# Patient Record
Sex: MALE | Race: BLACK OR AFRICAN AMERICAN | NOT HISPANIC OR LATINO | Employment: OTHER | URBAN - METROPOLITAN AREA
[De-identification: names, ages, dates, MRNs, and addresses within clinical notes are randomized per-mention and may not be internally consistent; named-entity substitution may affect disease eponyms.]

---

## 2017-05-05 ENCOUNTER — TELEPHONE (OUTPATIENT)
Dept: GASTROENTEROLOGY | Facility: CLINIC | Age: 62
End: 2017-05-05

## 2018-06-25 ENCOUNTER — TELEPHONE (OUTPATIENT)
Dept: ADMINISTRATIVE | Facility: OTHER | Age: 63
End: 2018-06-25

## 2018-06-25 NOTE — TELEPHONE ENCOUNTER
Spoke to patient and updated his insurance to [facility] Medicare.  Scheduled his appointment with Kade Smith NP for Wedneday 6/27/18 at 1:45pm.  Patient was in agreement with this date and time.

## 2018-06-25 NOTE — TELEPHONE ENCOUNTER
----- Message from Juanis Snow sent at 6/25/2018  8:50 AM CDT -----  Contact: Pt  Pt is calling to schedule an appt for lower back pain and sciatica.  Pt was previously seen by Dr. Dasilva and has [employer] insurance.    Pt can be reached at [phone].  Thank you

## 2019-12-09 PROBLEM — E87.5 HYPERKALEMIA: Status: ACTIVE | Noted: 2019-12-09

## 2019-12-09 PROBLEM — E79.0 HYPERURICEMIA: Status: ACTIVE | Noted: 2019-12-09

## 2019-12-09 PROBLEM — I10 HYPERTENSION, ESSENTIAL: Status: ACTIVE | Noted: 2019-12-09

## 2019-12-09 PROBLEM — D50.9 IRON DEFICIENCY ANEMIA: Status: ACTIVE | Noted: 2019-12-09

## 2019-12-09 PROBLEM — R80.9 MICROALBUMINURIA: Status: ACTIVE | Noted: 2019-12-09

## 2019-12-09 PROBLEM — N17.9 ACUTE RENAL FAILURE: Status: ACTIVE | Noted: 2019-12-09

## 2020-06-16 ENCOUNTER — DOCUMENT SCAN (OUTPATIENT)
Dept: HOME HEALTH SERVICES | Facility: HOSPITAL | Age: 65
End: 2020-06-16

## 2020-06-26 ENCOUNTER — DOCUMENT SCAN (OUTPATIENT)
Dept: HOME HEALTH SERVICES | Facility: HOSPITAL | Age: 65
End: 2020-06-26

## 2020-07-06 ENCOUNTER — DOCUMENT SCAN (OUTPATIENT)
Dept: HOME HEALTH SERVICES | Facility: HOSPITAL | Age: 65
End: 2020-07-06

## 2020-12-09 ENCOUNTER — PES CALL (OUTPATIENT)
Dept: ADMINISTRATIVE | Facility: CLINIC | Age: 65
End: 2020-12-09

## 2020-12-11 ENCOUNTER — OFFICE VISIT (OUTPATIENT)
Dept: INTERNAL MEDICINE | Facility: CLINIC | Age: 65
End: 2020-12-11
Payer: MEDICARE

## 2020-12-11 VITALS
SYSTOLIC BLOOD PRESSURE: 120 MMHG | HEIGHT: 67 IN | BODY MASS INDEX: 30.83 KG/M2 | DIASTOLIC BLOOD PRESSURE: 70 MMHG | WEIGHT: 196.44 LBS | TEMPERATURE: 98 F | HEART RATE: 67 BPM

## 2020-12-11 DIAGNOSIS — N18.30 TYPE 2 DIABETES MELLITUS WITH STAGE 3 CHRONIC KIDNEY DISEASE, WITHOUT LONG-TERM CURRENT USE OF INSULIN, UNSPECIFIED WHETHER STAGE 3A OR 3B CKD: ICD-10-CM

## 2020-12-11 DIAGNOSIS — N18.30 STAGE 3 CHRONIC KIDNEY DISEASE, UNSPECIFIED WHETHER STAGE 3A OR 3B CKD: ICD-10-CM

## 2020-12-11 DIAGNOSIS — I73.9 PAD (PERIPHERAL ARTERY DISEASE): ICD-10-CM

## 2020-12-11 DIAGNOSIS — E11.22 TYPE 2 DIABETES MELLITUS WITH STAGE 3 CHRONIC KIDNEY DISEASE, WITHOUT LONG-TERM CURRENT USE OF INSULIN, UNSPECIFIED WHETHER STAGE 3A OR 3B CKD: ICD-10-CM

## 2020-12-11 DIAGNOSIS — I15.2 HYPERTENSION ASSOCIATED WITH DIABETES: Primary | ICD-10-CM

## 2020-12-11 DIAGNOSIS — G89.4 CHRONIC PAIN SYNDROME: ICD-10-CM

## 2020-12-11 DIAGNOSIS — I25.10 CORONARY ARTERY DISEASE INVOLVING NATIVE CORONARY ARTERY OF NATIVE HEART WITHOUT ANGINA PECTORIS: ICD-10-CM

## 2020-12-11 DIAGNOSIS — E11.59 HYPERTENSION ASSOCIATED WITH DIABETES: Primary | ICD-10-CM

## 2020-12-11 DIAGNOSIS — Z12.5 PROSTATE CANCER SCREENING: ICD-10-CM

## 2020-12-11 DIAGNOSIS — E11.51 DIABETES MELLITUS WITH PERIPHERAL CIRCULATORY DISORDER: ICD-10-CM

## 2020-12-11 DIAGNOSIS — E78.5 DYSLIPIDEMIA ASSOCIATED WITH TYPE 2 DIABETES MELLITUS: ICD-10-CM

## 2020-12-11 DIAGNOSIS — E11.69 DYSLIPIDEMIA ASSOCIATED WITH TYPE 2 DIABETES MELLITUS: ICD-10-CM

## 2020-12-11 PROCEDURE — 99499 UNLISTED E&M SERVICE: CPT | Mod: S$GLB,,, | Performed by: INTERNAL MEDICINE

## 2020-12-11 PROCEDURE — 1159F MED LIST DOCD IN RCRD: CPT | Mod: HCNC,S$GLB,, | Performed by: INTERNAL MEDICINE

## 2020-12-11 PROCEDURE — 90694 VACC AIIV4 NO PRSRV 0.5ML IM: CPT | Mod: HCNC,S$GLB,, | Performed by: INTERNAL MEDICINE

## 2020-12-11 PROCEDURE — 99999 PR PBB SHADOW E&M-EST. PATIENT-LVL IV: ICD-10-PCS | Mod: PBBFAC,HCNC,, | Performed by: INTERNAL MEDICINE

## 2020-12-11 PROCEDURE — 3078F PR MOST RECENT DIASTOLIC BLOOD PRESSURE < 80 MM HG: ICD-10-PCS | Mod: HCNC,CPTII,S$GLB, | Performed by: INTERNAL MEDICINE

## 2020-12-11 PROCEDURE — 3008F BODY MASS INDEX DOCD: CPT | Mod: HCNC,CPTII,S$GLB, | Performed by: INTERNAL MEDICINE

## 2020-12-11 PROCEDURE — G0008 ADMIN INFLUENZA VIRUS VAC: HCPCS | Mod: HCNC,S$GLB,, | Performed by: INTERNAL MEDICINE

## 2020-12-11 PROCEDURE — 99204 PR OFFICE/OUTPT VISIT, NEW, LEVL IV, 45-59 MIN: ICD-10-PCS | Mod: HCNC,25,S$GLB, | Performed by: INTERNAL MEDICINE

## 2020-12-11 PROCEDURE — 99999 PR PBB SHADOW E&M-EST. PATIENT-LVL IV: CPT | Mod: PBBFAC,HCNC,, | Performed by: INTERNAL MEDICINE

## 2020-12-11 PROCEDURE — 1101F PT FALLS ASSESS-DOCD LE1/YR: CPT | Mod: HCNC,CPTII,S$GLB, | Performed by: INTERNAL MEDICINE

## 2020-12-11 PROCEDURE — G0008 FLU VACCINE - QUADRIVALENT - ADJUVANTED: ICD-10-PCS | Mod: HCNC,S$GLB,, | Performed by: INTERNAL MEDICINE

## 2020-12-11 PROCEDURE — 3288F PR FALLS RISK ASSESSMENT DOCUMENTED: ICD-10-PCS | Mod: HCNC,CPTII,S$GLB, | Performed by: INTERNAL MEDICINE

## 2020-12-11 PROCEDURE — 1159F PR MEDICATION LIST DOCUMENTED IN MEDICAL RECORD: ICD-10-PCS | Mod: HCNC,S$GLB,, | Performed by: INTERNAL MEDICINE

## 2020-12-11 PROCEDURE — 1126F AMNT PAIN NOTED NONE PRSNT: CPT | Mod: HCNC,S$GLB,, | Performed by: INTERNAL MEDICINE

## 2020-12-11 PROCEDURE — 3288F FALL RISK ASSESSMENT DOCD: CPT | Mod: HCNC,CPTII,S$GLB, | Performed by: INTERNAL MEDICINE

## 2020-12-11 PROCEDURE — 3078F DIAST BP <80 MM HG: CPT | Mod: HCNC,CPTII,S$GLB, | Performed by: INTERNAL MEDICINE

## 2020-12-11 PROCEDURE — 99499 RISK ADDL DX/OHS AUDIT: ICD-10-PCS | Mod: S$GLB,,, | Performed by: INTERNAL MEDICINE

## 2020-12-11 PROCEDURE — 1101F PR PT FALLS ASSESS DOC 0-1 FALLS W/OUT INJ PAST YR: ICD-10-PCS | Mod: HCNC,CPTII,S$GLB, | Performed by: INTERNAL MEDICINE

## 2020-12-11 PROCEDURE — 3074F PR MOST RECENT SYSTOLIC BLOOD PRESSURE < 130 MM HG: ICD-10-PCS | Mod: HCNC,CPTII,S$GLB, | Performed by: INTERNAL MEDICINE

## 2020-12-11 PROCEDURE — 90694 FLU VACCINE - QUADRIVALENT - ADJUVANTED: ICD-10-PCS | Mod: HCNC,S$GLB,, | Performed by: INTERNAL MEDICINE

## 2020-12-11 PROCEDURE — 3008F PR BODY MASS INDEX (BMI) DOCUMENTED: ICD-10-PCS | Mod: HCNC,CPTII,S$GLB, | Performed by: INTERNAL MEDICINE

## 2020-12-11 PROCEDURE — 1126F PR PAIN SEVERITY QUANTIFIED, NO PAIN PRESENT: ICD-10-PCS | Mod: HCNC,S$GLB,, | Performed by: INTERNAL MEDICINE

## 2020-12-11 PROCEDURE — 3074F SYST BP LT 130 MM HG: CPT | Mod: HCNC,CPTII,S$GLB, | Performed by: INTERNAL MEDICINE

## 2020-12-11 PROCEDURE — 99204 OFFICE O/P NEW MOD 45 MIN: CPT | Mod: HCNC,25,S$GLB, | Performed by: INTERNAL MEDICINE

## 2020-12-11 RX ORDER — IRBESARTAN 300 MG/1
300 TABLET ORAL DAILY
Qty: 90 TABLET | Refills: 1 | Status: SHIPPED | OUTPATIENT
Start: 2020-12-11 | End: 2021-04-22

## 2020-12-11 NOTE — PROGRESS NOTES
History of present illness:  65-year-old male in today 1st visit with our office here to establish care.  The patient was accommodated today having arrived 20 min late for his 30 min appointment.  The patient has been followed elsewhere, due to an insurance change she is transferring most of his care here.  He has a history of hypertension, diabetes mellitus, CKD, dyslipidemia, PA D, CAD, chronic pain in uncertain psychiatric issues followed by outside psychiatry.  He reports things are generally stable this time.  He reports taking medications as directed though he does state he has been off of his irbesartan for 1 week due to logistics of refill.    Current medications:  Medications all noted reviewed with the patient's medication bottles.  Amlodipine.  Irbesartan.  Carvedilol.  Pravastatin.  Fenofibrate.  Hydrochlorothiazide.  Clopidogrel.  Metformin.  Wellbutrin.  Trazodone.  Seroquel.  Oxycodone.  Xtamza    Review of systems:  General: no fever, chills, generalized body aches. No unexpected weight loss.  Eyes:  No visual disturbances.  HEENT:  No hoarseness, dysphagia, ear pain.  Respiratory:  No cough, no shortness of breath.  Cardiovascular: no chest pain, palpitations, cough, exertional limb pain. No edema.  GI: no nausea, vomiting.  No abdominal pain. No change in bowel habits.  No melena, no hematochezia.  : no dysuria. No change in the color or character of the urine. No urinary frequency.  Musculoskeletal:  Chronic lower back pain.  Neurologic:  Denies any new focal neurological symptomatologies.  Skin:  No rashes or other concerns.  Psych:  No new issues, he is followed by outside psychiatry-.    Past medical history, past surgical history, family medical history social history is all noted reviewed with patient and in the electronic medical record history sections.    Health screenings:  Not certain about immunizations.  Not certain about date of last eye exam.  Not aware of having had  pneumococcal vaccines.    Physical examination:  General:  Alert cooperative gentleman acute distress.  Vital signs:  Blood pressure taken manually is 120/70 other vital signs normal.  HEENT:  Normocephalic.  Neck supple no masses no thyromegaly.  Lungs:  Clear to auscultation.  Cardiovascular:  Regular rate rhythm.  No significant murmur.  Carotids full bilaterally bruits.  No overt ischemic changes of lower extremities.  GI:  The abdomen is obese soft benign no tenderness.  Mental status:  Alert oriented affect mood appear to be appropriate.    Subspecialty physicians:  Cardiology-Dr Putnam.  Pain management-Dr Harmon.  Psychiatry-Dr Mars.  Previous PCP-Dr Duane Lala.      Impression:  Hypertension appears controlled.  Type 2 diabetes mellitus.  Dyslipidemia.  CAD.  CKD.  PA D.  Chronic lower back pain followed by pain management.  Followed by psychiatry for clear diagnoses.    Plan:  Outside record release.  Update lab data to include CBC, chemistry profile, lipid profile, TSH, PSA, glycohemoglobin, urinalysis, urine for microalbumin/creatinine ratio.  Influenza vaccine.  Return clinic in 1 month for a follow-up review

## 2020-12-14 PROBLEM — N18.30 STAGE 3 CHRONIC KIDNEY DISEASE: Status: ACTIVE | Noted: 2020-12-14

## 2020-12-15 ENCOUNTER — LAB VISIT (OUTPATIENT)
Dept: LAB | Facility: HOSPITAL | Age: 65
End: 2020-12-15
Attending: INTERNAL MEDICINE
Payer: MEDICARE

## 2020-12-15 DIAGNOSIS — I15.2 HYPERTENSION ASSOCIATED WITH DIABETES: ICD-10-CM

## 2020-12-15 DIAGNOSIS — E11.59 HYPERTENSION ASSOCIATED WITH DIABETES: ICD-10-CM

## 2020-12-15 DIAGNOSIS — E11.22 TYPE 2 DIABETES MELLITUS WITH STAGE 3 CHRONIC KIDNEY DISEASE, WITHOUT LONG-TERM CURRENT USE OF INSULIN, UNSPECIFIED WHETHER STAGE 3A OR 3B CKD: ICD-10-CM

## 2020-12-15 DIAGNOSIS — E78.5 DYSLIPIDEMIA ASSOCIATED WITH TYPE 2 DIABETES MELLITUS: ICD-10-CM

## 2020-12-15 DIAGNOSIS — N18.30 TYPE 2 DIABETES MELLITUS WITH STAGE 3 CHRONIC KIDNEY DISEASE, WITHOUT LONG-TERM CURRENT USE OF INSULIN, UNSPECIFIED WHETHER STAGE 3A OR 3B CKD: ICD-10-CM

## 2020-12-15 DIAGNOSIS — Z12.5 PROSTATE CANCER SCREENING: ICD-10-CM

## 2020-12-15 DIAGNOSIS — E11.69 DYSLIPIDEMIA ASSOCIATED WITH TYPE 2 DIABETES MELLITUS: ICD-10-CM

## 2020-12-15 LAB
ALBUMIN SERPL BCP-MCNC: 3.7 G/DL (ref 3.5–5.2)
ALP SERPL-CCNC: 117 U/L (ref 55–135)
ALT SERPL W/O P-5'-P-CCNC: 9 U/L (ref 10–44)
ANION GAP SERPL CALC-SCNC: 8 MMOL/L (ref 8–16)
AST SERPL-CCNC: 12 U/L (ref 10–40)
BASOPHILS # BLD AUTO: 0.05 K/UL (ref 0–0.2)
BASOPHILS NFR BLD: 0.7 % (ref 0–1.9)
BILIRUB SERPL-MCNC: 0.3 MG/DL (ref 0.1–1)
BUN SERPL-MCNC: 17 MG/DL (ref 8–23)
CALCIUM SERPL-MCNC: 9.6 MG/DL (ref 8.7–10.5)
CHLORIDE SERPL-SCNC: 102 MMOL/L (ref 95–110)
CHOLEST SERPL-MCNC: 120 MG/DL (ref 120–199)
CHOLEST/HDLC SERPL: 3.9 {RATIO} (ref 2–5)
CO2 SERPL-SCNC: 28 MMOL/L (ref 23–29)
COMPLEXED PSA SERPL-MCNC: 0.02 NG/ML (ref 0–4)
CREAT SERPL-MCNC: 1.8 MG/DL (ref 0.5–1.4)
DIFFERENTIAL METHOD: ABNORMAL
EOSINOPHIL # BLD AUTO: 0.4 K/UL (ref 0–0.5)
EOSINOPHIL NFR BLD: 5 % (ref 0–8)
ERYTHROCYTE [DISTWIDTH] IN BLOOD BY AUTOMATED COUNT: 13.7 % (ref 11.5–14.5)
EST. GFR  (AFRICAN AMERICAN): 44.3 ML/MIN/1.73 M^2
EST. GFR  (NON AFRICAN AMERICAN): 38.4 ML/MIN/1.73 M^2
ESTIMATED AVG GLUCOSE: 134 MG/DL (ref 68–131)
GLUCOSE SERPL-MCNC: 112 MG/DL (ref 70–110)
HBA1C MFR BLD HPLC: 6.3 % (ref 4–5.6)
HCT VFR BLD AUTO: 41.2 % (ref 40–54)
HDLC SERPL-MCNC: 31 MG/DL (ref 40–75)
HDLC SERPL: 25.8 % (ref 20–50)
HGB BLD-MCNC: 12.2 G/DL (ref 14–18)
IMM GRANULOCYTES # BLD AUTO: 0.04 K/UL (ref 0–0.04)
IMM GRANULOCYTES NFR BLD AUTO: 0.6 % (ref 0–0.5)
LDLC SERPL CALC-MCNC: 73.6 MG/DL (ref 63–159)
LYMPHOCYTES # BLD AUTO: 2.1 K/UL (ref 1–4.8)
LYMPHOCYTES NFR BLD: 29.2 % (ref 18–48)
MCH RBC QN AUTO: 27.5 PG (ref 27–31)
MCHC RBC AUTO-ENTMCNC: 29.6 G/DL (ref 32–36)
MCV RBC AUTO: 93 FL (ref 82–98)
MONOCYTES # BLD AUTO: 0.6 K/UL (ref 0.3–1)
MONOCYTES NFR BLD: 8.7 % (ref 4–15)
NEUTROPHILS # BLD AUTO: 3.9 K/UL (ref 1.8–7.7)
NEUTROPHILS NFR BLD: 55.8 % (ref 38–73)
NONHDLC SERPL-MCNC: 89 MG/DL
NRBC BLD-RTO: 0 /100 WBC
PLATELET # BLD AUTO: 370 K/UL (ref 150–350)
PMV BLD AUTO: 10.7 FL (ref 9.2–12.9)
POTASSIUM SERPL-SCNC: 4.1 MMOL/L (ref 3.5–5.1)
PROT SERPL-MCNC: 7.7 G/DL (ref 6–8.4)
RBC # BLD AUTO: 4.43 M/UL (ref 4.6–6.2)
SODIUM SERPL-SCNC: 138 MMOL/L (ref 136–145)
TRIGL SERPL-MCNC: 77 MG/DL (ref 30–150)
TSH SERPL DL<=0.005 MIU/L-ACNC: 1.92 UIU/ML (ref 0.4–4)
WBC # BLD AUTO: 7.05 K/UL (ref 3.9–12.7)

## 2020-12-15 PROCEDURE — 83036 HEMOGLOBIN GLYCOSYLATED A1C: CPT | Mod: HCNC

## 2020-12-15 PROCEDURE — 84443 ASSAY THYROID STIM HORMONE: CPT | Mod: HCNC

## 2020-12-15 PROCEDURE — 80053 COMPREHEN METABOLIC PANEL: CPT | Mod: HCNC

## 2020-12-15 PROCEDURE — 84153 ASSAY OF PSA TOTAL: CPT | Mod: HCNC

## 2020-12-15 PROCEDURE — 80061 LIPID PANEL: CPT | Mod: HCNC

## 2020-12-15 PROCEDURE — 36415 COLL VENOUS BLD VENIPUNCTURE: CPT | Mod: HCNC,PO

## 2020-12-15 PROCEDURE — 85025 COMPLETE CBC W/AUTO DIFF WBC: CPT | Mod: HCNC

## 2020-12-17 DIAGNOSIS — Z12.11 COLON CANCER SCREENING: ICD-10-CM

## 2021-01-15 ENCOUNTER — OFFICE VISIT (OUTPATIENT)
Dept: INTERNAL MEDICINE | Facility: CLINIC | Age: 66
End: 2021-01-15
Payer: MEDICARE

## 2021-01-15 VITALS
BODY MASS INDEX: 30.72 KG/M2 | HEIGHT: 67 IN | SYSTOLIC BLOOD PRESSURE: 136 MMHG | TEMPERATURE: 98 F | HEART RATE: 76 BPM | WEIGHT: 195.75 LBS | DIASTOLIC BLOOD PRESSURE: 70 MMHG

## 2021-01-15 DIAGNOSIS — G56.00 CARPAL TUNNEL SYNDROME, UNSPECIFIED LATERALITY: ICD-10-CM

## 2021-01-15 DIAGNOSIS — Z12.11 COLON CANCER SCREENING: ICD-10-CM

## 2021-01-15 DIAGNOSIS — E78.5 DYSLIPIDEMIA ASSOCIATED WITH TYPE 2 DIABETES MELLITUS: ICD-10-CM

## 2021-01-15 DIAGNOSIS — N18.30 TYPE 2 DIABETES MELLITUS WITH STAGE 3 CHRONIC KIDNEY DISEASE, WITHOUT LONG-TERM CURRENT USE OF INSULIN, UNSPECIFIED WHETHER STAGE 3A OR 3B CKD: ICD-10-CM

## 2021-01-15 DIAGNOSIS — I15.2 HYPERTENSION ASSOCIATED WITH DIABETES: Primary | ICD-10-CM

## 2021-01-15 DIAGNOSIS — E11.69 DYSLIPIDEMIA ASSOCIATED WITH TYPE 2 DIABETES MELLITUS: ICD-10-CM

## 2021-01-15 DIAGNOSIS — I10 HYPERTENSION, ESSENTIAL: ICD-10-CM

## 2021-01-15 DIAGNOSIS — E11.51 DIABETES MELLITUS WITH PERIPHERAL CIRCULATORY DISORDER: ICD-10-CM

## 2021-01-15 DIAGNOSIS — I73.9 PAD (PERIPHERAL ARTERY DISEASE): ICD-10-CM

## 2021-01-15 DIAGNOSIS — E11.22 TYPE 2 DIABETES MELLITUS WITH STAGE 3 CHRONIC KIDNEY DISEASE, WITHOUT LONG-TERM CURRENT USE OF INSULIN, UNSPECIFIED WHETHER STAGE 3A OR 3B CKD: ICD-10-CM

## 2021-01-15 DIAGNOSIS — E11.59 HYPERTENSION ASSOCIATED WITH DIABETES: Primary | ICD-10-CM

## 2021-01-15 PROCEDURE — 3288F PR FALLS RISK ASSESSMENT DOCUMENTED: ICD-10-PCS | Mod: HCNC,CPTII,S$GLB, | Performed by: INTERNAL MEDICINE

## 2021-01-15 PROCEDURE — 3075F SYST BP GE 130 - 139MM HG: CPT | Mod: HCNC,CPTII,S$GLB, | Performed by: INTERNAL MEDICINE

## 2021-01-15 PROCEDURE — 1159F PR MEDICATION LIST DOCUMENTED IN MEDICAL RECORD: ICD-10-PCS | Mod: HCNC,S$GLB,, | Performed by: INTERNAL MEDICINE

## 2021-01-15 PROCEDURE — 99499 RISK ADDL DX/OHS AUDIT: ICD-10-PCS | Mod: HCNC,S$GLB,, | Performed by: INTERNAL MEDICINE

## 2021-01-15 PROCEDURE — 99499 UNLISTED E&M SERVICE: CPT | Mod: HCNC,S$GLB,, | Performed by: INTERNAL MEDICINE

## 2021-01-15 PROCEDURE — 3288F FALL RISK ASSESSMENT DOCD: CPT | Mod: HCNC,CPTII,S$GLB, | Performed by: INTERNAL MEDICINE

## 2021-01-15 PROCEDURE — 99214 PR OFFICE/OUTPT VISIT, EST, LEVL IV, 30-39 MIN: ICD-10-PCS | Mod: HCNC,S$GLB,, | Performed by: INTERNAL MEDICINE

## 2021-01-15 PROCEDURE — 3075F PR MOST RECENT SYSTOLIC BLOOD PRESS GE 130-139MM HG: ICD-10-PCS | Mod: HCNC,CPTII,S$GLB, | Performed by: INTERNAL MEDICINE

## 2021-01-15 PROCEDURE — 1159F MED LIST DOCD IN RCRD: CPT | Mod: HCNC,S$GLB,, | Performed by: INTERNAL MEDICINE

## 2021-01-15 PROCEDURE — 1125F AMNT PAIN NOTED PAIN PRSNT: CPT | Mod: HCNC,S$GLB,, | Performed by: INTERNAL MEDICINE

## 2021-01-15 PROCEDURE — 3044F HG A1C LEVEL LT 7.0%: CPT | Mod: HCNC,CPTII,S$GLB, | Performed by: INTERNAL MEDICINE

## 2021-01-15 PROCEDURE — 3008F PR BODY MASS INDEX (BMI) DOCUMENTED: ICD-10-PCS | Mod: HCNC,CPTII,S$GLB, | Performed by: INTERNAL MEDICINE

## 2021-01-15 PROCEDURE — 3078F PR MOST RECENT DIASTOLIC BLOOD PRESSURE < 80 MM HG: ICD-10-PCS | Mod: HCNC,CPTII,S$GLB, | Performed by: INTERNAL MEDICINE

## 2021-01-15 PROCEDURE — 3008F BODY MASS INDEX DOCD: CPT | Mod: HCNC,CPTII,S$GLB, | Performed by: INTERNAL MEDICINE

## 2021-01-15 PROCEDURE — 1101F PR PT FALLS ASSESS DOC 0-1 FALLS W/OUT INJ PAST YR: ICD-10-PCS | Mod: HCNC,CPTII,S$GLB, | Performed by: INTERNAL MEDICINE

## 2021-01-15 PROCEDURE — 99214 OFFICE O/P EST MOD 30 MIN: CPT | Mod: HCNC,S$GLB,, | Performed by: INTERNAL MEDICINE

## 2021-01-15 PROCEDURE — 3044F PR MOST RECENT HEMOGLOBIN A1C LEVEL <7.0%: ICD-10-PCS | Mod: HCNC,CPTII,S$GLB, | Performed by: INTERNAL MEDICINE

## 2021-01-15 PROCEDURE — G0009 PNEUMOCOCCAL POLYSACCHARIDE VACCINE 23-VALENT =>2YO SQ IM: ICD-10-PCS | Mod: HCNC,S$GLB,, | Performed by: INTERNAL MEDICINE

## 2021-01-15 PROCEDURE — 1101F PT FALLS ASSESS-DOCD LE1/YR: CPT | Mod: HCNC,CPTII,S$GLB, | Performed by: INTERNAL MEDICINE

## 2021-01-15 PROCEDURE — 90732 PPSV23 VACC 2 YRS+ SUBQ/IM: CPT | Mod: HCNC,S$GLB,, | Performed by: INTERNAL MEDICINE

## 2021-01-15 PROCEDURE — G0009 ADMIN PNEUMOCOCCAL VACCINE: HCPCS | Mod: HCNC,S$GLB,, | Performed by: INTERNAL MEDICINE

## 2021-01-15 PROCEDURE — 90732 PNEUMOCOCCAL POLYSACCHARIDE VACCINE 23-VALENT =>2YO SQ IM: ICD-10-PCS | Mod: HCNC,S$GLB,, | Performed by: INTERNAL MEDICINE

## 2021-01-15 PROCEDURE — 1125F PR PAIN SEVERITY QUANTIFIED, PAIN PRESENT: ICD-10-PCS | Mod: HCNC,S$GLB,, | Performed by: INTERNAL MEDICINE

## 2021-01-15 PROCEDURE — 99999 PR PBB SHADOW E&M-EST. PATIENT-LVL V: CPT | Mod: PBBFAC,HCNC,, | Performed by: INTERNAL MEDICINE

## 2021-01-15 PROCEDURE — 3078F DIAST BP <80 MM HG: CPT | Mod: HCNC,CPTII,S$GLB, | Performed by: INTERNAL MEDICINE

## 2021-01-15 PROCEDURE — 99999 PR PBB SHADOW E&M-EST. PATIENT-LVL V: ICD-10-PCS | Mod: PBBFAC,HCNC,, | Performed by: INTERNAL MEDICINE

## 2021-01-15 RX ORDER — CARVEDILOL 12.5 MG/1
12.5 TABLET ORAL 2 TIMES DAILY WITH MEALS
Qty: 180 TABLET | Refills: 3 | Status: SHIPPED | OUTPATIENT
Start: 2021-01-15 | End: 2021-03-26 | Stop reason: DRUGHIGH

## 2021-01-18 PROBLEM — E11.59 HYPERTENSION ASSOCIATED WITH DIABETES: Status: ACTIVE | Noted: 2021-01-18

## 2021-01-18 PROBLEM — I15.2 HYPERTENSION ASSOCIATED WITH DIABETES: Status: ACTIVE | Noted: 2021-01-18

## 2021-01-26 ENCOUNTER — TELEPHONE (OUTPATIENT)
Dept: ORTHOPEDICS | Facility: CLINIC | Age: 66
End: 2021-01-26

## 2021-01-26 DIAGNOSIS — M79.642 BILATERAL HAND PAIN: Primary | ICD-10-CM

## 2021-01-26 DIAGNOSIS — M79.641 BILATERAL HAND PAIN: Primary | ICD-10-CM

## 2021-01-27 ENCOUNTER — OFFICE VISIT (OUTPATIENT)
Dept: ORTHOPEDICS | Facility: CLINIC | Age: 66
End: 2021-01-27
Payer: MEDICARE

## 2021-01-27 ENCOUNTER — HOSPITAL ENCOUNTER (OUTPATIENT)
Dept: RADIOLOGY | Facility: HOSPITAL | Age: 66
Discharge: HOME | End: 2021-01-27
Attending: ORTHOPAEDIC SURGERY
Payer: MEDICARE

## 2021-01-27 VITALS — WEIGHT: 203.25 LBS | BODY MASS INDEX: 31.84 KG/M2

## 2021-01-27 DIAGNOSIS — M79.642 BILATERAL HAND PAIN: ICD-10-CM

## 2021-01-27 DIAGNOSIS — G56.03 BILATERAL CARPAL TUNNEL SYNDROME: Primary | ICD-10-CM

## 2021-01-27 DIAGNOSIS — M79.641 BILATERAL HAND PAIN: ICD-10-CM

## 2021-01-27 DIAGNOSIS — G56.23 CUBITAL TUNNEL SYNDROME OF BOTH UPPER EXTREMITIES: ICD-10-CM

## 2021-01-27 PROCEDURE — 1159F MED LIST DOCD IN RCRD: CPT | Mod: S$GLB,,, | Performed by: ORTHOPAEDIC SURGERY

## 2021-01-27 PROCEDURE — 99204 PR OFFICE/OUTPT VISIT, NEW, LEVL IV, 45-59 MIN: ICD-10-PCS | Mod: S$GLB,,, | Performed by: ORTHOPAEDIC SURGERY

## 2021-01-27 PROCEDURE — 3008F BODY MASS INDEX DOCD: CPT | Mod: CPTII,S$GLB,, | Performed by: ORTHOPAEDIC SURGERY

## 2021-01-27 PROCEDURE — 99999 PR PBB SHADOW E&M-EST. PATIENT-LVL IV: ICD-10-PCS | Mod: PBBFAC,,, | Performed by: ORTHOPAEDIC SURGERY

## 2021-01-27 PROCEDURE — 73130 XR HAND COMPLETE 3 VIEWS BILATERAL: ICD-10-PCS | Mod: 26,50,, | Performed by: RADIOLOGY

## 2021-01-27 PROCEDURE — 99204 OFFICE O/P NEW MOD 45 MIN: CPT | Mod: S$GLB,,, | Performed by: ORTHOPAEDIC SURGERY

## 2021-01-27 PROCEDURE — 73130 X-RAY EXAM OF HAND: CPT | Mod: 26,50,, | Performed by: RADIOLOGY

## 2021-01-27 PROCEDURE — 99999 PR PBB SHADOW E&M-EST. PATIENT-LVL IV: CPT | Mod: PBBFAC,,, | Performed by: ORTHOPAEDIC SURGERY

## 2021-01-27 PROCEDURE — 3008F PR BODY MASS INDEX (BMI) DOCUMENTED: ICD-10-PCS | Mod: CPTII,S$GLB,, | Performed by: ORTHOPAEDIC SURGERY

## 2021-01-27 PROCEDURE — 73130 X-RAY EXAM OF HAND: CPT | Mod: TC,50,PO

## 2021-01-27 PROCEDURE — 1159F PR MEDICATION LIST DOCUMENTED IN MEDICAL RECORD: ICD-10-PCS | Mod: S$GLB,,, | Performed by: ORTHOPAEDIC SURGERY

## 2021-01-28 RX ORDER — CLOPIDOGREL BISULFATE 75 MG/1
75 TABLET ORAL DAILY
Qty: 90 TABLET | Refills: 3 | Status: SHIPPED | OUTPATIENT
Start: 2021-01-28 | End: 2021-05-19

## 2021-01-28 RX ORDER — HYDROCHLOROTHIAZIDE 12.5 MG/1
12.5 TABLET ORAL DAILY
Qty: 90 TABLET | Refills: 3 | Status: SHIPPED | OUTPATIENT
Start: 2021-01-28 | End: 2021-08-03

## 2021-01-28 RX ORDER — PRAVASTATIN SODIUM 10 MG/1
10 TABLET ORAL DAILY
Qty: 90 TABLET | Refills: 3 | Status: SHIPPED | OUTPATIENT
Start: 2021-01-28 | End: 2021-05-28

## 2021-03-01 ENCOUNTER — PROCEDURE VISIT (OUTPATIENT)
Dept: PHYSICAL MEDICINE AND REHAB | Facility: CLINIC | Age: 66
End: 2021-03-01
Payer: MEDICARE

## 2021-03-01 DIAGNOSIS — G56.23 CUBITAL TUNNEL SYNDROME OF BOTH UPPER EXTREMITIES: ICD-10-CM

## 2021-03-01 DIAGNOSIS — G56.03 BILATERAL CARPAL TUNNEL SYNDROME: ICD-10-CM

## 2021-03-01 PROCEDURE — 95885 MUSC TST DONE W/NERV TST LIM: CPT | Mod: 59,S$GLB,, | Performed by: PHYSICAL MEDICINE & REHABILITATION

## 2021-03-01 PROCEDURE — 95885 PR MUSC TST DONE W/NERV TST LIM: ICD-10-PCS | Mod: 59,S$GLB,, | Performed by: PHYSICAL MEDICINE & REHABILITATION

## 2021-03-01 PROCEDURE — 95886 MUSC TEST DONE W/N TEST COMP: CPT | Mod: S$GLB,,, | Performed by: PHYSICAL MEDICINE & REHABILITATION

## 2021-03-01 PROCEDURE — 95886 PR EMG COMPLETE, W/ NERVE CONDUCTION STUDIES, 5+ MUSCLES: ICD-10-PCS | Mod: S$GLB,,, | Performed by: PHYSICAL MEDICINE & REHABILITATION

## 2021-03-01 PROCEDURE — 95911 PR NERVE CONDUCTION STUDY; 9-10 STUDIES: ICD-10-PCS | Mod: S$GLB,,, | Performed by: PHYSICAL MEDICINE & REHABILITATION

## 2021-03-01 PROCEDURE — 95911 NRV CNDJ TEST 9-10 STUDIES: CPT | Mod: S$GLB,,, | Performed by: PHYSICAL MEDICINE & REHABILITATION

## 2021-03-02 ENCOUNTER — PATIENT OUTREACH (OUTPATIENT)
Dept: ADMINISTRATIVE | Facility: OTHER | Age: 66
End: 2021-03-02

## 2021-03-04 ENCOUNTER — OFFICE VISIT (OUTPATIENT)
Dept: OPTOMETRY | Facility: CLINIC | Age: 66
End: 2021-03-04
Payer: MEDICARE

## 2021-03-04 DIAGNOSIS — H04.123 DRY EYE SYNDROME OF BOTH EYES: ICD-10-CM

## 2021-03-04 DIAGNOSIS — H52.203 MYOPIA WITH ASTIGMATISM, BILATERAL: ICD-10-CM

## 2021-03-04 DIAGNOSIS — E11.9 TYPE 2 DIABETES MELLITUS WITHOUT RETINOPATHY: Primary | ICD-10-CM

## 2021-03-04 DIAGNOSIS — H25.13 SENILE NUCLEAR SCLEROSIS, BILATERAL: ICD-10-CM

## 2021-03-04 DIAGNOSIS — H10.13 ALLERGIC CONJUNCTIVITIS OF BOTH EYES: ICD-10-CM

## 2021-03-04 DIAGNOSIS — H52.13 MYOPIA WITH ASTIGMATISM, BILATERAL: ICD-10-CM

## 2021-03-04 DIAGNOSIS — E11.36 TYPE 2 DIABETES MELLITUS WITH CATARACT: ICD-10-CM

## 2021-03-04 PROCEDURE — 92015 PR REFRACTION: ICD-10-PCS | Mod: S$GLB,,, | Performed by: OPTOMETRIST

## 2021-03-04 PROCEDURE — 99999 PR PBB SHADOW E&M-EST. PATIENT-LVL III: ICD-10-PCS | Mod: PBBFAC,,, | Performed by: OPTOMETRIST

## 2021-03-04 PROCEDURE — 99999 PR PBB SHADOW E&M-EST. PATIENT-LVL III: CPT | Mod: PBBFAC,,, | Performed by: OPTOMETRIST

## 2021-03-04 PROCEDURE — 92015 DETERMINE REFRACTIVE STATE: CPT | Mod: S$GLB,,, | Performed by: OPTOMETRIST

## 2021-03-04 PROCEDURE — 1126F PR PAIN SEVERITY QUANTIFIED, NO PAIN PRESENT: ICD-10-PCS | Mod: S$GLB,,, | Performed by: OPTOMETRIST

## 2021-03-04 PROCEDURE — 3288F FALL RISK ASSESSMENT DOCD: CPT | Mod: CPTII,S$GLB,, | Performed by: OPTOMETRIST

## 2021-03-04 PROCEDURE — 92004 PR EYE EXAM, NEW PATIENT,COMPREHESV: ICD-10-PCS | Mod: S$GLB,,, | Performed by: OPTOMETRIST

## 2021-03-04 PROCEDURE — 1101F PR PT FALLS ASSESS DOC 0-1 FALLS W/OUT INJ PAST YR: ICD-10-PCS | Mod: CPTII,S$GLB,, | Performed by: OPTOMETRIST

## 2021-03-04 PROCEDURE — 1101F PT FALLS ASSESS-DOCD LE1/YR: CPT | Mod: CPTII,S$GLB,, | Performed by: OPTOMETRIST

## 2021-03-04 PROCEDURE — 1126F AMNT PAIN NOTED NONE PRSNT: CPT | Mod: S$GLB,,, | Performed by: OPTOMETRIST

## 2021-03-04 PROCEDURE — 92004 COMPRE OPH EXAM NEW PT 1/>: CPT | Mod: S$GLB,,, | Performed by: OPTOMETRIST

## 2021-03-04 PROCEDURE — 3288F PR FALLS RISK ASSESSMENT DOCUMENTED: ICD-10-PCS | Mod: CPTII,S$GLB,, | Performed by: OPTOMETRIST

## 2021-03-04 PROCEDURE — 2023F DILAT RTA XM W/O RTNOPTHY: CPT | Mod: S$GLB,,, | Performed by: OPTOMETRIST

## 2021-03-04 PROCEDURE — 2023F PR DILATED RETINAL EXAM W/O EVID OF RETINOPATHY: ICD-10-PCS | Mod: S$GLB,,, | Performed by: OPTOMETRIST

## 2021-03-16 RX ORDER — GLIMEPIRIDE 1 MG/1
TABLET ORAL
Qty: 90 TABLET | Refills: 1 | Status: SHIPPED | OUTPATIENT
Start: 2021-03-16 | End: 2021-05-13

## 2021-03-25 ENCOUNTER — PATIENT OUTREACH (OUTPATIENT)
Dept: ADMINISTRATIVE | Facility: HOSPITAL | Age: 66
End: 2021-03-25

## 2021-03-26 ENCOUNTER — HOSPITAL ENCOUNTER (OUTPATIENT)
Dept: RADIOLOGY | Facility: HOSPITAL | Age: 66
Discharge: HOME | End: 2021-03-26
Attending: INTERNAL MEDICINE
Payer: MEDICARE

## 2021-03-26 ENCOUNTER — OFFICE VISIT (OUTPATIENT)
Dept: INTERNAL MEDICINE | Facility: CLINIC | Age: 66
End: 2021-03-26
Payer: MEDICARE

## 2021-03-26 VITALS
OXYGEN SATURATION: 99 % | SYSTOLIC BLOOD PRESSURE: 143 MMHG | HEART RATE: 58 BPM | BODY MASS INDEX: 29.03 KG/M2 | DIASTOLIC BLOOD PRESSURE: 79 MMHG | HEIGHT: 67 IN | WEIGHT: 184.94 LBS | TEMPERATURE: 97 F

## 2021-03-26 DIAGNOSIS — F17.200 TOBACCO USE DISORDER: ICD-10-CM

## 2021-03-26 DIAGNOSIS — Z12.2 ENCOUNTER FOR SCREENING FOR MALIGNANT NEOPLASM OF RESPIRATORY ORGANS: ICD-10-CM

## 2021-03-26 DIAGNOSIS — I10 HTN (HYPERTENSION), BENIGN: ICD-10-CM

## 2021-03-26 DIAGNOSIS — E11.51 DIABETES MELLITUS WITH PERIPHERAL CIRCULATORY DISORDER: ICD-10-CM

## 2021-03-26 DIAGNOSIS — I10 HTN (HYPERTENSION), BENIGN: Primary | ICD-10-CM

## 2021-03-26 PROCEDURE — 1126F PR PAIN SEVERITY QUANTIFIED, NO PAIN PRESENT: ICD-10-PCS | Mod: S$GLB,,, | Performed by: INTERNAL MEDICINE

## 2021-03-26 PROCEDURE — 1101F PR PT FALLS ASSESS DOC 0-1 FALLS W/OUT INJ PAST YR: ICD-10-PCS | Mod: CPTII,S$GLB,, | Performed by: INTERNAL MEDICINE

## 2021-03-26 PROCEDURE — 3077F PR MOST RECENT SYSTOLIC BLOOD PRESSURE >= 140 MM HG: ICD-10-PCS | Mod: CPTII,S$GLB,, | Performed by: INTERNAL MEDICINE

## 2021-03-26 PROCEDURE — 1159F MED LIST DOCD IN RCRD: CPT | Mod: S$GLB,,, | Performed by: INTERNAL MEDICINE

## 2021-03-26 PROCEDURE — 71046 X-RAY EXAM CHEST 2 VIEWS: CPT | Mod: TC,PO

## 2021-03-26 PROCEDURE — 1159F PR MEDICATION LIST DOCUMENTED IN MEDICAL RECORD: ICD-10-PCS | Mod: S$GLB,,, | Performed by: INTERNAL MEDICINE

## 2021-03-26 PROCEDURE — 99214 OFFICE O/P EST MOD 30 MIN: CPT | Mod: S$GLB,,, | Performed by: INTERNAL MEDICINE

## 2021-03-26 PROCEDURE — 99999 PR PBB SHADOW E&M-EST. PATIENT-LVL V: ICD-10-PCS | Mod: PBBFAC,,, | Performed by: INTERNAL MEDICINE

## 2021-03-26 PROCEDURE — 1126F AMNT PAIN NOTED NONE PRSNT: CPT | Mod: S$GLB,,, | Performed by: INTERNAL MEDICINE

## 2021-03-26 PROCEDURE — 71046 X-RAY EXAM CHEST 2 VIEWS: CPT | Mod: 26,,, | Performed by: RADIOLOGY

## 2021-03-26 PROCEDURE — 1101F PT FALLS ASSESS-DOCD LE1/YR: CPT | Mod: CPTII,S$GLB,, | Performed by: INTERNAL MEDICINE

## 2021-03-26 PROCEDURE — 3044F HG A1C LEVEL LT 7.0%: CPT | Mod: CPTII,S$GLB,, | Performed by: INTERNAL MEDICINE

## 2021-03-26 PROCEDURE — 3077F SYST BP >= 140 MM HG: CPT | Mod: CPTII,S$GLB,, | Performed by: INTERNAL MEDICINE

## 2021-03-26 PROCEDURE — 71046 XR CHEST PA AND LATERAL: ICD-10-PCS | Mod: 26,,, | Performed by: RADIOLOGY

## 2021-03-26 PROCEDURE — 99214 PR OFFICE/OUTPT VISIT, EST, LEVL IV, 30-39 MIN: ICD-10-PCS | Mod: S$GLB,,, | Performed by: INTERNAL MEDICINE

## 2021-03-26 PROCEDURE — 3078F DIAST BP <80 MM HG: CPT | Mod: CPTII,S$GLB,, | Performed by: INTERNAL MEDICINE

## 2021-03-26 PROCEDURE — 3288F FALL RISK ASSESSMENT DOCD: CPT | Mod: CPTII,S$GLB,, | Performed by: INTERNAL MEDICINE

## 2021-03-26 PROCEDURE — 99999 PR PBB SHADOW E&M-EST. PATIENT-LVL V: CPT | Mod: PBBFAC,,, | Performed by: INTERNAL MEDICINE

## 2021-03-26 PROCEDURE — 3008F BODY MASS INDEX DOCD: CPT | Mod: CPTII,S$GLB,, | Performed by: INTERNAL MEDICINE

## 2021-03-26 PROCEDURE — 3078F PR MOST RECENT DIASTOLIC BLOOD PRESSURE < 80 MM HG: ICD-10-PCS | Mod: CPTII,S$GLB,, | Performed by: INTERNAL MEDICINE

## 2021-03-26 PROCEDURE — 3288F PR FALLS RISK ASSESSMENT DOCUMENTED: ICD-10-PCS | Mod: CPTII,S$GLB,, | Performed by: INTERNAL MEDICINE

## 2021-03-26 PROCEDURE — 3008F PR BODY MASS INDEX (BMI) DOCUMENTED: ICD-10-PCS | Mod: CPTII,S$GLB,, | Performed by: INTERNAL MEDICINE

## 2021-03-26 PROCEDURE — 3044F PR MOST RECENT HEMOGLOBIN A1C LEVEL <7.0%: ICD-10-PCS | Mod: CPTII,S$GLB,, | Performed by: INTERNAL MEDICINE

## 2021-03-26 RX ORDER — LANCETS 33 GAUGE
EACH MISCELLANEOUS
COMMUNITY
Start: 2021-03-18

## 2021-03-26 RX ORDER — CALCIUM CITRATE/VITAMIN D3 200MG-6.25
TABLET ORAL
COMMUNITY
Start: 2021-03-09

## 2021-03-26 RX ORDER — CARVEDILOL 25 MG/1
25 TABLET ORAL 2 TIMES DAILY WITH MEALS
Qty: 180 TABLET | Refills: 3 | Status: SHIPPED | OUTPATIENT
Start: 2021-03-26 | End: 2021-12-28

## 2021-03-27 ENCOUNTER — TELEPHONE (OUTPATIENT)
Dept: ENDOSCOPY | Facility: HOSPITAL | Age: 66
End: 2021-03-27

## 2021-03-29 ENCOUNTER — OFFICE VISIT (OUTPATIENT)
Dept: ORTHOPEDICS | Facility: CLINIC | Age: 66
End: 2021-03-29
Payer: MEDICARE

## 2021-03-29 ENCOUNTER — IMMUNIZATION (OUTPATIENT)
Dept: PRIMARY CARE CLINIC | Facility: CLINIC | Age: 66
End: 2021-03-29
Payer: MEDICARE

## 2021-03-29 VITALS — WEIGHT: 190.94 LBS | BODY MASS INDEX: 29.9 KG/M2

## 2021-03-29 DIAGNOSIS — G56.03 BILATERAL CARPAL TUNNEL SYNDROME: Primary | ICD-10-CM

## 2021-03-29 DIAGNOSIS — G56.21 ULNAR NEUROPATHY AT WRIST, RIGHT: ICD-10-CM

## 2021-03-29 DIAGNOSIS — M65.351 TRIGGER LITTLE FINGER OF RIGHT HAND: ICD-10-CM

## 2021-03-29 DIAGNOSIS — Z23 NEED FOR VACCINATION: Primary | ICD-10-CM

## 2021-03-29 DIAGNOSIS — G56.23 CUBITAL TUNNEL SYNDROME OF BOTH UPPER EXTREMITIES: ICD-10-CM

## 2021-03-29 PROCEDURE — 1125F PR PAIN SEVERITY QUANTIFIED, PAIN PRESENT: ICD-10-PCS | Mod: S$GLB,,, | Performed by: ORTHOPAEDIC SURGERY

## 2021-03-29 PROCEDURE — 99999 PR PBB SHADOW E&M-EST. PATIENT-LVL V: ICD-10-PCS | Mod: PBBFAC,,, | Performed by: ORTHOPAEDIC SURGERY

## 2021-03-29 PROCEDURE — 20526 THER INJECTION CARP TUNNEL: CPT | Mod: 50,S$GLB,, | Performed by: ORTHOPAEDIC SURGERY

## 2021-03-29 PROCEDURE — 99999 PR PBB SHADOW E&M-EST. PATIENT-LVL V: CPT | Mod: PBBFAC,,, | Performed by: ORTHOPAEDIC SURGERY

## 2021-03-29 PROCEDURE — 99213 OFFICE O/P EST LOW 20 MIN: CPT | Mod: 25,S$GLB,, | Performed by: ORTHOPAEDIC SURGERY

## 2021-03-29 PROCEDURE — 0011A PR IMMUNIZ ADMIN, SARS-COV-2 COVID-19 VACC, 100MCG/0.5ML, 1ST DOSE: ICD-10-PCS | Mod: CV19,S$GLB,, | Performed by: INTERNAL MEDICINE

## 2021-03-29 PROCEDURE — 91301 PR SARS-COV-2 COVID-19 VACCINE, NO PRSV, 100MCG/0.5ML, IM: CPT | Mod: S$GLB,,, | Performed by: INTERNAL MEDICINE

## 2021-03-29 PROCEDURE — 1159F MED LIST DOCD IN RCRD: CPT | Mod: S$GLB,,, | Performed by: ORTHOPAEDIC SURGERY

## 2021-03-29 PROCEDURE — 3008F PR BODY MASS INDEX (BMI) DOCUMENTED: ICD-10-PCS | Mod: CPTII,S$GLB,, | Performed by: ORTHOPAEDIC SURGERY

## 2021-03-29 PROCEDURE — 1159F PR MEDICATION LIST DOCUMENTED IN MEDICAL RECORD: ICD-10-PCS | Mod: S$GLB,,, | Performed by: ORTHOPAEDIC SURGERY

## 2021-03-29 PROCEDURE — 0011A PR IMMUNIZ ADMIN, SARS-COV-2 COVID-19 VACC, 100MCG/0.5ML, 1ST DOSE: CPT | Mod: CV19,S$GLB,, | Performed by: INTERNAL MEDICINE

## 2021-03-29 PROCEDURE — 3008F BODY MASS INDEX DOCD: CPT | Mod: CPTII,S$GLB,, | Performed by: ORTHOPAEDIC SURGERY

## 2021-03-29 PROCEDURE — 20526 PR INJECT CARPAL TUNNEL: ICD-10-PCS | Mod: 50,S$GLB,, | Performed by: ORTHOPAEDIC SURGERY

## 2021-03-29 PROCEDURE — 99213 PR OFFICE/OUTPT VISIT, EST, LEVL III, 20-29 MIN: ICD-10-PCS | Mod: 25,S$GLB,, | Performed by: ORTHOPAEDIC SURGERY

## 2021-03-29 PROCEDURE — 1125F AMNT PAIN NOTED PAIN PRSNT: CPT | Mod: S$GLB,,, | Performed by: ORTHOPAEDIC SURGERY

## 2021-03-29 PROCEDURE — 91301 PR SARS-COV-2 COVID-19 VACCINE, NO PRSV, 100MCG/0.5ML, IM: ICD-10-PCS | Mod: S$GLB,,, | Performed by: INTERNAL MEDICINE

## 2021-03-29 RX ORDER — METHYLPREDNISOLONE ACETATE 40 MG/ML
40 INJECTION, SUSPENSION INTRA-ARTICULAR; INTRALESIONAL; INTRAMUSCULAR; SOFT TISSUE
Status: DISCONTINUED | OUTPATIENT
Start: 2021-03-29 | End: 2021-03-29 | Stop reason: HOSPADM

## 2021-03-29 RX ADMIN — Medication 0.5 ML: at 10:03

## 2021-03-29 RX ADMIN — METHYLPREDNISOLONE ACETATE 40 MG: 40 INJECTION, SUSPENSION INTRA-ARTICULAR; INTRALESIONAL; INTRAMUSCULAR; SOFT TISSUE at 02:03

## 2021-03-30 ENCOUNTER — HOSPITAL ENCOUNTER (OUTPATIENT)
Dept: RADIOLOGY | Facility: HOSPITAL | Age: 66
Discharge: HOME | End: 2021-03-30
Attending: INTERNAL MEDICINE
Payer: MEDICARE

## 2021-03-30 DIAGNOSIS — F17.200 TOBACCO USE DISORDER: ICD-10-CM

## 2021-03-30 DIAGNOSIS — Z12.2 ENCOUNTER FOR SCREENING FOR MALIGNANT NEOPLASM OF RESPIRATORY ORGANS: ICD-10-CM

## 2021-03-30 PROCEDURE — 71271 CT THORAX LUNG CANCER SCR C-: CPT | Mod: TC

## 2021-03-30 PROCEDURE — 71271 CT THORAX LUNG CANCER SCR C-: CPT | Mod: 26,,, | Performed by: RADIOLOGY

## 2021-03-30 PROCEDURE — 71271 CT CHEST LUNG SCREENING LOW DOSE: ICD-10-PCS | Mod: 26,,, | Performed by: RADIOLOGY

## 2021-04-20 ENCOUNTER — HOSPITAL ENCOUNTER (EMERGENCY)
Facility: HOSPITAL | Age: 66
Discharge: HOME | End: 2021-04-21
Attending: EMERGENCY MEDICINE
Payer: MEDICARE

## 2021-04-20 DIAGNOSIS — R07.9 CHEST PAIN: ICD-10-CM

## 2021-04-20 LAB
ALBUMIN SERPL BCP-MCNC: 3.7 G/DL (ref 3.5–5.2)
ALP SERPL-CCNC: 127 U/L (ref 55–135)
ALT SERPL W/O P-5'-P-CCNC: 13 U/L (ref 10–44)
ANION GAP SERPL CALC-SCNC: 7 MMOL/L (ref 8–16)
AST SERPL-CCNC: 11 U/L (ref 10–40)
BASOPHILS # BLD AUTO: 0.05 K/UL (ref 0–0.2)
BASOPHILS NFR BLD: 0.6 % (ref 0–1.9)
BILIRUB SERPL-MCNC: 0.2 MG/DL (ref 0.1–1)
BNP SERPL-MCNC: 14 PG/ML (ref 0–99)
BUN SERPL-MCNC: 19 MG/DL (ref 8–23)
CALCIUM SERPL-MCNC: 9.1 MG/DL (ref 8.7–10.5)
CHLORIDE SERPL-SCNC: 107 MMOL/L (ref 95–110)
CO2 SERPL-SCNC: 24 MMOL/L (ref 23–29)
CREAT SERPL-MCNC: 1.8 MG/DL (ref 0.5–1.4)
DIFFERENTIAL METHOD: ABNORMAL
EOSINOPHIL # BLD AUTO: 0.2 K/UL (ref 0–0.5)
EOSINOPHIL NFR BLD: 2.4 % (ref 0–8)
ERYTHROCYTE [DISTWIDTH] IN BLOOD BY AUTOMATED COUNT: 13.7 % (ref 11.5–14.5)
EST. GFR  (AFRICAN AMERICAN): 44 ML/MIN/1.73 M^2
EST. GFR  (NON AFRICAN AMERICAN): 38 ML/MIN/1.73 M^2
GLUCOSE SERPL-MCNC: 140 MG/DL (ref 70–110)
HCT VFR BLD AUTO: 40.2 % (ref 40–54)
HGB BLD-MCNC: 12.6 G/DL (ref 14–18)
IMM GRANULOCYTES # BLD AUTO: 0.03 K/UL (ref 0–0.04)
IMM GRANULOCYTES NFR BLD AUTO: 0.4 % (ref 0–0.5)
LYMPHOCYTES # BLD AUTO: 2.1 K/UL (ref 1–4.8)
LYMPHOCYTES NFR BLD: 25.3 % (ref 18–48)
MCH RBC QN AUTO: 28.7 PG (ref 27–31)
MCHC RBC AUTO-ENTMCNC: 31.3 G/DL (ref 32–36)
MCV RBC AUTO: 92 FL (ref 82–98)
MONOCYTES # BLD AUTO: 0.6 K/UL (ref 0.3–1)
MONOCYTES NFR BLD: 7.1 % (ref 4–15)
NEUTROPHILS # BLD AUTO: 5.3 K/UL (ref 1.8–7.7)
NEUTROPHILS NFR BLD: 64.2 % (ref 38–73)
NRBC BLD-RTO: 0 /100 WBC
PLATELET # BLD AUTO: 291 K/UL (ref 150–450)
PMV BLD AUTO: 10.3 FL (ref 9.2–12.9)
POTASSIUM SERPL-SCNC: 3.9 MMOL/L (ref 3.5–5.1)
PROT SERPL-MCNC: 7.2 G/DL (ref 6–8.4)
RBC # BLD AUTO: 4.39 M/UL (ref 4.6–6.2)
SODIUM SERPL-SCNC: 138 MMOL/L (ref 136–145)
TROPONIN I SERPL DL<=0.01 NG/ML-MCNC: <0.006 NG/ML (ref 0–0.03)
WBC # BLD AUTO: 8.27 K/UL (ref 3.9–12.7)

## 2021-04-20 PROCEDURE — 80053 COMPREHEN METABOLIC PANEL: CPT | Performed by: EMERGENCY MEDICINE

## 2021-04-20 PROCEDURE — 83880 ASSAY OF NATRIURETIC PEPTIDE: CPT | Performed by: EMERGENCY MEDICINE

## 2021-04-20 PROCEDURE — 93005 ELECTROCARDIOGRAM TRACING: CPT

## 2021-04-20 PROCEDURE — 25000003 PHARM REV CODE 250: Performed by: EMERGENCY MEDICINE

## 2021-04-20 PROCEDURE — 25000242 PHARM REV CODE 250 ALT 637 W/ HCPCS: Performed by: EMERGENCY MEDICINE

## 2021-04-20 PROCEDURE — 93010 ELECTROCARDIOGRAM REPORT: CPT | Mod: ,,, | Performed by: INTERNAL MEDICINE

## 2021-04-20 PROCEDURE — 84484 ASSAY OF TROPONIN QUANT: CPT | Performed by: EMERGENCY MEDICINE

## 2021-04-20 PROCEDURE — 85025 COMPLETE CBC W/AUTO DIFF WBC: CPT | Performed by: EMERGENCY MEDICINE

## 2021-04-20 PROCEDURE — 99285 EMERGENCY DEPT VISIT HI MDM: CPT | Mod: 25

## 2021-04-20 PROCEDURE — 93010 EKG 12-LEAD: ICD-10-PCS | Mod: ,,, | Performed by: INTERNAL MEDICINE

## 2021-04-20 RX ORDER — ASPIRIN 325 MG
325 TABLET ORAL
Status: COMPLETED | OUTPATIENT
Start: 2021-04-20 | End: 2021-04-20

## 2021-04-20 RX ORDER — NITROGLYCERIN 0.4 MG/1
0.4 TABLET SUBLINGUAL
Status: COMPLETED | OUTPATIENT
Start: 2021-04-20 | End: 2021-04-20

## 2021-04-20 RX ADMIN — NITROGLYCERIN 0.4 MG: 0.4 TABLET, ORALLY DISINTEGRATING SUBLINGUAL at 09:04

## 2021-04-20 RX ADMIN — ASPIRIN 325 MG ORAL TABLET 325 MG: 325 PILL ORAL at 09:04

## 2021-04-21 VITALS
TEMPERATURE: 98 F | HEART RATE: 48 BPM | DIASTOLIC BLOOD PRESSURE: 60 MMHG | RESPIRATION RATE: 18 BRPM | SYSTOLIC BLOOD PRESSURE: 128 MMHG | WEIGHT: 190 LBS | BODY MASS INDEX: 29.76 KG/M2 | OXYGEN SATURATION: 99 %

## 2021-04-21 LAB — TROPONIN I SERPL DL<=0.01 NG/ML-MCNC: 0.01 NG/ML (ref 0–0.03)

## 2021-04-21 PROCEDURE — 84484 ASSAY OF TROPONIN QUANT: CPT | Performed by: EMERGENCY MEDICINE

## 2021-04-28 ENCOUNTER — IMMUNIZATION (OUTPATIENT)
Dept: PRIMARY CARE CLINIC | Facility: CLINIC | Age: 66
End: 2021-04-28
Payer: MEDICARE

## 2021-04-28 DIAGNOSIS — Z23 NEED FOR VACCINATION: Primary | ICD-10-CM

## 2021-04-28 PROCEDURE — 0012A PR IMMUNIZ ADMIN, SARS-COV-2 COVID-19 VACC, 100MCG/0.5ML, 2ND DOSE: ICD-10-PCS | Mod: CV19,S$GLB,, | Performed by: INTERNAL MEDICINE

## 2021-04-28 PROCEDURE — 0012A PR IMMUNIZ ADMIN, SARS-COV-2 COVID-19 VACC, 100MCG/0.5ML, 2ND DOSE: CPT | Mod: CV19,S$GLB,, | Performed by: INTERNAL MEDICINE

## 2021-04-28 PROCEDURE — 91301 PR SARS-COV-2 COVID-19 VACCINE, NO PRSV, 100MCG/0.5ML, IM: CPT | Mod: S$GLB,,, | Performed by: INTERNAL MEDICINE

## 2021-04-28 PROCEDURE — 91301 PR SARS-COV-2 COVID-19 VACCINE, NO PRSV, 100MCG/0.5ML, IM: ICD-10-PCS | Mod: S$GLB,,, | Performed by: INTERNAL MEDICINE

## 2021-04-28 RX ADMIN — Medication 0.5 ML: at 10:04

## 2021-05-13 RX ORDER — GLIMEPIRIDE 1 MG/1
TABLET ORAL
Qty: 180 TABLET | Refills: 1 | Status: SHIPPED | OUTPATIENT
Start: 2021-05-13 | End: 2021-07-19

## 2021-05-21 ENCOUNTER — LAB VISIT (OUTPATIENT)
Dept: LAB | Facility: HOSPITAL | Age: 66
End: 2021-05-21
Attending: INTERNAL MEDICINE
Payer: MEDICARE

## 2021-05-21 ENCOUNTER — OFFICE VISIT (OUTPATIENT)
Dept: INTERNAL MEDICINE | Facility: CLINIC | Age: 66
End: 2021-05-21
Payer: MEDICARE

## 2021-05-21 VITALS
HEART RATE: 39 BPM | WEIGHT: 194 LBS | SYSTOLIC BLOOD PRESSURE: 140 MMHG | TEMPERATURE: 98 F | BODY MASS INDEX: 30.45 KG/M2 | OXYGEN SATURATION: 97 % | RESPIRATION RATE: 17 BRPM | HEIGHT: 67 IN | DIASTOLIC BLOOD PRESSURE: 60 MMHG

## 2021-05-21 DIAGNOSIS — E11.22 TYPE 2 DIABETES MELLITUS WITH STAGE 3 CHRONIC KIDNEY DISEASE, WITHOUT LONG-TERM CURRENT USE OF INSULIN, UNSPECIFIED WHETHER STAGE 3A OR 3B CKD: ICD-10-CM

## 2021-05-21 DIAGNOSIS — I73.9 PAD (PERIPHERAL ARTERY DISEASE): ICD-10-CM

## 2021-05-21 DIAGNOSIS — I25.119 CORONARY ARTERY DISEASE INVOLVING NATIVE CORONARY ARTERY OF NATIVE HEART WITH ANGINA PECTORIS: ICD-10-CM

## 2021-05-21 DIAGNOSIS — N18.30 TYPE 2 DIABETES MELLITUS WITH STAGE 3 CHRONIC KIDNEY DISEASE, WITHOUT LONG-TERM CURRENT USE OF INSULIN, UNSPECIFIED WHETHER STAGE 3A OR 3B CKD: ICD-10-CM

## 2021-05-21 DIAGNOSIS — I15.2 HYPERTENSION ASSOCIATED WITH DIABETES: Primary | ICD-10-CM

## 2021-05-21 DIAGNOSIS — E11.59 HYPERTENSION ASSOCIATED WITH DIABETES: Primary | ICD-10-CM

## 2021-05-21 LAB
ESTIMATED AVG GLUCOSE: 140 MG/DL (ref 68–131)
HBA1C MFR BLD: 6.5 % (ref 4–5.6)

## 2021-05-21 PROCEDURE — 3008F BODY MASS INDEX DOCD: CPT | Mod: CPTII,S$GLB,, | Performed by: INTERNAL MEDICINE

## 2021-05-21 PROCEDURE — 99214 OFFICE O/P EST MOD 30 MIN: CPT | Mod: S$GLB,,, | Performed by: INTERNAL MEDICINE

## 2021-05-21 PROCEDURE — 1159F MED LIST DOCD IN RCRD: CPT | Mod: S$GLB,,, | Performed by: INTERNAL MEDICINE

## 2021-05-21 PROCEDURE — 3288F FALL RISK ASSESSMENT DOCD: CPT | Mod: CPTII,S$GLB,, | Performed by: INTERNAL MEDICINE

## 2021-05-21 PROCEDURE — 83036 HEMOGLOBIN GLYCOSYLATED A1C: CPT | Performed by: INTERNAL MEDICINE

## 2021-05-21 PROCEDURE — 1101F PT FALLS ASSESS-DOCD LE1/YR: CPT | Mod: CPTII,S$GLB,, | Performed by: INTERNAL MEDICINE

## 2021-05-21 PROCEDURE — 99214 PR OFFICE/OUTPT VISIT, EST, LEVL IV, 30-39 MIN: ICD-10-PCS | Mod: S$GLB,,, | Performed by: INTERNAL MEDICINE

## 2021-05-21 PROCEDURE — 3288F PR FALLS RISK ASSESSMENT DOCUMENTED: ICD-10-PCS | Mod: CPTII,S$GLB,, | Performed by: INTERNAL MEDICINE

## 2021-05-21 PROCEDURE — 99999 PR PBB SHADOW E&M-EST. PATIENT-LVL V: ICD-10-PCS | Mod: PBBFAC,,, | Performed by: INTERNAL MEDICINE

## 2021-05-21 PROCEDURE — 3008F PR BODY MASS INDEX (BMI) DOCUMENTED: ICD-10-PCS | Mod: CPTII,S$GLB,, | Performed by: INTERNAL MEDICINE

## 2021-05-21 PROCEDURE — 36415 COLL VENOUS BLD VENIPUNCTURE: CPT | Mod: PO | Performed by: INTERNAL MEDICINE

## 2021-05-21 PROCEDURE — 99499 UNLISTED E&M SERVICE: CPT | Mod: HCNC,S$GLB,, | Performed by: INTERNAL MEDICINE

## 2021-05-21 PROCEDURE — 1159F PR MEDICATION LIST DOCUMENTED IN MEDICAL RECORD: ICD-10-PCS | Mod: S$GLB,,, | Performed by: INTERNAL MEDICINE

## 2021-05-21 PROCEDURE — 99499 RISK ADDL DX/OHS AUDIT: ICD-10-PCS | Mod: HCNC,S$GLB,, | Performed by: INTERNAL MEDICINE

## 2021-05-21 PROCEDURE — 3078F PR MOST RECENT DIASTOLIC BLOOD PRESSURE < 80 MM HG: ICD-10-PCS | Mod: CPTII,S$GLB,, | Performed by: INTERNAL MEDICINE

## 2021-05-21 PROCEDURE — 1125F AMNT PAIN NOTED PAIN PRSNT: CPT | Mod: S$GLB,,, | Performed by: INTERNAL MEDICINE

## 2021-05-21 PROCEDURE — 1125F PR PAIN SEVERITY QUANTIFIED, PAIN PRESENT: ICD-10-PCS | Mod: S$GLB,,, | Performed by: INTERNAL MEDICINE

## 2021-05-21 PROCEDURE — 1101F PR PT FALLS ASSESS DOC 0-1 FALLS W/OUT INJ PAST YR: ICD-10-PCS | Mod: CPTII,S$GLB,, | Performed by: INTERNAL MEDICINE

## 2021-05-21 PROCEDURE — 3077F PR MOST RECENT SYSTOLIC BLOOD PRESSURE >= 140 MM HG: ICD-10-PCS | Mod: CPTII,S$GLB,, | Performed by: INTERNAL MEDICINE

## 2021-05-21 PROCEDURE — 3078F DIAST BP <80 MM HG: CPT | Mod: CPTII,S$GLB,, | Performed by: INTERNAL MEDICINE

## 2021-05-21 PROCEDURE — 99999 PR PBB SHADOW E&M-EST. PATIENT-LVL V: CPT | Mod: PBBFAC,,, | Performed by: INTERNAL MEDICINE

## 2021-05-21 PROCEDURE — 3077F SYST BP >= 140 MM HG: CPT | Mod: CPTII,S$GLB,, | Performed by: INTERNAL MEDICINE

## 2021-05-27 ENCOUNTER — TELEPHONE (OUTPATIENT)
Dept: UROLOGY | Facility: CLINIC | Age: 66
End: 2021-05-27

## 2021-05-27 ENCOUNTER — PATIENT OUTREACH (OUTPATIENT)
Dept: ADMINISTRATIVE | Facility: OTHER | Age: 66
End: 2021-05-27

## 2021-05-28 ENCOUNTER — OFFICE VISIT (OUTPATIENT)
Dept: CARDIOLOGY | Facility: CLINIC | Age: 66
End: 2021-05-28
Payer: MEDICARE

## 2021-05-28 VITALS
HEART RATE: 47 BPM | BODY MASS INDEX: 31.08 KG/M2 | WEIGHT: 198 LBS | HEIGHT: 67 IN | SYSTOLIC BLOOD PRESSURE: 121 MMHG | DIASTOLIC BLOOD PRESSURE: 58 MMHG

## 2021-05-28 DIAGNOSIS — E78.00 PURE HYPERCHOLESTEROLEMIA: ICD-10-CM

## 2021-05-28 DIAGNOSIS — I20.9 ANGINA PECTORIS, UNSPECIFIED: ICD-10-CM

## 2021-05-28 DIAGNOSIS — N18.32 STAGE 3B CHRONIC KIDNEY DISEASE: Chronic | ICD-10-CM

## 2021-05-28 DIAGNOSIS — Z95.5 PRESENCE OF STENT IN CORONARY ARTERY: Chronic | ICD-10-CM

## 2021-05-28 DIAGNOSIS — I25.2 OLD MI (MYOCARDIAL INFARCTION): ICD-10-CM

## 2021-05-28 DIAGNOSIS — E11.69 DYSLIPIDEMIA ASSOCIATED WITH TYPE 2 DIABETES MELLITUS: Chronic | ICD-10-CM

## 2021-05-28 DIAGNOSIS — I10 HYPERTENSION, ESSENTIAL: Chronic | ICD-10-CM

## 2021-05-28 DIAGNOSIS — I73.9 PAD (PERIPHERAL ARTERY DISEASE): Chronic | ICD-10-CM

## 2021-05-28 DIAGNOSIS — I25.119 CORONARY ARTERY DISEASE INVOLVING NATIVE CORONARY ARTERY OF NATIVE HEART WITH ANGINA PECTORIS: Primary | ICD-10-CM

## 2021-05-28 DIAGNOSIS — E78.5 DYSLIPIDEMIA ASSOCIATED WITH TYPE 2 DIABETES MELLITUS: Chronic | ICD-10-CM

## 2021-05-28 PROBLEM — I49.3 VENTRICULAR PREMATURE BEATS: Status: ACTIVE | Noted: 2021-05-28

## 2021-05-28 PROBLEM — M47.817 SPONDYLOSIS OF LUMBOSACRAL SPINE WITHOUT MYELOPATHY: Status: ACTIVE | Noted: 2021-05-28

## 2021-05-28 PROBLEM — J98.4 BRONCHOGENIC CYST: Status: ACTIVE | Noted: 2021-05-28

## 2021-05-28 PROBLEM — R73.02 IMPAIRED GLUCOSE TOLERANCE: Status: ACTIVE | Noted: 2021-05-28

## 2021-05-28 PROBLEM — F32.A DEPRESSIVE DISORDER: Status: ACTIVE | Noted: 2021-05-28

## 2021-05-28 PROBLEM — C61 CARCINOMA OF PROSTATE: Status: ACTIVE | Noted: 2021-05-28

## 2021-05-28 PROCEDURE — 99999 PR PBB SHADOW E&M-EST. PATIENT-LVL V: ICD-10-PCS | Mod: PBBFAC,,, | Performed by: INTERNAL MEDICINE

## 2021-05-28 PROCEDURE — 3008F PR BODY MASS INDEX (BMI) DOCUMENTED: ICD-10-PCS | Mod: CPTII,S$GLB,, | Performed by: INTERNAL MEDICINE

## 2021-05-28 PROCEDURE — 3288F FALL RISK ASSESSMENT DOCD: CPT | Mod: CPTII,S$GLB,, | Performed by: INTERNAL MEDICINE

## 2021-05-28 PROCEDURE — 1126F AMNT PAIN NOTED NONE PRSNT: CPT | Mod: S$GLB,,, | Performed by: INTERNAL MEDICINE

## 2021-05-28 PROCEDURE — 1159F MED LIST DOCD IN RCRD: CPT | Mod: S$GLB,,, | Performed by: INTERNAL MEDICINE

## 2021-05-28 PROCEDURE — 99204 OFFICE O/P NEW MOD 45 MIN: CPT | Mod: S$GLB,,, | Performed by: INTERNAL MEDICINE

## 2021-05-28 PROCEDURE — 99999 PR PBB SHADOW E&M-EST. PATIENT-LVL V: CPT | Mod: PBBFAC,,, | Performed by: INTERNAL MEDICINE

## 2021-05-28 PROCEDURE — 3288F PR FALLS RISK ASSESSMENT DOCUMENTED: ICD-10-PCS | Mod: CPTII,S$GLB,, | Performed by: INTERNAL MEDICINE

## 2021-05-28 PROCEDURE — 99499 UNLISTED E&M SERVICE: CPT | Mod: HCNC,S$GLB,, | Performed by: INTERNAL MEDICINE

## 2021-05-28 PROCEDURE — 1101F PR PT FALLS ASSESS DOC 0-1 FALLS W/OUT INJ PAST YR: ICD-10-PCS | Mod: CPTII,S$GLB,, | Performed by: INTERNAL MEDICINE

## 2021-05-28 PROCEDURE — 1126F PR PAIN SEVERITY QUANTIFIED, NO PAIN PRESENT: ICD-10-PCS | Mod: S$GLB,,, | Performed by: INTERNAL MEDICINE

## 2021-05-28 PROCEDURE — 99499 RISK ADDL DX/OHS AUDIT: ICD-10-PCS | Mod: HCNC,S$GLB,, | Performed by: INTERNAL MEDICINE

## 2021-05-28 PROCEDURE — 3008F BODY MASS INDEX DOCD: CPT | Mod: CPTII,S$GLB,, | Performed by: INTERNAL MEDICINE

## 2021-05-28 PROCEDURE — 1101F PT FALLS ASSESS-DOCD LE1/YR: CPT | Mod: CPTII,S$GLB,, | Performed by: INTERNAL MEDICINE

## 2021-05-28 PROCEDURE — 99204 PR OFFICE/OUTPT VISIT, NEW, LEVL IV, 45-59 MIN: ICD-10-PCS | Mod: S$GLB,,, | Performed by: INTERNAL MEDICINE

## 2021-05-28 PROCEDURE — 1159F PR MEDICATION LIST DOCUMENTED IN MEDICAL RECORD: ICD-10-PCS | Mod: S$GLB,,, | Performed by: INTERNAL MEDICINE

## 2021-05-28 RX ORDER — PRAVASTATIN SODIUM 40 MG/1
40 TABLET ORAL NIGHTLY
Qty: 90 TABLET | Refills: 3 | Status: SHIPPED | OUTPATIENT
Start: 2021-05-28 | End: 2022-03-28

## 2021-06-02 ENCOUNTER — TELEPHONE (OUTPATIENT)
Dept: CARDIOLOGY | Facility: CLINIC | Age: 66
End: 2021-06-02

## 2021-06-03 ENCOUNTER — TELEPHONE (OUTPATIENT)
Dept: CARDIOLOGY | Facility: CLINIC | Age: 66
End: 2021-06-03

## 2021-06-04 ENCOUNTER — HOSPITAL ENCOUNTER (OUTPATIENT)
Dept: CARDIOLOGY | Facility: HOSPITAL | Age: 66
Discharge: HOME | End: 2021-06-04
Attending: INTERNAL MEDICINE
Payer: MEDICARE

## 2021-06-04 DIAGNOSIS — I20.9 ANGINA PECTORIS, UNSPECIFIED: ICD-10-CM

## 2021-06-04 DIAGNOSIS — I25.119 CORONARY ARTERY DISEASE INVOLVING NATIVE CORONARY ARTERY OF NATIVE HEART WITH ANGINA PECTORIS: ICD-10-CM

## 2021-06-04 DIAGNOSIS — Z95.5 PRESENCE OF STENT IN CORONARY ARTERY: Chronic | ICD-10-CM

## 2021-06-04 DIAGNOSIS — I25.2 OLD MI (MYOCARDIAL INFARCTION): ICD-10-CM

## 2021-06-04 DIAGNOSIS — I73.9 PAD (PERIPHERAL ARTERY DISEASE): Chronic | ICD-10-CM

## 2021-06-04 LAB
CV PHARM DOSE: 0.4 MG
CV STRESS BASE HR: 45 BPM
DIASTOLIC BLOOD PRESSURE: 68 MMHG
END DIASTOLIC INDEX-HIGH: 170 ML/M2
END SYSTOLIC INDEX-HIGH: 70 ML/M2
NUC REST DIASTOLIC VOLUME INDEX: 111
NUC REST EJECTION FRACTION: 63
NUC REST SYSTOLIC VOLUME INDEX: 41
NUC STRESS DIASTOLIC VOLUME INDEX: 84
NUC STRESS EJECTION FRACTION: 72 %
NUC STRESS SYSTOLIC VOLUME INDEX: 24
OHS CV CPX 85 PERCENT MAX PREDICTED HEART RATE MALE: 131
OHS CV CPX MAX PREDICTED HEART RATE: 154
OHS CV CPX PATIENT IS FEMALE: 0
OHS CV CPX PATIENT IS MALE: 1
OHS CV CPX PEAK DIASTOLIC BLOOD PRESSURE: 51 MMHG
OHS CV CPX PEAK HEAR RATE: 57 BPM
OHS CV CPX PEAK RATE PRESSURE PRODUCT: 7695
OHS CV CPX PEAK SYSTOLIC BLOOD PRESSURE: 135 MMHG
OHS CV CPX PERCENT MAX PREDICTED HEART RATE ACHIEVED: 37
OHS CV CPX RATE PRESSURE PRODUCT PRESENTING: 7560
RETIRED EF AND QEF - SEE NOTES: 51 %
SYSTOLIC BLOOD PRESSURE: 168 MMHG

## 2021-06-04 PROCEDURE — 63600175 PHARM REV CODE 636 W HCPCS: Performed by: INTERNAL MEDICINE

## 2021-06-04 PROCEDURE — 78452 HT MUSCLE IMAGE SPECT MULT: CPT | Mod: 26,,, | Performed by: INTERNAL MEDICINE

## 2021-06-04 PROCEDURE — 93018 STRESS TEST WITH MYOCARDIAL PERFUSION (CUPID ONLY): ICD-10-PCS | Mod: ,,, | Performed by: INTERNAL MEDICINE

## 2021-06-04 PROCEDURE — 93018 CV STRESS TEST I&R ONLY: CPT | Mod: ,,, | Performed by: INTERNAL MEDICINE

## 2021-06-04 PROCEDURE — 78452 STRESS TEST WITH MYOCARDIAL PERFUSION (CUPID ONLY): ICD-10-PCS | Mod: 26,,, | Performed by: INTERNAL MEDICINE

## 2021-06-04 PROCEDURE — 93016 STRESS TEST WITH MYOCARDIAL PERFUSION (CUPID ONLY): ICD-10-PCS | Mod: ,,, | Performed by: INTERNAL MEDICINE

## 2021-06-04 PROCEDURE — 93016 CV STRESS TEST SUPVJ ONLY: CPT | Mod: ,,, | Performed by: INTERNAL MEDICINE

## 2021-06-04 PROCEDURE — 93017 CV STRESS TEST TRACING ONLY: CPT

## 2021-06-04 PROCEDURE — A9502 TC99M TETROFOSMIN: HCPCS

## 2021-06-04 RX ORDER — REGADENOSON 0.08 MG/ML
0.4 INJECTION, SOLUTION INTRAVENOUS ONCE
Status: COMPLETED | OUTPATIENT
Start: 2021-06-04 | End: 2021-06-04

## 2021-06-04 RX ORDER — AMINOPHYLLINE 25 MG/ML
75 INJECTION, SOLUTION INTRAVENOUS ONCE
Status: COMPLETED | OUTPATIENT
Start: 2021-06-04 | End: 2021-06-04

## 2021-06-04 RX ADMIN — REGADENOSON 0.4 MG: 0.08 INJECTION, SOLUTION INTRAVENOUS at 09:06

## 2021-06-04 RX ADMIN — AMINOPHYLLINE 75 MG: 25 INJECTION, SOLUTION INTRAVENOUS at 09:06

## 2021-06-07 ENCOUNTER — TELEPHONE (OUTPATIENT)
Dept: CARDIOLOGY | Facility: CLINIC | Age: 66
End: 2021-06-07

## 2021-07-06 DIAGNOSIS — F17.200 TOBACCO USE DISORDER: Primary | ICD-10-CM

## 2021-07-06 RX ORDER — IBUPROFEN 200 MG
1 TABLET ORAL DAILY
Qty: 28 PATCH | Refills: 2 | Status: SHIPPED | OUTPATIENT
Start: 2021-07-06 | End: 2021-08-04

## 2021-07-28 ENCOUNTER — HOSPITAL ENCOUNTER (OUTPATIENT)
Dept: RADIOLOGY | Facility: HOSPITAL | Age: 66
Discharge: HOME | End: 2021-07-28
Attending: INTERNAL MEDICINE
Payer: MEDICARE

## 2021-07-28 DIAGNOSIS — N18.31 STAGE 3A CHRONIC KIDNEY DISEASE: ICD-10-CM

## 2021-07-28 PROCEDURE — 76770 US EXAM ABDO BACK WALL COMP: CPT | Mod: 26,,, | Performed by: RADIOLOGY

## 2021-07-28 PROCEDURE — 76770 US EXAM ABDO BACK WALL COMP: CPT | Mod: TC

## 2021-07-28 PROCEDURE — 76770 US KIDNEY: ICD-10-PCS | Mod: 26,,, | Performed by: RADIOLOGY

## 2021-11-29 ENCOUNTER — HOSPITAL ENCOUNTER (EMERGENCY)
Facility: HOSPITAL | Age: 66
Discharge: HOME | End: 2021-11-29
Attending: EMERGENCY MEDICINE
Payer: MEDICARE

## 2021-11-29 VITALS
TEMPERATURE: 99 F | OXYGEN SATURATION: 94 % | SYSTOLIC BLOOD PRESSURE: 145 MMHG | HEART RATE: 47 BPM | DIASTOLIC BLOOD PRESSURE: 68 MMHG | RESPIRATION RATE: 20 BRPM

## 2021-11-29 DIAGNOSIS — M79.673 FOOT PAIN: ICD-10-CM

## 2021-11-29 LAB
ANION GAP SERPL CALC-SCNC: 11 MMOL/L (ref 8–16)
BASOPHILS # BLD AUTO: 0.05 K/UL (ref 0–0.2)
BASOPHILS NFR BLD: 0.6 % (ref 0–1.9)
BUN SERPL-MCNC: 17 MG/DL (ref 8–23)
CALCIUM SERPL-MCNC: 9.2 MG/DL (ref 8.7–10.5)
CHLORIDE SERPL-SCNC: 105 MMOL/L (ref 95–110)
CO2 SERPL-SCNC: 24 MMOL/L (ref 23–29)
CREAT SERPL-MCNC: 1.6 MG/DL (ref 0.5–1.4)
DIFFERENTIAL METHOD: ABNORMAL
EOSINOPHIL # BLD AUTO: 0.5 K/UL (ref 0–0.5)
EOSINOPHIL NFR BLD: 5.7 % (ref 0–8)
ERYTHROCYTE [DISTWIDTH] IN BLOOD BY AUTOMATED COUNT: 13.8 % (ref 11.5–14.5)
EST. GFR  (AFRICAN AMERICAN): 51 ML/MIN/1.73 M^2
EST. GFR  (NON AFRICAN AMERICAN): 44 ML/MIN/1.73 M^2
GLUCOSE SERPL-MCNC: 81 MG/DL (ref 70–110)
HCT VFR BLD AUTO: 38.9 % (ref 40–54)
HGB BLD-MCNC: 11.9 G/DL (ref 14–18)
IMM GRANULOCYTES # BLD AUTO: 0.03 K/UL (ref 0–0.04)
IMM GRANULOCYTES NFR BLD AUTO: 0.4 % (ref 0–0.5)
LYMPHOCYTES # BLD AUTO: 1.8 K/UL (ref 1–4.8)
LYMPHOCYTES NFR BLD: 23.1 % (ref 18–48)
MCH RBC QN AUTO: 28.2 PG (ref 27–31)
MCHC RBC AUTO-ENTMCNC: 30.6 G/DL (ref 32–36)
MCV RBC AUTO: 92 FL (ref 82–98)
MONOCYTES # BLD AUTO: 0.8 K/UL (ref 0.3–1)
MONOCYTES NFR BLD: 10.1 % (ref 4–15)
NEUTROPHILS # BLD AUTO: 4.8 K/UL (ref 1.8–7.7)
NEUTROPHILS NFR BLD: 60.1 % (ref 38–73)
NRBC BLD-RTO: 0 /100 WBC
PLATELET # BLD AUTO: 238 K/UL (ref 150–450)
PMV BLD AUTO: 11.5 FL (ref 9.2–12.9)
POTASSIUM SERPL-SCNC: 5.1 MMOL/L (ref 3.5–5.1)
RBC # BLD AUTO: 4.22 M/UL (ref 4.6–6.2)
SODIUM SERPL-SCNC: 140 MMOL/L (ref 136–145)
URATE SERPL-MCNC: 9.9 MG/DL (ref 3.4–7)
WBC # BLD AUTO: 7.91 K/UL (ref 3.9–12.7)

## 2021-11-29 PROCEDURE — 96372 THER/PROPH/DIAG INJ SC/IM: CPT | Mod: HCNC

## 2021-11-29 PROCEDURE — 80048 BASIC METABOLIC PNL TOTAL CA: CPT | Mod: HCNC | Performed by: NURSE PRACTITIONER

## 2021-11-29 PROCEDURE — 63600175 PHARM REV CODE 636 W HCPCS: Mod: HCNC | Performed by: PHYSICIAN ASSISTANT

## 2021-11-29 PROCEDURE — 99284 EMERGENCY DEPT VISIT MOD MDM: CPT | Mod: 25,HCNC

## 2021-11-29 PROCEDURE — 85025 COMPLETE CBC W/AUTO DIFF WBC: CPT | Mod: HCNC | Performed by: NURSE PRACTITIONER

## 2021-11-29 PROCEDURE — 84550 ASSAY OF BLOOD/URIC ACID: CPT | Mod: HCNC | Performed by: NURSE PRACTITIONER

## 2021-11-29 RX ORDER — KETOROLAC TROMETHAMINE 30 MG/ML
15 INJECTION, SOLUTION INTRAMUSCULAR; INTRAVENOUS
Status: COMPLETED | OUTPATIENT
Start: 2021-11-29 | End: 2021-11-29

## 2021-11-29 RX ORDER — NAPROXEN 500 MG/1
500 TABLET ORAL 2 TIMES DAILY WITH MEALS
Qty: 30 TABLET | Refills: 0 | OUTPATIENT
Start: 2021-11-29 | End: 2021-12-01

## 2021-11-29 RX ADMIN — KETOROLAC TROMETHAMINE 15 MG: 30 INJECTION, SOLUTION INTRAMUSCULAR; INTRAVENOUS at 05:11

## 2021-12-01 ENCOUNTER — HOSPITAL ENCOUNTER (EMERGENCY)
Facility: HOSPITAL | Age: 66
Discharge: HOME | End: 2021-12-01
Attending: EMERGENCY MEDICINE
Payer: MEDICARE

## 2021-12-01 VITALS
DIASTOLIC BLOOD PRESSURE: 67 MMHG | TEMPERATURE: 99 F | WEIGHT: 200 LBS | RESPIRATION RATE: 18 BRPM | BODY MASS INDEX: 31.32 KG/M2 | SYSTOLIC BLOOD PRESSURE: 145 MMHG | OXYGEN SATURATION: 98 % | HEART RATE: 49 BPM

## 2021-12-01 DIAGNOSIS — M10.9 ACUTE GOUT OF RIGHT FOOT, UNSPECIFIED CAUSE: Primary | ICD-10-CM

## 2021-12-01 PROCEDURE — 63600175 PHARM REV CODE 636 W HCPCS: Mod: HCNC | Performed by: PHYSICIAN ASSISTANT

## 2021-12-01 PROCEDURE — 96372 THER/PROPH/DIAG INJ SC/IM: CPT | Mod: HCNC

## 2021-12-01 PROCEDURE — 99284 EMERGENCY DEPT VISIT MOD MDM: CPT | Mod: 25,HCNC

## 2021-12-01 RX ORDER — KETOROLAC TROMETHAMINE 30 MG/ML
15 INJECTION, SOLUTION INTRAMUSCULAR; INTRAVENOUS
Status: COMPLETED | OUTPATIENT
Start: 2021-12-01 | End: 2021-12-01

## 2021-12-01 RX ORDER — NAPROXEN 500 MG/1
500 TABLET ORAL 2 TIMES DAILY WITH MEALS
Qty: 30 TABLET | Refills: 0 | Status: SHIPPED | OUTPATIENT
Start: 2021-12-01 | End: 2022-01-04

## 2021-12-01 RX ADMIN — KETOROLAC TROMETHAMINE 15 MG: 30 INJECTION, SOLUTION INTRAMUSCULAR; INTRAVENOUS at 10:12

## 2021-12-02 ENCOUNTER — TELEPHONE (OUTPATIENT)
Dept: INTERNAL MEDICINE | Facility: CLINIC | Age: 66
End: 2021-12-02
Payer: MEDICARE

## 2021-12-15 ENCOUNTER — PES CALL (OUTPATIENT)
Dept: ADMINISTRATIVE | Facility: CLINIC | Age: 66
End: 2021-12-15
Payer: MEDICARE

## 2022-01-19 ENCOUNTER — OFFICE VISIT (OUTPATIENT)
Dept: INTERNAL MEDICINE | Facility: CLINIC | Age: 67
End: 2022-01-19
Payer: MEDICARE

## 2022-01-19 VITALS
SYSTOLIC BLOOD PRESSURE: 130 MMHG | DIASTOLIC BLOOD PRESSURE: 62 MMHG | BODY MASS INDEX: 31.52 KG/M2 | HEART RATE: 60 BPM | WEIGHT: 200.81 LBS | TEMPERATURE: 97 F | RESPIRATION RATE: 14 BRPM | HEIGHT: 67 IN

## 2022-01-19 DIAGNOSIS — Z12.5 PROSTATE CANCER SCREENING: ICD-10-CM

## 2022-01-19 DIAGNOSIS — E11.51 DIABETES MELLITUS WITH PERIPHERAL CIRCULATORY DISORDER: Primary | ICD-10-CM

## 2022-01-19 DIAGNOSIS — I73.9 PAD (PERIPHERAL ARTERY DISEASE): ICD-10-CM

## 2022-01-19 DIAGNOSIS — E11.59 HYPERTENSION ASSOCIATED WITH DIABETES: ICD-10-CM

## 2022-01-19 DIAGNOSIS — N18.30 TYPE 2 DIABETES MELLITUS WITH STAGE 3 CHRONIC KIDNEY DISEASE, WITHOUT LONG-TERM CURRENT USE OF INSULIN, UNSPECIFIED WHETHER STAGE 3A OR 3B CKD: ICD-10-CM

## 2022-01-19 DIAGNOSIS — I15.2 HYPERTENSION ASSOCIATED WITH DIABETES: ICD-10-CM

## 2022-01-19 DIAGNOSIS — Z13.6 SCREENING FOR AAA (ABDOMINAL AORTIC ANEURYSM): ICD-10-CM

## 2022-01-19 DIAGNOSIS — E11.69 DYSLIPIDEMIA ASSOCIATED WITH TYPE 2 DIABETES MELLITUS: ICD-10-CM

## 2022-01-19 DIAGNOSIS — I25.10 CORONARY ARTERY DISEASE INVOLVING NATIVE CORONARY ARTERY OF NATIVE HEART WITHOUT ANGINA PECTORIS: ICD-10-CM

## 2022-01-19 DIAGNOSIS — E78.5 DYSLIPIDEMIA ASSOCIATED WITH TYPE 2 DIABETES MELLITUS: ICD-10-CM

## 2022-01-19 DIAGNOSIS — R05.9 COUGH: ICD-10-CM

## 2022-01-19 DIAGNOSIS — E11.22 TYPE 2 DIABETES MELLITUS WITH STAGE 3 CHRONIC KIDNEY DISEASE, WITHOUT LONG-TERM CURRENT USE OF INSULIN, UNSPECIFIED WHETHER STAGE 3A OR 3B CKD: ICD-10-CM

## 2022-01-19 PROCEDURE — 99214 OFFICE O/P EST MOD 30 MIN: CPT | Mod: HCNC,S$GLB,, | Performed by: INTERNAL MEDICINE

## 2022-01-19 PROCEDURE — 3072F LOW RISK FOR RETINOPATHY: CPT | Mod: HCNC,CPTII,S$GLB, | Performed by: INTERNAL MEDICINE

## 2022-01-19 PROCEDURE — 99214 PR OFFICE/OUTPT VISIT, EST, LEVL IV, 30-39 MIN: ICD-10-PCS | Mod: HCNC,S$GLB,, | Performed by: INTERNAL MEDICINE

## 2022-01-19 PROCEDURE — 3078F DIAST BP <80 MM HG: CPT | Mod: HCNC,CPTII,S$GLB, | Performed by: INTERNAL MEDICINE

## 2022-01-19 PROCEDURE — 1126F AMNT PAIN NOTED NONE PRSNT: CPT | Mod: HCNC,CPTII,S$GLB, | Performed by: INTERNAL MEDICINE

## 2022-01-19 PROCEDURE — 3008F PR BODY MASS INDEX (BMI) DOCUMENTED: ICD-10-PCS | Mod: HCNC,CPTII,S$GLB, | Performed by: INTERNAL MEDICINE

## 2022-01-19 PROCEDURE — 1160F RVW MEDS BY RX/DR IN RCRD: CPT | Mod: HCNC,CPTII,S$GLB, | Performed by: INTERNAL MEDICINE

## 2022-01-19 PROCEDURE — 3066F NEPHROPATHY DOC TX: CPT | Mod: HCNC,CPTII,S$GLB, | Performed by: INTERNAL MEDICINE

## 2022-01-19 PROCEDURE — 1159F PR MEDICATION LIST DOCUMENTED IN MEDICAL RECORD: ICD-10-PCS | Mod: HCNC,CPTII,S$GLB, | Performed by: INTERNAL MEDICINE

## 2022-01-19 PROCEDURE — 3072F PR LOW RISK FOR RETINOPATHY: ICD-10-PCS | Mod: HCNC,CPTII,S$GLB, | Performed by: INTERNAL MEDICINE

## 2022-01-19 PROCEDURE — 1159F MED LIST DOCD IN RCRD: CPT | Mod: HCNC,CPTII,S$GLB, | Performed by: INTERNAL MEDICINE

## 2022-01-19 PROCEDURE — 3078F PR MOST RECENT DIASTOLIC BLOOD PRESSURE < 80 MM HG: ICD-10-PCS | Mod: HCNC,CPTII,S$GLB, | Performed by: INTERNAL MEDICINE

## 2022-01-19 PROCEDURE — 3075F PR MOST RECENT SYSTOLIC BLOOD PRESS GE 130-139MM HG: ICD-10-PCS | Mod: HCNC,CPTII,S$GLB, | Performed by: INTERNAL MEDICINE

## 2022-01-19 PROCEDURE — 3066F PR DOCUMENTATION OF TREATMENT FOR NEPHROPATHY: ICD-10-PCS | Mod: HCNC,CPTII,S$GLB, | Performed by: INTERNAL MEDICINE

## 2022-01-19 PROCEDURE — U0003 INFECTIOUS AGENT DETECTION BY NUCLEIC ACID (DNA OR RNA); SEVERE ACUTE RESPIRATORY SYNDROME CORONAVIRUS 2 (SARS-COV-2) (CORONAVIRUS DISEASE [COVID-19]), AMPLIFIED PROBE TECHNIQUE, MAKING USE OF HIGH THROUGHPUT TECHNOLOGIES AS DESCRIBED BY CMS-2020-01-R: HCPCS | Mod: HCNC | Performed by: INTERNAL MEDICINE

## 2022-01-19 PROCEDURE — 99499 UNLISTED E&M SERVICE: CPT | Mod: S$GLB,,, | Performed by: INTERNAL MEDICINE

## 2022-01-19 PROCEDURE — 99999 PR PBB SHADOW E&M-EST. PATIENT-LVL V: ICD-10-PCS | Mod: PBBFAC,HCNC,, | Performed by: INTERNAL MEDICINE

## 2022-01-19 PROCEDURE — U0005 INFEC AGEN DETEC AMPLI PROBE: HCPCS | Performed by: INTERNAL MEDICINE

## 2022-01-19 PROCEDURE — 99499 RISK ADDL DX/OHS AUDIT: ICD-10-PCS | Mod: S$GLB,,, | Performed by: INTERNAL MEDICINE

## 2022-01-19 PROCEDURE — 1160F PR REVIEW ALL MEDS BY PRESCRIBER/CLIN PHARMACIST DOCUMENTED: ICD-10-PCS | Mod: HCNC,CPTII,S$GLB, | Performed by: INTERNAL MEDICINE

## 2022-01-19 PROCEDURE — 3008F BODY MASS INDEX DOCD: CPT | Mod: HCNC,CPTII,S$GLB, | Performed by: INTERNAL MEDICINE

## 2022-01-19 PROCEDURE — 99999 PR PBB SHADOW E&M-EST. PATIENT-LVL V: CPT | Mod: PBBFAC,HCNC,, | Performed by: INTERNAL MEDICINE

## 2022-01-19 PROCEDURE — 1126F PR PAIN SEVERITY QUANTIFIED, NO PAIN PRESENT: ICD-10-PCS | Mod: HCNC,CPTII,S$GLB, | Performed by: INTERNAL MEDICINE

## 2022-01-19 PROCEDURE — 3075F SYST BP GE 130 - 139MM HG: CPT | Mod: HCNC,CPTII,S$GLB, | Performed by: INTERNAL MEDICINE

## 2022-01-19 NOTE — PROGRESS NOTES
-- DO NOT REPLY / DO NOT REPLY ALL --  -- Message is from the [facility]--    COVID-19 Universal Screening: Negative    General Patient Message      Reason for Call: Wants to have  Results from colonoscopy 06/04/2020 sent to Dr. Ravi Ortiz's office . Patient has appointment this Thursday 07/09/2020.  Phone: [phone]  Fax: [phone]    Caller Information       Type Contact Phone    07/07/2020 04:43 PM Phone (Incoming) Melanie Harrington (Self) [phone] (M)          Alternative phone number: none  Please call patient to confirm.      Turnaround time given to caller:   \"This message will be sent to [state Provider's name]. The clinical team will fulfill your request as soon as they review your message when the office opens tomorrow.\"     History of present illness:  66-year-old gentleman with hypertension, diabetes mellitus, CKD, dyslipidemia, PA D, CAD following up on a couple those issues.  He is a followed by Nephrology and Cardiology.  Reports generally all is doing well though he does report a couple of days of mild nonproductive cough.  No shortness of breath.  No hemoptysis.  No fever no chills.  Mild sinus congestion.  No COVID contacts that he is aware of.    Current medications:  All medications are noted reviewed    Review of systems:  Constitutional:  No fever no chills no generalized body aches.  Cardiovascular:  Denies chest pain palpitations syncope, claudication, edema.  Respiratory:  See HPI.  GI:  No nausea no vomiting abdominal pain or diarrhea.  Neurologic:  No headaches or new focal neurological symptomatologies.    Physical examination:  General:  Pleasant alert appropriately groomed gentleman no acute distress.  Vital signs:  All noted and reviewed is normal.  Eyes:  Sclerae white and nonicteric.  HEENT:  Normocephalic.  Neck supple no masses.  Lungs:  Clear to auscultation.  Cardiovascular:  Regular rate rhythm.  No significant murmur carotids full bilaterally no JVD.  No peripheral extremity edema.    Data:  Reviewed lab data over the last six months.        Impression:  Hypertension controlled.  Type 2 diabetes mellitus due for update.  Dyslipidemia statin therapy.  CKD followed by Nephrology.  CAD peers to be clinically stable.  PA KEYANNA clinically stable.  Recent cough.    Plan:  Update PSA, lipid profile, glycohemoglobin.  COVID swab submitted.  Discussed symptomatic treatment  Return clinic six months.

## 2022-01-20 LAB
SARS-COV-2 RNA RESP QL NAA+PROBE: NOT DETECTED
SARS-COV-2- CYCLE NUMBER: NORMAL

## 2022-01-21 ENCOUNTER — LAB VISIT (OUTPATIENT)
Dept: LAB | Facility: HOSPITAL | Age: 67
End: 2022-01-21
Attending: INTERNAL MEDICINE
Payer: MEDICARE

## 2022-01-21 DIAGNOSIS — E78.5 DYSLIPIDEMIA ASSOCIATED WITH TYPE 2 DIABETES MELLITUS: ICD-10-CM

## 2022-01-21 DIAGNOSIS — Z12.5 PROSTATE CANCER SCREENING: ICD-10-CM

## 2022-01-21 DIAGNOSIS — E11.69 DYSLIPIDEMIA ASSOCIATED WITH TYPE 2 DIABETES MELLITUS: ICD-10-CM

## 2022-01-21 DIAGNOSIS — E11.22 TYPE 2 DIABETES MELLITUS WITH STAGE 3 CHRONIC KIDNEY DISEASE, WITHOUT LONG-TERM CURRENT USE OF INSULIN, UNSPECIFIED WHETHER STAGE 3A OR 3B CKD: ICD-10-CM

## 2022-01-21 DIAGNOSIS — N18.30 TYPE 2 DIABETES MELLITUS WITH STAGE 3 CHRONIC KIDNEY DISEASE, WITHOUT LONG-TERM CURRENT USE OF INSULIN, UNSPECIFIED WHETHER STAGE 3A OR 3B CKD: ICD-10-CM

## 2022-01-21 LAB
CHOLEST SERPL-MCNC: 88 MG/DL (ref 120–199)
CHOLEST/HDLC SERPL: 2.9 {RATIO} (ref 2–5)
COMPLEXED PSA SERPL-MCNC: 0.01 NG/ML (ref 0–4)
ESTIMATED AVG GLUCOSE: 126 MG/DL (ref 68–131)
HBA1C MFR BLD: 6 % (ref 4–5.6)
HDLC SERPL-MCNC: 30 MG/DL (ref 40–75)
HDLC SERPL: 34.1 % (ref 20–50)
LDLC SERPL CALC-MCNC: 35.8 MG/DL (ref 63–159)
NONHDLC SERPL-MCNC: 58 MG/DL
TRIGL SERPL-MCNC: 111 MG/DL (ref 30–150)

## 2022-01-21 PROCEDURE — 80061 LIPID PANEL: CPT | Mod: HCNC | Performed by: INTERNAL MEDICINE

## 2022-01-21 PROCEDURE — 84153 ASSAY OF PSA TOTAL: CPT | Mod: HCNC | Performed by: INTERNAL MEDICINE

## 2022-01-21 PROCEDURE — 83036 HEMOGLOBIN GLYCOSYLATED A1C: CPT | Mod: HCNC | Performed by: INTERNAL MEDICINE

## 2022-01-21 PROCEDURE — 36415 COLL VENOUS BLD VENIPUNCTURE: CPT | Mod: HCNC,PO | Performed by: INTERNAL MEDICINE

## 2022-02-24 RX ORDER — ALLOPURINOL 100 MG/1
100 TABLET ORAL DAILY
Qty: 90 TABLET | Refills: 1 | Status: SHIPPED | OUTPATIENT
Start: 2022-02-24 | End: 2022-06-14

## 2022-02-24 RX ORDER — BUPROPION HYDROCHLORIDE 75 MG/1
75 TABLET ORAL DAILY
Qty: 90 TABLET | Refills: 1 | Status: SHIPPED | OUTPATIENT
Start: 2022-02-24 | End: 2022-11-15

## 2022-03-14 DIAGNOSIS — E11.9 TYPE 2 DIABETES MELLITUS WITHOUT COMPLICATION: ICD-10-CM

## 2022-06-14 ENCOUNTER — OFFICE VISIT (OUTPATIENT)
Dept: CARDIOLOGY | Facility: CLINIC | Age: 67
End: 2022-06-14
Payer: MEDICARE

## 2022-06-14 VITALS
HEART RATE: 60 BPM | WEIGHT: 196.88 LBS | DIASTOLIC BLOOD PRESSURE: 59 MMHG | HEIGHT: 67 IN | SYSTOLIC BLOOD PRESSURE: 140 MMHG | BODY MASS INDEX: 30.9 KG/M2

## 2022-06-14 DIAGNOSIS — R01.1 SYSTOLIC MURMUR: ICD-10-CM

## 2022-06-14 DIAGNOSIS — Z95.5 PRESENCE OF STENT IN CORONARY ARTERY: ICD-10-CM

## 2022-06-14 DIAGNOSIS — I25.10 CORONARY ARTERY DISEASE INVOLVING NATIVE CORONARY ARTERY OF NATIVE HEART WITHOUT ANGINA PECTORIS: Primary | Chronic | ICD-10-CM

## 2022-06-14 DIAGNOSIS — N18.31 TYPE 2 DIABETES MELLITUS WITH STAGE 3A CHRONIC KIDNEY DISEASE, WITHOUT LONG-TERM CURRENT USE OF INSULIN: ICD-10-CM

## 2022-06-14 DIAGNOSIS — I10 HYPERTENSION, ESSENTIAL: ICD-10-CM

## 2022-06-14 DIAGNOSIS — I73.9 PAD (PERIPHERAL ARTERY DISEASE): ICD-10-CM

## 2022-06-14 DIAGNOSIS — N18.32 STAGE 3B CHRONIC KIDNEY DISEASE: ICD-10-CM

## 2022-06-14 DIAGNOSIS — I25.2 OLD MI (MYOCARDIAL INFARCTION): ICD-10-CM

## 2022-06-14 DIAGNOSIS — E78.2 MIXED HYPERLIPIDEMIA: ICD-10-CM

## 2022-06-14 DIAGNOSIS — E11.22 TYPE 2 DIABETES MELLITUS WITH STAGE 3A CHRONIC KIDNEY DISEASE, WITHOUT LONG-TERM CURRENT USE OF INSULIN: ICD-10-CM

## 2022-06-14 DIAGNOSIS — F17.200 TOBACCO USE DISORDER: ICD-10-CM

## 2022-06-14 PROBLEM — N17.9 ACUTE RENAL FAILURE: Status: RESOLVED | Noted: 2019-12-09 | Resolved: 2022-06-14

## 2022-06-14 PROCEDURE — 1126F PR PAIN SEVERITY QUANTIFIED, NO PAIN PRESENT: ICD-10-PCS | Mod: CPTII,S$GLB,, | Performed by: INTERNAL MEDICINE

## 2022-06-14 PROCEDURE — 4010F ACE/ARB THERAPY RXD/TAKEN: CPT | Mod: CPTII,S$GLB,, | Performed by: INTERNAL MEDICINE

## 2022-06-14 PROCEDURE — 4010F PR ACE/ARB THEARPY RXD/TAKEN: ICD-10-PCS | Mod: CPTII,S$GLB,, | Performed by: INTERNAL MEDICINE

## 2022-06-14 PROCEDURE — 3044F PR MOST RECENT HEMOGLOBIN A1C LEVEL <7.0%: ICD-10-PCS | Mod: CPTII,S$GLB,, | Performed by: INTERNAL MEDICINE

## 2022-06-14 PROCEDURE — 99499 RISK ADDL DX/OHS AUDIT: ICD-10-PCS | Mod: HCNC,S$GLB,, | Performed by: INTERNAL MEDICINE

## 2022-06-14 PROCEDURE — 99499 UNLISTED E&M SERVICE: CPT | Mod: HCNC,S$GLB,, | Performed by: INTERNAL MEDICINE

## 2022-06-14 PROCEDURE — 99214 OFFICE O/P EST MOD 30 MIN: CPT | Mod: S$GLB,,, | Performed by: INTERNAL MEDICINE

## 2022-06-14 PROCEDURE — 99999 PR PBB SHADOW E&M-EST. PATIENT-LVL IV: ICD-10-PCS | Mod: PBBFAC,,, | Performed by: INTERNAL MEDICINE

## 2022-06-14 PROCEDURE — 1126F AMNT PAIN NOTED NONE PRSNT: CPT | Mod: CPTII,S$GLB,, | Performed by: INTERNAL MEDICINE

## 2022-06-14 PROCEDURE — 1160F PR REVIEW ALL MEDS BY PRESCRIBER/CLIN PHARMACIST DOCUMENTED: ICD-10-PCS | Mod: CPTII,S$GLB,, | Performed by: INTERNAL MEDICINE

## 2022-06-14 PROCEDURE — 3288F FALL RISK ASSESSMENT DOCD: CPT | Mod: CPTII,S$GLB,, | Performed by: INTERNAL MEDICINE

## 2022-06-14 PROCEDURE — 3072F LOW RISK FOR RETINOPATHY: CPT | Mod: CPTII,S$GLB,, | Performed by: INTERNAL MEDICINE

## 2022-06-14 PROCEDURE — 3072F PR LOW RISK FOR RETINOPATHY: ICD-10-PCS | Mod: CPTII,S$GLB,, | Performed by: INTERNAL MEDICINE

## 2022-06-14 PROCEDURE — 99214 PR OFFICE/OUTPT VISIT, EST, LEVL IV, 30-39 MIN: ICD-10-PCS | Mod: S$GLB,,, | Performed by: INTERNAL MEDICINE

## 2022-06-14 PROCEDURE — 1160F RVW MEDS BY RX/DR IN RCRD: CPT | Mod: CPTII,S$GLB,, | Performed by: INTERNAL MEDICINE

## 2022-06-14 PROCEDURE — 3008F BODY MASS INDEX DOCD: CPT | Mod: CPTII,S$GLB,, | Performed by: INTERNAL MEDICINE

## 2022-06-14 PROCEDURE — 3078F PR MOST RECENT DIASTOLIC BLOOD PRESSURE < 80 MM HG: ICD-10-PCS | Mod: CPTII,S$GLB,, | Performed by: INTERNAL MEDICINE

## 2022-06-14 PROCEDURE — 1101F PR PT FALLS ASSESS DOC 0-1 FALLS W/OUT INJ PAST YR: ICD-10-PCS | Mod: CPTII,S$GLB,, | Performed by: INTERNAL MEDICINE

## 2022-06-14 PROCEDURE — 3066F NEPHROPATHY DOC TX: CPT | Mod: CPTII,S$GLB,, | Performed by: INTERNAL MEDICINE

## 2022-06-14 PROCEDURE — 99999 PR PBB SHADOW E&M-EST. PATIENT-LVL IV: CPT | Mod: PBBFAC,,, | Performed by: INTERNAL MEDICINE

## 2022-06-14 PROCEDURE — 3077F SYST BP >= 140 MM HG: CPT | Mod: CPTII,S$GLB,, | Performed by: INTERNAL MEDICINE

## 2022-06-14 PROCEDURE — 1159F PR MEDICATION LIST DOCUMENTED IN MEDICAL RECORD: ICD-10-PCS | Mod: CPTII,S$GLB,, | Performed by: INTERNAL MEDICINE

## 2022-06-14 PROCEDURE — 3044F HG A1C LEVEL LT 7.0%: CPT | Mod: CPTII,S$GLB,, | Performed by: INTERNAL MEDICINE

## 2022-06-14 PROCEDURE — 3077F PR MOST RECENT SYSTOLIC BLOOD PRESSURE >= 140 MM HG: ICD-10-PCS | Mod: CPTII,S$GLB,, | Performed by: INTERNAL MEDICINE

## 2022-06-14 PROCEDURE — 3066F PR DOCUMENTATION OF TREATMENT FOR NEPHROPATHY: ICD-10-PCS | Mod: CPTII,S$GLB,, | Performed by: INTERNAL MEDICINE

## 2022-06-14 PROCEDURE — 1159F MED LIST DOCD IN RCRD: CPT | Mod: CPTII,S$GLB,, | Performed by: INTERNAL MEDICINE

## 2022-06-14 PROCEDURE — 1101F PT FALLS ASSESS-DOCD LE1/YR: CPT | Mod: CPTII,S$GLB,, | Performed by: INTERNAL MEDICINE

## 2022-06-14 PROCEDURE — 3008F PR BODY MASS INDEX (BMI) DOCUMENTED: ICD-10-PCS | Mod: CPTII,S$GLB,, | Performed by: INTERNAL MEDICINE

## 2022-06-14 PROCEDURE — 3078F DIAST BP <80 MM HG: CPT | Mod: CPTII,S$GLB,, | Performed by: INTERNAL MEDICINE

## 2022-06-14 PROCEDURE — 3288F PR FALLS RISK ASSESSMENT DOCUMENTED: ICD-10-PCS | Mod: CPTII,S$GLB,, | Performed by: INTERNAL MEDICINE

## 2022-06-14 RX ORDER — TOPIRAMATE 100 MG/1
TABLET, FILM COATED ORAL
COMMUNITY
Start: 2022-04-26 | End: 2022-09-13

## 2022-06-14 RX ORDER — PROPRANOLOL HYDROCHLORIDE 10 MG/1
10 TABLET ORAL DAILY
COMMUNITY
Start: 2022-02-21 | End: 2022-06-14

## 2022-06-14 NOTE — PROGRESS NOTES
Subjective:   06/14/2022     Patient ID:  Neena Kohli is a 66 y.o. male who presents for evaulation of Hypertension, Coronary Artery Disease, Hyperlipidemia, and Old MI      Patient comes in with a history of heart problems.  He had an episode of chest pain last year and was seen in the emergency room at [facility].  His initial cardiac evaluation was negative and he was discharged.  A stress test was subsequently performed.       Normal myocardial perfusion scan. There is no evidence of myocardial ischemia or infarction.    The gated perfusion images showed an ejection fraction of 63% at rest. The gated perfusion images showed an ejection fraction of 72% post stress. Normal ejection fraction is greater than 51%.    There is normal wall motion at rest and post stress.    LV cavity size is normal at rest and normal at stress.    The EKG portion of this study is negative for ischemia.    The patient reported no chest pain during the stress test.    During stress, rare PACs are noted.    There are no prior studies for comparison.      He has not had further chest pain since that time.  He did have heart attacks, prior coronary stents.  These were also done [facility].    Records obtained from [facility] include an office visit with Dr. Putnam in 2020.  It was noted that he had coronary disease and was status post right coronary artery bare metal stent.  He developed a late stent thrombosis treated with PTCA in January of 2010. In 2016 he had abnormal Lexiscan stress test and was treated with medical therapy.  He had had a syncopal episode last year, head CT was benign.  Orthostatics were negative.  His troponin was negative.  Carvedilol was discontinued because of bradycardia.  A 30 day event detector was pending results at that time.  On that visit, he was sent for an adenosine stress test, and a tilt test.  The results of those tests are unknown.    His activity is quite limited because of severe  peripheral arterial disease.  He notes that he is status post peripheral stents, he does not know who put those in.  These were done [place]. He continues to have right leg pain with activity, 1 block walking.  The pain is more in the upper thigh then the calf.  This is unchanged.    Hypertension is present, his blood pressure appears well controlled on current medications.    He does have chronic kidney disease, stage III, will try to avoid nephrotoxic agents including angiographic contrast    Hypercholesterolemia is present.  His triglycerides are normal.  Fenofibrate was discontinued, pravastatin dose increased, his last LDL was 36.    He no longer smokes cigarettes.  Smoking cessation continuation strongly stressed            Past Medical History:   Diagnosis Date    Arthritis     Cancer     Prostate    Diabetes mellitus     Hyperthyroidism     PAD (peripheral artery disease)        Review of patient's allergies indicates:  No Known Allergies      Current Outpatient Medications:     amLODIPine (NORVASC) 10 MG tablet, TAKE 1 TABLET EVERY DAY, Disp: 90 tablet, Rfl: 3    aspirin (ECOTRIN) 81 MG EC tablet, Take 81 mg by mouth once daily., Disp: , Rfl:     baclofen (LIORESAL) 10 MG tablet, Take 1/2 to 1 tablet by mouth every day to twice a day for muscle tightness/pain, Disp: 60 tablet, Rfl: 5    buPROPion (WELLBUTRIN) 75 MG tablet, Take 1 tablet (75 mg total) by mouth Daily., Disp: 90 tablet, Rfl: 1    carvediloL (COREG) 25 MG tablet, TAKE 1 TABLET (25 MG ) BY MOUTH 2 (TWO) TIMES DAILY WITH MEALS. (Patient taking differently: Take 25 mg by mouth 2 (two) times daily.), Disp: 180 tablet, Rfl: 3    citalopram (CELEXA) 20 MG tablet, Take 20 mg by mouth once daily., Disp: , Rfl:     colchicine (COLCRYS) 0.6 mg tablet, Take 1 tablet (0.6 mg total) by mouth daily as needed (gout flare)., Disp: 30 tablet, Rfl: 2    ergocalciferol (ERGOCALCIFEROL) 50,000 unit Cap, Take 50,000 Units by mouth every 7 days.,  Disp: , Rfl:     glimepiride (AMARYL) 1 MG tablet, TAKE 2 TABLETS EVERY DAY, Disp: 180 tablet, Rfl: 1    hydroCHLOROthiazide (HYDRODIURIL) 12.5 MG Tab, TAKE 1 TABLET EVERY DAY, Disp: 90 tablet, Rfl: 3    irbesartan (AVAPRO) 300 MG tablet, TAKE 1 TABLET EVERY DAY, Disp: 90 tablet, Rfl: 1    metFORMIN (GLUCOPHAGE) 1000 MG tablet, Take 1,000 mg by mouth 2 (two) times daily with meals., Disp: , Rfl:     multivitamin with minerals tablet, Take 1 tablet by mouth once daily., Disp: , Rfl:     oxyCODONE myristate (XTAMPZA ER) 13.5 mg CSpT, take 1 capsule  by mouth every morning with a full meal as needed for pain, Disp: 30 each, Rfl: 0    pravastatin (PRAVACHOL) 40 MG tablet, TAKE 1 TABLET (40 MG TOTAL) BY MOUTH EVERY EVENING., Disp: 90 tablet, Rfl: 3    QUEtiapine (SEROQUEL) 100 MG Tab, Take 100 mg by mouth 2 (two) times daily. At bedtime, Disp: , Rfl:     topiramate (TOPAMAX) 100 MG tablet, TAKE 1 TABLET BY MOUTH TWICE DAILY AS NEEDED FOR HEADACHE PREVENTION / NERVE PAIN, Disp: , Rfl:     traZODone (DESYREL) 100 MG tablet, Take 100 mg by mouth every evening., Disp: , Rfl:     TRUE METRIX GLUCOSE TEST STRIP Strp, , Disp: , Rfl:     TRUEPLUS LANCETS 33 gauge Misc, , Disp: , Rfl:     hydrALAZINE (APRESOLINE) 100 MG tablet, Take 1 tablet (100 mg total) by mouth 2 (two) times daily., Disp: 60 tablet, Rfl: 0     Objective:   Review of Systems   Cardiovascular: Positive for claudication and dyspnea on exertion. Negative for chest pain, cyanosis, irregular heartbeat, leg swelling, near-syncope, orthopnea, palpitations, paroxysmal nocturnal dyspnea and syncope.       Vitals:    06/14/22 1339   BP: (!) 140/59   Pulse: 60       Physical Exam  Vitals reviewed.   Constitutional:       General: He is not in acute distress.     Appearance: He is well-developed.   HENT:      Head: Normocephalic and atraumatic.      Nose: Nose normal.   Eyes:      Conjunctiva/sclera: Conjunctivae normal.      Pupils: Pupils are equal, round,  and reactive to light.   Neck:      Vascular: No JVD.   Cardiovascular:      Rate and Rhythm: Normal rate and regular rhythm.      Pulses: Intact distal pulses.           Carotid pulses are 2+ on the right side and 3+ on the left side.       Radial pulses are 2+ on the right side and 2+ on the left side.        Femoral pulses are 2+ on the right side and 2+ on the left side.       Popliteal pulses are 1+ on the right side and 1+ on the left side.        Dorsalis pedis pulses are 0 on the right side and 0 on the left side.        Posterior tibial pulses are 0 on the right side and 0 on the left side.      Heart sounds: Normal heart sounds. No murmur heard.    No friction rub. No gallop.   Pulmonary:      Effort: Pulmonary effort is normal. No respiratory distress.      Breath sounds: Normal breath sounds. No wheezing or rales.   Chest:      Chest wall: No tenderness.   Abdominal:      General: Bowel sounds are normal. There is no distension.      Palpations: Abdomen is soft.      Tenderness: There is no abdominal tenderness.   Musculoskeletal:         General: No tenderness or deformity. Normal range of motion.      Cervical back: Normal range of motion and neck supple.   Skin:     General: Skin is warm and dry.      Findings: No erythema or rash.   Neurological:      Mental Status: He is alert and oriented to person, place, and time.      Cranial Nerves: No cranial nerve deficit.      Motor: No abnormal muscle tone.      Coordination: Coordination normal.   Psychiatric:         Behavior: Behavior normal.         Thought Content: Thought content normal.         Judgment: Judgment normal.         Lab Visit on 01/21/2022   Component Date Value    Cholesterol 01/21/2022 88 (A)    Triglycerides 01/21/2022 111     HDL 01/21/2022 30 (A)    LDL Cholesterol 01/21/2022 35.8 (A)    HDL/Cholesterol Ratio 01/21/2022 34.1     Total Cholesterol/HDL Ra* 01/21/2022 2.9     Non-HDL Cholesterol 01/21/2022 58     Hemoglobin A1C  01/21/2022 6.0 (A)    Estimated Avg Glucose 01/21/2022 126     PSA, Screen 01/21/2022 0.01    Office Visit on 01/19/2022   Component Date Value    SARS-CoV2 (COVID-19) Ismael* 01/19/2022 Not Detected     SARS-COV-2- Cycle Number 01/19/2022 N/A         Assessment and Plan:     Coronary artery disease involving native coronary artery of native heart without angina pectoris  Comments:  Continues to be asymptomatic    Hypertension, essential  Comments:  Generally well controlled    Mixed hyperlipidemia  Comments:  Excellent on current statin dose    Old MI (myocardial infarction)  Comments:  Systolic function preserved    PAD (peripheral artery disease)  Comments:  Appears clinically stable, no indication for escalation of evaluation    Presence of stent in coronary artery    Stage 3b chronic kidney disease    Type 2 diabetes mellitus with stage 3a chronic kidney disease, without long-term current use of insulin    Tobacco use disorder  Comments:  Has quit smoking    Systolic murmur  -     Echo; Future; Expected date: 12/14/2022         Follow up in about 6 months (around 12/14/2022).

## 2022-06-21 RX ORDER — IRBESARTAN 300 MG/1
TABLET ORAL
Qty: 90 TABLET | Refills: 0 | Status: SHIPPED | OUTPATIENT
Start: 2022-06-21 | End: 2022-06-30 | Stop reason: SDUPTHER

## 2022-06-30 RX ORDER — IRBESARTAN 300 MG/1
300 TABLET ORAL DAILY
Qty: 90 TABLET | Refills: 0 | Status: SHIPPED | OUTPATIENT
Start: 2022-06-30 | End: 2023-08-23 | Stop reason: SDUPTHER

## 2022-06-30 NOTE — TELEPHONE ENCOUNTER
----- Message from Alejandra Isaac sent at 6/30/2022  9:35 AM CDT -----  Contact: pt [phone]  Requesting an RX refill or new RX.  Is this a refill or new RX: new  RX name and strength (copy/paste from chart):  irbesartan (AVAPRO) 300 MG tablet  Is this a 30 day or 90 day RX: 90  Pharmacy name and phone # (copy/paste from chart):      [facility] ([facility]) - [facility] [address]  [address] [address]  Phone: [phone] Fax: [phone]      The doctors have asked that we provide their patients with the following 2 reminders -- prescription refills can take up to 72 hours, and a friendly reminder that in the future you can use your [facility] account to request refills: yes

## 2022-11-17 RX ORDER — METFORMIN HYDROCHLORIDE 1000 MG/1
1000 TABLET ORAL 2 TIMES DAILY WITH MEALS
Qty: 180 TABLET | Refills: 1 | Status: SHIPPED | OUTPATIENT
Start: 2022-11-17 | End: 2023-05-09

## 2022-11-30 DIAGNOSIS — E11.9 TYPE 2 DIABETES MELLITUS WITHOUT COMPLICATION: ICD-10-CM

## 2022-12-07 ENCOUNTER — HOSPITAL ENCOUNTER (OUTPATIENT)
Dept: CARDIOLOGY | Facility: HOSPITAL | Age: 67
Discharge: HOME | End: 2022-12-07
Attending: INTERNAL MEDICINE
Payer: MEDICARE

## 2022-12-07 VITALS
BODY MASS INDEX: 29.82 KG/M2 | DIASTOLIC BLOOD PRESSURE: 60 MMHG | WEIGHT: 190 LBS | HEART RATE: 51 BPM | HEIGHT: 67 IN | SYSTOLIC BLOOD PRESSURE: 160 MMHG

## 2022-12-07 DIAGNOSIS — R01.1 SYSTOLIC MURMUR: ICD-10-CM

## 2022-12-07 LAB
ASCENDING AORTA: 3.14 CM
AV INDEX (PROSTH): 0.67
AV MEAN GRADIENT: 5 MMHG
AV PEAK GRADIENT: 10 MMHG
AV VALVE AREA: 2.18 CM2
AV VELOCITY RATIO: 0.85
BSA FOR ECHO PROCEDURE: 2.02 M2
CV ECHO LV RWT: 0.38 CM
DOP CALC AO PEAK VEL: 1.56 M/S
DOP CALC AO VTI: 40.81 CM
DOP CALC LVOT AREA: 3.2 CM2
DOP CALC LVOT DIAMETER: 2.03 CM
DOP CALC LVOT PEAK VEL: 1.32 M/S
DOP CALC LVOT STROKE VOLUME: 88.8 CM3
DOP CALC RVOT PEAK VEL: 0.58 M/S
DOP CALC RVOT VTI: 15.32 CM
DOP CALCLVOT PEAK VEL VTI: 27.45 CM
ECHO LV POSTERIOR WALL: 0.86 CM (ref 0.6–1.1)
EJECTION FRACTION: 70 %
FRACTIONAL SHORTENING: 43 % (ref 28–44)
INTERVENTRICULAR SEPTUM: 0.9 CM (ref 0.6–1.1)
LA MAJOR: 4.81 CM
LA MINOR: 4.85 CM
LA WIDTH: 4.17 CM
LEFT ATRIUM SIZE: 3.99 CM
LEFT ATRIUM VOLUME INDEX MOD: 28.9 ML/M2
LEFT ATRIUM VOLUME INDEX: 34.5 ML/M2
LEFT ATRIUM VOLUME MOD: 57.19 CM3
LEFT ATRIUM VOLUME: 68.31 CM3
LEFT INTERNAL DIMENSION IN SYSTOLE: 2.56 CM (ref 2.1–4)
LEFT VENTRICLE DIASTOLIC VOLUME INDEX: 46.58 ML/M2
LEFT VENTRICLE DIASTOLIC VOLUME: 92.23 ML
LEFT VENTRICLE MASS INDEX: 65 G/M2
LEFT VENTRICLE SYSTOLIC VOLUME INDEX: 12 ML/M2
LEFT VENTRICLE SYSTOLIC VOLUME: 23.78 ML
LEFT VENTRICULAR INTERNAL DIMENSION IN DIASTOLE: 4.5 CM (ref 3.5–6)
LEFT VENTRICULAR MASS: 128.89 G
MV A" WAVE DURATION": 14.08 MSEC
PISA TR MAX VEL: 2.5 M/S
PULM VEIN S/D RATIO: 1.05
PV MEAN GRADIENT: 0.81 MMHG
PV PEAK D VEL: 0.42 M/S
PV PEAK S VEL: 0.44 M/S
PV PEAK VELOCITY: 0.91 CM/S
RA MAJOR: 4.22 CM
RA PRESSURE: 3 MMHG
RA WIDTH: 2.63 CM
SINUS: 3.11 CM
STJ: 2.94 CM
TDI LATERAL: 0.08 M/S
TDI SEPTAL: 0.06 M/S
TDI: 0.07 M/S
TR MAX PG: 25 MMHG
TRICUSPID ANNULAR PLANE SYSTOLIC EXCURSION: 2.38 CM
TV REST PULMONARY ARTERY PRESSURE: 28 MMHG

## 2022-12-07 PROCEDURE — 93306 ECHO (CUPID ONLY): ICD-10-PCS | Mod: 26,,, | Performed by: INTERNAL MEDICINE

## 2022-12-07 PROCEDURE — 93306 TTE W/DOPPLER COMPLETE: CPT | Mod: 26,,, | Performed by: INTERNAL MEDICINE

## 2022-12-07 PROCEDURE — 93306 TTE W/DOPPLER COMPLETE: CPT

## 2022-12-12 ENCOUNTER — OFFICE VISIT (OUTPATIENT)
Dept: CARDIOLOGY | Facility: CLINIC | Age: 67
End: 2022-12-12
Payer: MEDICARE

## 2022-12-12 VITALS
HEIGHT: 67 IN | DIASTOLIC BLOOD PRESSURE: 64 MMHG | HEART RATE: 45 BPM | SYSTOLIC BLOOD PRESSURE: 177 MMHG | WEIGHT: 194.25 LBS | BODY MASS INDEX: 30.49 KG/M2

## 2022-12-12 DIAGNOSIS — N18.32 STAGE 3B CHRONIC KIDNEY DISEASE: ICD-10-CM

## 2022-12-12 DIAGNOSIS — I10 HYPERTENSION, ESSENTIAL: Chronic | ICD-10-CM

## 2022-12-12 DIAGNOSIS — F17.200 TOBACCO USE DISORDER: ICD-10-CM

## 2022-12-12 DIAGNOSIS — I35.0 MILD AORTIC VALVE STENOSIS: ICD-10-CM

## 2022-12-12 DIAGNOSIS — I73.9 PAD (PERIPHERAL ARTERY DISEASE): Chronic | ICD-10-CM

## 2022-12-12 DIAGNOSIS — I25.2 OLD MI (MYOCARDIAL INFARCTION): Chronic | ICD-10-CM

## 2022-12-12 DIAGNOSIS — M54.50 LOWER BACK PAIN: Primary | ICD-10-CM

## 2022-12-12 DIAGNOSIS — Z95.5 PRESENCE OF STENT IN CORONARY ARTERY: Chronic | ICD-10-CM

## 2022-12-12 DIAGNOSIS — I25.10 CORONARY ARTERY DISEASE INVOLVING NATIVE CORONARY ARTERY OF NATIVE HEART WITHOUT ANGINA PECTORIS: Primary | Chronic | ICD-10-CM

## 2022-12-12 DIAGNOSIS — E78.2 MIXED HYPERLIPIDEMIA: Chronic | ICD-10-CM

## 2022-12-12 PROCEDURE — 3044F PR MOST RECENT HEMOGLOBIN A1C LEVEL <7.0%: ICD-10-PCS | Mod: CPTII,S$GLB,, | Performed by: INTERNAL MEDICINE

## 2022-12-12 PROCEDURE — 99214 PR OFFICE/OUTPT VISIT, EST, LEVL IV, 30-39 MIN: ICD-10-PCS | Mod: S$GLB,,, | Performed by: INTERNAL MEDICINE

## 2022-12-12 PROCEDURE — 3077F PR MOST RECENT SYSTOLIC BLOOD PRESSURE >= 140 MM HG: ICD-10-PCS | Mod: CPTII,S$GLB,, | Performed by: INTERNAL MEDICINE

## 2022-12-12 PROCEDURE — 3078F PR MOST RECENT DIASTOLIC BLOOD PRESSURE < 80 MM HG: ICD-10-PCS | Mod: CPTII,S$GLB,, | Performed by: INTERNAL MEDICINE

## 2022-12-12 PROCEDURE — 1159F PR MEDICATION LIST DOCUMENTED IN MEDICAL RECORD: ICD-10-PCS | Mod: CPTII,S$GLB,, | Performed by: INTERNAL MEDICINE

## 2022-12-12 PROCEDURE — 3077F SYST BP >= 140 MM HG: CPT | Mod: CPTII,S$GLB,, | Performed by: INTERNAL MEDICINE

## 2022-12-12 PROCEDURE — 3008F BODY MASS INDEX DOCD: CPT | Mod: CPTII,S$GLB,, | Performed by: INTERNAL MEDICINE

## 2022-12-12 PROCEDURE — 1126F PR PAIN SEVERITY QUANTIFIED, NO PAIN PRESENT: ICD-10-PCS | Mod: CPTII,S$GLB,, | Performed by: INTERNAL MEDICINE

## 2022-12-12 PROCEDURE — 3066F NEPHROPATHY DOC TX: CPT | Mod: CPTII,S$GLB,, | Performed by: INTERNAL MEDICINE

## 2022-12-12 PROCEDURE — 1101F PT FALLS ASSESS-DOCD LE1/YR: CPT | Mod: CPTII,S$GLB,, | Performed by: INTERNAL MEDICINE

## 2022-12-12 PROCEDURE — 99999 PR PBB SHADOW E&M-EST. PATIENT-LVL IV: CPT | Mod: PBBFAC,,, | Performed by: INTERNAL MEDICINE

## 2022-12-12 PROCEDURE — 3288F FALL RISK ASSESSMENT DOCD: CPT | Mod: CPTII,S$GLB,, | Performed by: INTERNAL MEDICINE

## 2022-12-12 PROCEDURE — 1101F PR PT FALLS ASSESS DOC 0-1 FALLS W/OUT INJ PAST YR: ICD-10-PCS | Mod: CPTII,S$GLB,, | Performed by: INTERNAL MEDICINE

## 2022-12-12 PROCEDURE — 4010F PR ACE/ARB THEARPY RXD/TAKEN: ICD-10-PCS | Mod: CPTII,S$GLB,, | Performed by: INTERNAL MEDICINE

## 2022-12-12 PROCEDURE — 3008F PR BODY MASS INDEX (BMI) DOCUMENTED: ICD-10-PCS | Mod: CPTII,S$GLB,, | Performed by: INTERNAL MEDICINE

## 2022-12-12 PROCEDURE — 99214 OFFICE O/P EST MOD 30 MIN: CPT | Mod: S$GLB,,, | Performed by: INTERNAL MEDICINE

## 2022-12-12 PROCEDURE — 4010F ACE/ARB THERAPY RXD/TAKEN: CPT | Mod: CPTII,S$GLB,, | Performed by: INTERNAL MEDICINE

## 2022-12-12 PROCEDURE — 3072F PR LOW RISK FOR RETINOPATHY: ICD-10-PCS | Mod: CPTII,S$GLB,, | Performed by: INTERNAL MEDICINE

## 2022-12-12 PROCEDURE — 1126F AMNT PAIN NOTED NONE PRSNT: CPT | Mod: CPTII,S$GLB,, | Performed by: INTERNAL MEDICINE

## 2022-12-12 PROCEDURE — 3066F PR DOCUMENTATION OF TREATMENT FOR NEPHROPATHY: ICD-10-PCS | Mod: CPTII,S$GLB,, | Performed by: INTERNAL MEDICINE

## 2022-12-12 PROCEDURE — 3044F HG A1C LEVEL LT 7.0%: CPT | Mod: CPTII,S$GLB,, | Performed by: INTERNAL MEDICINE

## 2022-12-12 PROCEDURE — 3288F PR FALLS RISK ASSESSMENT DOCUMENTED: ICD-10-PCS | Mod: CPTII,S$GLB,, | Performed by: INTERNAL MEDICINE

## 2022-12-12 PROCEDURE — 1159F MED LIST DOCD IN RCRD: CPT | Mod: CPTII,S$GLB,, | Performed by: INTERNAL MEDICINE

## 2022-12-12 PROCEDURE — 3078F DIAST BP <80 MM HG: CPT | Mod: CPTII,S$GLB,, | Performed by: INTERNAL MEDICINE

## 2022-12-12 PROCEDURE — 99999 PR PBB SHADOW E&M-EST. PATIENT-LVL IV: ICD-10-PCS | Mod: PBBFAC,,, | Performed by: INTERNAL MEDICINE

## 2022-12-12 PROCEDURE — 3072F LOW RISK FOR RETINOPATHY: CPT | Mod: CPTII,S$GLB,, | Performed by: INTERNAL MEDICINE

## 2022-12-12 RX ORDER — HYDRALAZINE HYDROCHLORIDE 100 MG/1
100 TABLET, FILM COATED ORAL EVERY 8 HOURS
Qty: 270 TABLET | Refills: 3 | Status: SHIPPED | OUTPATIENT
Start: 2022-12-12 | End: 2023-12-18

## 2022-12-12 RX ORDER — OXYCODONE HYDROCHLORIDE 15 MG/1
15 TABLET ORAL
COMMUNITY
Start: 2022-11-28

## 2022-12-12 RX ORDER — ASPIRIN 325 MG
TABLET, DELAYED RELEASE (ENTERIC COATED) ORAL
COMMUNITY
Start: 2022-11-28 | End: 2022-12-12 | Stop reason: SDUPTHER

## 2022-12-12 RX ORDER — TOPIRAMATE 100 MG/1
100 TABLET, FILM COATED ORAL DAILY
COMMUNITY
Start: 2022-09-26

## 2022-12-12 NOTE — PROGRESS NOTES
Subjective:   12/12/2022     Patient ID:  Neena Kohli is a 67 y.o. male who presents for evaulation of Hypertension, Coronary Artery Disease, Peripheral Arterial Disease, Hyperlipidemia, and Old MI      Patient comes in with a history of heart problems.  He had an episode of chest pain last year and was seen in the emergency room at [facility].  His initial cardiac evaluation was negative and he was discharged.  A stress test was subsequently performed.     ·  Normal myocardial perfusion scan. There is no evidence of myocardial ischemia or infarction.  ·  The gated perfusion images showed an ejection fraction of 63% at rest. The gated perfusion images showed an ejection fraction of 72% post stress. Normal ejection fraction is greater than 51%.  ·  There is normal wall motion at rest and post stress.  ·  LV cavity size is normal at rest and normal at stress.  ·  The EKG portion of this study is negative for ischemia.  ·  The patient reported no chest pain during the stress test.  ·  During stress, rare PACs are noted.  ·  There are no prior studies for comparison.      He has not had further chest pain since that time.  He did have prior heart attacks, prior coronary stents.  These were done [facility].    Records obtained from [facility] include an office visit with Dr. Putnam in 2020.  It was noted that he had coronary disease and was status post right coronary artery bare metal stent.  He developed a late stent thrombosis treated with PTCA in January of 2010. In 2016 he had abnormal Lexiscan stress test and was treated with medical therapy.  He had had a syncopal episode last year, head CT was benign.  Orthostatics were negative.  His troponin was negative.  Carvedilol was discontinued because of bradycardia.  A 30 day event detector was pending results at that time.  On that visit, he was sent for an adenosine stress test, and a tilt test.  The results of those tests are unknown.    His activity is quite  limited because of severe peripheral arterial disease.  He notes that he is status post peripheral stents, he does not know who put those in.  These were done [place]. He continues to have right leg pain with activity, 1 block walking.  The pain is more in the upper thigh then the calf.  This is unchanged.    Hypertension is present, his blood pressure appears elevated on current medications.    He does have chronic kidney disease, stage III, will try to avoid nephrotoxic agents including angiographic contrast.  Followed by Nephrology.    Hypercholesterolemia is present.  His triglycerides are normal.  Fenofibrate was discontinued, pravastatin dose increased, his last LDL was 36.  Needs to be rechecked    He has resumed smoking cigarettes.  Smoking cessation strongly stressed    Mild aortic valve stenosis present noted on recent echocardiography:  · The left ventricle is normal in size with normal systolic function.  · The estimated ejection fraction is 70%.  · Indeterminate left ventricular diastolic function.  · The estimated PA systolic pressure is 28 mmHg.  · Normal right ventricular size with normal right ventricular systolic function.  · Normal central venous pressure (3 mmHg).  · There is no pulmonary hypertension.  · There is mild aortic valve sclerosis without stenosis              Past Medical History:   Diagnosis Date    Arthritis     Cancer     Prostate    Diabetes mellitus     Hyperthyroidism     PAD (peripheral artery disease)        Review of patient's allergies indicates:  No Known Allergies      Current Outpatient Medications:     allopurinoL (ZYLOPRIM) 100 MG tablet, Take 100 mg by mouth once daily., Disp: , Rfl:     amLODIPine (NORVASC) 10 MG tablet, TAKE 1 TABLET EVERY DAY, Disp: 90 tablet, Rfl: 3    aspirin (ECOTRIN) 81 MG EC tablet, Take 81 mg by mouth once daily., Disp: , Rfl:     buPROPion (WELLBUTRIN) 75 MG tablet, TAKE 1 TABLET EVERY DAY, Disp: 90 tablet, Rfl: 0    carvediloL (COREG)  25 MG tablet, TAKE 1 TABLET (25 MG ) BY MOUTH 2 (TWO) TIMES DAILY WITH MEALS. (Patient taking differently: Take 25 mg by mouth 2 (two) times daily.), Disp: 180 tablet, Rfl: 3    citalopram (CELEXA) 20 MG tablet, Take 20 mg by mouth once daily., Disp: , Rfl:     ergocalciferol (ERGOCALCIFEROL) 50,000 unit Cap, Take 50,000 Units by mouth every 7 days., Disp: , Rfl:     glimepiride (AMARYL) 1 MG tablet, TAKE 2 TABLETS EVERY DAY, Disp: 180 tablet, Rfl: 1    irbesartan (AVAPRO) 300 MG tablet, Take 1 tablet (300 mg total) by mouth once daily., Disp: 90 tablet, Rfl: 0    metFORMIN (GLUCOPHAGE) 1000 MG tablet, Take 1 tablet (1,000 mg total) by mouth 2 (two) times daily with meals., Disp: 180 tablet, Rfl: 1    oxyCODONE (ROXICODONE) 15 MG Tab, Take 15 mg by mouth every 4 to 6 hours as needed., Disp: , Rfl:     pravastatin (PRAVACHOL) 40 MG tablet, TAKE 1 TABLET (40 MG TOTAL) BY MOUTH EVERY EVENING., Disp: 90 tablet, Rfl: 3    propranoloL (INDERAL) 10 MG tablet, Take 10 mg by mouth once daily., Disp: , Rfl:     QUEtiapine (SEROQUEL) 100 MG Tab, Take 100 mg by mouth 2 (two) times daily. At bedtime, Disp: , Rfl:     topiramate (TOPAMAX) 100 MG tablet, Take 100 mg by mouth once daily., Disp: , Rfl:     traZODone (DESYREL) 100 MG tablet, Take 100 mg by mouth every evening., Disp: , Rfl:     TRUE METRIX GLUCOSE TEST STRIP Strp, , Disp: , Rfl:     TRUEPLUS LANCETS 33 gauge Misc, , Disp: , Rfl:     hydrALAZINE (APRESOLINE) 100 MG tablet, Take 1 tablet (100 mg total) by mouth every 8 (eight) hours., Disp: 270 tablet, Rfl: 3    oxyCODONE myristate (XTAMPZA ER) 13.5 mg CSpT, take 1 capsule  by mouth every morning with a full meal as needed for pain, Disp: 30 each, Rfl: 0     Objective:   Review of Systems   Cardiovascular:  Positive for dyspnea on exertion. Negative for chest pain, claudication, cyanosis, irregular heartbeat, leg swelling, near-syncope, orthopnea, palpitations, paroxysmal nocturnal dyspnea and syncope.     Vitals:     12/12/22 1438   BP: (!) 177/64   Pulse: (!) 45         Physical Exam  Vitals reviewed.   Constitutional:       General: He is not in acute distress.     Appearance: He is well-developed.   HENT:      Head: Normocephalic and atraumatic.      Nose: Nose normal.   Eyes:      Conjunctiva/sclera: Conjunctivae normal.      Pupils: Pupils are equal, round, and reactive to light.   Neck:      Vascular: No JVD.   Cardiovascular:      Rate and Rhythm: Normal rate and regular rhythm.      Pulses: Intact distal pulses. Decreased pulses.           Carotid pulses are 2+ on the right side and 3+ on the left side.       Radial pulses are 2+ on the right side and 2+ on the left side.        Femoral pulses are 2+ on the right side and 2+ on the left side.       Popliteal pulses are 1+ on the right side and 1+ on the left side.        Dorsalis pedis pulses are 0 on the right side and 0 on the left side.        Posterior tibial pulses are 0 on the right side and 0 on the left side.      Heart sounds: Normal heart sounds. No murmur heard.    No friction rub. No gallop.   Pulmonary:      Effort: Pulmonary effort is normal. No respiratory distress.      Breath sounds: Normal breath sounds. No wheezing or rales.   Chest:      Chest wall: No tenderness.   Abdominal:      General: Bowel sounds are normal. There is no distension.      Palpations: Abdomen is soft.      Tenderness: There is no abdominal tenderness.   Musculoskeletal:         General: No tenderness or deformity. Normal range of motion.      Cervical back: Normal range of motion and neck supple.   Skin:     General: Skin is warm and dry.      Findings: No erythema or rash.   Neurological:      Mental Status: He is alert and oriented to person, place, and time.      Cranial Nerves: No cranial nerve deficit.      Motor: No abnormal muscle tone.      Coordination: Coordination normal.   Psychiatric:         Behavior: Behavior normal.         Thought Content: Thought content normal.   "       Judgment: Judgment normal.       Hospital Outpatient Visit on 12/07/2022   Component Date Value    BSA 12/07/2022 2.02     TDI SEPTAL 12/07/2022 0.06     LA WIDTH 12/07/2022 4.17     TDI LATERAL 12/07/2022 0.08     PV PEAK VELOCITY 12/07/2022 0.91     LVIDd 12/07/2022 4.50     IVS 12/07/2022 0.90     Posterior Wall 12/07/2022 0.86     LVIDs 12/07/2022 2.56     FS 12/07/2022 43     LA volume 12/07/2022 68.31     Sinus 12/07/2022 3.11     STJ 12/07/2022 2.94     Ascending aorta 12/07/2022 3.14     LV mass 12/07/2022 128.89     LA size 12/07/2022 3.99     TAPSE 12/07/2022 2.38     Left Ventricle Relative * 12/07/2022 0.38     AV mean gradient 12/07/2022 5     AV valve area 12/07/2022 2.18     AV Velocity Ratio 12/07/2022 0.85     AV index (prosthetic) 12/07/2022 0.67     Mean e' 12/07/2022 0.07     MV "A" wave duration 12/07/2022 14.08     Pulm vein S/D ratio 12/07/2022 1.05     LVOT diameter 12/07/2022 2.03     LVOT area 12/07/2022 3.2     LVOT peak trent 12/07/2022 1.32     LVOT peak VTI 12/07/2022 27.45     Ao peak trent 12/07/2022 1.56     Ao VTI 12/07/2022 40.81     RVOT peak trent 12/07/2022 0.58     RVOT peak VTI 12/07/2022 15.32     LVOT stroke volume 12/07/2022 88.80     AV peak gradient 12/07/2022 10     PV mean gradient 12/07/2022 0.81     TR Max [place] 12/07/2022 2.50     PV Peak S [place] 12/07/2022 0.44     PV Peak D [place] 12/07/2022 0.42     LV Systolic Volume 12/07/2022 23.78     LV Systolic Volume Index 12/07/2022 12.0     LV Diastolic Volume 12/07/2022 92.23     LV Diastolic Volume Index 12/07/2022 46.58     LA Volume Index 12/07/2022 34.5     LV Mass Index 12/07/2022 65     RA Major [place] 12/07/2022 4.22     Left Atrium Minor Axis 12/07/2022 4.85     Left Atrium Major Axis 12/07/2022 4.81     Triscuspid Valve Regurgi* 12/07/2022 25     LA Volume Index (Mod) 12/07/2022 28.9     LA volume (mod) 12/07/2022 57.19     RA Width 12/07/2022 2.63     Right Atrial Pressure (f* 12/07/2022 3     EF 12/07/2022 70     TV " rest pulmonary artery* 12/07/2022 28    Orders Only on 09/06/2022   Component Date Value    WBC 09/09/2022 7.4     RBC 09/09/2022 4.18 (L)     Hemoglobin 09/09/2022 12.1 (L)     Hematocrit 09/09/2022 37.4 (L)     MCV 09/09/2022 89.5     MCH 09/09/2022 28.9     MCHC 09/09/2022 32.4     RDW 09/09/2022 12.9     Platelets 09/09/2022 256     MPV 09/09/2022 10.9     Neutrophils, Abs 09/09/2022 4,255     Bands 09/09/2022 CANCELED     Metamyelocytes 09/09/2022 CANCELED     Myelocytes 09/09/2022 CANCELED     Promyelocytes 09/09/2022 CANCELED     Lymph # 09/09/2022 2,242     Mono # 09/09/2022 592     Eos # 09/09/2022 274     Baso # 09/09/2022 37     Blasts Absolute 09/09/2022 CANCELED     Absolute Nucleated RBC 09/09/2022 CANCELED     Neutrophils Relative 09/09/2022 57.5     Bands 09/09/2022 CANCELED     Metamyelocytes Relative 09/09/2022 CANCELED     Myelocytes 09/09/2022 CANCELED     Promyelocytes Relative 09/09/2022 CANCELED     Lymph % 09/09/2022 30.3     Atypical Lymphocytes Rel* 09/09/2022 CANCELED     Mono % 09/09/2022 8.0     Eosinophil % 09/09/2022 3.7     Basophil % 09/09/2022 0.5     Blasts 09/09/2022 CANCELED     Manual nRBC per 100 Cells 09/09/2022 CANCELED     Differential Comment 09/09/2022 CANCELED     Glucose 09/09/2022 80     BUN 09/09/2022 12     Creatinine 09/09/2022 1.38 (H)     eGFR 09/09/2022 56 (L)     BUN/Creatinine Ratio 09/09/2022 9     Sodium 09/09/2022 140     Potassium 09/09/2022 4.2     Chloride 09/09/2022 108     CO2 09/09/2022 25     Calcium 09/09/2022 9.3     Total Protein 09/09/2022 6.8     Albumin 09/09/2022 3.9     Globulin, Total 09/09/2022 2.9     Albumin/Globulin Ratio 09/09/2022 1.3     Total Bilirubin 09/09/2022 0.3     Alkaline Phosphatase 09/09/2022 190 (H)     AST 09/09/2022 8 (L)     ALT 09/09/2022 11     Magnesium 09/09/2022 1.9     Phosphate (as Phosphorus) 09/09/2022 4.0     Color, UA 09/09/2022 YELLOW     Appearance, UA 09/09/2022 CLEAR     Specific Gravity, UA 09/09/2022  1.020     pH, UA 09/09/2022 6.5     Glucose, UA 09/09/2022 NEGATIVE     Bilirubin, UA 09/09/2022 NEGATIVE     Ketones, UA 09/09/2022 NEGATIVE     Occult Blood UA 09/09/2022 NEGATIVE     Protein, UA 09/09/2022 NEGATIVE     Nitrite, UA 09/09/2022 NEGATIVE     Leukocytes, UA 09/09/2022 NEGATIVE     WBC Casts, UA 09/09/2022 NONE SEEN     RBC Casts, UA 09/09/2022 NONE SEEN     Squam Epithel, UA 09/09/2022 NONE SEEN     Trans Epithel, UA 09/09/2022 CANCELED     Renal Epithel, UA 09/09/2022 CANCELED     Bacteria, UA 09/09/2022 NONE SEEN     Ca Oxalate Evy, UA 09/09/2022 CANCELED     Triplephos Evy, UA 09/09/2022 CANCELED     Uric Acid Evy, UA 09/09/2022 CANCELED     Amorphous, UA 09/09/2022 CANCELED     Crystals 09/09/2022 CANCELED     Hyaline Casts, UA 09/09/2022 NONE SEEN     Granular Casts, UA 09/09/2022 CANCELED     Casts 09/09/2022 CANCELED     Yeast, UA 09/09/2022 CANCELED     Comments: 09/09/2022 CANCELED     Service Cmt: 09/09/2022      Creatinine, Urine 09/09/2022 196     Protein/Creatinine Ratio 09/09/2022 66     Protein/Creat Ratio 09/09/2022 0.066     Protein, Total Random Ur* 09/09/2022 13         Assessment and Plan:     Coronary artery disease involving native coronary artery of native heart without angina pectoris  Comments:  Currently asymptomatic    Hypertension, essential  Comments:  Blood pressure quite elevated today, even on recheck.  Increase hydralazine  Orders:  -     hydrALAZINE (APRESOLINE) 100 MG tablet; Take 1 tablet (100 mg total) by mouth every 8 (eight) hours.  Dispense: 270 tablet; Refill: 3    Old MI (myocardial infarction)  Comments:  Left ventricular systolic function preserved    Mixed hyperlipidemia  Comments:  LDL goal less than 50, check lipoprotein small a also  Orders:  -     Comprehensive Metabolic Panel; Future; Expected date: 12/19/2022  -     Lipoprotein A (LPA); Future; Expected date: 12/19/2022  -     Lipid Panel; Future; Expected date: 12/19/2022    PAD (peripheral artery  disease)  Comments:  Not symptomatic currently    Presence of stent in coronary artery    Mild aortic valve stenosis    Stage 3b chronic kidney disease    Tobacco use disorder  Comments:  Strongly supported smoking cessation       Will need follow-up with Nephrology.  Needs a lipid and lipoprotein small a also    No follow-ups on file.

## 2023-01-24 ENCOUNTER — LAB VISIT (OUTPATIENT)
Dept: LAB | Facility: HOSPITAL | Age: 68
End: 2023-01-24
Attending: INTERNAL MEDICINE
Payer: MEDICARE

## 2023-01-24 ENCOUNTER — TELEPHONE (OUTPATIENT)
Dept: CARDIOLOGY | Facility: CLINIC | Age: 68
End: 2023-01-24
Payer: MEDICARE

## 2023-01-24 DIAGNOSIS — E78.2 MIXED HYPERLIPIDEMIA: Chronic | ICD-10-CM

## 2023-01-24 DIAGNOSIS — E78.2 MIXED HYPERLIPIDEMIA: Primary | Chronic | ICD-10-CM

## 2023-01-24 DIAGNOSIS — I25.2 OLD MI (MYOCARDIAL INFARCTION): Chronic | ICD-10-CM

## 2023-01-24 LAB
ALBUMIN SERPL BCP-MCNC: 3.5 G/DL (ref 3.5–5.2)
ALP SERPL-CCNC: 179 U/L (ref 55–135)
ALT SERPL W/O P-5'-P-CCNC: 10 U/L (ref 10–44)
ANION GAP SERPL CALC-SCNC: 9 MMOL/L (ref 8–16)
AST SERPL-CCNC: 14 U/L (ref 10–40)
BILIRUB SERPL-MCNC: 0.3 MG/DL (ref 0.1–1)
BUN SERPL-MCNC: 13 MG/DL (ref 8–23)
CALCIUM SERPL-MCNC: 9.2 MG/DL (ref 8.7–10.5)
CHLORIDE SERPL-SCNC: 105 MMOL/L (ref 95–110)
CHOLEST SERPL-MCNC: 113 MG/DL (ref 120–199)
CHOLEST/HDLC SERPL: 3.5 {RATIO} (ref 2–5)
CO2 SERPL-SCNC: 25 MMOL/L (ref 23–29)
CREAT SERPL-MCNC: 1.5 MG/DL (ref 0.5–1.4)
EST. GFR  (NO RACE VARIABLE): 50.4 ML/MIN/1.73 M^2
GLUCOSE SERPL-MCNC: 140 MG/DL (ref 70–110)
HDLC SERPL-MCNC: 32 MG/DL (ref 40–75)
HDLC SERPL: 28.3 % (ref 20–50)
LDLC SERPL CALC-MCNC: 62.6 MG/DL (ref 63–159)
NONHDLC SERPL-MCNC: 81 MG/DL
POTASSIUM SERPL-SCNC: 4.4 MMOL/L (ref 3.5–5.1)
PROT SERPL-MCNC: 7.3 G/DL (ref 6–8.4)
SODIUM SERPL-SCNC: 139 MMOL/L (ref 136–145)
TRIGL SERPL-MCNC: 92 MG/DL (ref 30–150)

## 2023-01-24 PROCEDURE — 80053 COMPREHEN METABOLIC PANEL: CPT | Mod: HCNC | Performed by: INTERNAL MEDICINE

## 2023-01-24 PROCEDURE — 80061 LIPID PANEL: CPT | Mod: HCNC | Performed by: INTERNAL MEDICINE

## 2023-01-24 PROCEDURE — 36415 COLL VENOUS BLD VENIPUNCTURE: CPT | Mod: HCNC,PO | Performed by: INTERNAL MEDICINE

## 2023-01-24 PROCEDURE — 83695 ASSAY OF LIPOPROTEIN(A): CPT | Mod: HCNC | Performed by: INTERNAL MEDICINE

## 2023-01-24 RX ORDER — ATORVASTATIN CALCIUM 40 MG/1
40 TABLET, FILM COATED ORAL DAILY
Qty: 90 TABLET | Refills: 3 | Status: SHIPPED | OUTPATIENT
Start: 2023-01-24 | End: 2023-09-26

## 2023-01-24 NOTE — TELEPHONE ENCOUNTER
----- Message from Solitario Keller Jr., MD sent at 1/24/2023  3:37 PM CST -----  Please call to review results:  1. Cholesterol less good than it had been previously  2. Renal function stable    I would recommend that he change from pravastatin 40 mg daily to atorvastatin 40 mg daily to obtain a lower LDL cholesterol.    Please let me know if that is acceptable with the patient I will make the change.

## 2023-01-26 LAB — LPA SERPL-MCNC: 38 MG/DL (ref 0–30)

## 2023-05-08 NOTE — TELEPHONE ENCOUNTER
Refill Routing Note   Medication(s) are not appropriate for processing by [facility] for the following reason(s):      Non-participating provider    [facility] action(s):  Route None identified            Appointments  past 12m or future 3m with PCP    Date Provider   Last Visit   1/19/2022 CLAY Ramirez MD   Next Visit   8/17/2023 CLAY Ramirez MD   ED visits in past 90 days: 0        Note composed:5:00 PM 05/08/2023

## 2023-05-09 RX ORDER — METFORMIN HYDROCHLORIDE 1000 MG/1
TABLET ORAL
Qty: 180 TABLET | Refills: 0 | Status: SHIPPED | OUTPATIENT
Start: 2023-05-09 | End: 2023-08-23 | Stop reason: SDUPTHER

## 2023-05-12 ENCOUNTER — TELEPHONE (OUTPATIENT)
Dept: FAMILY MEDICINE | Facility: CLINIC | Age: 68
End: 2023-05-12
Payer: MEDICARE

## 2023-05-15 ENCOUNTER — TELEPHONE (OUTPATIENT)
Dept: FAMILY MEDICINE | Facility: CLINIC | Age: 68
End: 2023-05-15
Payer: MEDICARE

## 2023-06-15 ENCOUNTER — TELEPHONE (OUTPATIENT)
Dept: INTERNAL MEDICINE | Facility: CLINIC | Age: 68
End: 2023-06-15
Payer: MEDICARE

## 2023-06-15 NOTE — TELEPHONE ENCOUNTER
----- Message from Zoe Lafleur sent at 6/15/2023 10:55 AM CDT -----  Contact: Neena   Neena would torie a call back. He said his Blood pressure has been real high He did say he is okay right now. He also wants to get a recommendation for a Nephrologist

## 2023-06-15 NOTE — TELEPHONE ENCOUNTER
Pt requesting a new Nephrologist , other provider no longer practices here .      Pt complaining of high bp , offered an appointment he states he will get back to me . Denies at this time .

## 2023-07-19 DIAGNOSIS — E11.9 TYPE 2 DIABETES MELLITUS WITHOUT COMPLICATION: ICD-10-CM

## 2023-07-26 ENCOUNTER — TELEPHONE (OUTPATIENT)
Dept: INTERNAL MEDICINE | Facility: CLINIC | Age: 68
End: 2023-07-26
Payer: MEDICARE

## 2023-07-26 NOTE — TELEPHONE ENCOUNTER
----- Message from Vidya Kevyn sent at 7/26/2023  9:22 AM CDT -----  Contact: Pt @[phone]  Caller is requesting an earlier appointment then we can schedule.  Caller is requesting a message be sent to the provider.  If this is for urgent care symptoms, did you offer other providers at this location, providers at other locations, or [facility]? (yes, no, n/a):    If this is for the patients physical, did you offer to schedule next available and put on wait list, or to see NP or PA for their physical?  (yes, no, n/a):        When is the next available appointment with their provider:  n/a    Reason for the appointment:  possible gout/ feet swollen       Patient preference of timeframe to be scheduled:   7.26.23    Would the patient like a call back, or a response through their [facility] portal?:   call back     Comments:   Please call back to advise on appt.

## 2023-07-28 ENCOUNTER — OFFICE VISIT (OUTPATIENT)
Dept: URGENT CARE | Facility: CLINIC | Age: 68
End: 2023-07-28
Payer: MEDICARE

## 2023-07-28 VITALS
HEART RATE: 56 BPM | TEMPERATURE: 99 F | DIASTOLIC BLOOD PRESSURE: 73 MMHG | RESPIRATION RATE: 16 BRPM | HEIGHT: 67 IN | BODY MASS INDEX: 30.45 KG/M2 | OXYGEN SATURATION: 97 % | SYSTOLIC BLOOD PRESSURE: 153 MMHG | WEIGHT: 194 LBS

## 2023-07-28 DIAGNOSIS — M10.9 ACUTE GOUT OF LEFT FOOT, UNSPECIFIED CAUSE: Primary | ICD-10-CM

## 2023-07-28 PROCEDURE — 96372 PR INJECTION,THERAP/PROPH/DIAG2ST, IM OR SUBCUT: ICD-10-PCS | Mod: S$GLB,,, | Performed by: FAMILY MEDICINE

## 2023-07-28 PROCEDURE — 99203 OFFICE O/P NEW LOW 30 MIN: CPT | Mod: 25,S$GLB,,

## 2023-07-28 PROCEDURE — 99203 PR OFFICE/OUTPT VISIT, NEW, LEVL III, 30-44 MIN: ICD-10-PCS | Mod: 25,S$GLB,,

## 2023-07-28 PROCEDURE — 96372 THER/PROPH/DIAG INJ SC/IM: CPT | Mod: S$GLB,,, | Performed by: FAMILY MEDICINE

## 2023-07-28 RX ORDER — PREDNISONE 20 MG/1
20 TABLET ORAL DAILY
Qty: 5 TABLET | Refills: 0 | Status: SHIPPED | OUTPATIENT
Start: 2023-07-28 | End: 2023-08-02

## 2023-07-28 RX ORDER — ASPIRIN 325 MG
50000 TABLET, DELAYED RELEASE (ENTERIC COATED) ORAL
COMMUNITY
Start: 2023-07-21

## 2023-07-28 RX ORDER — KETOROLAC TROMETHAMINE 30 MG/ML
15 INJECTION, SOLUTION INTRAMUSCULAR; INTRAVENOUS
Status: COMPLETED | OUTPATIENT
Start: 2023-07-28 | End: 2023-07-28

## 2023-07-28 RX ADMIN — KETOROLAC TROMETHAMINE 15 MG: 30 INJECTION, SOLUTION INTRAMUSCULAR; INTRAVENOUS at 12:07

## 2023-07-28 NOTE — PATIENT INSTRUCTIONS
Start prednisone and take as directed. Take it in the morning so it does not keep you up at night. Take with food and water. This medication can increase your BP and sugar level. Please monitor and eat a well balanced diet. Avoid high salt content foods and eat more foods with potassium, magnesium and calcium.     Make sure you are drinking plenty of water per day with a nutritious diet. A Mediterranean diet helps decrease inflammation. Elevate your foot today and intermittently apply an ice pack. Avoid alcohol, red meat, shellfish and sweetened drinks.     The recommended daily fluid intake for men is 3.7 liters (seven 16 oz bottles).    Please follow up with your PCP for continued/worsening symptoms in a few days.       ([facility] recommendation)  The Mediterranean diet is based on plant-based foods and healthy fats.     You eat LOTS of vegetables, fruits, beans, lentils, nuts, whole grains (wheat bread, brown rice) & extra virgin olive oil.   ---- These foods are high in fiber and antioxidants.   ---- These nutrients help reduce inflammation.   ---- Fiber helps regulate bowel movements.   ---- Antioxidants protect you against cancer.     Eat a moderate amount of fish (salmon, herring, mackerel, sardines, anchovies, halibut, rainbow trout, tuna)   ---- (fish are high in omega-3 fatty acids)    Eat a moderate amount of cheese and yogurt.    Eat little or no meat-- eat more poultry (baked chicken, grilled chicken, ground turkey)  ---- avoid red meat and pork (causes inflammation and linked to colon cancer)    Eat little or no sweats, sugary drinks or butter.     Limit your sodium intake. A diet high in salt can increase your blood pressure and predisposes you to a heart attack or stroke.     BENEFITS OF DIET  --- Lowers your risk of cardiovascular disease (heart attack, stroke) and many other diseases.   --- Supports a healthy body weight.   --- Supports a healthy blood sugar, blood pressure and cholesterol.   ---  Lowers your risk of metabolic syndrome   --- Supports a heathy balance of good gut bacteria in your digestive system.   --- Lowers your risk for cancer.   --- Helps with brain function and slows decline as we age.   --- Helps you live longer.     Talk to your primary care doctor about a dietician referral for further guidance.

## 2023-07-28 NOTE — PROGRESS NOTES
"Subjective:      Patient ID: Neena Kohli is a 67 y.o. male.    Vitals:  height is 5' 7" (1.702 m) and weight is 88 kg (194 lb). His oral temperature is 98.8 °F (37.1 °C). His blood pressure is 153/73 (abnormal) and his pulse is 56 (abnormal). His respiration is 16 and oxygen saturation is 97%.     Chief Complaint: Gout (BOTH FEET *mostly LEFT)    Pt is coming in today for gout flair up in both feet mostly left foot that started Monday. He is scheduled to see his primary NP on the 31st but he has been in excruciating pain and ran out of his medication that he usually takes for gout flair up. Noticeable swelling of the LEFT foot.     Constitution: Negative for chills, fatigue and fever.   Musculoskeletal:  Positive for pain and joint swelling.   Skin:  Positive for erythema.    Objective:     Physical Exam   Constitutional: He is oriented to person, place, and time. He appears well-developed.   HENT:   Head: Normocephalic and atraumatic. Head is without abrasion, without contusion and without laceration.   Ears:   Right Ear: External ear normal.   Left Ear: External ear normal.   Nose: Nose normal.   Mouth/Throat: Oropharynx is clear and moist and mucous membranes are normal.   Eyes: EOM and lids are normal.   Neck: Trachea normal and phonation normal. Neck supple.   Cardiovascular: Normal rate, regular rhythm and normal heart sounds.   Pulmonary/Chest: Effort normal and breath sounds normal. No stridor. No respiratory distress.   Musculoskeletal: Normal range of motion.         General: Normal range of motion.        Feet:    Neurological: He is alert and oriented to person, place, and time.   Skin: Skin is warm, dry, intact and no rash. Capillary refill takes less than 2 seconds. erythema No abrasion, No burn, No bruising and No ecchymosis   Psychiatric: His speech is normal and behavior is normal. Judgment and thought content normal.   Nursing note and vitals reviewed.    Assessment:     1. Acute gout of left " foot, unspecified cause        Plan:       Acute gout of left foot, unspecified cause  -     ketorolac injection 15 mg  -     predniSONE (DELTASONE) 20 MG tablet; Take 1 tablet (20 mg total) by mouth once daily. for 5 days  Dispense: 5 tablet; Refill: 0            Discussed results/diagnosis/plan with patient in clinic. Strict precautions given to patient to monitor for worsening signs and symptoms. Advised to follow up with PCP or specialist.  Explained side effects of medications prescribed with patient and informed him/her to discontinue use if he/she has any side effects and to inform UC or PCP if this occurs. All questions answered. Strict ED verses clinic return precautions stressed and given in depth. Advised if symptoms worsens of fail to improve he/she should go to the Emergency Room. Discharge and follow-up instructions given verbally/printed with the patient who expressed understanding and willingness to comply with my recommendations. Patient voiced understanding and in agreement with current treatment plan. Patient exits the exam room in no acute distress. Conversant and engaged during discharge discussion, verbalized understanding.

## 2023-07-31 ENCOUNTER — PATIENT OUTREACH (OUTPATIENT)
Dept: ADMINISTRATIVE | Facility: HOSPITAL | Age: 68
End: 2023-07-31
Payer: MEDICARE

## 2023-07-31 NOTE — PROGRESS NOTES
Health Maintenance Due   Topic Date Due    TETANUS VACCINE  Never done    Shingles Vaccine (1 of 2) Never done    Colorectal Cancer Screening  Never done    Abdominal Aortic Aneurysm Screening  Never done    Diabetes Urine Screening  12/15/2021    Pneumococcal Vaccines (Age 65+) (3 - PCV) 01/15/2022    Eye Exam  03/04/2022    COVID-19 Vaccine (4 - Moderna series) 03/24/2022    Foot Exam  05/21/2022     Chart reviewed.   Immunizations: Reconciled  Orders placed: N/A  Upcoming appts to satisfy MIKAELA topics: N/A

## 2023-08-01 RX ORDER — BUPROPION HYDROCHLORIDE 75 MG/1
TABLET ORAL
Qty: 90 TABLET | Refills: 0 | Status: SHIPPED | OUTPATIENT
Start: 2023-08-01

## 2023-08-23 ENCOUNTER — LAB VISIT (OUTPATIENT)
Dept: LAB | Facility: HOSPITAL | Age: 68
End: 2023-08-23
Payer: MEDICARE

## 2023-08-23 ENCOUNTER — OFFICE VISIT (OUTPATIENT)
Dept: INTERNAL MEDICINE | Facility: CLINIC | Age: 68
End: 2023-08-23
Payer: MEDICARE

## 2023-08-23 VITALS
RESPIRATION RATE: 20 BRPM | DIASTOLIC BLOOD PRESSURE: 58 MMHG | TEMPERATURE: 98 F | OXYGEN SATURATION: 97 % | HEART RATE: 60 BPM | SYSTOLIC BLOOD PRESSURE: 148 MMHG | HEIGHT: 67 IN | BODY MASS INDEX: 29.18 KG/M2 | WEIGHT: 185.94 LBS

## 2023-08-23 DIAGNOSIS — Z23 NEED FOR PNEUMOCOCCAL VACCINATION: ICD-10-CM

## 2023-08-23 DIAGNOSIS — Z23 NEED FOR SHINGLES VACCINE: ICD-10-CM

## 2023-08-23 DIAGNOSIS — N18.31 TYPE 2 DIABETES MELLITUS WITH STAGE 3A CHRONIC KIDNEY DISEASE, WITHOUT LONG-TERM CURRENT USE OF INSULIN: ICD-10-CM

## 2023-08-23 DIAGNOSIS — I25.119 CORONARY ARTERY DISEASE INVOLVING NATIVE CORONARY ARTERY OF NATIVE HEART WITH ANGINA PECTORIS: ICD-10-CM

## 2023-08-23 DIAGNOSIS — Z12.11 COLON CANCER SCREENING: ICD-10-CM

## 2023-08-23 DIAGNOSIS — E11.9 TYPE 2 DIABETES MELLITUS WITHOUT COMPLICATION: ICD-10-CM

## 2023-08-23 DIAGNOSIS — Z13.6 SCREENING FOR AAA (ABDOMINAL AORTIC ANEURYSM): ICD-10-CM

## 2023-08-23 DIAGNOSIS — C61 CARCINOMA OF PROSTATE: ICD-10-CM

## 2023-08-23 DIAGNOSIS — M10.9 GOUT, UNSPECIFIED CAUSE, UNSPECIFIED CHRONICITY, UNSPECIFIED SITE: ICD-10-CM

## 2023-08-23 DIAGNOSIS — N18.32 STAGE 3B CHRONIC KIDNEY DISEASE: ICD-10-CM

## 2023-08-23 DIAGNOSIS — F33.2 MAJOR DEPRESSIVE DISORDER, RECURRENT SEVERE WITHOUT PSYCHOTIC FEATURES: ICD-10-CM

## 2023-08-23 DIAGNOSIS — Z12.5 ENCOUNTER FOR SCREENING FOR MALIGNANT NEOPLASM OF PROSTATE: ICD-10-CM

## 2023-08-23 DIAGNOSIS — I10 HYPERTENSION, UNSPECIFIED TYPE: ICD-10-CM

## 2023-08-23 DIAGNOSIS — F17.200 SMOKING: ICD-10-CM

## 2023-08-23 DIAGNOSIS — E11.22 TYPE 2 DIABETES MELLITUS WITH STAGE 3A CHRONIC KIDNEY DISEASE, WITHOUT LONG-TERM CURRENT USE OF INSULIN: ICD-10-CM

## 2023-08-23 DIAGNOSIS — Z00.01 ANNUAL VISIT FOR GENERAL ADULT MEDICAL EXAMINATION WITH ABNORMAL FINDINGS: Primary | ICD-10-CM

## 2023-08-23 DIAGNOSIS — Z23 NEED FOR DIPHTHERIA, TETANUS, PERTUSSIS, AND HIB VACCINATION: ICD-10-CM

## 2023-08-23 DIAGNOSIS — E11.9 TYPE 2 DIABETES MELLITUS WITHOUT COMPLICATION, WITHOUT LONG-TERM CURRENT USE OF INSULIN: ICD-10-CM

## 2023-08-23 LAB
ALBUMIN/CREAT UR: 5.5 UG/MG (ref 0–30)
BASOPHILS # BLD AUTO: 0.05 K/UL (ref 0–0.2)
BASOPHILS NFR BLD: 0.6 % (ref 0–1.9)
CREAT UR-MCNC: 220 MG/DL (ref 23–375)
DIFFERENTIAL METHOD: ABNORMAL
EOSINOPHIL # BLD AUTO: 0.4 K/UL (ref 0–0.5)
EOSINOPHIL NFR BLD: 5.1 % (ref 0–8)
ERYTHROCYTE [DISTWIDTH] IN BLOOD BY AUTOMATED COUNT: 13.5 % (ref 11.5–14.5)
HCT VFR BLD AUTO: 41.7 % (ref 40–54)
HGB BLD-MCNC: 12.7 G/DL (ref 14–18)
IMM GRANULOCYTES # BLD AUTO: 0.03 K/UL (ref 0–0.04)
IMM GRANULOCYTES NFR BLD AUTO: 0.4 % (ref 0–0.5)
LYMPHOCYTES # BLD AUTO: 2.2 K/UL (ref 1–4.8)
LYMPHOCYTES NFR BLD: 26.3 % (ref 18–48)
MCH RBC QN AUTO: 29.6 PG (ref 27–31)
MCHC RBC AUTO-ENTMCNC: 30.5 G/DL (ref 32–36)
MCV RBC AUTO: 97 FL (ref 82–98)
MICROALBUMIN UR DL<=1MG/L-MCNC: 12 UG/ML
MONOCYTES # BLD AUTO: 0.8 K/UL (ref 0.3–1)
MONOCYTES NFR BLD: 9.4 % (ref 4–15)
NEUTROPHILS # BLD AUTO: 4.8 K/UL (ref 1.8–7.7)
NEUTROPHILS NFR BLD: 58.2 % (ref 38–73)
NRBC BLD-RTO: 0 /100 WBC
PLATELET # BLD AUTO: 286 K/UL (ref 150–450)
PMV BLD AUTO: 11.1 FL (ref 9.2–12.9)
RBC # BLD AUTO: 4.29 M/UL (ref 4.6–6.2)
WBC # BLD AUTO: 8.28 K/UL (ref 3.9–12.7)

## 2023-08-23 PROCEDURE — G0009 ADMIN PNEUMOCOCCAL VACCINE: HCPCS | Mod: 59,S$GLB,, | Performed by: INTERNAL MEDICINE

## 2023-08-23 PROCEDURE — 3078F PR MOST RECENT DIASTOLIC BLOOD PRESSURE < 80 MM HG: ICD-10-PCS | Mod: CPTII,GC,S$GLB,

## 2023-08-23 PROCEDURE — 85025 COMPLETE CBC W/AUTO DIFF WBC: CPT

## 2023-08-23 PROCEDURE — 90732 PNEUMOCOCCAL POLYSACCHARIDE VACCINE 23-VALENT =>2YO SQ IM: ICD-10-PCS | Mod: S$GLB,,, | Performed by: INTERNAL MEDICINE

## 2023-08-23 PROCEDURE — 3288F FALL RISK ASSESSMENT DOCD: CPT | Mod: CPTII,GC,S$GLB,

## 2023-08-23 PROCEDURE — 4010F PR ACE/ARB THEARPY RXD/TAKEN: ICD-10-PCS | Mod: CPTII,GC,S$GLB,

## 2023-08-23 PROCEDURE — 3061F NEG MICROALBUMINURIA REV: CPT | Mod: CPTII,GC,S$GLB,

## 2023-08-23 PROCEDURE — 3066F NEPHROPATHY DOC TX: CPT | Mod: CPTII,GC,S$GLB,

## 2023-08-23 PROCEDURE — 90714 TD VACCINE GREATER THAN OR EQUAL TO 7YO PRESERVATIVE FREE IM: ICD-10-PCS | Mod: S$GLB,,, | Performed by: INTERNAL MEDICINE

## 2023-08-23 PROCEDURE — 4010F ACE/ARB THERAPY RXD/TAKEN: CPT | Mod: CPTII,GC,S$GLB,

## 2023-08-23 PROCEDURE — 3061F PR NEG MICROALBUMINURIA RESULT DOCUMENTED/REVIEW: ICD-10-PCS | Mod: CPTII,GC,S$GLB,

## 2023-08-23 PROCEDURE — 82570 ASSAY OF URINE CREATININE: CPT | Performed by: INTERNAL MEDICINE

## 2023-08-23 PROCEDURE — 1101F PR PT FALLS ASSESS DOC 0-1 FALLS W/OUT INJ PAST YR: ICD-10-PCS | Mod: CPTII,GC,S$GLB,

## 2023-08-23 PROCEDURE — 1159F PR MEDICATION LIST DOCUMENTED IN MEDICAL RECORD: ICD-10-PCS | Mod: CPTII,GC,S$GLB,

## 2023-08-23 PROCEDURE — 3077F SYST BP >= 140 MM HG: CPT | Mod: CPTII,GC,S$GLB,

## 2023-08-23 PROCEDURE — 36415 COLL VENOUS BLD VENIPUNCTURE: CPT | Mod: PO

## 2023-08-23 PROCEDURE — 90732 PPSV23 VACC 2 YRS+ SUBQ/IM: CPT | Mod: S$GLB,,, | Performed by: INTERNAL MEDICINE

## 2023-08-23 PROCEDURE — 90471 TD VACCINE GREATER THAN OR EQUAL TO 7YO PRESERVATIVE FREE IM: ICD-10-PCS | Mod: S$GLB,,, | Performed by: INTERNAL MEDICINE

## 2023-08-23 PROCEDURE — G0009 PNEUMOCOCCAL POLYSACCHARIDE VACCINE 23-VALENT =>2YO SQ IM: ICD-10-PCS | Mod: 59,S$GLB,, | Performed by: INTERNAL MEDICINE

## 2023-08-23 PROCEDURE — 90471 IMMUNIZATION ADMIN: CPT | Mod: S$GLB,,, | Performed by: INTERNAL MEDICINE

## 2023-08-23 PROCEDURE — 84153 ASSAY OF PSA TOTAL: CPT

## 2023-08-23 PROCEDURE — 1159F MED LIST DOCD IN RCRD: CPT | Mod: CPTII,GC,S$GLB,

## 2023-08-23 PROCEDURE — 3077F PR MOST RECENT SYSTOLIC BLOOD PRESSURE >= 140 MM HG: ICD-10-PCS | Mod: CPTII,GC,S$GLB,

## 2023-08-23 PROCEDURE — 1101F PT FALLS ASSESS-DOCD LE1/YR: CPT | Mod: CPTII,GC,S$GLB,

## 2023-08-23 PROCEDURE — 3008F PR BODY MASS INDEX (BMI) DOCUMENTED: ICD-10-PCS | Mod: CPTII,GC,S$GLB,

## 2023-08-23 PROCEDURE — 90714 TD VACC NO PRESV 7 YRS+ IM: CPT | Mod: S$GLB,,, | Performed by: INTERNAL MEDICINE

## 2023-08-23 PROCEDURE — 3066F PR DOCUMENTATION OF TREATMENT FOR NEPHROPATHY: ICD-10-PCS | Mod: CPTII,GC,S$GLB,

## 2023-08-23 PROCEDURE — 99999 PR PBB SHADOW E&M-EST. PATIENT-LVL V: ICD-10-PCS | Mod: PBBFAC,GC,,

## 2023-08-23 PROCEDURE — 1125F AMNT PAIN NOTED PAIN PRSNT: CPT | Mod: CPTII,GC,S$GLB,

## 2023-08-23 PROCEDURE — 99999 PR PBB SHADOW E&M-EST. PATIENT-LVL V: CPT | Mod: PBBFAC,GC,,

## 2023-08-23 PROCEDURE — 99214 OFFICE O/P EST MOD 30 MIN: CPT | Mod: 25,GC,S$GLB,

## 2023-08-23 PROCEDURE — 3288F PR FALLS RISK ASSESSMENT DOCUMENTED: ICD-10-PCS | Mod: CPTII,GC,S$GLB,

## 2023-08-23 PROCEDURE — 3078F DIAST BP <80 MM HG: CPT | Mod: CPTII,GC,S$GLB,

## 2023-08-23 PROCEDURE — 99214 PR OFFICE/OUTPT VISIT, EST, LEVL IV, 30-39 MIN: ICD-10-PCS | Mod: 25,GC,S$GLB,

## 2023-08-23 PROCEDURE — 1125F PR PAIN SEVERITY QUANTIFIED, PAIN PRESENT: ICD-10-PCS | Mod: CPTII,GC,S$GLB,

## 2023-08-23 PROCEDURE — 3008F BODY MASS INDEX DOCD: CPT | Mod: CPTII,GC,S$GLB,

## 2023-08-23 RX ORDER — QUETIAPINE FUMARATE 100 MG/1
100 TABLET, FILM COATED ORAL 2 TIMES DAILY
Qty: 60 TABLET | Refills: 2 | Status: SHIPPED | OUTPATIENT
Start: 2023-08-23

## 2023-08-23 RX ORDER — METFORMIN HYDROCHLORIDE 1000 MG/1
1000 TABLET ORAL 2 TIMES DAILY WITH MEALS
Qty: 180 TABLET | Refills: 0 | Status: SHIPPED | OUTPATIENT
Start: 2023-08-23 | End: 2023-12-20 | Stop reason: SDUPTHER

## 2023-08-23 RX ORDER — IRBESARTAN 300 MG/1
300 TABLET ORAL DAILY
Qty: 90 TABLET | Refills: 0 | Status: SHIPPED | OUTPATIENT
Start: 2023-08-23 | End: 2023-12-18 | Stop reason: SDUPTHER

## 2023-08-23 RX ORDER — ALLOPURINOL 100 MG/1
100 TABLET ORAL DAILY
Qty: 90 TABLET | Refills: 0 | Status: SHIPPED | OUTPATIENT
Start: 2023-08-23 | End: 2023-12-18 | Stop reason: SDUPTHER

## 2023-08-23 RX ORDER — CARVEDILOL 25 MG/1
25 TABLET ORAL 2 TIMES DAILY
Qty: 180 TABLET | Refills: 3 | Status: SHIPPED | OUTPATIENT
Start: 2023-08-23

## 2023-08-23 RX ORDER — AMLODIPINE BESYLATE 10 MG/1
10 TABLET ORAL DAILY
Qty: 90 TABLET | Refills: 2 | Status: SHIPPED | OUTPATIENT
Start: 2023-08-23 | End: 2024-08-22

## 2023-08-23 RX ORDER — VARENICLINE TARTRATE 1 MG/1
1 TABLET, FILM COATED ORAL DAILY
Qty: 30 TABLET | Refills: 2 | Status: CANCELLED | OUTPATIENT
Start: 2023-08-23

## 2023-08-23 NOTE — PROGRESS NOTES
CC: ANNUAL PE    Mr. Neena Kohli is a 67 y.o. male w/ an active medical problem list including HTN, T2DM, CKD, MDD, DLD, PAD, CAD. Follows with nephrology, Cardiology. Presents for Annual PE    Recently went to urgent care in late July for gout flare, was given prednisone and ketorolac.       Current meds include   Current Outpatient Medications   Medication Instructions    allopurinoL (ZYLOPRIM) 100 mg, Oral, Daily    amLODIPine (NORVASC) 10 MG tablet TAKE 1 TABLET EVERY DAY    aspirin (ECOTRIN) 81 mg, Oral, Daily    atorvastatin (LIPITOR) 40 mg, Oral, Daily    buPROPion (WELLBUTRIN) 75 MG tablet TAKE 1 TABLET EVERY DAY    carvediloL (COREG) 25 MG tablet TAKE 1 TABLET (25 MG ) BY MOUTH 2 (TWO) TIMES DAILY WITH MEALS.    cholecalciferol (vitamin D3) 50,000 Units, Oral, Every 14 days    citalopram (CELEXA) 20 mg, Oral, Daily    ergocalciferol (ERGOCALCIFEROL) 50,000 Units, Oral, Every 7 days    glimepiride (AMARYL) 1 MG tablet TAKE 2 TABLETS EVERY DAY    hydrALAZINE (APRESOLINE) 100 mg, Oral, Every 8 hours    irbesartan (AVAPRO) 300 mg, Oral, Daily    metFORMIN (GLUCOPHAGE) 1000 MG tablet TAKE 1 TABLET BY MOUTH TWICE DAILY WITH MEALS    oxyCODONE (ROXICODONE) 15 mg, Oral, Every 4-6 hours PRN    oxyCODONE myristate (XTAMPZA ER) 13.5 mg CSpT take 1 capsule  by mouth every morning with a full meal as needed for pain    propranoloL (INDERAL) 10 mg, Oral, Daily    QUEtiapine (SEROQUEL) 100 mg, Oral, 2 times daily, At bedtime     topiramate (TOPAMAX) 100 mg, Oral, Daily    traZODone (DESYREL) 100 mg, Oral, Nightly    TRUE METRIX GLUCOSE TEST STRIP Strp No dose, route, or frequency recorded.    TRUEPLUS LANCETS 33 gauge Misc No dose, route, or frequency recorded.           Nonsmoker  Social ETOH      HEALTH MAINTENANCE:  Cholesterol/lab: Last lipid panel in Jan 2023   - Cholesterol 113, TGL 92, HDLL 32, LDL 62.3   - on atorvastatin 40mg  C-scope: Colonoscopy  due  AAA screening: due  PSA/prostate: due  DM urine  screening: due  A1c: due  EYE exam: Done  FOOT exam: due  VACCINATIONS:   Td: due  Pneumovax: due  Shingles:: dose 1/2 due    MEDCARD: Reviewed    ROS:  No fever, chills, or night sweats  No visual disturbance or d/c  No ear or sinus pain or pressure  No dysphagia or early satiety  No chest pain or palpitations  No cough or wheezing  No GERD or abdominal pain  No change in bowels or blood in stool  No hematuria or dysuria;  + nocturia  No difficulty starting or stopping urinary stream  No skin rashes or lesions  No joint swelling or erythema  No unusual HA's or focal deficits  No cold or heat intolerance  No increased thirst or urination  No unusual bruising or bleeding    ADLs: no limitations, 100% independent  Advance directive, living will:   Memory:   Mental health:   Safety:   Gait: wnl, no falls  Diet:   Exercise:  Urinary incontinence:    PMHX:  Past Medical History:   Diagnosis Date    Arthritis     Cancer     Prostate    Diabetes mellitus     Hyperthyroidism     PAD (peripheral artery disease)        PSHX:   Past Surgical History:   Procedure Laterality Date    PROSTATE SURGERY         SHX:   Social Connections: Not on file         PHYSICAL EXAM:  VS: As noted  GENERAL: Well developed well nourished, alert and oriented, NAD  Conversant and co-operative.  EYES: Conjunctiva/lids unremarkable, sclera anicteric, PERRL, EOMI  ENT: Hearing intact. Canals free of cerumen, TM's unremarkable;  Nasal mucosa/turbinates/septum unremarkable  Oropharynx w/o lesions or exudate. No stridor.  Sinus nontender  NECK: Supple w/o lymphadenopathy or thyromegaly  RESPIRATORY: Efforts unlabored, lungs CTAP  CARDIOVASCULAR: Heart RRR, w/o mumur, gallop, or rub. No carotid bruits noted  1+ pedal pulses. No LE edema noted  GASTROINTESTINAL: Bowel sounds +, soft, nontender, nondistended,   no hepatosplenomegaly  Noted  Rectum: No lesions noted. Heme negative  : Prostate nontender, w/o asymmetry or nodules noted  MUSCULOSKELETAL:  Gait normal. No CCE.  NEUROLOGY: CARMONA. No tremor noted.    IMPRESSION:    Mr. Neena Kohli is a 67 y.o. male w/ an active medical problem list including     Colon cancer screening  - colonoscopy ordered    Need for pneumococcal vaccination  - 23 valent final dose administered    Need for diphtheria, tetanus, vaccination  - Td vaccine today    Screening for AAA (abdominal aortic aneurysm)  - Aortic ultrasound ordered    Type 2 diabetes mellitus without complication, without long-term current use of insulin  - foot exam  - A1c  - Optho referral  - microalbumin/Cr  - continue glimepiride  - refilled metformin 1000mg BID    Need for Shingles Vaccine  - pt to receive at pharmacy    Hypertension  - unclear compliance with amlodipine 10mg, refilling  - not taking carvedilol, refilled  - refill irbesartan    Gout  - out of allopurinol, refilling today    Smoking  - smoking cessation program.    CKD3b   - lost to nephrology follow-up, referral placed    MDD  - continue following with psych  - continue SSRI, trazodone,     Prostate Screening  PSA    CAD  - continue ASA, statin    Time spent today: > 30 minutes on H&P, MRR, and MDM        Health Maintenance Due   Topic Date Due    TETANUS VACCINE  Never done    Shingles Vaccine (1 of 2) Never done    Colorectal Cancer Screening  Never done    Abdominal Aortic Aneurysm Screening  Never done    Diabetes Urine Screening  12/15/2021    Pneumococcal Vaccines (Age 65+) (3 - PCV) 01/15/2022    Eye Exam  03/04/2022    COVID-19 Vaccine (4 - Moderna series) 03/24/2022    Foot Exam  05/21/2022     Chantel Calle MD  Internal Medicine PGY-2  [facility]

## 2023-08-23 NOTE — PATIENT INSTRUCTIONS
Medication list:  Blood Pressure  - Irbesartan  - Carvedilol (Coreg) Twice a day  - Amlodipine (Norvasc)  - Hydralazine. Three times a day    Gout  - Allopurinol     Diabetes  - metformin  - glimepiride    Cholesterol  - Atorvastatin  - Baby aspirin    Anxiety/Sleep/Depression  - Quetiapine nightly  - trazodone nightly  - propranolol  - celexa  - Buproprion     Headache  - topiramate twice a day    Pain  - oxycodone (ONLY AS NEEDED)    Need Shingles vaccine, can get [facility]    Need Colonoscopy, will call to schedule    Need Aorta ultrasound, will call to schedule    Need Nephrology, will call to schedule     Quit smoking clinic, will call to schedule.     Check [facility] to schedule the above.

## 2023-08-24 LAB — COMPLEXED PSA SERPL-MCNC: <0.01 NG/ML (ref 0–4)

## 2023-08-25 ENCOUNTER — TELEPHONE (OUTPATIENT)
Dept: INTERNAL MEDICINE | Facility: CLINIC | Age: 68
End: 2023-08-25
Payer: MEDICARE

## 2023-08-25 NOTE — TELEPHONE ENCOUNTER
----- Message from Elvia Glass sent at 8/25/2023  2:18 PM CDT -----  Contact: [phone]  Patient is calling for Medical Advice regarding: Patient was seen on 8/23 and received two injections and he is now having problems with his left leg, please call and advise. He says he is having trouble walking.      Comments: Please call

## 2023-08-28 ENCOUNTER — HOSPITAL ENCOUNTER (OUTPATIENT)
Dept: RADIOLOGY | Facility: HOSPITAL | Age: 68
Discharge: HOME | End: 2023-08-28
Payer: MEDICARE

## 2023-08-28 ENCOUNTER — TELEPHONE (OUTPATIENT)
Dept: INTERNAL MEDICINE | Facility: CLINIC | Age: 68
End: 2023-08-28
Payer: MEDICARE

## 2023-08-28 DIAGNOSIS — Z13.6 SCREENING FOR AAA (ABDOMINAL AORTIC ANEURYSM): ICD-10-CM

## 2023-08-28 PROCEDURE — 76775 US EXAM ABDO BACK WALL LIM: CPT | Mod: 26,,, | Performed by: RADIOLOGY

## 2023-08-28 PROCEDURE — 76775 US ABDOMINAL AORTA: ICD-10-PCS | Mod: 26,,, | Performed by: RADIOLOGY

## 2023-08-28 PROCEDURE — 76775 US EXAM ABDO BACK WALL LIM: CPT | Mod: TC

## 2023-08-28 NOTE — TELEPHONE ENCOUNTER
Received fax from [facility] requesting clarification on the pt's quetipine 100 mg. Two sets of directions: one tablet BID or one tablet at bedtime.  Please clarify.  Pharmacy queued.

## 2023-08-29 NOTE — TELEPHONE ENCOUNTER
Per Dr. Ramirez:  This medication is normally prescribed by his psychiatrist Dr Harry Mars.   Clarify with pt what his current dose is

## 2023-09-01 ENCOUNTER — TELEPHONE (OUTPATIENT)
Dept: INTERNAL MEDICINE | Facility: CLINIC | Age: 68
End: 2023-09-01

## 2023-09-01 NOTE — TELEPHONE ENCOUNTER
[facility] pharmacy needs clarification for Seroquel.   It states 100 mg 2 times a day,  but then also states at bedtime  Pt was seen by Dr Calle on 8/23  Please clarify / advise

## 2023-09-01 NOTE — TELEPHONE ENCOUNTER
----- Message from Johanna Paniagua sent at 9/1/2023  9:12 AM CDT -----  Contact: [facility] called to confirm rx QUEtiapine (SEROQUEL) 100 MG Tab. Please call and advise. [facility] - [place], [place] - [address]   Phone:  [phone]  Fax:  [phone]  Osiel busby

## 2023-09-03 ENCOUNTER — OFFICE VISIT (OUTPATIENT)
Dept: URGENT CARE | Facility: CLINIC | Age: 68
End: 2023-09-03
Payer: MEDICARE

## 2023-09-03 ENCOUNTER — HOSPITAL ENCOUNTER (INPATIENT)
Facility: HOSPITAL | Age: 68
LOS: 5 days | Discharge: HOME | DRG: 418 | End: 2023-09-09
Attending: EMERGENCY MEDICINE | Admitting: HOSPITALIST
Payer: MEDICARE

## 2023-09-03 VITALS
DIASTOLIC BLOOD PRESSURE: 67 MMHG | HEIGHT: 67 IN | HEART RATE: 51 BPM | WEIGHT: 185 LBS | RESPIRATION RATE: 18 BRPM | BODY MASS INDEX: 29.03 KG/M2 | TEMPERATURE: 99 F | SYSTOLIC BLOOD PRESSURE: 195 MMHG | OXYGEN SATURATION: 96 %

## 2023-09-03 DIAGNOSIS — I25.10 CORONARY ARTERY DISEASE INVOLVING NATIVE CORONARY ARTERY OF NATIVE HEART WITHOUT ANGINA PECTORIS: Chronic | ICD-10-CM

## 2023-09-03 DIAGNOSIS — E11.51 DIABETES MELLITUS WITH PERIPHERAL CIRCULATORY DISORDER: ICD-10-CM

## 2023-09-03 DIAGNOSIS — J96.01 ACUTE RESPIRATORY FAILURE WITH HYPOXEMIA: ICD-10-CM

## 2023-09-03 DIAGNOSIS — K80.01 CALCULUS OF GALLBLADDER WITH ACUTE CHOLECYSTITIS AND OBSTRUCTION: Primary | ICD-10-CM

## 2023-09-03 DIAGNOSIS — R10.30 LOWER ABDOMINAL PAIN: ICD-10-CM

## 2023-09-03 DIAGNOSIS — I16.0 HYPERTENSIVE URGENCY: ICD-10-CM

## 2023-09-03 DIAGNOSIS — I10 HYPERTENSION, ESSENTIAL: Chronic | ICD-10-CM

## 2023-09-03 DIAGNOSIS — R19.7 DIARRHEA, UNSPECIFIED TYPE: ICD-10-CM

## 2023-09-03 DIAGNOSIS — Z01.818 PREOPERATIVE CLEARANCE: ICD-10-CM

## 2023-09-03 DIAGNOSIS — G47.33 OBSTRUCTIVE SLEEP APNEA: ICD-10-CM

## 2023-09-03 DIAGNOSIS — R00.1 SINUS BRADYCARDIA: ICD-10-CM

## 2023-09-03 DIAGNOSIS — K80.20 CHOLELITHIASIS: ICD-10-CM

## 2023-09-03 DIAGNOSIS — Z87.19 HISTORY OF CHOLELITHIASIS: ICD-10-CM

## 2023-09-03 DIAGNOSIS — R07.9 CHEST PAIN: ICD-10-CM

## 2023-09-03 DIAGNOSIS — R10.9 ABDOMINAL PAIN, UNSPECIFIED ABDOMINAL LOCATION: Primary | ICD-10-CM

## 2023-09-03 LAB
ALBUMIN SERPL BCP-MCNC: 3.7 G/DL (ref 3.5–5.2)
ALP SERPL-CCNC: 147 U/L (ref 55–135)
ALT SERPL W/O P-5'-P-CCNC: 5 U/L (ref 10–44)
ANION GAP SERPL CALC-SCNC: 10 MMOL/L (ref 8–16)
AST SERPL-CCNC: 7 U/L (ref 10–40)
BACTERIA #/AREA URNS HPF: NORMAL /HPF
BASOPHILS # BLD AUTO: 0.04 K/UL (ref 0–0.2)
BASOPHILS NFR BLD: 0.4 % (ref 0–1.9)
BILIRUB SERPL-MCNC: 0.4 MG/DL (ref 0.1–1)
BILIRUB UR QL STRIP: NEGATIVE
BILIRUB UR QL STRIP: NEGATIVE
BUN SERPL-MCNC: 9 MG/DL (ref 8–23)
CALCIUM SERPL-MCNC: 9.3 MG/DL (ref 8.7–10.5)
CHLORIDE SERPL-SCNC: 107 MMOL/L (ref 95–110)
CLARITY UR: CLEAR
CO2 SERPL-SCNC: 22 MMOL/L (ref 23–29)
COLOR UR: YELLOW
CREAT SERPL-MCNC: 1.2 MG/DL (ref 0.5–1.4)
DIFFERENTIAL METHOD: ABNORMAL
EOSINOPHIL # BLD AUTO: 0.1 K/UL (ref 0–0.5)
EOSINOPHIL NFR BLD: 1.2 % (ref 0–8)
ERYTHROCYTE [DISTWIDTH] IN BLOOD BY AUTOMATED COUNT: 13.2 % (ref 11.5–14.5)
EST. GFR  (NO RACE VARIABLE): >60 ML/MIN/1.73 M^2
GLUCOSE SERPL-MCNC: 97 MG/DL (ref 70–110)
GLUCOSE UR QL STRIP: NEGATIVE
GLUCOSE UR QL STRIP: NEGATIVE
HCT VFR BLD AUTO: 38 % (ref 40–54)
HGB BLD-MCNC: 12.3 G/DL (ref 14–18)
HGB UR QL STRIP: NEGATIVE
HYALINE CASTS #/AREA URNS LPF: 0 /LPF
IMM GRANULOCYTES # BLD AUTO: 0.02 K/UL (ref 0–0.04)
IMM GRANULOCYTES NFR BLD AUTO: 0.2 % (ref 0–0.5)
KETONES UR QL STRIP: NEGATIVE
KETONES UR QL STRIP: NEGATIVE
LACTATE SERPL-SCNC: 1.6 MMOL/L (ref 0.5–2.2)
LEUKOCYTE ESTERASE UR QL STRIP: NEGATIVE
LEUKOCYTE ESTERASE UR QL STRIP: NEGATIVE
LIPASE SERPL-CCNC: 3 U/L (ref 4–60)
LYMPHOCYTES # BLD AUTO: 2.1 K/UL (ref 1–4.8)
LYMPHOCYTES NFR BLD: 18.6 % (ref 18–48)
MCH RBC QN AUTO: 29.6 PG (ref 27–31)
MCHC RBC AUTO-ENTMCNC: 32.4 G/DL (ref 32–36)
MCV RBC AUTO: 92 FL (ref 82–98)
MICROSCOPIC COMMENT: NORMAL
MONOCYTES # BLD AUTO: 1.1 K/UL (ref 0.3–1)
MONOCYTES NFR BLD: 9.7 % (ref 4–15)
NEUTROPHILS # BLD AUTO: 7.8 K/UL (ref 1.8–7.7)
NEUTROPHILS NFR BLD: 69.9 % (ref 38–73)
NITRITE UR QL STRIP: NEGATIVE
NRBC BLD-RTO: 0 /100 WBC
PH UR STRIP: 6 [PH] (ref 5–8)
PH, POC UA: 5.5 (ref 5–8)
PLATELET # BLD AUTO: 243 K/UL (ref 150–450)
PMV BLD AUTO: 10.4 FL (ref 9.2–12.9)
POC BLOOD, URINE: NEGATIVE
POC NITRATES, URINE: NEGATIVE
POTASSIUM SERPL-SCNC: 3.8 MMOL/L (ref 3.5–5.1)
PROT SERPL-MCNC: 7 G/DL (ref 6–8.4)
PROT UR QL STRIP: ABNORMAL
PROT UR QL STRIP: POSITIVE
RBC # BLD AUTO: 4.15 M/UL (ref 4.6–6.2)
RBC #/AREA URNS HPF: 1 /HPF (ref 0–4)
SODIUM SERPL-SCNC: 139 MMOL/L (ref 136–145)
SP GR UR STRIP: 1.02 (ref 1–1.03)
SP GR UR STRIP: >1.03 (ref 1–1.03)
SQUAMOUS #/AREA URNS HPF: 0 /HPF
URN SPEC COLLECT METH UR: ABNORMAL
UROBILINOGEN UR STRIP-ACNC: NEGATIVE EU/DL
UROBILINOGEN UR STRIP-ACNC: NORMAL (ref 0.3–2.2)
WBC # BLD AUTO: 11.12 K/UL (ref 3.9–12.7)
WBC #/AREA URNS HPF: 0 /HPF (ref 0–5)

## 2023-09-03 PROCEDURE — 93005 ELECTROCARDIOGRAM TRACING: CPT

## 2023-09-03 PROCEDURE — 96376 TX/PRO/DX INJ SAME DRUG ADON: CPT

## 2023-09-03 PROCEDURE — 63600175 PHARM REV CODE 636 W HCPCS: Performed by: EMERGENCY MEDICINE

## 2023-09-03 PROCEDURE — 99285 EMERGENCY DEPT VISIT HI MDM: CPT | Mod: 25

## 2023-09-03 PROCEDURE — 96375 TX/PRO/DX INJ NEW DRUG ADDON: CPT

## 2023-09-03 PROCEDURE — 81003 POCT URINALYSIS, DIPSTICK, AUTOMATED, W/O SCOPE: ICD-10-PCS | Mod: QW,S$GLB,, | Performed by: FAMILY MEDICINE

## 2023-09-03 PROCEDURE — 81003 URINALYSIS AUTO W/O SCOPE: CPT | Mod: QW,S$GLB,, | Performed by: FAMILY MEDICINE

## 2023-09-03 PROCEDURE — 81000 URINALYSIS NONAUTO W/SCOPE: CPT | Performed by: EMERGENCY MEDICINE

## 2023-09-03 PROCEDURE — 25500020 PHARM REV CODE 255: Performed by: EMERGENCY MEDICINE

## 2023-09-03 PROCEDURE — 93010 ELECTROCARDIOGRAM REPORT: CPT | Mod: ,,, | Performed by: INTERNAL MEDICINE

## 2023-09-03 PROCEDURE — 83605 ASSAY OF LACTIC ACID: CPT | Performed by: EMERGENCY MEDICINE

## 2023-09-03 PROCEDURE — 25000003 PHARM REV CODE 250: Performed by: EMERGENCY MEDICINE

## 2023-09-03 PROCEDURE — 80053 COMPREHEN METABOLIC PANEL: CPT | Performed by: EMERGENCY MEDICINE

## 2023-09-03 PROCEDURE — 83690 ASSAY OF LIPASE: CPT | Performed by: EMERGENCY MEDICINE

## 2023-09-03 PROCEDURE — 93010 EKG 12-LEAD: ICD-10-PCS | Mod: ,,, | Performed by: INTERNAL MEDICINE

## 2023-09-03 PROCEDURE — 82962 GLUCOSE BLOOD TEST: CPT

## 2023-09-03 PROCEDURE — 96374 THER/PROPH/DIAG INJ IV PUSH: CPT

## 2023-09-03 PROCEDURE — 99214 PR OFFICE/OUTPT VISIT, EST, LEVL IV, 30-39 MIN: ICD-10-PCS | Mod: S$GLB,,, | Performed by: FAMILY MEDICINE

## 2023-09-03 PROCEDURE — 85025 COMPLETE CBC W/AUTO DIFF WBC: CPT | Performed by: EMERGENCY MEDICINE

## 2023-09-03 PROCEDURE — 99214 OFFICE O/P EST MOD 30 MIN: CPT | Mod: S$GLB,,, | Performed by: FAMILY MEDICINE

## 2023-09-03 PROCEDURE — 96361 HYDRATE IV INFUSION ADD-ON: CPT

## 2023-09-03 RX ORDER — MORPHINE SULFATE 4 MG/ML
4 INJECTION, SOLUTION INTRAMUSCULAR; INTRAVENOUS
Status: COMPLETED | OUTPATIENT
Start: 2023-09-03 | End: 2023-09-03

## 2023-09-03 RX ORDER — HYDRALAZINE HYDROCHLORIDE 25 MG/1
100 TABLET, FILM COATED ORAL EVERY 8 HOURS
Status: DISCONTINUED | OUTPATIENT
Start: 2023-09-03 | End: 2023-09-04 | Stop reason: SDUPTHER

## 2023-09-03 RX ORDER — CARVEDILOL 25 MG/1
25 TABLET ORAL 2 TIMES DAILY
Status: DISCONTINUED | OUTPATIENT
Start: 2023-09-03 | End: 2023-09-04

## 2023-09-03 RX ORDER — ONDANSETRON 2 MG/ML
8 INJECTION INTRAMUSCULAR; INTRAVENOUS
Status: COMPLETED | OUTPATIENT
Start: 2023-09-03 | End: 2023-09-03

## 2023-09-03 RX ORDER — HYDRALAZINE HYDROCHLORIDE 20 MG/ML
10 INJECTION INTRAMUSCULAR; INTRAVENOUS
Status: COMPLETED | OUTPATIENT
Start: 2023-09-03 | End: 2023-09-03

## 2023-09-03 RX ADMIN — HYDRALAZINE HYDROCHLORIDE 100 MG: 25 TABLET, FILM COATED ORAL at 09:09

## 2023-09-03 RX ADMIN — ONDANSETRON 8 MG: 2 INJECTION INTRAMUSCULAR; INTRAVENOUS at 07:09

## 2023-09-03 RX ADMIN — IOHEXOL 100 ML: 350 INJECTION, SOLUTION INTRAVENOUS at 08:09

## 2023-09-03 RX ADMIN — HYDRALAZINE HYDROCHLORIDE 10 MG: 20 INJECTION, SOLUTION INTRAMUSCULAR; INTRAVENOUS at 11:09

## 2023-09-03 RX ADMIN — MORPHINE SULFATE 4 MG: 4 INJECTION INTRAVENOUS at 09:09

## 2023-09-03 RX ADMIN — SODIUM CHLORIDE 1000 ML: 9 INJECTION, SOLUTION INTRAVENOUS at 07:09

## 2023-09-03 RX ADMIN — MORPHINE SULFATE 4 MG: 4 INJECTION INTRAVENOUS at 07:09

## 2023-09-03 NOTE — PATIENT INSTRUCTIONS
67 yr old male with DM, HTN, CKD, CAD, prostate CA s/p surgical resection remotely presents with acute onset of severe abdominal pain x 2 days, sharp, almost constant. Pain preceded by 3 days of intermittent vomiting and diarrhea, non bloody, no melena reported. No fever. + malaise.    States did have vomiting today and could not keep his BP meds 'down'. Bp elevated in clinic. Does not report CP, SOB.       On exam he is very tender in the left and right lower quadrants with guarding.     UA concentrated and with protein.     I do recommend for patient to go to ER for further evaluation of acute severe abd pain, r/o appendicitis, colitis, diverticulitis and other acute abdominal processes.

## 2023-09-03 NOTE — Clinical Note
Diagnosis: Cholelithiasis [660826]   Future Attending Provider: MARNI EPPS [[id number]]   Admitting Provider:: MARNI EPPS [[id number]]

## 2023-09-03 NOTE — PROGRESS NOTES
"Subjective:      Patient ID: Neena Kohli is a 67 y.o. male.    Vitals:  height is 5' 7" (1.702 m) and weight is 83.9 kg (185 lb). His oral temperature is 99.3 °F (37.4 °C). His blood pressure is 195/67 (abnormal) and his pulse is 51 (abnormal). His respiration is 18 and oxygen saturation is 96%.     Chief Complaint: Abdominal Pain    67 yr old male with DM, HTN, CKD, CAD, prostate CA s/p surgical resection remotely presents with acute onset of severe abdominal pain x 2 days, sharp, almost constant. Pain preceded by 3 days of intermittent vomiting and diarrhea, non bloody, no melena reported. No fever. + malaise.    States did have vomiting today and could not keep his BP meds 'down'. Bp elevated in clinic. Does not report CP, SOB.         Abdominal Pain  This is a new problem. The current episode started in the past 7 days. The onset quality is sudden. The problem occurs daily. The problem has been unchanged. The pain is located in the generalized abdominal region. The pain is at a severity of 8/10. The quality of the pain is cramping. The abdominal pain does not radiate. Associated symptoms include diarrhea and vomiting. The pain is aggravated by eating. The pain is relieved by Nothing. Treatments tried: pepto bismul and kaopectate. The treatment provided no relief.       Gastrointestinal:  Positive for abdominal pain, vomiting and diarrhea.      Objective:     Physical Exam  Constitutional: Pt oriented to person, place, and time.  Non-toxic appearance.   Patient does not appear ill. No distress. normal  HENT: No icterus or facial swelling appreciated  Head: Normocephalic and atraumatic.   Dry mucous membranes  Nose: No congestion.   Pulmonary/Chest: Effort normal. No stridor. No respiratory distress.   Abdominal: Normal appearance. Abdomen exhibits no distension.   Musculoskeletal:         General: No swelling.   Neurological: no focal deficit. Patient is alert and oriented to person, place, and time.   Skin: " Skin is not diaphoretic and not pale. no jaundice  Psychiatric: Patients behavior is normal. Mood, judgment and thought content normal.     Assessment:     1. Abdominal pain, unspecified abdominal location    2. History of cholelithiasis - seen incidentally on previous imaging   3. Lower abdominal pain    4. Diarrhea, unspecified type        Plan:       Abdominal pain, unspecified abdominal location  -     POCT Urinalysis, Dipstick, Automated, W/O Scope    History of cholelithiasis  Neg murphys  Recommending further eval for acute abd pain, can asses the stones.     Lower abdominal pain    Diarrhea, unspecified type        On exam he is very tender in the left and right lower quadrants with guarding.     UA concentrated and with protein.     I do recommend for patient to go to ER for further evaluation of acute severe abd pain, r/o appendicitis, colitis, diverticulitis and other acute abdominal processes.

## 2023-09-04 PROBLEM — J96.01 ACUTE RESPIRATORY FAILURE WITH HYPOXEMIA: Status: ACTIVE | Noted: 2023-09-04

## 2023-09-04 PROBLEM — R00.1 SINUS BRADYCARDIA: Status: ACTIVE | Noted: 2023-09-04

## 2023-09-04 PROBLEM — E87.6 HYPOKALEMIA: Status: ACTIVE | Noted: 2023-09-04

## 2023-09-04 PROBLEM — I16.0 HYPERTENSIVE URGENCY: Status: ACTIVE | Noted: 2023-09-04

## 2023-09-04 PROBLEM — K80.01 CALCULUS OF GALLBLADDER WITH ACUTE CHOLECYSTITIS AND OBSTRUCTION: Status: ACTIVE | Noted: 2023-09-04

## 2023-09-04 PROBLEM — G47.33 OBSTRUCTIVE SLEEP APNEA: Status: ACTIVE | Noted: 2023-09-04

## 2023-09-04 LAB
ALBUMIN SERPL BCP-MCNC: 3.6 G/DL (ref 3.5–5.2)
ALP SERPL-CCNC: 144 U/L (ref 55–135)
ALT SERPL W/O P-5'-P-CCNC: 6 U/L (ref 10–44)
ANION GAP SERPL CALC-SCNC: 10 MMOL/L (ref 8–16)
ANION GAP SERPL CALC-SCNC: 2 MMOL/L (ref 8–16)
AST SERPL-CCNC: 8 U/L (ref 10–40)
AV INDEX (PROSTH): 0.74
AV MEAN GRADIENT: 11 MMHG
AV PEAK GRADIENT: 19 MMHG
AV VALVE AREA BY VELOCITY RATIO: 2.33 CM²
AV VALVE AREA: 2.21 CM²
AV VELOCITY RATIO: 0.78
BASOPHILS # BLD AUTO: 0.05 K/UL (ref 0–0.2)
BASOPHILS NFR BLD: 0.3 % (ref 0–1.9)
BILIRUB SERPL-MCNC: 0.5 MG/DL (ref 0.1–1)
BSA FOR ECHO PROCEDURE: 1.99 M2
BUN SERPL-MCNC: 10 MG/DL (ref 8–23)
BUN SERPL-MCNC: 8 MG/DL (ref 8–23)
CALCIUM SERPL-MCNC: 8.7 MG/DL (ref 8.7–10.5)
CALCIUM SERPL-MCNC: 9.2 MG/DL (ref 8.7–10.5)
CHLORIDE SERPL-SCNC: 106 MMOL/L (ref 95–110)
CHLORIDE SERPL-SCNC: 108 MMOL/L (ref 95–110)
CO2 SERPL-SCNC: 22 MMOL/L (ref 23–29)
CO2 SERPL-SCNC: 26 MMOL/L (ref 23–29)
CREAT SERPL-MCNC: 1.1 MG/DL (ref 0.5–1.4)
CREAT SERPL-MCNC: 1.1 MG/DL (ref 0.5–1.4)
CV ECHO LV RWT: 0.33 CM
DIFFERENTIAL METHOD: ABNORMAL
DOP CALC AO PEAK VEL: 2.2 M/S
DOP CALC AO VTI: 47.8 CM
DOP CALC LVOT AREA: 3 CM2
DOP CALC LVOT DIAMETER: 1.95 CM
DOP CALC LVOT PEAK VEL: 1.72 M/S
DOP CALC LVOT STROKE VOLUME: 105.67 CM3
DOP CALC MV VTI: 26.6 CM
DOP CALCLVOT PEAK VEL VTI: 35.4 CM
E WAVE DECELERATION TIME: 155.08 MSEC
E/A RATIO: 0.84
E/E' RATIO: 10.53 M/S
ECHO LV POSTERIOR WALL: 0.82 CM (ref 0.6–1.1)
EOSINOPHIL # BLD AUTO: 0 K/UL (ref 0–0.5)
EOSINOPHIL NFR BLD: 0.1 % (ref 0–8)
ERYTHROCYTE [DISTWIDTH] IN BLOOD BY AUTOMATED COUNT: 13.3 % (ref 11.5–14.5)
EST. GFR  (NO RACE VARIABLE): >60 ML/MIN/1.73 M^2
EST. GFR  (NO RACE VARIABLE): >60 ML/MIN/1.73 M^2
ESTIMATED AVG GLUCOSE: 100 MG/DL (ref 68–131)
FRACTIONAL SHORTENING: 31 % (ref 28–44)
GLUCOSE SERPL-MCNC: 152 MG/DL (ref 70–110)
GLUCOSE SERPL-MCNC: 192 MG/DL (ref 70–110)
HBA1C MFR BLD: 5.1 % (ref 4–5.6)
HCT VFR BLD AUTO: 38 % (ref 40–54)
HGB BLD-MCNC: 12.4 G/DL (ref 14–18)
IMM GRANULOCYTES # BLD AUTO: 0.11 K/UL (ref 0–0.04)
IMM GRANULOCYTES NFR BLD AUTO: 0.7 % (ref 0–0.5)
INTERVENTRICULAR SEPTUM: 1.23 CM (ref 0.6–1.1)
IVC DIAMETER: 1.85 CM
LA MAJOR: 4.97 CM
LA MINOR: 5.32 CM
LA WIDTH: 4.4 CM
LEFT ATRIUM SIZE: 4.66 CM
LEFT ATRIUM VOLUME INDEX MOD: 32.9 ML/M2
LEFT ATRIUM VOLUME INDEX: 45.7 ML/M2
LEFT ATRIUM VOLUME MOD: 64.56 CM3
LEFT ATRIUM VOLUME: 89.57 CM3
LEFT INTERNAL DIMENSION IN SYSTOLE: 3.47 CM (ref 2.1–4)
LEFT VENTRICLE DIASTOLIC VOLUME INDEX: 60.73 ML/M2
LEFT VENTRICLE DIASTOLIC VOLUME: 119.04 ML
LEFT VENTRICLE MASS INDEX: 96 G/M2
LEFT VENTRICLE SYSTOLIC VOLUME INDEX: 25.4 ML/M2
LEFT VENTRICLE SYSTOLIC VOLUME: 49.73 ML
LEFT VENTRICULAR INTERNAL DIMENSION IN DIASTOLE: 5.01 CM (ref 3.5–6)
LEFT VENTRICULAR MASS: 188.75 G
LV LATERAL E/E' RATIO: 9.09 M/S
LV SEPTAL E/E' RATIO: 12.5 M/S
LVOT MG: 5.25 MMHG
LVOT MV: 1.07 CM/S
LYMPHOCYTES # BLD AUTO: 1.2 K/UL (ref 1–4.8)
LYMPHOCYTES NFR BLD: 8.2 % (ref 18–48)
MAGNESIUM SERPL-MCNC: 1.6 MG/DL (ref 1.6–2.6)
MAGNESIUM SERPL-MCNC: 2.1 MG/DL (ref 1.6–2.6)
MCH RBC QN AUTO: 30 PG (ref 27–31)
MCHC RBC AUTO-ENTMCNC: 32.6 G/DL (ref 32–36)
MCV RBC AUTO: 92 FL (ref 82–98)
MONOCYTES # BLD AUTO: 1.1 K/UL (ref 0.3–1)
MONOCYTES NFR BLD: 7.4 % (ref 4–15)
MV A" WAVE DURATION": 125.59 MSEC
MV MEAN GRADIENT: 3 MMHG
MV PEAK A VEL: 1.19 M/S
MV PEAK E VEL: 1 M/S
MV PEAK GRADIENT: 6 MMHG
MV STENOSIS PRESSURE HALF TIME: 44.97 MS
MV VALVE AREA BY CONTINUITY EQUATION: 3.97 CM2
MV VALVE AREA P 1/2 METHOD: 4.89 CM2
NEUTROPHILS # BLD AUTO: 12.6 K/UL (ref 1.8–7.7)
NEUTROPHILS NFR BLD: 83.3 % (ref 38–73)
NRBC BLD-RTO: 0 /100 WBC
OHS LV EJECTION FRACTION SIMPSONS BIPLANE MOD: 71 %
PHOSPHATE SERPL-MCNC: 3.2 MG/DL (ref 2.7–4.5)
PISA TR MAX VEL: 2.05 M/S
PLATELET # BLD AUTO: 277 K/UL (ref 150–450)
PMV BLD AUTO: 10.4 FL (ref 9.2–12.9)
POCT GLUCOSE: 137 MG/DL (ref 70–110)
POCT GLUCOSE: 171 MG/DL (ref 70–110)
POCT GLUCOSE: 172 MG/DL (ref 70–110)
POCT GLUCOSE: 227 MG/DL (ref 70–110)
POCT GLUCOSE: 230 MG/DL (ref 70–110)
POCT GLUCOSE: 84 MG/DL (ref 70–110)
POTASSIUM SERPL-SCNC: 3.1 MMOL/L (ref 3.5–5.1)
POTASSIUM SERPL-SCNC: 4.3 MMOL/L (ref 3.5–5.1)
PROT SERPL-MCNC: 7.1 G/DL (ref 6–8.4)
PULM VEIN S/D RATIO: 1.82
PV MV: 1.15 M/S
PV PEAK D VEL: 0.57 M/S
PV PEAK GRADIENT: 8 MMHG
PV PEAK S VEL: 1.04 M/S
PV PEAK VELOCITY: 1.37 M/S
RA MAJOR: 3.92 CM
RA PRESSURE ESTIMATED: 3 MMHG
RA WIDTH: 3.61 CM
RBC # BLD AUTO: 4.13 M/UL (ref 4.6–6.2)
RIGHT VENTRICULAR END-DIASTOLIC DIMENSION: 3.23 CM
RV TB RVSP: 5 MMHG
RV TISSUE DOPPLER FREE WALL SYSTOLIC VELOCITY 1 (APICAL 4 CHAMBER VIEW): 19.25 CM/S
SODIUM SERPL-SCNC: 136 MMOL/L (ref 136–145)
SODIUM SERPL-SCNC: 138 MMOL/L (ref 136–145)
TDI LATERAL: 0.11 M/S
TDI SEPTAL: 0.08 M/S
TDI: 0.1 M/S
TR MAX PG: 17 MMHG
TRICUSPID ANNULAR PLANE SYSTOLIC EXCURSION: 3 CM
TROPONIN I SERPL DL<=0.01 NG/ML-MCNC: 0.01 NG/ML (ref 0–0.03)
TSH SERPL DL<=0.005 MIU/L-ACNC: 0.98 UIU/ML (ref 0.4–4)
TV REST PULMONARY ARTERY PRESSURE: 20 MMHG
WBC # BLD AUTO: 15.19 K/UL (ref 3.9–12.7)
Z-SCORE OF LEFT VENTRICULAR DIMENSION IN END DIASTOLE: -1.13
Z-SCORE OF LEFT VENTRICULAR DIMENSION IN END SYSTOLE: 0.05

## 2023-09-04 PROCEDURE — 63600175 PHARM REV CODE 636 W HCPCS: Performed by: STUDENT IN AN ORGANIZED HEALTH CARE EDUCATION/TRAINING PROGRAM

## 2023-09-04 PROCEDURE — 80053 COMPREHEN METABOLIC PANEL: CPT | Performed by: STUDENT IN AN ORGANIZED HEALTH CARE EDUCATION/TRAINING PROGRAM

## 2023-09-04 PROCEDURE — 25000003 PHARM REV CODE 250: Performed by: STUDENT IN AN ORGANIZED HEALTH CARE EDUCATION/TRAINING PROGRAM

## 2023-09-04 PROCEDURE — 83735 ASSAY OF MAGNESIUM: CPT | Performed by: STUDENT IN AN ORGANIZED HEALTH CARE EDUCATION/TRAINING PROGRAM

## 2023-09-04 PROCEDURE — 63600175 PHARM REV CODE 636 W HCPCS: Performed by: EMERGENCY MEDICINE

## 2023-09-04 PROCEDURE — 25000003 PHARM REV CODE 250: Performed by: HOSPITALIST

## 2023-09-04 PROCEDURE — 99223 PR INITIAL HOSPITAL CARE,LEVL III: ICD-10-PCS | Mod: ,,, | Performed by: SURGERY

## 2023-09-04 PROCEDURE — 25000003 PHARM REV CODE 250: Performed by: EMERGENCY MEDICINE

## 2023-09-04 PROCEDURE — 84100 ASSAY OF PHOSPHORUS: CPT | Performed by: STUDENT IN AN ORGANIZED HEALTH CARE EDUCATION/TRAINING PROGRAM

## 2023-09-04 PROCEDURE — 96372 THER/PROPH/DIAG INJ SC/IM: CPT | Performed by: STUDENT IN AN ORGANIZED HEALTH CARE EDUCATION/TRAINING PROGRAM

## 2023-09-04 PROCEDURE — 85025 COMPLETE CBC W/AUTO DIFF WBC: CPT | Performed by: STUDENT IN AN ORGANIZED HEALTH CARE EDUCATION/TRAINING PROGRAM

## 2023-09-04 PROCEDURE — 63600175 PHARM REV CODE 636 W HCPCS: Performed by: NURSE PRACTITIONER

## 2023-09-04 PROCEDURE — 83036 HEMOGLOBIN GLYCOSYLATED A1C: CPT | Performed by: STUDENT IN AN ORGANIZED HEALTH CARE EDUCATION/TRAINING PROGRAM

## 2023-09-04 PROCEDURE — 83735 ASSAY OF MAGNESIUM: CPT | Mod: 91 | Performed by: SURGERY

## 2023-09-04 PROCEDURE — 99223 1ST HOSP IP/OBS HIGH 75: CPT | Mod: ,,, | Performed by: INTERNAL MEDICINE

## 2023-09-04 PROCEDURE — 80048 BASIC METABOLIC PNL TOTAL CA: CPT | Mod: XB | Performed by: SURGERY

## 2023-09-04 PROCEDURE — 25000003 PHARM REV CODE 250: Performed by: SURGERY

## 2023-09-04 PROCEDURE — 84443 ASSAY THYROID STIM HORMONE: CPT | Performed by: STUDENT IN AN ORGANIZED HEALTH CARE EDUCATION/TRAINING PROGRAM

## 2023-09-04 PROCEDURE — 25000242 PHARM REV CODE 250 ALT 637 W/ HCPCS: Performed by: STUDENT IN AN ORGANIZED HEALTH CARE EDUCATION/TRAINING PROGRAM

## 2023-09-04 PROCEDURE — 36415 COLL VENOUS BLD VENIPUNCTURE: CPT | Performed by: SURGERY

## 2023-09-04 PROCEDURE — 84484 ASSAY OF TROPONIN QUANT: CPT | Performed by: STUDENT IN AN ORGANIZED HEALTH CARE EDUCATION/TRAINING PROGRAM

## 2023-09-04 PROCEDURE — 99223 PR INITIAL HOSPITAL CARE,LEVL III: ICD-10-PCS | Mod: ,,, | Performed by: INTERNAL MEDICINE

## 2023-09-04 PROCEDURE — 20000000 HC ICU ROOM

## 2023-09-04 PROCEDURE — 87040 BLOOD CULTURE FOR BACTERIA: CPT | Mod: 59 | Performed by: STUDENT IN AN ORGANIZED HEALTH CARE EDUCATION/TRAINING PROGRAM

## 2023-09-04 PROCEDURE — 94761 N-INVAS EAR/PLS OXIMETRY MLT: CPT

## 2023-09-04 PROCEDURE — 99223 1ST HOSP IP/OBS HIGH 75: CPT | Mod: ,,, | Performed by: SURGERY

## 2023-09-04 PROCEDURE — 63600175 PHARM REV CODE 636 W HCPCS: Performed by: SURGERY

## 2023-09-04 PROCEDURE — 94640 AIRWAY INHALATION TREATMENT: CPT

## 2023-09-04 RX ORDER — OXYCODONE HYDROCHLORIDE 5 MG/1
10 TABLET ORAL EVERY 6 HOURS PRN
Status: DISCONTINUED | OUTPATIENT
Start: 2023-09-04 | End: 2023-09-04

## 2023-09-04 RX ORDER — IPRATROPIUM BROMIDE AND ALBUTEROL SULFATE 2.5; .5 MG/3ML; MG/3ML
3 SOLUTION RESPIRATORY (INHALATION) ONCE
Status: COMPLETED | OUTPATIENT
Start: 2023-09-04 | End: 2023-09-04

## 2023-09-04 RX ORDER — ONDANSETRON 8 MG/1
8 TABLET, ORALLY DISINTEGRATING ORAL EVERY 8 HOURS PRN
Status: DISCONTINUED | OUTPATIENT
Start: 2023-09-04 | End: 2023-09-09 | Stop reason: HOSPADM

## 2023-09-04 RX ORDER — HYDRALAZINE HYDROCHLORIDE 20 MG/ML
20 INJECTION INTRAMUSCULAR; INTRAVENOUS ONCE
Status: COMPLETED | OUTPATIENT
Start: 2023-09-04 | End: 2023-09-04

## 2023-09-04 RX ORDER — BUPROPION HYDROCHLORIDE 75 MG/1
75 TABLET ORAL DAILY
Status: DISCONTINUED | OUTPATIENT
Start: 2023-09-04 | End: 2023-09-09 | Stop reason: HOSPADM

## 2023-09-04 RX ORDER — LOSARTAN POTASSIUM 50 MG/1
100 TABLET ORAL DAILY
Status: DISCONTINUED | OUTPATIENT
Start: 2023-09-04 | End: 2023-09-04

## 2023-09-04 RX ORDER — MAGNESIUM SULFATE HEPTAHYDRATE 40 MG/ML
2 INJECTION, SOLUTION INTRAVENOUS ONCE
Status: COMPLETED | OUTPATIENT
Start: 2023-09-04 | End: 2023-09-04

## 2023-09-04 RX ORDER — ACETAMINOPHEN 325 MG/1
650 TABLET ORAL EVERY 6 HOURS PRN
Status: DISCONTINUED | OUTPATIENT
Start: 2023-09-04 | End: 2023-09-09 | Stop reason: HOSPADM

## 2023-09-04 RX ORDER — KETOROLAC TROMETHAMINE 30 MG/ML
15 INJECTION, SOLUTION INTRAMUSCULAR; INTRAVENOUS EVERY 6 HOURS
Status: DISCONTINUED | OUTPATIENT
Start: 2023-09-04 | End: 2023-09-05

## 2023-09-04 RX ORDER — IBUPROFEN 200 MG
1 TABLET ORAL DAILY
Status: DISCONTINUED | OUTPATIENT
Start: 2023-09-05 | End: 2023-09-09 | Stop reason: HOSPADM

## 2023-09-04 RX ORDER — HYDROCHLOROTHIAZIDE 12.5 MG/1
12.5 TABLET ORAL DAILY
Status: DISCONTINUED | OUTPATIENT
Start: 2023-09-04 | End: 2023-09-05

## 2023-09-04 RX ORDER — MORPHINE SULFATE 4 MG/ML
4 INJECTION, SOLUTION INTRAMUSCULAR; INTRAVENOUS EVERY 4 HOURS PRN
Status: DISCONTINUED | OUTPATIENT
Start: 2023-09-04 | End: 2023-09-04

## 2023-09-04 RX ORDER — TRAZODONE HYDROCHLORIDE 50 MG/1
50 TABLET ORAL NIGHTLY
Status: DISCONTINUED | OUTPATIENT
Start: 2023-09-04 | End: 2023-09-09 | Stop reason: HOSPADM

## 2023-09-04 RX ORDER — HYDRALAZINE HYDROCHLORIDE 25 MG/1
100 TABLET, FILM COATED ORAL EVERY 8 HOURS
Status: DISCONTINUED | OUTPATIENT
Start: 2023-09-04 | End: 2023-09-04

## 2023-09-04 RX ORDER — LOSARTAN POTASSIUM 50 MG/1
100 TABLET ORAL DAILY
Status: DISCONTINUED | OUTPATIENT
Start: 2023-09-05 | End: 2023-09-04

## 2023-09-04 RX ORDER — MORPHINE SULFATE 2 MG/ML
2 INJECTION, SOLUTION INTRAMUSCULAR; INTRAVENOUS
Status: COMPLETED | OUTPATIENT
Start: 2023-09-04 | End: 2023-09-04

## 2023-09-04 RX ORDER — NALOXONE HCL 0.4 MG/ML
0.02 VIAL (ML) INJECTION
Status: DISCONTINUED | OUTPATIENT
Start: 2023-09-04 | End: 2023-09-09 | Stop reason: HOSPADM

## 2023-09-04 RX ORDER — LOSARTAN POTASSIUM 50 MG/1
100 TABLET ORAL ONCE
Status: COMPLETED | OUTPATIENT
Start: 2023-09-04 | End: 2023-09-04

## 2023-09-04 RX ORDER — POLYETHYLENE GLYCOL 3350 17 G/17G
17 POWDER, FOR SOLUTION ORAL 2 TIMES DAILY PRN
Status: DISCONTINUED | OUTPATIENT
Start: 2023-09-04 | End: 2023-09-06

## 2023-09-04 RX ORDER — SODIUM CHLORIDE 9 MG/ML
INJECTION, SOLUTION INTRAVENOUS
Status: DISCONTINUED | OUTPATIENT
Start: 2023-09-04 | End: 2023-09-09 | Stop reason: HOSPADM

## 2023-09-04 RX ORDER — AMLODIPINE BESYLATE 5 MG/1
10 TABLET ORAL
Status: COMPLETED | OUTPATIENT
Start: 2023-09-04 | End: 2023-09-04

## 2023-09-04 RX ORDER — SODIUM CHLORIDE, SODIUM LACTATE, POTASSIUM CHLORIDE, CALCIUM CHLORIDE 600; 310; 30; 20 MG/100ML; MG/100ML; MG/100ML; MG/100ML
INJECTION, SOLUTION INTRAVENOUS CONTINUOUS
Status: ACTIVE | OUTPATIENT
Start: 2023-09-04 | End: 2023-09-05

## 2023-09-04 RX ORDER — HYDROMORPHONE HYDROCHLORIDE 1 MG/ML
1 INJECTION, SOLUTION INTRAMUSCULAR; INTRAVENOUS; SUBCUTANEOUS
Status: DISCONTINUED | OUTPATIENT
Start: 2023-09-04 | End: 2023-09-06

## 2023-09-04 RX ORDER — SODIUM CHLORIDE 0.9 % (FLUSH) 0.9 %
10 SYRINGE (ML) INJECTION EVERY 12 HOURS PRN
Status: DISCONTINUED | OUTPATIENT
Start: 2023-09-04 | End: 2023-09-09 | Stop reason: HOSPADM

## 2023-09-04 RX ORDER — NICARDIPINE HYDROCHLORIDE 0.2 MG/ML
0-15 INJECTION INTRAVENOUS CONTINUOUS
Status: DISCONTINUED | OUTPATIENT
Start: 2023-09-04 | End: 2023-09-07

## 2023-09-04 RX ORDER — GLUCAGON 1 MG
1 KIT INJECTION
Status: DISCONTINUED | OUTPATIENT
Start: 2023-09-04 | End: 2023-09-09 | Stop reason: HOSPADM

## 2023-09-04 RX ORDER — OXYCODONE HYDROCHLORIDE 5 MG/1
5 TABLET ORAL EVERY 6 HOURS PRN
Status: DISCONTINUED | OUTPATIENT
Start: 2023-09-04 | End: 2023-09-06

## 2023-09-04 RX ORDER — INSULIN ASPART 100 [IU]/ML
0-5 INJECTION, SOLUTION INTRAVENOUS; SUBCUTANEOUS
Status: DISCONTINUED | OUTPATIENT
Start: 2023-09-04 | End: 2023-09-09 | Stop reason: HOSPADM

## 2023-09-04 RX ORDER — IBUPROFEN 200 MG
16 TABLET ORAL
Status: DISCONTINUED | OUTPATIENT
Start: 2023-09-04 | End: 2023-09-09 | Stop reason: HOSPADM

## 2023-09-04 RX ORDER — ACETAMINOPHEN 325 MG/1
650 TABLET ORAL EVERY 4 HOURS
Status: DISCONTINUED | OUTPATIENT
Start: 2023-09-04 | End: 2023-09-06

## 2023-09-04 RX ORDER — TALC
6 POWDER (GRAM) TOPICAL NIGHTLY PRN
Status: DISCONTINUED | OUTPATIENT
Start: 2023-09-04 | End: 2023-09-09 | Stop reason: HOSPADM

## 2023-09-04 RX ORDER — NICARDIPINE HYDROCHLORIDE 0.2 MG/ML
0-15 INJECTION INTRAVENOUS CONTINUOUS
Status: DISCONTINUED | OUTPATIENT
Start: 2023-09-04 | End: 2023-09-04

## 2023-09-04 RX ORDER — ATORVASTATIN CALCIUM 40 MG/1
40 TABLET, FILM COATED ORAL DAILY
Status: DISCONTINUED | OUTPATIENT
Start: 2023-09-04 | End: 2023-09-04

## 2023-09-04 RX ORDER — SODIUM CHLORIDE, SODIUM LACTATE, POTASSIUM CHLORIDE, CALCIUM CHLORIDE 600; 310; 30; 20 MG/100ML; MG/100ML; MG/100ML; MG/100ML
INJECTION, SOLUTION INTRAVENOUS CONTINUOUS
Status: ACTIVE | OUTPATIENT
Start: 2023-09-04 | End: 2023-09-04

## 2023-09-04 RX ORDER — QUETIAPINE FUMARATE 100 MG/1
200 TABLET, FILM COATED ORAL NIGHTLY
Status: DISCONTINUED | OUTPATIENT
Start: 2023-09-04 | End: 2023-09-09 | Stop reason: HOSPADM

## 2023-09-04 RX ORDER — ASPIRIN 81 MG/1
81 TABLET ORAL DAILY
Status: DISCONTINUED | OUTPATIENT
Start: 2023-09-04 | End: 2023-09-04

## 2023-09-04 RX ORDER — CARVEDILOL 25 MG/1
25 TABLET ORAL 2 TIMES DAILY
Status: DISCONTINUED | OUTPATIENT
Start: 2023-09-04 | End: 2023-09-09 | Stop reason: HOSPADM

## 2023-09-04 RX ORDER — AMLODIPINE BESYLATE 5 MG/1
10 TABLET ORAL DAILY
Status: DISCONTINUED | OUTPATIENT
Start: 2023-09-05 | End: 2023-09-04

## 2023-09-04 RX ORDER — IPRATROPIUM BROMIDE AND ALBUTEROL SULFATE 2.5; .5 MG/3ML; MG/3ML
3 SOLUTION RESPIRATORY (INHALATION) EVERY 6 HOURS PRN
Status: DISCONTINUED | OUTPATIENT
Start: 2023-09-04 | End: 2023-09-09 | Stop reason: HOSPADM

## 2023-09-04 RX ORDER — OXYCODONE HYDROCHLORIDE 5 MG/1
15 TABLET ORAL EVERY 6 HOURS PRN
Status: DISCONTINUED | OUTPATIENT
Start: 2023-09-04 | End: 2023-09-04

## 2023-09-04 RX ORDER — POTASSIUM CHLORIDE 7.45 MG/ML
10 INJECTION INTRAVENOUS
Status: COMPLETED | OUTPATIENT
Start: 2023-09-04 | End: 2023-09-04

## 2023-09-04 RX ORDER — OXYCODONE HYDROCHLORIDE 5 MG/1
5 TABLET ORAL EVERY 6 HOURS PRN
Status: DISCONTINUED | OUTPATIENT
Start: 2023-09-04 | End: 2023-09-04

## 2023-09-04 RX ORDER — PROCHLORPERAZINE EDISYLATE 5 MG/ML
5 INJECTION INTRAMUSCULAR; INTRAVENOUS EVERY 6 HOURS PRN
Status: DISCONTINUED | OUTPATIENT
Start: 2023-09-04 | End: 2023-09-09 | Stop reason: HOSPADM

## 2023-09-04 RX ORDER — ENOXAPARIN SODIUM 100 MG/ML
40 INJECTION SUBCUTANEOUS EVERY 24 HOURS
Status: DISCONTINUED | OUTPATIENT
Start: 2023-09-04 | End: 2023-09-09 | Stop reason: HOSPADM

## 2023-09-04 RX ORDER — MUPIROCIN 20 MG/G
OINTMENT TOPICAL 2 TIMES DAILY
Status: COMPLETED | OUTPATIENT
Start: 2023-09-04 | End: 2023-09-09

## 2023-09-04 RX ORDER — HYDROMORPHONE HYDROCHLORIDE 1 MG/ML
1 INJECTION, SOLUTION INTRAMUSCULAR; INTRAVENOUS; SUBCUTANEOUS ONCE
Status: COMPLETED | OUTPATIENT
Start: 2023-09-04 | End: 2023-09-04

## 2023-09-04 RX ORDER — IBUPROFEN 200 MG
24 TABLET ORAL
Status: DISCONTINUED | OUTPATIENT
Start: 2023-09-04 | End: 2023-09-09 | Stop reason: HOSPADM

## 2023-09-04 RX ORDER — HYDROMORPHONE HYDROCHLORIDE 1 MG/ML
1 INJECTION, SOLUTION INTRAMUSCULAR; INTRAVENOUS; SUBCUTANEOUS EVERY 6 HOURS PRN
Status: DISCONTINUED | OUTPATIENT
Start: 2023-09-04 | End: 2023-09-04

## 2023-09-04 RX ADMIN — Medication 6 MG: at 01:09

## 2023-09-04 RX ADMIN — PIPERACILLIN AND TAZOBACTAM 4.5 G: 4; .5 INJECTION, POWDER, LYOPHILIZED, FOR SOLUTION INTRAVENOUS; PARENTERAL at 11:09

## 2023-09-04 RX ADMIN — POTASSIUM CHLORIDE 10 MEQ: 7.46 INJECTION, SOLUTION INTRAVENOUS at 11:09

## 2023-09-04 RX ADMIN — OXYCODONE HYDROCHLORIDE 15 MG: 5 TABLET ORAL at 05:09

## 2023-09-04 RX ADMIN — HYDROMORPHONE HYDROCHLORIDE 1 MG: 1 INJECTION, SOLUTION INTRAMUSCULAR; INTRAVENOUS; SUBCUTANEOUS at 03:09

## 2023-09-04 RX ADMIN — ENOXAPARIN SODIUM 40 MG: 40 INJECTION SUBCUTANEOUS at 05:09

## 2023-09-04 RX ADMIN — ASPIRIN 81 MG: 81 TABLET, COATED ORAL at 09:09

## 2023-09-04 RX ADMIN — MAGNESIUM SULFATE 2 G: 2 INJECTION INTRAVENOUS at 09:09

## 2023-09-04 RX ADMIN — NICARDIPINE HYDROCHLORIDE 15 MG/HR: 0.2 INJECTION, SOLUTION INTRAVENOUS at 01:09

## 2023-09-04 RX ADMIN — NICARDIPINE HYDROCHLORIDE 2.5 MG/HR: 0.2 INJECTION, SOLUTION INTRAVENOUS at 04:09

## 2023-09-04 RX ADMIN — NICARDIPINE HYDROCHLORIDE 12.5 MG/HR: 0.2 INJECTION, SOLUTION INTRAVENOUS at 08:09

## 2023-09-04 RX ADMIN — HYDROCHLOROTHIAZIDE 12.5 MG: 12.5 TABLET ORAL at 01:09

## 2023-09-04 RX ADMIN — SODIUM CHLORIDE, POTASSIUM CHLORIDE, SODIUM LACTATE AND CALCIUM CHLORIDE: 600; 310; 30; 20 INJECTION, SOLUTION INTRAVENOUS at 03:09

## 2023-09-04 RX ADMIN — MORPHINE SULFATE 4 MG: 4 INJECTION INTRAVENOUS at 02:09

## 2023-09-04 RX ADMIN — PIPERACILLIN AND TAZOBACTAM 4.5 G: 4; .5 INJECTION, POWDER, LYOPHILIZED, FOR SOLUTION INTRAVENOUS; PARENTERAL at 06:09

## 2023-09-04 RX ADMIN — KETOROLAC TROMETHAMINE 15 MG: 30 INJECTION, SOLUTION INTRAMUSCULAR; INTRAVENOUS at 11:09

## 2023-09-04 RX ADMIN — KETOROLAC TROMETHAMINE 15 MG: 30 INJECTION, SOLUTION INTRAMUSCULAR; INTRAVENOUS at 06:09

## 2023-09-04 RX ADMIN — BUPROPION HYDROCHLORIDE 75 MG: 75 TABLET, FILM COATED ORAL at 09:09

## 2023-09-04 RX ADMIN — HYDRALAZINE HYDROCHLORIDE 20 MG: 20 INJECTION, SOLUTION INTRAMUSCULAR; INTRAVENOUS at 03:09

## 2023-09-04 RX ADMIN — HYDROMORPHONE HYDROCHLORIDE 1 MG: 1 INJECTION, SOLUTION INTRAMUSCULAR; INTRAVENOUS; SUBCUTANEOUS at 09:09

## 2023-09-04 RX ADMIN — NICARDIPINE HYDROCHLORIDE 15 MG/HR: 0.2 INJECTION, SOLUTION INTRAVENOUS at 11:09

## 2023-09-04 RX ADMIN — TRAZODONE HYDROCHLORIDE 50 MG: 50 TABLET ORAL at 09:09

## 2023-09-04 RX ADMIN — ACETAMINOPHEN 325MG 650 MG: 325 TABLET ORAL at 06:09

## 2023-09-04 RX ADMIN — QUETIAPINE FUMARATE 200 MG: 100 TABLET ORAL at 09:09

## 2023-09-04 RX ADMIN — OXYCODONE HYDROCHLORIDE 5 MG: 5 TABLET ORAL at 02:09

## 2023-09-04 RX ADMIN — ACETAMINOPHEN 325MG 650 MG: 325 TABLET ORAL at 09:09

## 2023-09-04 RX ADMIN — HYDROMORPHONE HYDROCHLORIDE 1 MG: 1 INJECTION, SOLUTION INTRAMUSCULAR; INTRAVENOUS; SUBCUTANEOUS at 07:09

## 2023-09-04 RX ADMIN — POTASSIUM CHLORIDE 10 MEQ: 7.46 INJECTION, SOLUTION INTRAVENOUS at 01:09

## 2023-09-04 RX ADMIN — SODIUM CHLORIDE, POTASSIUM CHLORIDE, SODIUM LACTATE AND CALCIUM CHLORIDE: 600; 310; 30; 20 INJECTION, SOLUTION INTRAVENOUS at 01:09

## 2023-09-04 RX ADMIN — IPRATROPIUM BROMIDE AND ALBUTEROL SULFATE 3 ML: 2.5; .5 SOLUTION RESPIRATORY (INHALATION) at 04:09

## 2023-09-04 RX ADMIN — MORPHINE SULFATE 2 MG: 2 INJECTION, SOLUTION INTRAMUSCULAR; INTRAVENOUS at 01:09

## 2023-09-04 RX ADMIN — CARVEDILOL 25 MG: 25 TABLET, FILM COATED ORAL at 09:09

## 2023-09-04 RX ADMIN — INSULIN ASPART 2 UNITS: 100 INJECTION, SOLUTION INTRAVENOUS; SUBCUTANEOUS at 12:09

## 2023-09-04 RX ADMIN — ONDANSETRON 8 MG: 8 TABLET, ORALLY DISINTEGRATING ORAL at 03:09

## 2023-09-04 RX ADMIN — LOSARTAN POTASSIUM 100 MG: 50 TABLET, FILM COATED ORAL at 02:09

## 2023-09-04 RX ADMIN — PIPERACILLIN AND TAZOBACTAM 4.5 G: 4; .5 INJECTION, POWDER, LYOPHILIZED, FOR SOLUTION INTRAVENOUS; PARENTERAL at 01:09

## 2023-09-04 RX ADMIN — MORPHINE SULFATE 4 MG: 4 INJECTION INTRAVENOUS at 07:09

## 2023-09-04 RX ADMIN — HYDROMORPHONE HYDROCHLORIDE 1 MG: 1 INJECTION, SOLUTION INTRAMUSCULAR; INTRAVENOUS; SUBCUTANEOUS at 10:09

## 2023-09-04 RX ADMIN — POTASSIUM CHLORIDE 10 MEQ: 7.46 INJECTION, SOLUTION INTRAVENOUS at 02:09

## 2023-09-04 RX ADMIN — POTASSIUM CHLORIDE 10 MEQ: 7.46 INJECTION, SOLUTION INTRAVENOUS at 09:09

## 2023-09-04 RX ADMIN — POTASSIUM CHLORIDE 10 MEQ: 7.46 INJECTION, SOLUTION INTRAVENOUS at 10:09

## 2023-09-04 RX ADMIN — SODIUM CHLORIDE, POTASSIUM CHLORIDE, SODIUM LACTATE AND CALCIUM CHLORIDE: 600; 310; 30; 20 INJECTION, SOLUTION INTRAVENOUS at 11:09

## 2023-09-04 RX ADMIN — INSULIN ASPART 1 UNITS: 100 INJECTION, SOLUTION INTRAVENOUS; SUBCUTANEOUS at 05:09

## 2023-09-04 RX ADMIN — SODIUM CHLORIDE: 9 INJECTION, SOLUTION INTRAVENOUS at 08:09

## 2023-09-04 RX ADMIN — MUPIROCIN: 20 OINTMENT TOPICAL at 11:09

## 2023-09-04 RX ADMIN — AMLODIPINE BESYLATE 10 MG: 5 TABLET ORAL at 12:09

## 2023-09-04 RX ADMIN — POTASSIUM CHLORIDE 10 MEQ: 7.46 INJECTION, SOLUTION INTRAVENOUS at 08:09

## 2023-09-04 RX ADMIN — ACETAMINOPHEN 325MG 650 MG: 325 TABLET ORAL at 01:09

## 2023-09-04 NOTE — ASSESSMENT & PLAN NOTE
Outpatient regimen includes coreg, irbesartan, hydralazine   Unable to keep po meds down for last few days  Unable to control BP with po and IVP meds on admission -- transferred to ICU for Cardene gtt

## 2023-09-04 NOTE — ASSESSMENT & PLAN NOTE
Takes oxycodone 15mg multiple times per day for chronic pain prescribed by pain dr  - confirmed on  -- pt recently picked up Rx of oxy 15mg, 175 tabs for a 30d supply  - will continue Oxycodonde 15mg prn inpatient -- increase frequency given acute on chronic pain and will add IV prn pain med for breakthrough pain or inability to tolerate po med

## 2023-09-04 NOTE — ASSESSMENT & PLAN NOTE
Patient has a current diagnosis of hypertensive urgency (without evidence of end organ damage) which is uncontrolled.  Latest blood pressure and vitals reviewed-   Temp:  [98.2 °F (36.8 °C)-99.3 °F (37.4 °C)]   Pulse:  [43-90]   Resp:  [14-36]   BP: (175-242)/()   SpO2:  [94 %-99 %] .   Patient currently on IV antihypertensives.   Home meds for hypertension were reviewed and noted below.   Hypertension Medications             amLODIPine (NORVASC) 10 MG tablet Take 1 tablet (10 mg total) by mouth once daily.    carvediloL (COREG) 25 MG tablet Take 1 tablet (25 mg total) by mouth 2 (two) times daily.    hydrALAZINE (APRESOLINE) 100 MG tablet Take 1 tablet (100 mg total) by mouth every 8 (eight) hours.    irbesartan (AVAPRO) 300 MG tablet Take 1 tablet (300 mg total) by mouth once daily.    propranoloL (INDERAL) 10 MG tablet Take 10 mg by mouth once daily.          Medication adjustment for hospital antihypertensives is as follows-   Attempted to control BP with po meds and control pain with narcotics however persistently elevated BP and inability to keep po meds down ultimately I transferred pt to the ICU for Cardene gtt. Discontinued all po anti-HTN for now.   Goal MAP reduction of 25-30% in first few hours with goal to avoid rapidly correcting BP    Will aim for controlled BP reduction by medications noted above. Monitor and mitigate end organ damage as indicated.

## 2023-09-04 NOTE — SUBJECTIVE & OBJECTIVE
Past Medical History:   Diagnosis Date    Arthritis     Cancer     Prostate    Diabetes mellitus     Hyperthyroidism     PAD (peripheral artery disease)        Past Surgical History:   Procedure Laterality Date    PROSTATE SURGERY         Review of patient's allergies indicates:  No Known Allergies    No current facility-administered medications on file prior to encounter.     Current Outpatient Medications on File Prior to Encounter   Medication Sig    allopurinoL (ZYLOPRIM) 100 MG tablet Take 1 tablet (100 mg total) by mouth once daily.    amLODIPine (NORVASC) 10 MG tablet Take 1 tablet (10 mg total) by mouth once daily.    aspirin (ECOTRIN) 81 MG EC tablet Take 81 mg by mouth once daily.    atorvastatin (LIPITOR) 40 MG tablet Take 1 tablet (40 mg total) by mouth once daily.    buPROPion (WELLBUTRIN) 75 MG tablet TAKE 1 TABLET EVERY DAY    carvediloL (COREG) 25 MG tablet Take 1 tablet (25 mg total) by mouth 2 (two) times daily.    cholecalciferol, vitamin D3, 1,250 mcg (50,000 unit) capsule Take 50,000 Units by mouth every 14 (fourteen) days.    citalopram (CELEXA) 20 MG tablet Take 20 mg by mouth once daily.    ergocalciferol (ERGOCALCIFEROL) 50,000 unit Cap Take 50,000 Units by mouth every 7 days.    glimepiride (AMARYL) 1 MG tablet TAKE 2 TABLETS EVERY DAY    hydrALAZINE (APRESOLINE) 100 MG tablet Take 1 tablet (100 mg total) by mouth every 8 (eight) hours.    irbesartan (AVAPRO) 300 MG tablet Take 1 tablet (300 mg total) by mouth once daily.    metFORMIN (GLUCOPHAGE) 1000 MG tablet Take 1 tablet (1,000 mg total) by mouth 2 (two) times daily with meals.    oxyCODONE (ROXICODONE) 15 MG Tab Take 15 mg by mouth every 4 to 6 hours as needed.    oxyCODONE myristate (XTAMPZA ER) 13.5 mg CSpT take 1 capsule  by mouth every morning with a full meal as needed for pain    propranoloL (INDERAL) 10 MG tablet Take 10 mg by mouth once daily.    QUEtiapine (SEROQUEL) 100 MG Tab Take 1 tablet (100 mg total) by mouth 2 (two)  times daily. At bedtime    topiramate (TOPAMAX) 100 MG tablet Take 100 mg by mouth once daily.    traZODone (DESYREL) 100 MG tablet Take 100 mg by mouth every evening.    TRUE METRIX GLUCOSE TEST STRIP Strp     TRUEPLUS LANCETS 33 gauge Misc      Family History       Problem Relation (Age of Onset)    Cancer Paternal Uncle    Diabetes Mother, Father    Gout Father    Hyperlipidemia Mother, Father    Hypertension Mother, Father    Stroke Maternal Grandmother          Tobacco Use    Smoking status: Every Day     Types: Vaping with nicotine    Smokeless tobacco: Current   Substance and Sexual Activity    Alcohol use: Not Currently     Alcohol/week: 0.0 standard drinks of alcohol     Comment: Former User    Drug use: Not Currently     Types: Cocaine, Marijuana     Comment: Former User    Sexual activity: Not Currently     Review of Systems   Constitutional:  Positive for appetite change and chills. Negative for diaphoresis and fever.   HENT:  Negative for congestion, sore throat and trouble swallowing.    Eyes:  Negative for photophobia, pain and visual disturbance.   Respiratory:  Positive for shortness of breath (2/2 abd pain when inhaling). Negative for cough and choking.    Cardiovascular:  Positive for chest pain. Negative for palpitations and leg swelling.   Gastrointestinal:  Positive for abdominal pain, diarrhea, nausea and vomiting. Negative for abdominal distention, blood in stool and constipation.   Endocrine: Negative for polydipsia and polyuria.   Genitourinary:  Negative for difficulty urinating, dysuria, flank pain, frequency, hematuria and urgency.   Musculoskeletal:  Positive for arthralgias and back pain. Negative for myalgias.   Skin:  Negative for rash and wound.   Neurological:  Negative for dizziness, tremors, syncope, facial asymmetry, speech difficulty, weakness, light-headedness and headaches.   Hematological:  Negative for adenopathy. Does not bruise/bleed easily.   Psychiatric/Behavioral:   Negative for agitation and confusion.      Objective:     Vital Signs (Most Recent):  Temp: 98.2 °F (36.8 °C) (09/04/23 0633)  Pulse: 90 (09/04/23 0633)  Resp: (!) 28 (09/04/23 0633)  BP: (!) 175/77 (09/04/23 0633)  SpO2: 95 % (09/04/23 0633) Vital Signs (24h Range):  Temp:  [98.2 °F (36.8 °C)-99.3 °F (37.4 °C)] 98.2 °F (36.8 °C)  Pulse:  [43-90] 90  Resp:  [14-28] 28  SpO2:  [94 %-99 %] 95 %  BP: (175-242)/() 175/77     Weight: 84 kg (185 lb 3 oz)  Body mass index is 29 kg/m².     Physical Exam  Vitals and nursing note reviewed.   Constitutional:       General: He is in acute distress.      Appearance: He is ill-appearing.   HENT:      Head: Normocephalic and atraumatic.      Nose: Nose normal.      Mouth/Throat:      Mouth: Mucous membranes are dry.   Eyes:      Extraocular Movements: Extraocular movements intact.      Conjunctiva/sclera: Conjunctivae normal.   Cardiovascular:      Rate and Rhythm: Regular rhythm. Bradycardia present.      Pulses: Normal pulses.      Comments: Bradycardic initially, HR now 80s  Pulmonary:      Effort: Pulmonary effort is normal. No respiratory distress.      Breath sounds: No wheezing or rales.   Abdominal:      General: There is no distension.      Palpations: Abdomen is soft.      Tenderness: There is no right CVA tenderness, left CVA tenderness, guarding or rebound.      Comments: +TTP RUQ, LUQ, and epigastric area   Hypoactive BS   Musculoskeletal:      Cervical back: Normal range of motion and neck supple. No rigidity.      Right lower leg: No edema.      Left lower leg: No edema.   Skin:     General: Skin is warm and dry.   Neurological:      General: No focal deficit present.      Mental Status: He is alert and oriented to person, place, and time.   Psychiatric:         Mood and Affect: Mood normal.         Behavior: Behavior normal.                Significant Labs: All pertinent labs within the past 24 hours have been reviewed.    Significant Imaging: I have reviewed  all pertinent imaging results/findings within the past 24 hours.

## 2023-09-04 NOTE — ASSESSMENT & PLAN NOTE
Takes oxycodone 15mg multiple times per day for chronic pain prescribed by pain dr MAN confirmed on  -- pt recently picked up Rx of oxy 15mg, 175 tabs for a 30d supply  Will continue Oxycodonde 15mg prn inpatient -- increase frequency given acute on chronic pain and will add IV prn pain med for breakthrough pain or inability to tolerate po med

## 2023-09-04 NOTE — NURSING
Pt arrived to unit from ED w/ cardiac monitoring and 3L O2 via NC. Pt compalined of 20 out of 10 sharp lower abdominal pain that did not radiate to another site. Pt asked for morphine, but it was too soon to administer med, so deep breathing and calming techniques were used. Pt then stated that he couldn't breathe, Pt was sat up further and O2 was increased to 4L, O2 sats 96% at this time. Pt Nicardipine gtt was increased to 15 and BP goal to be clarified with primary team. Pt oriented to room, call light in reach, bed in lowest position and locked, and pt was provided with a urinal.

## 2023-09-04 NOTE — ASSESSMENT & PLAN NOTE
Hx MI with stent  Continue home bASA  Will r/o ACS on admission given substernal CP earlier on day of admission.

## 2023-09-04 NOTE — ASSESSMENT & PLAN NOTE
P/w n/v/abd pain x6d and imaging findings concerning for acute cholecystitis, CBD dilation, and possible choledocholithiasis.   ED spoke to gen surg who rec HIDA scan and to admit to internal medicine given co-morbidities   Pt was fed in the ED just before midnight with subsequent n/v -- will keep NPO for now   Got IVF bolus in the ED. Will give additional 1L IVF slowly -- 100mL/hr x10h due to continued volume losses. reassess volume status after this  Prn anti-emetics    Pain control with po and IV narcotics   Had chills at home. Leukocytosis now. Imaging with evidence of obstruction -- will start Zosyn for intra-abdominal ABX coverage  BCx x2 ordered on admission prior to ABX order  F/u HIDA scan  F/u gen surg recommendations

## 2023-09-04 NOTE — CONSULTS
[facility] - Intensive Care  Gastroenterology  Consult Note    Patient Name: Neena Kohli  MRN: [id number]  Admission Date: 9/3/2023  Hospital Length of Stay: 0 days  Code Status: Full Code   Attending Provider: Kamari Mina,*   Consulting Provider: Cesar Meadows MD  Primary Care Physician: CLAY Ramirez MD  Principal Problem:Calculus of gallbladder with acute cholecystitis and obstruction    Consults  Subjective:     HPI:  67-year-old gentleman past medical history significant for coronary artery disease, hypertension, chronic opioid use, and diabetes who presented to the emergency department complaints of right upper quadrant pain x1 week.  In the ED patient was found to have radiographic evidence cholecystitis with elevated white count ultrasound showed dilation and common bile duct, however liver tests are relatively normal.  Patient was admitted to ICU for treatment of hypertensive emergency.  GI was consulted for comanagement of cholecystitis.      Past Medical History:   Diagnosis Date    Arthritis     Cancer     Prostate    Diabetes mellitus     Hyperthyroidism     PAD (peripheral artery disease)        Past Surgical History:   Procedure Laterality Date    PROSTATE SURGERY         Review of patient's allergies indicates:  No Known Allergies  Family History       Problem Relation (Age of Onset)    Cancer Paternal Uncle    Diabetes Mother, Father    Gout Father    Hyperlipidemia Mother, Father    Hypertension Mother, Father    Stroke Maternal Grandmother          Tobacco Use    Smoking status: Every Day     Types: Vaping with nicotine    Smokeless tobacco: Current   Substance and Sexual Activity    Alcohol use: Not Currently     Alcohol/week: 0.0 standard drinks of alcohol     Comment: Former User    Drug use: Not Currently     Types: Cocaine, Marijuana     Comment: Former User    Sexual activity: Not Currently     Review of Systems   Constitutional:  Negative for chills and  fever.   Gastrointestinal:  Positive for abdominal pain. Negative for abdominal distention and nausea.     Objective:     Vital Signs (Most Recent):  Temp: 98.5 °F (36.9 °C) (09/04/23 0730)  Pulse: 70 (09/04/23 1440)  Resp: (!) 24 (09/04/23 1540)  BP: (!) 170/74 (09/04/23 1320)  SpO2: (!) 88 % (09/04/23 1440) Vital Signs (24h Range):  Temp:  [98.2 °F (36.8 °C)-99.2 °F (37.3 °C)] 98.5 °F (36.9 °C)  Pulse:  [] 70  Resp:  [14-36] 24  SpO2:  [87 %-99 %] 88 %  BP: (170-242)/() 170/74     Weight: 83.9 kg (185 lb) (09/04/23 0850)  Body mass index is 28.98 kg/m².      Intake/Output Summary (Last 24 hours) at 9/4/2023 1546  Last data filed at 9/4/2023 1530  Gross per 24 hour   Intake 3048.43 ml   Output 250 ml   Net 2798.43 ml       Lines/Drains/Airways       Peripheral Intravenous Line  Duration                  Peripheral IV - Single Lumen 09/03/23 1915 18 G Left Antecubital <1 day         Peripheral IV - Single Lumen 09/04/23 0326 20 G Right Antecubital <1 day                     Physical Exam  Vitals and nursing note reviewed.   Constitutional:       Appearance: He is well-developed.   HENT:      Head: Normocephalic and atraumatic.   Eyes:      General: No scleral icterus.     Pupils: Pupils are equal, round, and reactive to light.   Cardiovascular:      Rate and Rhythm: Normal rate and regular rhythm.      Heart sounds: Normal heart sounds.   Pulmonary:      Effort: Pulmonary effort is normal. No respiratory distress.      Breath sounds: Normal breath sounds.   Abdominal:      General: Bowel sounds are normal. There is no distension.      Palpations: Abdomen is soft.      Tenderness: There is abdominal tenderness. There is no guarding.   Musculoskeletal:         General: Normal range of motion.      Cervical back: Normal range of motion and neck supple.   Skin:     General: Skin is warm and dry.      Findings: No erythema or rash.   Neurological:      Mental Status: He is alert and oriented to person, place,  and time.      Comments: No asterixis   Psychiatric:         Behavior: Behavior normal.          Significant Labs:  Recent Lab Results  (Last 5 results in the past 24 hours)        09/04/23  1205   09/04/23  0935   09/04/23  0850   09/04/23  0558   09/04/23  0540        Albumin       3.6         Alkaline Phosphatase       144         ALT       6         Anion Gap       10         Ao peak jenise     2.20           Ao VTI     47.80           AST       8         AV valve area     2.21           ASPEN by Velocity Ratio     2.33           AV mean gradient     11           AV index (prosthetic)     0.74           AV peak gradient     19           AV Velocity Ratio     0.78           Baso #       0.05         Basophil %       0.3         BILIRUBIN TOTAL       0.5  Comment: For infants and newborns, interpretation of results should be based  on gestational age, weight and in agreement with clinical  observations.    Premature Infant recommended reference ranges:  Up to 24 hours.............<8.0 mg/dL  Up to 48 hours............<12.0 mg/dL  3-5 days..................<15.0 mg/dL  6-29 days.................<15.0 mg/dL           BSA     1.99           BUN       8         Calcium       9.2         Chloride       106         CO2       22         Creatinine       1.1         Left Ventricle Relative Wall Thickness     0.33           Differential Method       Automated         E/A ratio     0.84           E/E' ratio     10.53           eGFR       >60         Eos #       0.0         Eosinophil %       0.1         Estimated Avg Glucose       100         E wave deceleration time     155.08           FS     31           Glucose       192         Gran # (ANC)       12.6         Gran %       83.3         Hematocrit       38.0         Hemoglobin       12.4         Hemoglobin A1C External       5.1  Comment: ADA Screening Guidelines:  5.7-6.4%  Consistent with prediabetes  >or=6.5%  Consistent with diabetes    High levels of fetal hemoglobin  "interfere with the HbA1C  assay. Heterozygous hemoglobin variants (HbS, HgC, etc)do  not significantly interfere with this assay.   However, presence of multiple variants may affect accuracy.           Immature Grans (Abs)       0.11  Comment: Mild elevation in immature granulocytes is non specific and   can be seen in a variety of conditions including stress response,   acute inflammation, trauma and pregnancy. Correlation with other   laboratory and clinical findings is essential.           Immature Granulocytes       0.7         IVC diameter     1.85           IVSd     1.23           LA WIDTH     4.4           Left Atrium Major Axis     4.97           Left Atrium Minor Axis     5.32           LA size     4.66           LA volume     89.57                64.56           LA vol index     45.7           LA Volume Index (Mod)     32.9           LVOT area     3.0           LV LATERAL E/E' RATIO     9.09           LV SEPTAL E/E' RATIO     12.50           LV EDV BP     119.04           LV Diastolic Volume Index     60.73           LVIDd     5.01           LVIDs     3.47           LV mass     188.75           LV Mass Index     96           Left Ventricular Outflow Tract Mean Gradient     5.25           Left Ventricular Outflow Tract Mean Velocity     1.07           LVOT diameter     1.95           LVOT peak jenise     1.72           LVOT stroke volume     105.67           LVOT peak VTI     35.40           LV ESV BP     49.73           LV Systolic Volume Index     25.4           Lymph #       1.2         Lymph %       8.2         Magnesium        1.6         MCH       30.0         MCHC       32.6         MCV       92         Mean e'     0.10           Mono #       1.1         Mono %       7.4         MPV       10.4         MV valve area p 1/2 method     4.89           MV "A" wave duration     [id number]           MV valve area by continuity eq     3.97           MV mean gradient     3           MV peak gradient     " 6           MV Peak A Trent     1.19           MV Peak E Trent     1.00           MV stenosis pressure 1/2 time     44.97           MV VTI     26.6           nRBC       0         Ahn's Biplane MOD Ejection Fraction     71           Phosphorus       3.2         Platelets       277         POCT Glucose 227   230       172       Potassium       3.1         PROTEIN TOTAL       7.1         Pulmonary Valve Mean Velocity     1.15           PV peak gradient     8           PV Peak D Trent     0.57           PV Peak S Trent     1.04           PV PEAK VELOCITY     1.37           Pulm vein S/D ratio     1.82           Posterior Wall     0.82           RA Major Axis     3.92           Est. RA pres     3           RA Width     3.61           RBC       4.13         RDW       13.3         RV S'     19.25           RV TB RVSP     5           RVDD     3.23           Sodium       138         TAPSE     3.00           TDI SEPTAL     0.08           TDI LATERAL     0.11           Triscuspid Valve Regurgitation Peak Gradient     17           TR Max Trent     2.05           Troponin I       0.013  Comment: The reference interval for Troponin I represents the 99th percentile   cutoff   for our facility and is consistent with 3rd generation assay   performance.           TSH       0.985         TV resting pulmonary artery pressure     20           WBC       15.19         ZLVIDD     -1.13           ZLVIDS     0.05                                  Significant Imaging:  U/S: I have reviewed all results within the past 24 hours and my personal findings are:  Dilated common bile duct.    Assessment/Plan:     GI  * Calculus of gallbladder with acute cholecystitis and obstruction  With CBD dilation.  Liver tests not suggestive of obstructive process   Agree with surgery consultation.  Could consider MRCP versus intraoperative cholangiogram at time of cholecystectomy.  Will deferred to surgery  Continue antibiotics        Thank you for your consult. I  will follow-up with patient. Please contact us if you have any additional questions.    Cesar Meadows MD  Gastroenterology  [facility] - Intensive [facility]

## 2023-09-04 NOTE — PROGRESS NOTES
[facility] Medicine  Progress Note    Patient Name: Neena Kohli  MRN: [id number]  Patient Class: OP- Observation   Admission Date: 9/3/2023  Length of Stay: 0 days  Attending Physician: Kamari Mina,*  Primary Care Provider: CLAY Ramirze MD        Subjective:     Principal Problem:Calculus of gallbladder with acute cholecystitis and obstruction        HPI:  66yo M w/ HTN, HLD, PAD, CKD, T2DM, MDD, CAD with hx MI/stents, hx prostate CA p/w abd pain, nausea, vomiting, and diarrhea for last 6 days. Unable to keep down much of anything po, including his BP meds. BP usually well controlled on multiple medications but has been high since his symptoms began.  No fevers (did not take temperature at home) but has had chills. No flank pain, dysuria,or other urinary symptoms. Had some substernal CP when breathing deeply earlier today but denies CP at time of my assessment. Also worsening of abdominal pain with deep breathing. Denies HA, vision changes, weakness, dizziness or lightheadedness. No recent falls. No sick contacts. Has never had an episode like this before. He was unaware that he had gallstones.     He has chronic back pain and takes Oxycodone 15mg several times per day prescribed by pain doctor. He reports compliance with all of his meds prior to getting sick, but since he has been unable to keep down much, has not had all of his meds. Some nonbloody diarrhea. No constipation. Workup significant for imaging revealing gallstones with CBD dilation, possible cholecystitis and possible choledocholithiasis. ED physician spoke to general surgery who requested HIDA scan and admission to internal medicine given multiple co-morbidities. Sinus volodymyr but otherwise hemodynamically stable. Initially admitted pt to med/tele bed but due to inability to control BP with po meds, decision made to transfer him to the ICU for a nicardipine gtt.            Overview/Hospital Course:  No notes on  file    Interval History:  Substantial, spasming right upper quadrant pain.  Suspect secondary to cholecystitis.  Will need operative intervention; discussed with surgery who would like better blood pressure control 1st.    Review of Systems   Gastrointestinal:  Positive for abdominal pain.     Objective:     Vital Signs (Most Recent):  Temp: 98.5 °F (36.9 °C) (09/04/23 0730)  Pulse: 91 (09/04/23 1045)  Resp: (!) 25 (09/04/23 1045)  BP: (!) 203/93 (09/04/23 1045)  SpO2: (!) 93 % (09/04/23 1045) Vital Signs (24h Range):  Temp:  [98.2 °F (36.8 °C)-99.3 °F (37.4 °C)] 98.5 °F (36.9 °C)  Pulse:  [] 91  Resp:  [14-36] 25  SpO2:  [87 %-99 %] 93 %  BP: (175-242)/() 203/93     Weight: 83.9 kg (185 lb)  Body mass index is 28.98 kg/m².    Intake/Output Summary (Last 24 hours) at 9/4/2023 1134  Last data filed at 9/4/2023 0730  Gross per 24 hour   Intake 1437.9 ml   Output 100 ml   Net 1337.9 ml         Physical Exam  Vitals and nursing note reviewed.   Constitutional:       General: He is in acute distress.      Appearance: He is ill-appearing.   HENT:      Head: Normocephalic and atraumatic.      Nose: Nose normal.      Mouth/Throat:      Mouth: Mucous membranes are dry.   Eyes:      Extraocular Movements: Extraocular movements intact.      Conjunctiva/sclera: Conjunctivae normal.   Cardiovascular:      Rate and Rhythm: Regular rhythm. Bradycardia present.      Pulses: Normal pulses.      Comments: Bradycardic initially, HR now 80s  Pulmonary:      Effort: Pulmonary effort is normal. No respiratory distress.      Breath sounds: No wheezing or rales.   Abdominal:      General: There is no distension.      Palpations: Abdomen is soft.      Tenderness: There is no right CVA tenderness, left CVA tenderness, guarding or rebound.      Comments: +TTP RUQ, LUQ, and epigastric area   Hypoactive BS   Musculoskeletal:      Cervical back: Normal range of motion and neck supple. No rigidity.      Right lower leg: No edema.       Left lower leg: No edema.   Skin:     General: Skin is warm and dry.   Neurological:      General: No focal deficit present.      Mental Status: He is alert and oriented to person, place, and time.   Psychiatric:         Mood and Affect: Mood normal.         Behavior: Behavior normal.             Significant Labs: All pertinent labs within the past 24 hours have been reviewed.    Significant Imaging: I have reviewed all pertinent imaging results/findings within the past 24 hours.      Assessment/Plan:      * Calculus of gallbladder with acute cholecystitis and obstruction  P/w n/v/abd pain x6d and imaging findings concerning for acute cholecystitis, CBD dilation, and possible choledocholithiasis.   ED spoke to gen surg who rec HIDA scan and to admit to internal medicine given co-morbidities   Pt was fed in the ED just before midnight with subsequent n/v -- will keep NPO for now   Got IVF bolus in the ED. Will give additional 1L IVF slowly -- 100mL/hr x10h due to continued volume losses. reassess volume status after this  Prn anti-emetics    Pain control with po and IV narcotics   Had chills at home. Leukocytosis now. Imaging with evidence of obstruction -- will start Zosyn for intra-abdominal ABX coverage  BCx x2 ordered on admission prior to ABX order  F/u gen surg recommendations: will need OR once better BP control, planned for tomorrow    Sinus bradycardia  Sinus volodymyr to 40s on admission, confirmed sinus on EKG  Held BB   HR did finally begin to improve after several doses of po and IV hydralazine, so may be a component of reflex tachycardia from hydralazine rather than improvement in bradycardia  Pt was asymptomatic in terms of bradycardia   Telemetry, trend and replenish electrolytes as needed  Will get TTE (last in 12/2022 with hyperdynamic EF and indeterminate diastolic function)        Hypokalemia  Will replenish K with IV KCl given n/v        Hypertensive urgency  Patient has a current diagnosis of  hypertensive urgency (without evidence of end organ damage) which is uncontrolled.  Latest blood pressure and vitals reviewed-   Temp:  [98.2 °F (36.8 °C)-99.3 °F (37.4 °C)]   Pulse:  []   Resp:  [14-36]   BP: (175-242)/()   SpO2:  [87 %-99 %] .   Patient currently on IV antihypertensives.   Home meds for hypertension were reviewed and noted below.   Hypertension Medications             amLODIPine (NORVASC) 10 MG tablet Take 1 tablet (10 mg total) by mouth once daily.    carvediloL (COREG) 25 MG tablet Take 1 tablet (25 mg total) by mouth 2 (two) times daily.    hydrALAZINE (APRESOLINE) 100 MG tablet Take 1 tablet (100 mg total) by mouth every 8 (eight) hours.    irbesartan (AVAPRO) 300 MG tablet Take 1 tablet (300 mg total) by mouth once daily.    propranoloL (INDERAL) 10 MG tablet Take 10 mg by mouth once daily.          Medication adjustment for hospital antihypertensives is as follows-   Attempted to control BP with po meds and control pain with narcotics however persistently elevated BP and inability to keep po meds down ultimately transferred pt to the ICU for Cardene gtt. Discontinued all po anti-HTN for now.   Goal MAP reduction of 25-30% in first few hours with goal to avoid rapidly correcting BP  - could consider nitroprusside for short term BP management  - likely significant component of pain related    Will aim for controlled BP reduction by medications noted above. Monitor and mitigate end organ damage as indicated.    Major depressive disorder, recurrent severe without psychotic features  Patient has persistent depression which is unknown and is currently controlled. Will Continue anti-depressant medications. We will not consult psychiatry at this time. Patient does not display psychosis at this time. Continue to monitor closely and adjust plan of care as needed.        Mild aortic valve stenosis  Seen on echo in 2022  Repeating TTE this admission 2/2 sinus volodymyr on arrival       Mixed  hyperlipidemia  Will hold statin for now -- when tolerating po meds and symptoms improve, should restart this       Stage 3b chronic kidney disease  Cr at baseline low-mid 1s on admission   Continue to trend renal function  Will give additional IVF due to hypovolemia from n/v and poor po intake   Renally dose medications and avoid nephrotoxins    Coronary artery disease involving native coronary artery of native heart without angina pectoris  Hx MI with stent  Continue home La[place]ano  Will r/o ACS on admission given substernal CP earlier on day of admission.      Type 2 diabetes mellitus with stage 3 chronic kidney disease, without long-term current use of insulin  Outpatient he is on Amaryl and metformin with good glycemic control   Hold oral DM meds inpatient   Accuchecks q4h when NPO/not eating regular meals with Novolog low dose correction scale prn  If requiring SSI frequently, will need to add scheduled basal/bolus insulin   DM diet when tolerating/able to advance diet      Hypertension, essential  Outpatient regimen includes coreg, irbesartan, hydralazine   Unable to keep po meds down for last few days  Unable to control BP with po and IVP meds on admission -- transferred to ICU for Cardene gtt    DDD (degenerative disc disease), lumbar  Takes oxycodone 15mg multiple times per day for chronic pain prescribed by pain dr  - confirmed on  -- pt recently picked up Rx of oxy 15mg, 175 tabs for a 30d supply  - will continue Oxycodonde 15mg prn inpatient -- increase frequency given acute on chronic pain and will add IV prn pain med for breakthrough pain or inability to tolerate po med         VTE Risk Mitigation (From admission, onward)         Ordered     enoxaparin injection 40 mg  Daily         09/04/23 0104     IP VTE HIGH RISK PATIENT  Once         09/04/23 0104     Place sequential compression device  Until discontinued         09/04/23 0104                Discharge Planning   DONNA:      Code Status: Full Code    Is the patient medically ready for discharge?:     Reason for patient still in hospital (select all that apply): Consult recommendations               Critical care time spent on the evaluation and treatment of severe organ dysfunction, review of pertinent labs and imaging studies, discussions with consulting providers and discussions with patient/family: 35 minutes.      Kamari Mina MD  Department of Hospital Medicine   [facility] Intensive Care

## 2023-09-04 NOTE — CONSULTS
[facility]  INPATIENT Consult    REASON FOR CONSULT/ADMISSION: Severe abdominal pain    HPI: Neena Kohli is a 67 y.o. male with week of RUQ pain, became severe recently.  ER workup showed cholecystitis with CBD dilation and normal LFTs.  WBC 15.  SBP 220s on arrival.  Pt in ICU for hypertensive emergency on cardene gtt.  BP still 200s/90s. Pt with significant medical hx of CAD and DM among many others.    I have reviewed the patient's chart including prior progress notes, procedures and testing.     ROS:   Review of Systems   All other systems reviewed and are negative.      PROBLEM LIST:  Patient Active Problem List   Diagnosis    Lumbar facet arthropathy    Chronic pain syndrome    DDD (degenerative disc disease), lumbar    Hyperuricemia    Microalbuminuria    Hyperkalemia    Iron deficiency anemia    Hypertension, essential    Type 2 diabetes mellitus with stage 3 chronic kidney disease, without long-term current use of insulin    Coronary artery disease involving native coronary artery of native heart without angina pectoris    PAD (peripheral artery disease)    Dyslipidemia associated with type 2 diabetes mellitus    Diabetes mellitus with peripheral circulatory disorder    Stage 3b chronic kidney disease    Hypertension associated with diabetes    Tobacco use disorder    Presence of stent in coronary artery    Bronchogenic cyst    Carcinoma of prostate    Carpal tunnel syndrome    Depressive disorder    Mixed hyperlipidemia    Impaired glucose tolerance    Spondylosis of lumbosacral spine without myelopathy    Ventricular premature beats    Old MI (myocardial infarction)    Mild aortic valve stenosis    Major depressive disorder, recurrent severe without psychotic features    Calculus of gallbladder with acute cholecystitis and obstruction    Hypertensive urgency    Hypokalemia    Sinus bradycardia         HISTORY  Past Medical History:   Diagnosis Date    Arthritis     Cancer     Prostate     Diabetes mellitus     Hyperthyroidism     PAD (peripheral artery disease)        Past Surgical History:   Procedure Laterality Date    PROSTATE SURGERY         Social History     Tobacco Use    Smoking status: Every Day     Types: Vaping with nicotine    Smokeless tobacco: Current   Substance Use Topics    Alcohol use: Not Currently     Alcohol/week: 0.0 standard drinks of alcohol     Comment: Former User    Drug use: Not Currently     Types: Cocaine, Marijuana     Comment: Former User       Family History   Problem Relation Age of Onset    Diabetes Mother     Hypertension Mother     Hyperlipidemia Mother     Diabetes Father     Hyperlipidemia Father     Hypertension Father     Gout Father     Cancer Paternal Uncle     Stroke Maternal Grandmother     Amblyopia Neg Hx     Blindness Neg Hx     Cataracts Neg Hx     Glaucoma Neg Hx     Macular degeneration Neg Hx     Retinal detachment Neg Hx     Strabismus Neg Hx     Thyroid disease Neg Hx          MEDS:  No current facility-administered medications on file prior to encounter.     Current Outpatient Medications on File Prior to Encounter   Medication Sig Dispense Refill    allopurinoL (ZYLOPRIM) 100 MG tablet Take 1 tablet (100 mg total) by mouth once daily. 90 tablet 0    amLODIPine (NORVASC) 10 MG tablet Take 1 tablet (10 mg total) by mouth once daily. 90 tablet 2    aspirin (ECOTRIN) 81 MG EC tablet Take 81 mg by mouth once daily.      atorvastatin (LIPITOR) 40 MG tablet Take 1 tablet (40 mg total) by mouth once daily. 90 tablet 3    buPROPion (WELLBUTRIN) 75 MG tablet TAKE 1 TABLET EVERY DAY 90 tablet 0    carvediloL (COREG) 25 MG tablet Take 1 tablet (25 mg total) by mouth 2 (two) times daily. 180 tablet 3    cholecalciferol, vitamin D3, 1,250 mcg (50,000 unit) capsule Take 50,000 Units by mouth every 14 (fourteen) days.      citalopram (CELEXA) 20 MG tablet Take 20 mg by mouth once daily.      ergocalciferol (ERGOCALCIFEROL) 50,000 unit Cap Take 50,000 Units by  mouth every 7 days.      glimepiride (AMARYL) 1 MG tablet TAKE 2 TABLETS EVERY  tablet 1    hydrALAZINE (APRESOLINE) 100 MG tablet Take 1 tablet (100 mg total) by mouth every 8 (eight) hours. 270 tablet 3    irbesartan (AVAPRO) 300 MG tablet Take 1 tablet (300 mg total) by mouth once daily. 90 tablet 0    metFORMIN (GLUCOPHAGE) 1000 MG tablet Take 1 tablet (1,000 mg total) by mouth 2 (two) times daily with meals. 180 tablet 0    oxyCODONE (ROXICODONE) 15 MG Tab Take 15 mg by mouth every 4 to 6 hours as needed.      oxyCODONE myristate (XTAMPZA ER) 13.5 mg CSpT take 1 capsule  by mouth every morning with a full meal as needed for pain 30 each 0    propranoloL (INDERAL) 10 MG tablet Take 10 mg by mouth once daily.      QUEtiapine (SEROQUEL) 100 MG Tab Take 1 tablet (100 mg total) by mouth 2 (two) times daily. At bedtime 60 tablet 2    topiramate (TOPAMAX) 100 MG tablet Take 100 mg by mouth once daily.      traZODone (DESYREL) 100 MG tablet Take 100 mg by mouth every evening.      TRUE METRIX GLUCOSE TEST STRIP Strp       TRUEPLUS LANCETS 33 gauge Misc          ALLERGIES:  Review of patient's allergies indicates:  No Known Allergies      VITALS:  Temp:  [98.2 °F (36.8 °C)-99.3 °F (37.4 °C)] 98.5 °F (36.9 °C)  Pulse:  [] 94  Resp:  [14-36] 22  SpO2:  [87 %-99 %] 93 %  BP: (175-242)/() 203/94    I/O last 3 completed shifts:  In: 1437.9 [I.V.:338.9; IV Piggyback:1099]  Out: -       PHYSICAL EXAM:  Physical Exam  Constitutional:       General: He is in acute distress.      Appearance: Normal appearance. He is ill-appearing. He is not toxic-appearing.   HENT:      Head: Normocephalic and atraumatic.   Pulmonary:      Effort: Pulmonary effort is normal.   Abdominal:      Tenderness: There is abdominal tenderness.   Skin:     General: Skin is warm and dry.   Neurological:      Mental Status: Mental status is at baseline.   Psychiatric:         Mood and Affect: Mood normal.         Behavior: Behavior normal.            LABS:  Lab Results   Component Value Date    WBC 15.19 (H) 09/04/2023    WBC 8.4 01/28/2020    RBC 4.13 (L) 09/04/2023    HGB 12.4 (L) 09/04/2023    HGB 11.9 (L) 01/28/2020    HCT 38.0 (L) 09/04/2023     09/04/2023     Lab Results   Component Value Date     (H) 09/04/2023     09/04/2023     01/28/2020    K 3.1 (L) 09/04/2023    K 4.5 01/28/2020     09/04/2023    CL 98 01/28/2020    CO2 22 (L) 09/04/2023    CO2 28 01/28/2020    BUN 8 09/04/2023    BUN 17 01/28/2020    CREATININE 1.1 09/04/2023    CREATININE 1.5 (H) 01/28/2020    CALCIUM 9.2 09/04/2023    CALCIUM 9.6 01/28/2020     Lab Results   Component Value Date    ALT 6 (L) 09/04/2023    AST 8 (L) 09/04/2023    ALKPHOS 144 (H) 09/04/2023    BILITOT 0.5 09/04/2023     Lab Results   Component Value Date    MG 1.6 09/04/2023    PHOS 3.2 09/04/2023       STUDIES:  CG, U/S images and reports were personally reviewed.        ASSESSMENT & PLAN:  67 y.o. male with acute cholecystitis.  Suspect CBD dilation from GB inflammation.  Pt does not need HIDA scan from my standpoint.  Will need lap erik once BP under control.  Discussed with Dr Maldonado and she agrees he is not safe for anesthesia yet.  ICU team updated.      Alvaro Snyder M.D., F.A.C.S.  [facility] General [facility]  [facility] [place] [facility] [place]

## 2023-09-04 NOTE — H&P
[facility]  History & Physical    Patient Name: Neena Kohli  MRN: [id number]  Patient Class: OP- Observation  Admission Date: 9/3/2023  Attending Physician: Kamari Mina,*   Primary Care Provider: CLAY Ramirez MD         Patient information was obtained from patient, past medical records and ER records.     Subjective:     Principal Problem:Calculus of gallbladder with acute cholecystitis and obstruction    Chief Complaint:   Chief Complaint   Patient presents with    Abdominal Pain     Pt reports to the ed for abdominal pain and nausea x3 days. Pt is also not eating and taking his medication due to cramping.  Pt was sent here from urgent care.         HPI: 66yo M w/ HTN, HLD, PAD, CKD, T2DM, MDD, CAD with hx MI/stents, hx prostate CA p/w abd pain, nausea, vomiting, and diarrhea for last 6 days. Unable to keep down much of anything po, including his BP meds. BP usually well controlled on multiple medications but has been high since his symptoms began.  No fevers (did not take temperature at home) but has had chills. No flank pain, dysuria,or other urinary symptoms. Had some substernal CP when breathing deeply earlier today but denies CP at time of my assessment. Also worsening of abdominal pain with deep breathing. Denies HA, vision changes, weakness, dizziness or lightheadedness. No recent falls. No sick contacts. Has never had an episode like this before. He was unaware that he had gallstones.     He has chronic back pain and takes Oxycodone 15mg several times per day prescribed by pain doctor. He reports compliance with all of his meds prior to getting sick, but since he has been unable to keep down much, has not had all of his meds. Some nonbloody diarrhea. No constipation. Workup significant for imaging revealing gallstones with CBD dilation, possible cholecystitis and possible choledocholithiasis. ED physician spoke to general surgery who requested HIDA scan  and admission to internal medicine given multiple co-morbidities. Sinus volodymyr but otherwise hemodynamically stable. Initially admitted pt to med/tele bed but due to inability to control BP with po meds, decision made to transfer him to the ICU for a nicardipine gtt.            Past Medical History:   Diagnosis Date    Arthritis     Cancer     Prostate    Diabetes mellitus     Hyperthyroidism     PAD (peripheral artery disease)        Past Surgical History:   Procedure Laterality Date    PROSTATE SURGERY         Review of patient's allergies indicates:  No Known Allergies    No current facility-administered medications on file prior to encounter.     Current Outpatient Medications on File Prior to Encounter   Medication Sig    allopurinoL (ZYLOPRIM) 100 MG tablet Take 1 tablet (100 mg total) by mouth once daily.    amLODIPine (NORVASC) 10 MG tablet Take 1 tablet (10 mg total) by mouth once daily.    aspirin (ECOTRIN) 81 MG EC tablet Take 81 mg by mouth once daily.    atorvastatin (LIPITOR) 40 MG tablet Take 1 tablet (40 mg total) by mouth once daily.    buPROPion (WELLBUTRIN) 75 MG tablet TAKE 1 TABLET EVERY DAY    carvediloL (COREG) 25 MG tablet Take 1 tablet (25 mg total) by mouth 2 (two) times daily.    cholecalciferol, vitamin D3, 1,250 mcg (50,000 unit) capsule Take 50,000 Units by mouth every 14 (fourteen) days.    citalopram (CELEXA) 20 MG tablet Take 20 mg by mouth once daily.    ergocalciferol (ERGOCALCIFEROL) 50,000 unit Cap Take 50,000 Units by mouth every 7 days.    glimepiride (AMARYL) 1 MG tablet TAKE 2 TABLETS EVERY DAY    hydrALAZINE (APRESOLINE) 100 MG tablet Take 1 tablet (100 mg total) by mouth every 8 (eight) hours.    irbesartan (AVAPRO) 300 MG tablet Take 1 tablet (300 mg total) by mouth once daily.    metFORMIN (GLUCOPHAGE) 1000 MG tablet Take 1 tablet (1,000 mg total) by mouth 2 (two) times daily with meals.    oxyCODONE (ROXICODONE) 15 MG Tab Take 15 mg by mouth every 4 to  6 hours as needed.    oxyCODONE myristate (XTAMPZA ER) 13.5 mg CSpT take 1 capsule  by mouth every morning with a full meal as needed for pain    propranoloL (INDERAL) 10 MG tablet Take 10 mg by mouth once daily.    QUEtiapine (SEROQUEL) 100 MG Tab Take 1 tablet (100 mg total) by mouth 2 (two) times daily. At bedtime    topiramate (TOPAMAX) 100 MG tablet Take 100 mg by mouth once daily.    traZODone (DESYREL) 100 MG tablet Take 100 mg by mouth every evening.    TRUE METRIX GLUCOSE TEST STRIP Strp     TRUEPLUS LANCETS 33 gauge Misc      Family History       Problem Relation (Age of Onset)    Cancer Paternal Uncle    Diabetes Mother, Father    Gout Father    Hyperlipidemia Mother, Father    Hypertension Mother, Father    Stroke Maternal Grandmother          Tobacco Use    Smoking status: Every Day     Types: Vaping with nicotine    Smokeless tobacco: Current   Substance and Sexual Activity    Alcohol use: Not Currently     Alcohol/week: 0.0 standard drinks of alcohol     Comment: Former User    Drug use: Not Currently     Types: Cocaine, Marijuana     Comment: Former User    Sexual activity: Not Currently     Review of Systems   Constitutional:  Positive for appetite change and chills. Negative for diaphoresis and fever.   HENT:  Negative for congestion, sore throat and trouble swallowing.    Eyes:  Negative for photophobia, pain and visual disturbance.   Respiratory:  Positive for shortness of breath (2/2 abd pain when inhaling). Negative for cough and choking.    Cardiovascular:  Positive for chest pain. Negative for palpitations and leg swelling.   Gastrointestinal:  Positive for abdominal pain, diarrhea, nausea and vomiting. Negative for abdominal distention, blood in stool and constipation.   Endocrine: Negative for polydipsia and polyuria.   Genitourinary:  Negative for difficulty urinating, dysuria, flank pain, frequency, hematuria and urgency.   Musculoskeletal:  Positive for arthralgias and back  pain. Negative for myalgias.   Skin:  Negative for rash and wound.   Neurological:  Negative for dizziness, tremors, syncope, facial asymmetry, speech difficulty, weakness, light-headedness and headaches.   Hematological:  Negative for adenopathy. Does not bruise/bleed easily.   Psychiatric/Behavioral:  Negative for agitation and confusion.      Objective:     Vital Signs (Most Recent):  Temp: 98.2 °F (36.8 °C) (09/04/23 0633)  Pulse: 90 (09/04/23 0633)  Resp: (!) 28 (09/04/23 0633)  BP: (!) 175/77 (09/04/23 0633)  SpO2: 95 % (09/04/23 0633) Vital Signs (24h Range):  Temp:  [98.2 °F (36.8 °C)-99.3 °F (37.4 °C)] 98.2 °F (36.8 °C)  Pulse:  [43-90] 90  Resp:  [14-28] 28  SpO2:  [94 %-99 %] 95 %  BP: (175-242)/() 175/77     Weight: 84 kg (185 lb 3 oz)  Body mass index is 29 kg/m².     Physical Exam  Vitals and nursing note reviewed.   Constitutional:       General: He is in acute distress.      Appearance: He is ill-appearing.   HENT:      Head: Normocephalic and atraumatic.      Nose: Nose normal.      Mouth/Throat:      Mouth: Mucous membranes are dry.   Eyes:      Extraocular Movements: Extraocular movements intact.      Conjunctiva/sclera: Conjunctivae normal.   Cardiovascular:      Rate and Rhythm: Regular rhythm. Bradycardia present.      Pulses: Normal pulses.      Comments: Bradycardic initially, HR now 80s  Pulmonary:      Effort: Pulmonary effort is normal. No respiratory distress.      Breath sounds: No wheezing or rales.   Abdominal:      General: There is no distension.      Palpations: Abdomen is soft.      Tenderness: There is no right CVA tenderness, left CVA tenderness, guarding or rebound.      Comments: +TTP RUQ, LUQ, and epigastric area   Hypoactive BS   Musculoskeletal:      Cervical back: Normal range of motion and neck supple. No rigidity.      Right lower leg: No edema.      Left lower leg: No edema.   Skin:     General: Skin is warm and dry.   Neurological:      General: No focal deficit  present.      Mental Status: He is alert and oriented to person, place, and time.   Psychiatric:         Mood and Affect: Mood normal.         Behavior: Behavior normal.                Significant Labs: All pertinent labs within the past 24 hours have been reviewed.    Significant Imaging: I have reviewed all pertinent imaging results/findings within the past 24 hours.    Assessment/Plan:     * Calculus of gallbladder with acute cholecystitis and obstruction  P/w n/v/abd pain x6d and imaging findings concerning for acute cholecystitis, CBD dilation, and possible choledocholithiasis.   ED spoke to gen surg who rec HIDA scan and to admit to internal medicine given co-morbidities   Pt was fed in the ED just before midnight with subsequent n/v -- will keep NPO for now   Got IVF bolus in the ED. Will give additional 1L IVF slowly -- 100mL/hr x10h due to continued volume losses. reassess volume status after this  Prn anti-emetics    Pain control with po and IV narcotics   Had chills at home. Leukocytosis now. Imaging with evidence of obstruction -- will start Zosyn for intra-abdominal ABX coverage  BCx x2 ordered on admission prior to ABX order  F/u HIDA scan  F/u gen surg recommendations       Sinus bradycardia  Sinus volodymyr to 40s on admission, confirmed sinus on EKG  Held BB   HR did finally begin to improve after several doses of po and IV hydralazine, so may be a component of reflex tachycardia from hydralazine rather than improvement in bradycardia  Pt was asymptomatic in terms of bradycardia   Telemetry, trend and replenish electrolytes as needed  Will get TTE (last in 12/2022 with hyperdynamic EF and indeterminate diastolic function)        Hypokalemia  Will replenish K with IV KCl given n/v        Hypertensive urgency  Patient has a current diagnosis of hypertensive urgency (without evidence of end organ damage) which is uncontrolled.  Latest blood pressure and vitals reviewed-   Temp:  [98.2 °F (36.8 °C)-99.3 °F  (37.4 °C)]   Pulse:  [43-90]   Resp:  [14-36]   BP: (175-242)/()   SpO2:  [94 %-99 %] .   Patient currently on IV antihypertensives.   Home meds for hypertension were reviewed and noted below.   Hypertension Medications             amLODIPine (NORVASC) 10 MG tablet Take 1 tablet (10 mg total) by mouth once daily.    carvediloL (COREG) 25 MG tablet Take 1 tablet (25 mg total) by mouth 2 (two) times daily.    hydrALAZINE (APRESOLINE) 100 MG tablet Take 1 tablet (100 mg total) by mouth every 8 (eight) hours.    irbesartan (AVAPRO) 300 MG tablet Take 1 tablet (300 mg total) by mouth once daily.    propranoloL (INDERAL) 10 MG tablet Take 10 mg by mouth once daily.          Medication adjustment for hospital antihypertensives is as follows-   Attempted to control BP with po meds and control pain with narcotics however persistently elevated BP and inability to keep po meds down ultimately I transferred pt to the ICU for Cardene gtt. Discontinued all po anti-HTN for now.   Goal MAP reduction of 25-30% in first few hours with goal to avoid rapidly correcting BP    Will aim for controlled BP reduction by medications noted above. Monitor and mitigate end organ damage as indicated.    Major depressive disorder, recurrent severe without psychotic features  Patient has persistent depression which is unknown and is currently controlled. Will Continue anti-depressant medications. We will not consult psychiatry at this time. Patient does not display psychosis at this time. Continue to monitor closely and adjust plan of care as needed.        Mild aortic valve stenosis  Seen on echo in 2022  Repeating TTE this admission 2/2 sinus volodymyr on arrival       Mixed hyperlipidemia  Will hold statin for now -- when tolerating po meds and symptoms improve, should restart this       Stage 3b chronic kidney disease  Cr at baseline low-mid 1s on admission   Continue to trend renal function  Will give additional IVF due to hypovolemia from  n/v and poor po intake   Renally dose medications and avoid nephrotoxins    Coronary artery disease involving native coronary artery of native heart without angina pectoris  Hx MI with stent  Continue home La[place]  Will r/o ACS on admission given substernal CP earlier on day of admission.      Type 2 diabetes mellitus with stage 3 chronic kidney disease, without long-term current use of insulin  Outpatient he is on Amaryl and metformin with good glycemic control   Hold oral DM meds inpatient   Accuchecks q4h when NPO/not eating regular meals with Novolog low dose correction scale prn  If requiring SSI frequently, will need to add scheduled basal/bolus insulin   DM diet when tolerating/able to advance diet      Hypertension, essential  Outpatient regimen includes coreg, irbesartan, hydralazine   Unable to keep po meds down for last few days  Unable to control BP with po and IVP meds on admission -- transferred to ICU for Cardene gtt    DDD (degenerative disc disease), lumbar  Takes oxycodone 15mg multiple times per day for chronic pain prescribed by pain dr MAN confirmed on  -- pt recently picked up Rx of oxy 15mg, 175 tabs for a 30d supply  Will continue Oxycodonde 15mg prn inpatient -- increase frequency given acute on chronic pain and will add IV prn pain med for breakthrough pain or inability to tolerate po med         VTE Risk Mitigation (From admission, onward)         Ordered     enoxaparin injection 40 mg  Daily         09/04/23 0104     IP VTE HIGH RISK PATIENT  Once         09/04/23 0104     Place sequential compression device  Until discontinued         09/04/23 0104              Critical care time spent on the evaluation and treatment of severe organ dysfunction, review of pertinent labs and imaging studies, discussions with consulting providers and discussions with patient/family: 60 minutes.     On 09/04/2023, patient should be placed in hospital observation services under my care.        Fatmata GALLEGO  MD [place]  Department of [facility] Medicine  [place] - Intensive Care

## 2023-09-04 NOTE — ASSESSMENT & PLAN NOTE
Cr at baseline low-mid 1s on admission   Continue to trend renal function  Will give additional IVF due to hypovolemia from n/v and poor po intake   Renally dose medications and avoid nephrotoxins

## 2023-09-04 NOTE — SUBJECTIVE & OBJECTIVE
Interval History:  Substantial, spasming right upper quadrant pain.  Suspect secondary to cholecystitis.  Will need operative intervention; discussed with surgery who would like better blood pressure control 1st.    Review of Systems   Gastrointestinal:  Positive for abdominal pain.     Objective:     Vital Signs (Most Recent):  Temp: 98.5 °F (36.9 °C) (09/04/23 0730)  Pulse: 91 (09/04/23 1045)  Resp: (!) 25 (09/04/23 1045)  BP: (!) 203/93 (09/04/23 1045)  SpO2: (!) 93 % (09/04/23 1045) Vital Signs (24h Range):  Temp:  [98.2 °F (36.8 °C)-99.3 °F (37.4 °C)] 98.5 °F (36.9 °C)  Pulse:  [] 91  Resp:  [14-36] 25  SpO2:  [87 %-99 %] 93 %  BP: (175-242)/() 203/93     Weight: 83.9 kg (185 lb)  Body mass index is 28.98 kg/m².    Intake/Output Summary (Last 24 hours) at 9/4/2023 1134  Last data filed at 9/4/2023 0730  Gross per 24 hour   Intake 1437.9 ml   Output 100 ml   Net 1337.9 ml         Physical Exam  Vitals and nursing note reviewed.   Constitutional:       General: He is in acute distress.      Appearance: He is ill-appearing.   HENT:      Head: Normocephalic and atraumatic.      Nose: Nose normal.      Mouth/Throat:      Mouth: Mucous membranes are dry.   Eyes:      Extraocular Movements: Extraocular movements intact.      Conjunctiva/sclera: Conjunctivae normal.   Cardiovascular:      Rate and Rhythm: Regular rhythm. Bradycardia present.      Pulses: Normal pulses.      Comments: Bradycardic initially, HR now 80s  Pulmonary:      Effort: Pulmonary effort is normal. No respiratory distress.      Breath sounds: No wheezing or rales.   Abdominal:      General: There is no distension.      Palpations: Abdomen is soft.      Tenderness: There is no right CVA tenderness, left CVA tenderness, guarding or rebound.      Comments: +TTP RUQ, LUQ, and epigastric area   Hypoactive BS   Musculoskeletal:      Cervical back: Normal range of motion and neck supple. No rigidity.      Right lower leg: No edema.      Left  lower leg: No edema.   Skin:     General: Skin is warm and dry.   Neurological:      General: No focal deficit present.      Mental Status: He is alert and oriented to person, place, and time.   Psychiatric:         Mood and Affect: Mood normal.         Behavior: Behavior normal.             Significant Labs: All pertinent labs within the past 24 hours have been reviewed.    Significant Imaging: I have reviewed all pertinent imaging results/findings within the past 24 hours.

## 2023-09-04 NOTE — CONSULTS
Consult Note  [facility]    PCCM Attending: Dr. Hernandez  Fellow: Dr. Ellerman  Admit Date: 9/3/2023  Today's Date: 09/04/2023  Reason for Consult:  hypertension requiring titratable Cardene gtt    SUBJECTIVE:     HPI: 67M with PMH of HTN, HLD, PAD, CKD, T2DM, MDD, CAD with hx MI/stents, hx prostate CA p/w abd pain, nausea, vomiting, and diarrhea for six days prior to arrival. Due to these symptoms, he has been unable to keep down much of anything PO including his BP meds. He is on a multi-drug regimen for his BP including Coreg, irbesartan, and amlodipine.     Along with his nausea, vomiting, and diarrhea, he additionally complains of severe abdominal pain. The pain is generalized but worst in his RUQ. The pain is worsened by palpation and will worsen sporadically in waves.  No fevers (did not take temperature at home) but has had chills.    On admission, patient had markedly high BP readings so he was admitted to the ICU for titratable Cardene gtt.    Review of patient's allergies indicates:  No Known Allergies    Past Medical History:   Diagnosis Date    Arthritis     Cancer     Prostate    Diabetes mellitus     Hyperthyroidism     PAD (peripheral artery disease)      Past Surgical History:   Procedure Laterality Date    PROSTATE SURGERY       Family History   Problem Relation Age of Onset    Diabetes Mother     Hypertension Mother     Hyperlipidemia Mother     Diabetes Father     Hyperlipidemia Father     Hypertension Father     Gout Father     Cancer Paternal Uncle     Stroke Maternal Grandmother     Amblyopia Neg Hx     Blindness Neg Hx     Cataracts Neg Hx     Glaucoma Neg Hx     Macular degeneration Neg Hx     Retinal detachment Neg Hx     Strabismus Neg Hx     Thyroid disease Neg Hx      Social History     Tobacco Use    Smoking status: Every Day     Types: Vaping with nicotine    Smokeless tobacco: Current   Substance Use Topics    Alcohol use: Not Currently     Alcohol/week:  0.0 standard drinks of alcohol     Comment: Former User    Drug use: Not Currently     Types: Cocaine, Marijuana     Comment: Former User       All medications reviewed.    ROS negative except for HPI.    OBJECTIVE:     Vital Signs Trends/Hx Reviewed  Vitals:    09/04/23 0705 09/04/23 0754 09/04/23 0815 09/04/23 0820   BP:   (!) 208/93 (!) 208/93   BP Location:       Patient Position:       Pulse:   91 91   Resp: (!) 36 (!) 22 (!) 24 (!) 24   Temp:       TempSrc:       SpO2:   95% (!) 87%   Weight:       Height:           Physical Exam:  General: in moderate distress 2/2 biliary colic-related pain, on 2L NC  HEENT: AT/NC, PERRL, EOMI, oral and nasal mucosa moist.   Neck: Supple, no appreciable JVD.   Cardiac: normal rate, regular rhythm, with no MRG.  Respiratory: Auscultation clear bilaterally. No increased work of breathing noted.   Abdomen: Abdomen firm to RUQ, TTP throughout with worst TTP in RUQ.  Extremities: No edema.   Neuro: Grossly intact to brief exam. Oriented x3 with appropriate mood/affect to situation.       Laboratory:  Recent Labs   Lab 09/04/23  0558   WBC 15.19*   RBC 4.13*   HGB 12.4*   HCT 38.0*      MCV 92   MCH 30.0   MCHC 32.6     Recent Labs   Lab 09/04/23  0558      K 3.1*      CO2 22*   BUN 8   CREATININE 1.1   CALCIUM 9.2   MG 1.6       Microbiology Data:   Microbiology Results (last 7 days)       Procedure Component Value Units Date/Time    Blood culture [[id number]] Collected: 09/04/23 0114    Order Status: Sent Specimen: Blood from Peripheral, Antecubital, Right Updated: 09/04/23 0115    Blood culture [[id number]] Collected: 09/04/23 0106    Order Status: Sent Specimen: Blood from Peripheral, Antecubital, Left Updated: 09/04/23 0110             Chest Imaging:   No new imaging.     Infusions:     lactated ringers 100 mL/hr at 09/04/23 0637    niCARdipine 12.5 mg/hr (09/04/23 0816)       Scheduled Medications:    aspirin  81 mg Oral Daily    buPROPion  75 mg Oral Daily     carvediloL  25 mg Oral BID    enoxparin  40 mg Subcutaneous Daily    magnesium sulfate IVPB  2 g Intravenous Once    piperacillin-tazobactam (Zosyn) IV (PEDS and ADULTS) (extended infusion is not appropriate)  4.5 g Intravenous Q8H    potassium chloride  10 mEq Intravenous Q1H    QUEtiapine  200 mg Oral QHS    traZODone  50 mg Oral QHS       PRN Medications:   sodium chloride 0.9%, acetaminophen, dextrose 10%, dextrose 10%, glucagon (human recombinant), glucose, glucose, HYDROmorphone, insulin aspart U-100, melatonin, naloxone, ondansetron, oxyCODONE, polyethylene glycol, prochlorperazine, sodium chloride 0.9%    ASSESSMENT & RECOMMENDATIONS     Neuro/Psych  - no acute issues    CV  #Severe Asymptomatic HTN  - BP on arrival markedly elevated with SBP max of 242 mm Hg  - on three-drug regimen at home including Coreg 25mg BID, irbesartan 300mg daily, and amlodipine 10mg daily; he is adherent but has been unable to tolerate PO medications for the past several days  - no obvious signs of end-organ damage; Cr WNL, troponin not elevated, no encephalopathy  - BP elevated despite reintroduction of home oral medications, so started on Cardene  - suspect pain with impaired medication compliance are driving his current severely-elevated BP readings  - will follow up TTE  - continuing home Coreg, amlodipine, losartan as irbesartan is non-formulary  - starting HCTZ for additional blood pressure control  - consider secondary HTN work-up as patient is on three-drug regimen at maximal doses      Pulm  #acute hypoxemic respiratory failure  - patient on low-dose oxygen to maintain SpO2 in 90%s  - not on O2 at home  - lung exam with clear lung sounds  - could be 2/2 splinting given severe abdominal pain  - wean oxygen as tolerated  - obtaining CXR    FEN/GI  #acute cholecystitis  - patient with leukocytosis, imaging findings c/f acute cholecystitis on CT and ultrasound  - right now, covering with Dilaudid, ketorolac, and scheduled  Tylenol for pain  - agree with Zosyn  - [facility] surgery following  - keep NPO for now    RENAL  #CKD stage IIIb  - known, was to follow up with Nephrology but lost to follow-up  - daily CMPs  - will renally dose medications, avoid nephrotoxins     Heme  #normocytic anemia  - H/H 12.4/38.0; stable  - MCV in 90s  - unclear if patient is UTD on colonoscopy  - recommend OP follow-up for further work-up    Endo  #T2DM  - A1c 5.1% on admission; previously, was 6.0% in 1/2022  - maintain glucose 140-180  - Accu-checks, LDSSI ordered    ID  #acute cholecystitis  - blood cultures drawn, pending  - continuing Zosyn    F: NPO  A: Dilaudid PRN, oxycodone PRN, Toradol (scheduled)  S: none  T: Lovenox  H: elevated >30 degrees  U: not indicated  G: glucose goal 140 - 180  S: not applicable  B: none  I: PIV; maintain  D: continue Zosyn    Dispo  - remain in ICU pending BP improvement    Cesar Owusu MD  [facility], PGY-3  [facility]    Pt seen and examined with Pulmonary/Critical Care team and this note was reviewed and validated with the following additional comments: Acute cholecystitis/cholelithiasis with severe, uncontrolled hypertension. POCUS shows ,thickened GB wall with some pericholecystic fluid. Abx appropriate. Severe biliary colic.  Pt needs to go to the OR and we are attempting to titrate his BP into a safe range.    Critical Care time was spent validating the history and physical exam, reviewing the lab and imaging results, and discussing the care of the patient with the bedside nurse and the patient and/or surrogates. This critical care time did not overlap with that of any other provider or involve time for any procedures.  This patient has a high probability of sudden clinically significant deterioration which requires the highest level of physician preparedness to intervene urgently. I managed/supervised life or organ supporting interventions that required frequent physician  assessments. I devoted my full attention in the ICU to the direct care of this patient for this period of time. Organ systems which are failing and require intensive, critical care support are: hemodynamics, infectious diseases, GI  Critical Care time: 30 minutes    Se Damico MD  Phone [phone]

## 2023-09-04 NOTE — HPI
67-year-old gentleman past medical history significant for coronary artery disease, hypertension, chronic opioid use, and diabetes who presented to the emergency department complaints of right upper quadrant pain x1 week.  In the ED patient was found to have radiographic evidence cholecystitis with elevated white count ultrasound showed dilation and common bile duct, however liver tests are relatively normal.  Patient was admitted to ICU for treatment of hypertensive emergency.  GI was consulted for comanagement of cholecystitis.

## 2023-09-04 NOTE — SUBJECTIVE & OBJECTIVE
Past Medical History:   Diagnosis Date    Arthritis     Cancer     Prostate    Diabetes mellitus     Hyperthyroidism     PAD (peripheral artery disease)        Past Surgical History:   Procedure Laterality Date    PROSTATE SURGERY         Review of patient's allergies indicates:  No Known Allergies  Family History       Problem Relation (Age of Onset)    Cancer Paternal Uncle    Diabetes Mother, Father    Gout Father    Hyperlipidemia Mother, Father    Hypertension Mother, Father    Stroke Maternal Grandmother          Tobacco Use    Smoking status: Every Day     Types: Vaping with nicotine    Smokeless tobacco: Current   Substance and Sexual Activity    Alcohol use: Not Currently     Alcohol/week: 0.0 standard drinks of alcohol     Comment: Former User    Drug use: Not Currently     Types: Cocaine, Marijuana     Comment: Former User    Sexual activity: Not Currently     Review of Systems   Constitutional:  Negative for chills and fever.   Gastrointestinal:  Positive for abdominal pain. Negative for abdominal distention and nausea.     Objective:     Vital Signs (Most Recent):  Temp: 98.5 °F (36.9 °C) (09/04/23 0730)  Pulse: 70 (09/04/23 1440)  Resp: (!) 24 (09/04/23 1540)  BP: (!) 170/74 (09/04/23 1320)  SpO2: (!) 88 % (09/04/23 1440) Vital Signs (24h Range):  Temp:  [98.2 °F (36.8 °C)-99.2 °F (37.3 °C)] 98.5 °F (36.9 °C)  Pulse:  [] 70  Resp:  [14-36] 24  SpO2:  [87 %-99 %] 88 %  BP: (170-242)/() 170/74     Weight: 83.9 kg (185 lb) (09/04/23 0850)  Body mass index is 28.98 kg/m².      Intake/Output Summary (Last 24 hours) at 9/4/2023 1546  Last data filed at 9/4/2023 1530  Gross per 24 hour   Intake 3048.43 ml   Output 250 ml   Net 2798.43 ml       Lines/Drains/Airways       Peripheral Intravenous Line  Duration                  Peripheral IV - Single Lumen 09/03/23 1915 18 G Left Antecubital <1 day         Peripheral IV - Single Lumen 09/04/23 0326 20 G Right Antecubital <1 day                      Physical Exam  Vitals and nursing note reviewed.   Constitutional:       Appearance: He is well-developed.   HENT:      Head: Normocephalic and atraumatic.   Eyes:      General: No scleral icterus.     Pupils: Pupils are equal, round, and reactive to light.   Cardiovascular:      Rate and Rhythm: Normal rate and regular rhythm.      Heart sounds: Normal heart sounds.   Pulmonary:      Effort: Pulmonary effort is normal. No respiratory distress.      Breath sounds: Normal breath sounds.   Abdominal:      General: Bowel sounds are normal. There is no distension.      Palpations: Abdomen is soft.      Tenderness: There is abdominal tenderness. There is no guarding.   Musculoskeletal:         General: Normal range of motion.      Cervical back: Normal range of motion and neck supple.   Skin:     General: Skin is warm and dry.      Findings: No erythema or rash.   Neurological:      Mental Status: He is alert and oriented to person, place, and time.      Comments: No asterixis   Psychiatric:         Behavior: Behavior normal.          Significant Labs:  Recent Lab Results  (Last 5 results in the past 24 hours)        09/04/23  1205   09/04/23  0935   09/04/23  0850   09/04/23  0558   09/04/23  0540        Albumin       3.6         Alkaline Phosphatase       144         ALT       6         Anion Gap       10         Ao peak jenise     2.20           Ao VTI     47.80           AST       8         AV valve area     2.21           ASPEN by Velocity Ratio     2.33           AV mean gradient     11           AV index (prosthetic)     0.74           AV peak gradient     19           AV Velocity Ratio     0.78           Baso #       0.05         Basophil %       0.3         BILIRUBIN TOTAL       0.5  Comment: For infants and newborns, interpretation of results should be based  on gestational age, weight and in agreement with clinical  observations.    Premature Infant recommended reference ranges:  Up to 24 hours.............<8.0  mg/dL  Up to 48 hours............<12.0 mg/dL  3-5 days..................<15.0 mg/dL  6-29 days.................<15.0 mg/dL           BSA     1.99           BUN       8         Calcium       9.2         Chloride       106         CO2       22         Creatinine       1.1         Left Ventricle Relative Wall Thickness     0.33           Differential Method       Automated         E/A ratio     0.84           E/E' ratio     10.53           eGFR       >60         Eos #       0.0         Eosinophil %       0.1         Estimated Avg Glucose       100         E wave deceleration time     155.08           FS     31           Glucose       192         Gran # (ANC)       12.6         Gran %       83.3         Hematocrit       38.0         Hemoglobin       12.4         Hemoglobin A1C External       5.1  Comment: ADA Screening Guidelines:  5.7-6.4%  Consistent with prediabetes  >or=6.5%  Consistent with diabetes    High levels of fetal hemoglobin interfere with the HbA1C  assay. Heterozygous hemoglobin variants (HbS, HgC, etc)do  not significantly interfere with this assay.   However, presence of multiple variants may affect accuracy.           Immature Grans (Abs)       0.11  Comment: Mild elevation in immature granulocytes is non specific and   can be seen in a variety of conditions including stress response,   acute inflammation, trauma and pregnancy. Correlation with other   laboratory and clinical findings is essential.           Immature Granulocytes       0.7         IVC diameter     1.85           IVSd     1.23           LA WIDTH     4.4           Left Atrium Major Axis     4.97           Left Atrium Minor Axis     5.32           LA size     4.66           LA volume     89.57                64.56           LA vol index     45.7           LA Volume Index (Mod)     32.9           LVOT area     3.0           LV LATERAL E/E' RATIO     9.09           LV SEPTAL E/E' RATIO     12.50           LV EDV BP     119.04           LV  "Diastolic Volume Index     60.73           LVIDd     5.01           LVIDs     3.47           LV mass     188.75           LV Mass Index     96           Left Ventricular Outflow Tract Mean Gradient     5.25           Left Ventricular Outflow Tract Mean Velocity     1.07           LVOT diameter     1.95           LVOT peak trent     1.72           LVOT stroke volume     105.67           LVOT peak VTI     35.40           LV ESV BP     49.73           LV Systolic Volume Index     25.4           Lymph #       1.2         Lymph %       8.2         Magnesium        1.6         MCH       30.0         MCHC       32.6         MCV       92         Mean e'     0.10           Mono #       1.1         Mono %       7.4         MPV       10.4         MV valve area p 1/2 method     4.89           MV "A" wave duration     [id number]           MV valve area by continuity eq     3.97           MV mean gradient     3           MV peak gradient     6           MV Peak A Trent     1.19           MV Peak E Trent     1.00           MV stenosis pressure 1/2 time     44.97           MV VTI     26.6           nRBC       0         Ahn's Biplane MOD Ejection Fraction     71           Phosphorus       3.2         Platelets       277         POCT Glucose 227   230       172       Potassium       3.1         PROTEIN TOTAL       7.1         Pulmonary Valve Mean Velocity     1.15           PV peak gradient     8           PV Peak D Trent     0.57           PV Peak S Trent     1.04           PV PEAK VELOCITY     1.37           Pulm vein S/D ratio     1.82           Posterior Wall     0.82           RA Major Axis     3.92           Est. RA pres     3           RA Width     3.61           RBC       4.13         RDW       13.3         RV S'     19.25           RV TB RVSP     5           RVDD     3.23           Sodium       138         TAPSE     3.00           TDI SEPTAL     0.08           TDI LATERAL     0.11           Triscuspid Valve Regurgitation " Peak Gradient     17           TR Max Trent     2.05           Troponin I       0.013  Comment: The reference interval for Troponin I represents the 99th percentile   cutoff   for our facility and is consistent with 3rd generation assay   performance.           TSH       0.985         TV resting pulmonary artery pressure     20           WBC       15.19         ZLVIDD     -1.13           ZLVIDS     0.05                                  Significant Imaging:  U/S: I have reviewed all results within the past 24 hours and my personal findings are:  Dilated common bile duct.

## 2023-09-04 NOTE — ASSESSMENT & PLAN NOTE
Outpatient he is on Amaryl and metformin with good glycemic control   Hold oral DM meds inpatient   Accuchecks q4h when NPO/not eating regular meals with Novolog low dose correction scale prn  If requiring SSI frequently, will need to add scheduled basal/bolus insulin   DM diet when tolerating/able to advance diet

## 2023-09-04 NOTE — ASSESSMENT & PLAN NOTE
Sinus volodymyr to 40s on admission, confirmed sinus on EKG  Held BB   HR did finally begin to improve after several doses of po and IV hydralazine, so may be a component of reflex tachycardia from hydralazine rather than improvement in bradycardia  Pt was asymptomatic in terms of bradycardia   Telemetry, trend and replenish electrolytes as needed  Will get TTE (last in 12/2022 with hyperdynamic EF and indeterminate diastolic function)

## 2023-09-04 NOTE — ED TRIAGE NOTES
C/o generalized abd pain and n/v for the last week. Reports that he has been unable to keep food down for the last 2 days. No vomiting noted at present. Aaox3. Resp even unlabored. Skin warm and dry.

## 2023-09-04 NOTE — ED PROVIDER NOTES
Encounter Date: 9/3/2023       History     Chief Complaint   Patient presents with    Abdominal Pain     Pt reports to the ed for abdominal pain and nausea x3 days. Pt is also not eating and taking his medication due to cramping.  Pt was sent here from urgent care.      Neena Kohli is a 67 y.o. male who  has a past medical history of Arthritis, Cancer, Diabetes mellitus, Hyperthyroidism, and PAD (peripheral artery disease).    The patient presents to the ED due to abdominal pain patient has been having abdominal pain for the past 6 days.  Patient was seen at urgent care and referred here for further evaluation.  He reports nausea with vomiting and diarrhea.  He reports not passing much gas.  Has tried taking over-the-counter medications for pain without relief he denies any shortness of breath fever chest pains urinary complaints at this time.  He states he vomited 1 time today and had 1 episode of nonbloody diarrhea.    The history is provided by the patient.     Review of patient's allergies indicates:  No Known Allergies  Past Medical History:   Diagnosis Date    Arthritis     Cancer     Prostate    Diabetes mellitus     Hyperthyroidism     PAD (peripheral artery disease)      Past Surgical History:   Procedure Laterality Date    LAPAROSCOPIC CHOLECYSTECTOMY N/A 9/5/2023    Procedure: CHOLECYSTECTOMY, LAPAROSCOPIC;  Surgeon: Alvaro Snyder MD;  Location: [facility];  Service: General;  Laterality: N/A;    PROSTATE SURGERY       Family History   Problem Relation Age of Onset    Diabetes Mother     Hypertension Mother     Hyperlipidemia Mother     Diabetes Father     Hyperlipidemia Father     Hypertension Father     Gout Father     Cancer Paternal Uncle     Stroke Maternal Grandmother     Amblyopia Neg Hx     Blindness Neg Hx     Cataracts Neg Hx     Glaucoma Neg Hx     Macular degeneration Neg Hx     Retinal detachment Neg Hx     Strabismus Neg Hx     Thyroid disease Neg Hx      Social History     Tobacco Use     Smoking status: Every Day     Current packs/day: 1.00     Average packs/day: 1 pack/day for 51.7 years (51.7 ttl pk-yrs)     Types: Vaping with nicotine, Cigarettes     Start date: 1972    Smokeless tobacco: Current    Tobacco comments:     Enrolled in the [employer] on 2/20/19 ([employer] Member ID # [id number])  Ambulatory referral to Smoking Cessation clinic following hospital discharge.  Pt is a 1 pk/day cigarette smoker x 52 yrs.- switched to vaping with nicotine approx. 3 months ago.    Substance Use Topics    Alcohol use: Not Currently     Alcohol/week: 0.0 standard drinks of alcohol     Comment: Former User    Drug use: Not Currently     Types: Cocaine, Marijuana     Comment: Former User     Review of Systems   Constitutional:  Positive for appetite change. Negative for fever.   HENT:  Negative for sore throat.    Respiratory:  Negative for shortness of breath.    Cardiovascular:  Negative for chest pain.   Gastrointestinal:  Positive for abdominal pain, nausea and vomiting.   Genitourinary:  Negative for dysuria.   Musculoskeletal:  Negative for back pain.   Skin:  Negative for rash.   Neurological:  Negative for weakness.   Hematological:  Does not bruise/bleed easily.       Physical Exam     Initial Vitals [09/03/23 1804]   BP Pulse Resp Temp SpO2   (!) 194/80 80 14 98.6 °F (37 °C) 99 %      MAP       --         Physical Exam    Nursing note and vitals reviewed.  Constitutional: He appears well-developed and well-nourished. He is not diaphoretic. No distress.   HENT:   Head: Normocephalic and atraumatic.   Mouth/Throat: Oropharynx is clear and moist.   Eyes: EOM are normal. Pupils are equal, round, and reactive to light.   Neck: No tracheal deviation present.   Cardiovascular:  Normal rate, regular rhythm, normal heart sounds and intact distal pulses.           Pulmonary/Chest: Breath sounds normal. No stridor. No respiratory distress.   Abdominal: Abdomen is soft. He exhibits no distension and no mass.  There is abdominal tenderness.   R sided ttp w/o rebound or guarding    Musculoskeletal:         General: No edema. Normal range of motion.     Neurological: He is alert and oriented to person, place, and time. No cranial nerve deficit or sensory deficit.   Skin: Skin is warm and dry. Capillary refill takes less than 2 seconds. No rash noted.   Psychiatric: He has a normal mood and affect.         ED Course   Procedures  Labs Reviewed   CBC W/ AUTO DIFFERENTIAL - Abnormal; Notable for the following components:       Result Value    RBC 4.15 (*)     Hemoglobin 12.3 (*)     Hematocrit 38.0 (*)     Gran # (ANC) 7.8 (*)     Mono # 1.1 (*)     All other components within normal limits   COMPREHENSIVE METABOLIC PANEL - Abnormal; Notable for the following components:    CO2 22 (*)     Alkaline Phosphatase 147 (*)     AST 7 (*)     ALT 5 (*)     All other components within normal limits   LIPASE - Abnormal; Notable for the following components:    Lipase 3 (*)     All other components within normal limits   URINALYSIS, REFLEX TO URINE CULTURE - Abnormal; Notable for the following components:    Specific Gravity, UA >1.030 (*)     Protein, UA 1+ (*)     All other components within normal limits    Narrative:     Specimen Source->Urine   COMPREHENSIVE METABOLIC PANEL - Abnormal; Notable for the following components:    Potassium 3.1 (*)     CO2 22 (*)     Glucose 192 (*)     Alkaline Phosphatase 144 (*)     AST 8 (*)     ALT 6 (*)     All other components within normal limits   CBC W/ AUTO DIFFERENTIAL - Abnormal; Notable for the following components:    WBC 15.19 (*)     RBC 4.13 (*)     Hemoglobin 12.4 (*)     Hematocrit 38.0 (*)     Immature Granulocytes 0.7 (*)     Gran # (ANC) 12.6 (*)     Immature Grans (Abs) 0.11 (*)     Mono # 1.1 (*)     Gran % 83.3 (*)     Lymph % 8.2 (*)     All other components within normal limits   POCT GLUCOSE - Abnormal; Notable for the following components:    POCT Glucose 172 (*)     All  other components within normal limits   LACTIC ACID, PLASMA   URINALYSIS MICROSCOPIC    Narrative:     Specimen Source->Urine   MAGNESIUM   PHOSPHORUS   HEMOGLOBIN A1C   TROPONIN I   POCT GLUCOSE, HAND-HELD DEVICE   POCT GLUCOSE        ECG Results              EKG 12-lead (Final result)  Result time 09/05/23 17:26:03      Final result by Interface, Lab In [facility] (09/05/23 17:26:03)                   Narrative:    Test Reason : Z01.818,    Vent. Rate : 044 BPM     Atrial Rate : 044 BPM     P-R Int : 166 ms          QRS Dur : 088 ms      QT Int : 478 ms       P-R-T Axes : 057 059 -13 degrees     QTc Int : 408 ms    Marked sinus bradycardia  Nonspecific T wave abnormality , copnsider inferior and lateral ischemia  Abnormal ECG  When compared with ECG of 04-JUN-2021 09:09,  Inverted T waves have replaced nonspecific T wave abnormality in Inferior  leads  Nonspecific T wave abnormality now evident in Lateral leads  Confirmed by Wesley Cage MD ([id number]) on 9/5/2023 5:25:52 PM    Referred By: [email or web address]   SELF           Confirmed By:Wesley Cage MD                                  Imaging Results               US Abdomen Limited (Gallbladder) (Final result)  Result time 09/03/23 22:13:20      Final result by Cassi Alegre MD (09/03/23 22:13:20)                   Impression:      Limited visualization of the gallbladder with ultrasound secondary to multiple gallstones with artifact and contraction of the gallbladder .  Cholecystitis is not effectively excluded based on this exam however findings are suspicious for cholecystitis based on combination of the CT ule ultrasound findings as described in detail above.  Further evaluation and confirmation can be made with additional imaging with HIDA scan as clinically warranted.    Common bile duct is distended measuring 12 mm and choledocholithiasis is questioned.  MRCP could further evaluate.    Incidental note of 1.9 cm right renal simple cyst.    Incompletely  imaged pancreas secondary to artifact.    This report was flagged in Epic as abnormal.      Electronically signed by: Cassi Alegre  Date:    09/03/2023  Time:    22:13               Narrative:    EXAMINATION:  US ABDOMEN LIMITED    CLINICAL HISTORY:  Calculus of gallbladder without cholecystitis without obstruction    TECHNIQUE:  Limited ultrasound of the right upper quadrant of the abdomen (including pancreas, liver, gallbladder, common bile duct, and spleen) was performed.    COMPARISON:  CT abdomen pelvis 09/03/2023    FINDINGS:  Pancreas: The pancreas is obscured by bowel gas with only partial visualization of the head appearing unremarkable.    Liver: Visualized portions the liver are unremarkable.    Gallbladder: There is note of cholelithiasis with multiple gallstones.  Gallbladder is contracted and two dominant gallstones create significant shadowing artifact limiting visualization of a large portion of the gallbladder wall as well as its contents.  One gallstone measures at least 3.2  cm in diameter.  There is no visualized gallbladder wall thickening or pericholecystic fluid however the majority of the gallbladder wall is not visualized.  A small amount of fluid around the gallbladder tip on CT is noted in retrospect. This is not directly visualized on ultrasound likely secondary to the adjacent colon with shadowing artifact limiting assessment..  There is no Kirby sign or other visible secondary signs of cholecystitis..    Biliary system: The common duct is dilated, measuring 11.8 mm.  No intrahepatic ductal dilatation.    Miscellaneous: Right kidney demonstrates a 1.9 cm cyst which appears simple.  No hydronephrosis or significant findings noted involving the right kidney and otherwise.                        Final result by Cassi Alegre MD (09/03/23 22:13:20)                   Impression:      Limited visualization of the gallbladder with ultrasound secondary to multiple gallstones with  artifact and contraction of the gallbladder .  Cholecystitis is not effectively excluded based on this exam however findings are suspicious for cholecystitis based on combination of the CT ule ultrasound findings as described in detail above.  Further evaluation and confirmation can be made with additional imaging with HIDA scan as clinically warranted.    Common bile duct is distended measuring 12 mm and choledocholithiasis is questioned.  MRCP could further evaluate.    Incidental note of 1.9 cm right renal simple cyst.    Incompletely imaged pancreas secondary to artifact.    This report was flagged in Epic as abnormal.      Electronically signed by: Cassi Alegre  Date:    09/03/2023  Time:    22:13               Narrative:    EXAMINATION:  US ABDOMEN LIMITED    CLINICAL HISTORY:  Calculus of gallbladder without cholecystitis without obstruction    TECHNIQUE:  Limited ultrasound of the right upper quadrant of the abdomen (including pancreas, liver, gallbladder, common bile duct, and spleen) was performed.    COMPARISON:  CT abdomen pelvis 09/03/2023    FINDINGS:  Pancreas: The pancreas is obscured by bowel gas with only partial visualization of the head appearing unremarkable.    Liver: Visualized portions the liver are unremarkable.    Gallbladder: There is note of cholelithiasis with multiple gallstones.  Gallbladder is contracted and two dominant gallstones create significant shadowing artifact limiting visualization of a large portion of the gallbladder wall as well as its contents.  One gallstone measures at least 3.2  cm in diameter.  There is no visualized gallbladder wall thickening or pericholecystic fluid however the majority of the gallbladder wall is not visualized.  A small amount of fluid around the gallbladder tip on CT is noted in retrospect. This is not directly visualized on ultrasound likely secondary to the adjacent colon with shadowing artifact limiting assessment..  There is no Kirby  sign or other visible secondary signs of cholecystitis..    Biliary system: The common duct is dilated, measuring 11.8 mm.  No intrahepatic ductal dilatation.    Miscellaneous: Right kidney demonstrates a 1.9 cm cyst which appears simple.  No hydronephrosis or significant findings noted involving the right kidney and otherwise.                                        CT Abdomen Pelvis With Contrast (Final result)  Result time 09/03/23 20:41:57      Final result by Emanuel Alegre MD (09/03/23 20:41:57)                   Impression:      Cholelithiasis with equivocal findings of cholecystitis.  Correlation needed.    Intra and extrahepatic biliary ductal dilatation with focal point of obstruction not identified.  Consider HIDA scan if clinically warranted.    This report was flagged in Epic as abnormal.      Electronically signed by: Emanuel Alegre  Date:    09/03/2023  Time:    20:41               Narrative:    EXAMINATION:  CT ABDOMEN PELVIS WITH CONTRAST    CLINICAL HISTORY:  Nausea/vomiting;Abdominal pain, acute, nonlocalized;Lower abdominal pain with nausea and vomiting and diarrhea for the past 6 days;    TECHNIQUE:  Low dose axial images, sagittal and coronal reformations were obtained from the lung bases to the pubic symphysis following the IV administration of 100 mL of Omnipaque 350 .  Oral contrast was not given.    COMPARISON:  None.    FINDINGS:  There is a large 3.8 cm calcified laminated gallstone within distended gallbladder.  Mild gallbladder wall thickening and pericholecystic fluid without inflammation.  The common bile duct is dilated up to 16 mm with no discrete stone or ampullary or pancreatic head mass.  Minimal intrahepatic biliary ductal dilatation is present.    The pancreas, spleen and adrenal glands appear normal.    Both kidneys contain calcifications likely vascular with no evidence of obstructive uropathy.  A cyst is noted in the upper pole of the right kidney measuring 11  Hounsfield units.    The appendix appears normal.  The loops of large and small intestines appear normal.    The base of the heart pericardium are noteworthy for trivessel coronary artery calcifications.  No pericardial fluid.  The lung bases are clear.  Mild the Hunter seals are emphysematous changes noted in the lingula.    There is no ascites or free air.  Spondylosis in the spine without bony destructive process or fracture.    The urinary bladder appears unremarkable.  Seminal vesicles and prostate appear unremarkable.  The abdominal wall appears intact.                        Final result by Emanuel Alegre MD (09/03/23 20:41:57)                   Impression:      Cholelithiasis with equivocal findings of cholecystitis.  Correlation needed.    Intra and extrahepatic biliary ductal dilatation with focal point of obstruction not identified.  Consider HIDA scan if clinically warranted.    This report was flagged in Epic as abnormal.      Electronically signed by: Emanuel Alegre  Date:    09/03/2023  Time:    20:41               Narrative:    EXAMINATION:  CT ABDOMEN PELVIS WITH CONTRAST    CLINICAL HISTORY:  Nausea/vomiting;Abdominal pain, acute, nonlocalized;Lower abdominal pain with nausea and vomiting and diarrhea for the past 6 days;    TECHNIQUE:  Low dose axial images, sagittal and coronal reformations were obtained from the lung bases to the pubic symphysis following the IV administration of 100 mL of Omnipaque 350 .  Oral contrast was not given.    COMPARISON:  None.    FINDINGS:  There is a large 3.8 cm calcified laminated gallstone within distended gallbladder.  Mild gallbladder wall thickening and pericholecystic fluid without inflammation.  The common bile duct is dilated up to 16 mm with no discrete stone or ampullary or pancreatic head mass.  Minimal intrahepatic biliary ductal dilatation is present.    The pancreas, spleen and adrenal glands appear normal.    Both kidneys contain calcifications  likely vascular with no evidence of obstructive uropathy.  A cyst is noted in the upper pole of the right kidney measuring 11 Hounsfield units.    The appendix appears normal.  The loops of large and small intestines appear normal.    The base of the heart pericardium are noteworthy for trivessel coronary artery calcifications.  No pericardial fluid.  The lung bases are clear.  Mild the Hunter seals are emphysematous changes noted in the lingula.    There is no ascites or free air.  Spondylosis in the spine without bony destructive process or fracture.    The urinary bladder appears unremarkable.  Seminal vesicles and prostate appear unremarkable.  The abdominal wall appears intact.                        Final result by Emanuel Alegre MD (09/03/23 20:41:57)                   Impression:      Cholelithiasis with equivocal findings of cholecystitis.  Correlation needed.    Intra and extrahepatic biliary ductal dilatation with focal point of obstruction not identified.  Consider HIDA scan if clinically warranted.    This report was flagged in Epic as abnormal.      Electronically signed by: Emanuel Alegre  Date:    09/03/2023  Time:    20:41               Narrative:    EXAMINATION:  CT ABDOMEN PELVIS WITH CONTRAST    CLINICAL HISTORY:  Nausea/vomiting;Abdominal pain, acute, nonlocalized;Lower abdominal pain with nausea and vomiting and diarrhea for the past 6 days;    TECHNIQUE:  Low dose axial images, sagittal and coronal reformations were obtained from the lung bases to the pubic symphysis following the IV administration of 100 mL of Omnipaque 350 .  Oral contrast was not given.    COMPARISON:  None.    FINDINGS:  There is a large 3.8 cm calcified laminated gallstone within distended gallbladder.  Mild gallbladder wall thickening and pericholecystic fluid without inflammation.  The common bile duct is dilated up to 16 mm with no discrete stone or ampullary or pancreatic head mass.  Minimal intrahepatic biliary  ductal dilatation is present.    The pancreas, spleen and adrenal glands appear normal.    Both kidneys contain calcifications likely vascular with no evidence of obstructive uropathy.  A cyst is noted in the upper pole of the right kidney measuring 11 Hounsfield units.    The appendix appears normal.  The loops of large and small intestines appear normal.    The base of the heart pericardium are noteworthy for trivessel coronary artery calcifications.  No pericardial fluid.  The lung bases are clear.  Mild the Hunter seals are emphysematous changes noted in the lingula.    There is no ascites or free air.  Spondylosis in the spine without bony destructive process or fracture.    The urinary bladder appears unremarkable.  Seminal vesicles and prostate appear unremarkable.  The abdominal wall appears intact.                        Final result by Emanuel Alegre MD (09/03/23 20:41:57)                   Impression:      Cholelithiasis with equivocal findings of cholecystitis.  Correlation needed.    Intra and extrahepatic biliary ductal dilatation with focal point of obstruction not identified.  Consider HIDA scan if clinically warranted.    This report was flagged in Epic as abnormal.      Electronically signed by: Emanuel Alegre  Date:    09/03/2023  Time:    20:41               Narrative:    EXAMINATION:  CT ABDOMEN PELVIS WITH CONTRAST    CLINICAL HISTORY:  Nausea/vomiting;Abdominal pain, acute, nonlocalized;Lower abdominal pain with nausea and vomiting and diarrhea for the past 6 days;    TECHNIQUE:  Low dose axial images, sagittal and coronal reformations were obtained from the lung bases to the pubic symphysis following the IV administration of 100 mL of Omnipaque 350 .  Oral contrast was not given.    COMPARISON:  None.    FINDINGS:  There is a large 3.8 cm calcified laminated gallstone within distended gallbladder.  Mild gallbladder wall thickening and pericholecystic fluid without inflammation.  The  common bile duct is dilated up to 16 mm with no discrete stone or ampullary or pancreatic head mass.  Minimal intrahepatic biliary ductal dilatation is present.    The pancreas, spleen and adrenal glands appear normal.    Both kidneys contain calcifications likely vascular with no evidence of obstructive uropathy.  A cyst is noted in the upper pole of the right kidney measuring 11 Hounsfield units.    The appendix appears normal.  The loops of large and small intestines appear normal.    The base of the heart pericardium are noteworthy for trivessel coronary artery calcifications.  No pericardial fluid.  The lung bases are clear.  Mild the Hunter seals are emphysematous changes noted in the lingula.    There is no ascites or free air.  Spondylosis in the spine without bony destructive process or fracture.    The urinary bladder appears unremarkable.  Seminal vesicles and prostate appear unremarkable.  The abdominal wall appears intact.                                       Medications   sodium chloride 0.9% flush 10 mL (has no administration in time range)   naloxone 0.4 mg/mL injection 0.02 mg (has no administration in time range)   glucose chewable tablet 16 g (has no administration in time range)   glucose chewable tablet 24 g (has no administration in time range)   glucagon (human recombinant) injection 1 mg (has no administration in time range)   enoxaparin injection 40 mg (40 mg Subcutaneous Given 9/5/23 1635)   acetaminophen tablet 650 mg (has no administration in time range)   polyethylene glycol packet 17 g (has no administration in time range)   ondansetron disintegrating tablet 8 mg (8 mg Oral Given 9/6/23 1005)   prochlorperazine injection Soln 5 mg (has no administration in time range)   insulin aspart U-100 pen 0-5 Units (1 Units Subcutaneous Given 9/4/23 1704)   melatonin tablet 6 mg (6 mg Oral Given 9/4/23 0137)   dextrose 10% bolus 125 mL 125 mL (has no administration in time range)   dextrose 10%  bolus 250 mL 250 mL (has no administration in time range)   lactated ringers infusion ( Intravenous Verify Only 9/4/23 2200)   buPROPion tablet 75 mg (75 mg Oral Given 9/6/23 0940)   QUEtiapine tablet 200 mg (200 mg Oral Given 9/5/23 2206)   traZODone tablet 50 mg (50 mg Oral Given 9/5/23 2206)   niCARdipine 40 mg/200 mL (0.2 mg/mL) infusion (5 mg/hr Intravenous New Bag 9/6/23 0645)   carvediloL tablet 25 mg (25 mg Oral Given 9/6/23 0940)   0.9%  NaCl infusion ( Intravenous Stopped 9/4/23 1837)   acetaminophen tablet 650 mg (650 mg Oral Given 9/6/23 0959)   oxyCODONE immediate release tablet 5 mg (5 mg Oral Given 9/5/23 2002)   nicotine 14 mg/24 hr 1 patch (1 patch Transdermal Patch Applied 9/6/23 0826)   albuterol-ipratropium 2.5 mg-0.5 mg/3 mL nebulizer solution 3 mL (has no administration in time range)   lactated ringers infusion ( Intravenous Stopped 9/5/23 1058)   mupirocin 2 % ointment ( Nasal Given 9/6/23 0820)   hydrALAZINE injection 10 mg (10 mg Intravenous Given 9/6/23 0204)   hydroCHLOROthiazide tablet 25 mg (25 mg Oral Given 9/6/23 0940)   amLODIPine tablet 10 mg (10 mg Oral Given 9/6/23 0940)   atorvastatin tablet 40 mg (40 mg Oral Given 9/6/23 0940)   HYDROmorphone PCA syringe 6 mg/30 mL (0.2 mg/mL) NS ( Intravenous New Syringe/Bag 9/6/23 0827)   losartan tablet 100 mg (100 mg Oral Given 9/6/23 0940)   furosemide injection 40 mg (has no administration in time range)   hydrALAZINE tablet 25 mg (has no administration in time range)   ciprofloxacin (CIPRO)400mg/200ml D5W IVPB 400 mg (has no administration in time range)   metroNIDAZOLE tablet 500 mg (has no administration in time range)   sodium chloride 0.9% bolus 1,000 mL 1,000 mL (0 mLs Intravenous Stopped 9/3/23 2038)   morphine injection 4 mg (4 mg Intravenous Given 9/3/23 1937)   ondansetron injection 8 mg (8 mg Intravenous Given 9/3/23 1937)   iohexoL (OMNIPAQUE 350) injection 100 mL (100 mLs Intravenous Given 9/3/23 2001)   morphine injection 4  mg (4 mg Intravenous Given 9/3/23 2122)   hydrALAZINE injection 10 mg (10 mg Intravenous Given 9/3/23 2311)   amLODIPine tablet 10 mg (10 mg Oral Given 9/4/23 0012)   morphine injection 2 mg (2 mg Intravenous Given 9/4/23 0105)   losartan tablet 100 mg (100 mg Oral Given 9/4/23 0204)   hydrALAZINE injection 20 mg (20 mg Intravenous Given 9/4/23 0301)   potassium chloride 10 mEq in 100 mL IVPB (0 mEq Intravenous Stopped 9/4/23 1525)   HYDROmorphone injection 1 mg (1 mg Intravenous Given 9/4/23 0754)   magnesium sulfate 2g in water 50mL IVPB (premix) (0 g Intravenous Stopped 9/4/23 1103)   albuterol-ipratropium 2.5 mg-0.5 mg/3 mL nebulizer solution 3 mL (3 mLs Nebulization Given 9/4/23 1626)   furosemide injection 40 mg (40 mg Intravenous Given 9/5/23 1150)   potassium bicarbonate disintegrating tablet 50 mEq (50 mEq Oral Given 9/6/23 0943)   magnesium sulfate 2g in water 50mL IVPB (premix) (0 g Intravenous Stopped 9/6/23 1023)   potassium, sodium phosphates 280-160-250 mg packet 2 packet (2 packets Oral Given 9/6/23 0944)     Medical Decision Making  Differential Diagnosis includes, but is not limited to:  AAA, aortic dissection, mesenteric ischemia, perforated viscous, MI/ACS, SBO/volvulus, incarcerated/strangulated hernia, intussusception, ileus, appendicitis, cholecystitis, cholangitis, diverticulitis, esophagitis, hepatitis, nephrolithiasis, pancreatitis, gastroenteritis, colitis, IBD/IBS, biliary colic, GERD, PUD, constipation, UTI/pyelonephritis,  disorder.      Amount and/or Complexity of Data Reviewed  Labs: ordered.  Radiology: ordered.    Risk  Prescription drug management.                          Medical Decision Making:   Differential Diagnosis:   Differential Diagnosis includes, but is not limited to:  AAA, aortic dissection, mesenteric ischemia, perforated viscous, MI/ACS, SBO/volvulus, incarcerated/strangulated hernia, intussusception, ileus, appendicitis, cholecystitis, cholangitis, diverticulitis,  esophagitis, hepatitis, nephrolithiasis, pancreatitis, gastroenteritis, colitis, IBD/IBS, biliary colic, GERD, PUD, constipation, UTI/pyelonephritis,  disorder.    ED Management:  Patient found to have findings to suggest acute cholecystitis. No additional acute findings.   Discussed with Dr. Snyder, will evaluate in the morning. Patient informed of plan for surgery, agrees to admission.  Patient to be admitted to [facility] given elevated BP.       Clinical Impression:   Final diagnoses:  [K80.20] Cholelithiasis  [Z01.818] Preoperative clearance        ED Disposition Condition    Observation Stable          Portions of this note were dictated using voice recognition software and may contain dictation related errors in spelling/grammar/syntax not found on text review          James Bagley Jr., MD  09/06/23 6521

## 2023-09-04 NOTE — HPI
66yo M w/ HTN, HLD, PAD, CKD, T2DM, MDD, CAD with hx MI/stents, hx prostate CA p/w abd pain, nausea, vomiting, and diarrhea for last 6 days. Unable to keep down much of anything po, including his BP meds. BP usually well controlled on multiple medications but has been high since his symptoms began.  No fevers (did not take temperature at home) but has had chills. No flank pain, dysuria,or other urinary symptoms. Had some substernal CP when breathing deeply earlier today but denies CP at time of my assessment. Also worsening of abdominal pain with deep breathing. Denies HA, vision changes, weakness, dizziness or lightheadedness. No recent falls. No sick contacts. Has never had an episode like this before. He was unaware that he had gallstones.     He has chronic back pain and takes Oxycodone 15mg several times per day prescribed by pain doctor. He reports compliance with all of his meds prior to getting sick, but since he has been unable to keep down much, has not had all of his meds. Some nonbloody diarrhea. No constipation. Workup significant for imaging revealing gallstones with CBD dilation, possible cholecystitis and possible choledocholithiasis. ED physician spoke to general surgery who requested HIDA scan and admission to internal medicine given multiple co-morbidities. Sinus volodymyr but otherwise hemodynamically stable. Initially admitted pt to med/tele bed but due to inability to control BP with po meds, decision made to transfer him to the ICU for a nicardipine gtt.

## 2023-09-04 NOTE — ASSESSMENT & PLAN NOTE
P/w n/v/abd pain x6d and imaging findings concerning for acute cholecystitis, CBD dilation, and possible choledocholithiasis.   ED spoke to gen surg who rec HIDA scan and to admit to internal medicine given co-morbidities   Pt was fed in the ED just before midnight with subsequent n/v -- will keep NPO for now   Got IVF bolus in the ED. Will give additional 1L IVF slowly -- 100mL/hr x10h due to continued volume losses. reassess volume status after this  Prn anti-emetics    Pain control with po and IV narcotics   Had chills at home. Leukocytosis now. Imaging with evidence of obstruction -- will start Zosyn for intra-abdominal ABX coverage  BCx x2 ordered on admission prior to ABX order  F/u gen surg recommendations: will need OR once better BP control, planned for tomorrow

## 2023-09-04 NOTE — ASSESSMENT & PLAN NOTE
With CBD dilation.  Liver tests not suggestive of obstructive process   Agree with surgery consultation.  Could consider MRCP versus intraoperative cholangiogram at time of cholecystectomy.  Will deferred to surgery  Continue antibiotics

## 2023-09-05 ENCOUNTER — ANESTHESIA EVENT (OUTPATIENT)
Dept: SURGERY | Facility: HOSPITAL | Age: 68
DRG: 418 | End: 2023-09-05
Payer: MEDICARE

## 2023-09-05 ENCOUNTER — CLINICAL SUPPORT (OUTPATIENT)
Dept: SMOKING CESSATION | Facility: CLINIC | Age: 68
End: 2023-09-05
Payer: COMMERCIAL

## 2023-09-05 ENCOUNTER — ANESTHESIA (OUTPATIENT)
Dept: SURGERY | Facility: HOSPITAL | Age: 68
DRG: 418 | End: 2023-09-05
Payer: MEDICARE

## 2023-09-05 DIAGNOSIS — F17.210 CIGARETTE SMOKER: Primary | ICD-10-CM

## 2023-09-05 LAB
ALBUMIN SERPL BCP-MCNC: 2.6 G/DL (ref 3.5–5.2)
ALP SERPL-CCNC: 123 U/L (ref 55–135)
ALT SERPL W/O P-5'-P-CCNC: 6 U/L (ref 10–44)
ANION GAP SERPL CALC-SCNC: 11 MMOL/L (ref 8–16)
AST SERPL-CCNC: 11 U/L (ref 10–40)
BASOPHILS # BLD AUTO: 0.04 K/UL (ref 0–0.2)
BASOPHILS NFR BLD: 0.3 % (ref 0–1.9)
BILIRUB SERPL-MCNC: 0.7 MG/DL (ref 0.1–1)
BUN SERPL-MCNC: 12 MG/DL (ref 8–23)
CALCIUM SERPL-MCNC: 8.8 MG/DL (ref 8.7–10.5)
CHLORIDE SERPL-SCNC: 109 MMOL/L (ref 95–110)
CO2 SERPL-SCNC: 17 MMOL/L (ref 23–29)
CREAT SERPL-MCNC: 1.1 MG/DL (ref 0.5–1.4)
DIFFERENTIAL METHOD: ABNORMAL
EOSINOPHIL # BLD AUTO: 0.1 K/UL (ref 0–0.5)
EOSINOPHIL NFR BLD: 0.7 % (ref 0–8)
ERYTHROCYTE [DISTWIDTH] IN BLOOD BY AUTOMATED COUNT: 13.3 % (ref 11.5–14.5)
EST. GFR  (NO RACE VARIABLE): >60 ML/MIN/1.73 M^2
GLUCOSE SERPL-MCNC: 115 MG/DL (ref 70–110)
HCT VFR BLD AUTO: 32.9 % (ref 40–54)
HGB BLD-MCNC: 10.7 G/DL (ref 14–18)
IMM GRANULOCYTES # BLD AUTO: 0.07 K/UL (ref 0–0.04)
IMM GRANULOCYTES NFR BLD AUTO: 0.5 % (ref 0–0.5)
LYMPHOCYTES # BLD AUTO: 1.4 K/UL (ref 1–4.8)
LYMPHOCYTES NFR BLD: 9.7 % (ref 18–48)
MAGNESIUM SERPL-MCNC: 1.9 MG/DL (ref 1.6–2.6)
MCH RBC QN AUTO: 30.2 PG (ref 27–31)
MCHC RBC AUTO-ENTMCNC: 32.5 G/DL (ref 32–36)
MCV RBC AUTO: 93 FL (ref 82–98)
MONOCYTES # BLD AUTO: 1.3 K/UL (ref 0.3–1)
MONOCYTES NFR BLD: 8.5 % (ref 4–15)
NEUTROPHILS # BLD AUTO: 11.9 K/UL (ref 1.8–7.7)
NEUTROPHILS NFR BLD: 80.3 % (ref 38–73)
NRBC BLD-RTO: 0 /100 WBC
PHOSPHATE SERPL-MCNC: 3.5 MG/DL (ref 2.7–4.5)
PLATELET # BLD AUTO: 209 K/UL (ref 150–450)
PMV BLD AUTO: 10.4 FL (ref 9.2–12.9)
POCT GLUCOSE: 111 MG/DL (ref 70–110)
POCT GLUCOSE: 111 MG/DL (ref 70–110)
POCT GLUCOSE: 117 MG/DL (ref 70–110)
POCT GLUCOSE: 122 MG/DL (ref 70–110)
POCT GLUCOSE: 124 MG/DL (ref 70–110)
POTASSIUM SERPL-SCNC: 4.1 MMOL/L (ref 3.5–5.1)
PROT SERPL-MCNC: 5.8 G/DL (ref 6–8.4)
RBC # BLD AUTO: 3.54 M/UL (ref 4.6–6.2)
SODIUM SERPL-SCNC: 137 MMOL/L (ref 136–145)
WBC # BLD AUTO: 14.85 K/UL (ref 3.9–12.7)

## 2023-09-05 PROCEDURE — D9220A PRA ANESTHESIA: ICD-10-PCS | Mod: CRNA,,, | Performed by: NURSE ANESTHETIST, CERTIFIED REGISTERED

## 2023-09-05 PROCEDURE — 63600175 PHARM REV CODE 636 W HCPCS: Performed by: SURGERY

## 2023-09-05 PROCEDURE — 63600175 PHARM REV CODE 636 W HCPCS: Performed by: STUDENT IN AN ORGANIZED HEALTH CARE EDUCATION/TRAINING PROGRAM

## 2023-09-05 PROCEDURE — S4991 NICOTINE PATCH NONLEGEND: HCPCS | Performed by: STUDENT IN AN ORGANIZED HEALTH CARE EDUCATION/TRAINING PROGRAM

## 2023-09-05 PROCEDURE — D9220A PRA ANESTHESIA: Mod: ANES,,, | Performed by: ANESTHESIOLOGY

## 2023-09-05 PROCEDURE — 63600175 PHARM REV CODE 636 W HCPCS: Performed by: NURSE ANESTHETIST, CERTIFIED REGISTERED

## 2023-09-05 PROCEDURE — 88304 TISSUE EXAM BY PATHOLOGIST: CPT | Performed by: PATHOLOGY

## 2023-09-05 PROCEDURE — 36000708 HC OR TIME LEV III 1ST 15 MIN: Performed by: SURGERY

## 2023-09-05 PROCEDURE — 25000003 PHARM REV CODE 250: Performed by: SURGERY

## 2023-09-05 PROCEDURE — 27201423 OPTIME MED/SURG SUP & DEVICES STERILE SUPPLY: Performed by: SURGERY

## 2023-09-05 PROCEDURE — 84100 ASSAY OF PHOSPHORUS: CPT | Performed by: STUDENT IN AN ORGANIZED HEALTH CARE EDUCATION/TRAINING PROGRAM

## 2023-09-05 PROCEDURE — D9220A PRA ANESTHESIA: Mod: CRNA,,, | Performed by: NURSE ANESTHETIST, CERTIFIED REGISTERED

## 2023-09-05 PROCEDURE — 37000008 HC ANESTHESIA 1ST 15 MINUTES: Performed by: SURGERY

## 2023-09-05 PROCEDURE — 99233 SBSQ HOSP IP/OBS HIGH 50: CPT | Mod: 57,,, | Performed by: SURGERY

## 2023-09-05 PROCEDURE — 36415 COLL VENOUS BLD VENIPUNCTURE: CPT | Performed by: STUDENT IN AN ORGANIZED HEALTH CARE EDUCATION/TRAINING PROGRAM

## 2023-09-05 PROCEDURE — 99233 PR SUBSEQUENT HOSPITAL CARE,LEVL III: ICD-10-PCS | Mod: 57,,, | Performed by: SURGERY

## 2023-09-05 PROCEDURE — 99407 PR TOBACCO USE CESSATION INTENSIVE >10 MINUTES: ICD-10-PCS | Mod: S$GLB,,,

## 2023-09-05 PROCEDURE — 47562 LAPAROSCOPIC CHOLECYSTECTOMY: CPT | Mod: 22,,, | Performed by: SURGERY

## 2023-09-05 PROCEDURE — 99407 BEHAV CHNG SMOKING > 10 MIN: CPT | Mod: S$GLB,,,

## 2023-09-05 PROCEDURE — 27000221 HC OXYGEN, UP TO 24 HOURS

## 2023-09-05 PROCEDURE — 80053 COMPREHEN METABOLIC PANEL: CPT | Performed by: STUDENT IN AN ORGANIZED HEALTH CARE EDUCATION/TRAINING PROGRAM

## 2023-09-05 PROCEDURE — 94761 N-INVAS EAR/PLS OXIMETRY MLT: CPT

## 2023-09-05 PROCEDURE — 25000003 PHARM REV CODE 250: Performed by: NURSE ANESTHETIST, CERTIFIED REGISTERED

## 2023-09-05 PROCEDURE — 85025 COMPLETE CBC W/AUTO DIFF WBC: CPT | Performed by: STUDENT IN AN ORGANIZED HEALTH CARE EDUCATION/TRAINING PROGRAM

## 2023-09-05 PROCEDURE — 88304 PR  SURG PATH,LEVEL III: ICD-10-PCS | Mod: 26,,, | Performed by: PATHOLOGY

## 2023-09-05 PROCEDURE — 25000003 PHARM REV CODE 250: Performed by: STUDENT IN AN ORGANIZED HEALTH CARE EDUCATION/TRAINING PROGRAM

## 2023-09-05 PROCEDURE — 47562 PR LAP,CHOLECYSTECTOMY: ICD-10-PCS | Mod: 22,,, | Performed by: SURGERY

## 2023-09-05 PROCEDURE — D9220A PRA ANESTHESIA: ICD-10-PCS | Mod: ANES,,, | Performed by: ANESTHESIOLOGY

## 2023-09-05 PROCEDURE — 94660 CPAP INITIATION&MGMT: CPT

## 2023-09-05 PROCEDURE — 99900035 HC TECH TIME PER 15 MIN (STAT)

## 2023-09-05 PROCEDURE — 20000000 HC ICU ROOM

## 2023-09-05 PROCEDURE — 83735 ASSAY OF MAGNESIUM: CPT | Performed by: STUDENT IN AN ORGANIZED HEALTH CARE EDUCATION/TRAINING PROGRAM

## 2023-09-05 PROCEDURE — 88304 TISSUE EXAM BY PATHOLOGIST: CPT | Mod: 26,,, | Performed by: PATHOLOGY

## 2023-09-05 PROCEDURE — 37000009 HC ANESTHESIA EA ADD 15 MINS: Performed by: SURGERY

## 2023-09-05 PROCEDURE — 36000709 HC OR TIME LEV III EA ADD 15 MIN: Performed by: SURGERY

## 2023-09-05 RX ORDER — VASOPRESSIN 20 [USP'U]/ML
INJECTION, SOLUTION INTRAMUSCULAR; SUBCUTANEOUS
Status: DISCONTINUED | OUTPATIENT
Start: 2023-09-05 | End: 2023-09-05

## 2023-09-05 RX ORDER — HYDRALAZINE HYDROCHLORIDE 20 MG/ML
10 INJECTION INTRAMUSCULAR; INTRAVENOUS EVERY 8 HOURS PRN
Status: DISCONTINUED | OUTPATIENT
Start: 2023-09-05 | End: 2023-09-07

## 2023-09-05 RX ORDER — FENTANYL CITRATE 50 UG/ML
INJECTION, SOLUTION INTRAMUSCULAR; INTRAVENOUS
Status: DISCONTINUED | OUTPATIENT
Start: 2023-09-05 | End: 2023-09-05

## 2023-09-05 RX ORDER — HYDROMORPHONE HCL IN 0.9% NACL 6 MG/30 ML
PATIENT CONTROLLED ANALGESIA SYRINGE INTRAVENOUS CONTINUOUS
Status: DISCONTINUED | OUTPATIENT
Start: 2023-09-05 | End: 2023-09-07

## 2023-09-05 RX ORDER — DEXAMETHASONE SODIUM PHOSPHATE 4 MG/ML
INJECTION, SOLUTION INTRA-ARTICULAR; INTRALESIONAL; INTRAMUSCULAR; INTRAVENOUS; SOFT TISSUE
Status: DISCONTINUED | OUTPATIENT
Start: 2023-09-05 | End: 2023-09-05

## 2023-09-05 RX ORDER — SUCCINYLCHOLINE CHLORIDE 20 MG/ML
INJECTION INTRAMUSCULAR; INTRAVENOUS
Status: DISCONTINUED | OUTPATIENT
Start: 2023-09-05 | End: 2023-09-05

## 2023-09-05 RX ORDER — AMLODIPINE BESYLATE 5 MG/1
10 TABLET ORAL DAILY
Status: DISCONTINUED | OUTPATIENT
Start: 2023-09-05 | End: 2023-09-07

## 2023-09-05 RX ORDER — FUROSEMIDE 10 MG/ML
40 INJECTION INTRAMUSCULAR; INTRAVENOUS ONCE
Status: COMPLETED | OUTPATIENT
Start: 2023-09-05 | End: 2023-09-05

## 2023-09-05 RX ORDER — LOSARTAN POTASSIUM 50 MG/1
50 TABLET ORAL DAILY
Status: DISCONTINUED | OUTPATIENT
Start: 2023-09-05 | End: 2023-09-06

## 2023-09-05 RX ORDER — PROPOFOL 10 MG/ML
VIAL (ML) INTRAVENOUS
Status: DISCONTINUED | OUTPATIENT
Start: 2023-09-05 | End: 2023-09-05

## 2023-09-05 RX ORDER — HYDROCHLOROTHIAZIDE 25 MG/1
25 TABLET ORAL DAILY
Status: DISCONTINUED | OUTPATIENT
Start: 2023-09-05 | End: 2023-09-09 | Stop reason: HOSPADM

## 2023-09-05 RX ORDER — LIDOCAINE HYDROCHLORIDE 20 MG/ML
INJECTION INTRAVENOUS
Status: DISCONTINUED | OUTPATIENT
Start: 2023-09-05 | End: 2023-09-05

## 2023-09-05 RX ORDER — ATORVASTATIN CALCIUM 40 MG/1
40 TABLET, FILM COATED ORAL DAILY
Status: DISCONTINUED | OUTPATIENT
Start: 2023-09-06 | End: 2023-09-09 | Stop reason: HOSPADM

## 2023-09-05 RX ORDER — BUPIVACAINE HYDROCHLORIDE 5 MG/ML
INJECTION, SOLUTION PERINEURAL
Status: DISCONTINUED | OUTPATIENT
Start: 2023-09-05 | End: 2023-09-05 | Stop reason: HOSPADM

## 2023-09-05 RX ORDER — ROCURONIUM BROMIDE 10 MG/ML
INJECTION, SOLUTION INTRAVENOUS
Status: DISCONTINUED | OUTPATIENT
Start: 2023-09-05 | End: 2023-09-05

## 2023-09-05 RX ORDER — LIDOCAINE HYDROCHLORIDE 10 MG/ML
INJECTION, SOLUTION EPIDURAL; INFILTRATION; INTRACAUDAL; PERINEURAL
Status: DISCONTINUED | OUTPATIENT
Start: 2023-09-05 | End: 2023-09-05 | Stop reason: HOSPADM

## 2023-09-05 RX ORDER — ONDANSETRON HYDROCHLORIDE 2 MG/ML
INJECTION, SOLUTION INTRAMUSCULAR; INTRAVENOUS
Status: DISCONTINUED | OUTPATIENT
Start: 2023-09-05 | End: 2023-09-05

## 2023-09-05 RX ADMIN — VASOPRESSIN 1 UNITS: 20 INJECTION, SOLUTION INTRAMUSCULAR; SUBCUTANEOUS at 12:09

## 2023-09-05 RX ADMIN — HYDROMORPHONE HYDROCHLORIDE 1 MG: 1 INJECTION, SOLUTION INTRAMUSCULAR; INTRAVENOUS; SUBCUTANEOUS at 02:09

## 2023-09-05 RX ADMIN — PROPOFOL 180 MG: 10 INJECTION, EMULSION INTRAVENOUS at 12:09

## 2023-09-05 RX ADMIN — ACETAMINOPHEN 325MG 650 MG: 325 TABLET ORAL at 02:09

## 2023-09-05 RX ADMIN — SODIUM CHLORIDE, SODIUM LACTATE, POTASSIUM CHLORIDE, AND CALCIUM CHLORIDE: .6; .31; .03; .02 INJECTION, SOLUTION INTRAVENOUS at 12:09

## 2023-09-05 RX ADMIN — SUGAMMADEX 200 MG: 100 INJECTION, SOLUTION INTRAVENOUS at 01:09

## 2023-09-05 RX ADMIN — PIPERACILLIN AND TAZOBACTAM 4.5 G: 4; .5 INJECTION, POWDER, LYOPHILIZED, FOR SOLUTION INTRAVENOUS; PARENTERAL at 02:09

## 2023-09-05 RX ADMIN — HYDRALAZINE HYDROCHLORIDE 10 MG: 20 INJECTION, SOLUTION INTRAMUSCULAR; INTRAVENOUS at 07:09

## 2023-09-05 RX ADMIN — TRAZODONE HYDROCHLORIDE 50 MG: 50 TABLET ORAL at 10:09

## 2023-09-05 RX ADMIN — ACETAMINOPHEN 325MG 650 MG: 325 TABLET ORAL at 10:09

## 2023-09-05 RX ADMIN — CARVEDILOL 25 MG: 25 TABLET, FILM COATED ORAL at 08:09

## 2023-09-05 RX ADMIN — FENTANYL CITRATE 150 MCG: 0.05 INJECTION, SOLUTION INTRAMUSCULAR; INTRAVENOUS at 12:09

## 2023-09-05 RX ADMIN — KETOROLAC TROMETHAMINE 15 MG: 30 INJECTION, SOLUTION INTRAMUSCULAR; INTRAVENOUS at 05:09

## 2023-09-05 RX ADMIN — AMLODIPINE BESYLATE 10 MG: 5 TABLET ORAL at 08:09

## 2023-09-05 RX ADMIN — ACETAMINOPHEN 325MG 650 MG: 325 TABLET ORAL at 11:09

## 2023-09-05 RX ADMIN — PIPERACILLIN AND TAZOBACTAM 4.5 G: 4; .5 INJECTION, POWDER, LYOPHILIZED, FOR SOLUTION INTRAVENOUS; PARENTERAL at 10:09

## 2023-09-05 RX ADMIN — GLYCOPYRROLATE 0.2 MG: 0.2 INJECTION, SOLUTION INTRAMUSCULAR; INTRAVITREAL at 12:09

## 2023-09-05 RX ADMIN — ONDANSETRON 8 MG: 8 TABLET, ORALLY DISINTEGRATING ORAL at 08:09

## 2023-09-05 RX ADMIN — ROCURONIUM BROMIDE 5 MG: 10 INJECTION, SOLUTION INTRAVENOUS at 12:09

## 2023-09-05 RX ADMIN — FUROSEMIDE 40 MG: 10 INJECTION, SOLUTION INTRAMUSCULAR; INTRAVENOUS at 11:09

## 2023-09-05 RX ADMIN — HYDROMORPHONE HYDROCHLORIDE 1 MG: 1 INJECTION, SOLUTION INTRAMUSCULAR; INTRAVENOUS; SUBCUTANEOUS at 06:09

## 2023-09-05 RX ADMIN — PIPERACILLIN AND TAZOBACTAM 4.5 G: 4; .5 INJECTION, POWDER, LYOPHILIZED, FOR SOLUTION INTRAVENOUS; PARENTERAL at 05:09

## 2023-09-05 RX ADMIN — OXYCODONE HYDROCHLORIDE 5 MG: 5 TABLET ORAL at 08:09

## 2023-09-05 RX ADMIN — ENOXAPARIN SODIUM 40 MG: 40 INJECTION SUBCUTANEOUS at 04:09

## 2023-09-05 RX ADMIN — NICOTINE 1 PATCH: 14 PATCH, EXTENDED RELEASE TRANSDERMAL at 08:09

## 2023-09-05 RX ADMIN — HYDROCHLOROTHIAZIDE 25 MG: 25 TABLET ORAL at 09:09

## 2023-09-05 RX ADMIN — LOSARTAN POTASSIUM 50 MG: 50 TABLET, FILM COATED ORAL at 11:09

## 2023-09-05 RX ADMIN — MUPIROCIN: 20 OINTMENT TOPICAL at 08:09

## 2023-09-05 RX ADMIN — QUETIAPINE FUMARATE 200 MG: 100 TABLET ORAL at 10:09

## 2023-09-05 RX ADMIN — ACETAMINOPHEN 325MG 650 MG: 325 TABLET ORAL at 06:09

## 2023-09-05 RX ADMIN — ROCURONIUM BROMIDE 45 MG: 10 INJECTION, SOLUTION INTRAVENOUS at 12:09

## 2023-09-05 RX ADMIN — LIDOCAINE HYDROCHLORIDE 100 MG: 20 INJECTION, SOLUTION INTRAVENOUS at 12:09

## 2023-09-05 RX ADMIN — HYDRALAZINE HYDROCHLORIDE 10 MG: 20 INJECTION, SOLUTION INTRAMUSCULAR; INTRAVENOUS at 03:09

## 2023-09-05 RX ADMIN — NICARDIPINE HYDROCHLORIDE 2.5 MG/HR: 0.2 INJECTION, SOLUTION INTRAVENOUS at 06:09

## 2023-09-05 RX ADMIN — BUPROPION HYDROCHLORIDE 75 MG: 75 TABLET, FILM COATED ORAL at 08:09

## 2023-09-05 RX ADMIN — ONDANSETRON 8 MG: 2 INJECTION INTRAMUSCULAR; INTRAVENOUS at 01:09

## 2023-09-05 RX ADMIN — MUPIROCIN: 20 OINTMENT TOPICAL at 10:09

## 2023-09-05 RX ADMIN — SUCCINYLCHOLINE CHLORIDE 120 MG: 20 INJECTION, SOLUTION INTRAMUSCULAR; INTRAVENOUS at 12:09

## 2023-09-05 RX ADMIN — Medication: at 04:09

## 2023-09-05 RX ADMIN — OXYCODONE HYDROCHLORIDE 5 MG: 5 TABLET ORAL at 02:09

## 2023-09-05 RX ADMIN — DEXAMETHASONE SODIUM PHOSPHATE 8 MG: 4 INJECTION, SOLUTION INTRA-ARTICULAR; INTRALESIONAL; INTRAMUSCULAR; INTRAVENOUS; SOFT TISSUE at 12:09

## 2023-09-05 RX ADMIN — HYDROMORPHONE HYDROCHLORIDE 1 MG: 1 INJECTION, SOLUTION INTRAMUSCULAR; INTRAVENOUS; SUBCUTANEOUS at 08:09

## 2023-09-05 NOTE — ANESTHESIA PREPROCEDURE EVALUATION
09/05/2023  Neena Kohli is a 67 y.o., male.      Pre-op Assessment    I have reviewed the Patient Summary Reports.    I have reviewed the NPO Status.   I have reviewed the Medications.     Review of Systems  Anesthesia Hx:  No problems with previous Anesthesia   Denies Personal Hx of Anesthesia complications.   Cardiovascular:   Exercise tolerance: good Hypertension CAD   ECG has been reviewed. Admitted           Anesthesia Plan  Type of Anesthesia, risks & benefits discussed:    Anesthesia Type: Gen ETT  Intra-op Monitoring Plan: Standard ASA Monitors  Post Op Pain Control Plan: multimodal analgesia  Induction:  IV  Airway Plan: Video  Informed Consent: Informed consent signed with the Patient and all parties understand the risks and agree with anesthesia plan.  All questions answered.   ASA Score: 4  Day of Surgery Review of History & Physical: H&P Update referred to the surgeon/provider.    Ready For Surgery From Anesthesia Perspective.     .

## 2023-09-05 NOTE — ASSESSMENT & PLAN NOTE
Patient has a current diagnosis of hypertensive urgency (without evidence of end organ damage) which is uncontrolled.  Latest blood pressure and vitals reviewed-   Temp:  [97.7 °F (36.5 °C)-98.9 °F (37.2 °C)]   Pulse:  [42-74]   Resp:  [10-42]   BP: (128-223)/()   SpO2:  [89 %-97 %] .   Patient currently off IV antihypertensives.   Home meds for hypertension were reviewed and noted below.   Hypertension Medications             amLODIPine (NORVASC) 10 MG tablet Take 1 tablet (10 mg total) by mouth once daily.    carvediloL (COREG) 25 MG tablet Take 1 tablet (25 mg total) by mouth 2 (two) times daily.    hydrALAZINE (APRESOLINE) 100 MG tablet Take 1 tablet (100 mg total) by mouth every 8 (eight) hours.    irbesartan (AVAPRO) 300 MG tablet Take 1 tablet (300 mg total) by mouth once daily.    propranoloL (INDERAL) 10 MG tablet Take 10 mg by mouth once daily.          Medication adjustment for hospital antihypertensives is as follows-     Goal MAP reduction of 25-30% in first few hours with goal to avoid rapidly correcting BP  - likely significant component of pain related and inability to tolerate p.o. medications    Pain control.  Restarted on oral antihypertensives    Will aim for controlled BP reduction by medications noted above. Monitor and mitigate end organ damage as indicated.

## 2023-09-05 NOTE — OP NOTE
PATIENT: Neena Kohli    MRN: [id number]    DATE OF PROCEDURE: 09/05/2023    PREOPERATIVE DIAGNOSIS: Acute cholecystitis    POSTOPERATIVE DIAGNOSIS: Acute cholecystitis with gangrene     PROCEDURE: Laparoscopic cholecystectomy ( 22 modifier )    SURGEON: Alvaro Snyder M.D.    ASSISTANT: Ramsey Casper M.D.    ANESTHESIA: General Endotracheal    ESTIMATED BLOOD LOSS: minimal    SPECIMEN: Gallbladder and contents    COMPLICATIONS: None    INDICATIONS: The patient is 68 yo AAM with the above diagnosis. R/B/A to Laparoscopic cholecystectomy surgery were explained to the patient in detail.  The risks include, but are not limited to: bleeding, infection, re-operation, injury to surrounding structures,reaction to anesthesia, mynor-operative cardiopulmonary events including PE/DVT, bile leak, post-op diarrhea, failure to resolve symptoms, and possible death.  The patient stated a clear understanding of the risks and requests the procedure be done.    PROCEDURE IN DETAIL:  After surgical consent was obtained, the patient was transported to the operative theater and onto the operating room table in a supine position.  General endotracheal anesthesia was administered without difficulty.  The patient's abdomen was prepped and draped in a standard sterile fashion.  Appropriate perioperative antibiotics were given and a time-out was performed.  An incision was made in the periumbilical region and the fascia was elevated after blunt dissection.  It was sharply divided and the abdominal cavity was entered bluntly.  A trocar was inserted in the abdomen and it was insufflated.  Three additional trocars were inserted without difficulty and under direct visualization.  The gallbladder was noted to be acute inflamed and gangrenous with some leakage of biliary fluid.  The dome of the gallbladder was identified after taking down the omentum which was plastered over the gallbladder.  It was elevated anteriorly and towards the patient's right  shoulder.  The peritoneum was scored on both sides of the infundibulum and a critical view of safety was obtained.  The cystic duct and cystic artery were the only structures seen entering the gallbladder.  They were clipped and divided.  The gallbladder was then taken off the liver bed using cautery.  The posterior wall was partially gangrenous.  It was placed in a specimen bag and removed from the field without difficulty.  The liver bed was found to be free of bleeding or bile leakage.  Any remaining mucous on the liver bed was cauterized.  At this point all the trocars were removed under direct visualization and the abdomen was desufflated.  The fascial defect at the umbilicus was closed using two a figure-of-eight 0 Vicryl suture.  Incisions were all irrigated out with sterile saline and injected with local anesthetic.  The skin openings were closed with interrupted Monocryl suture and the incisions were all dressed in a standard sterile fashion.  At the end of the procedure the instrument, lap, and needle counts were all correct.  The patient was awoken from anesthesia having tolerated the procedure without difficulty and was returned to the PACU in a stable condition.  Patient's family was updated at the end of the procedure and all questions were answered.  Due to the significant inflammation and intraabdominal fluid, the case was extended greater than 3 times what could have been expected otherwise and a 22 modifier will be applied.      Alvaro Snyder M.D., F.A.C.S.  [facility] and General Surgery  [facility]

## 2023-09-05 NOTE — PLAN OF CARE
"  Care Plan    VSS overnight with episodes of hypertension resolved with prn pain med(dilaudid and oxycodone; scheduled tylenol and toradol ). POC reviewed with Neena Kohli. Questions and concerns addressed. No acute events today. Pt progressing toward goals. Will report off to AM RN.NPO overnight possible procedure today. See below and flowsheets for full assessment and VS info.       Neuro:  Jayashree Coma Scale  Best Eye Response: 4-->(E4) spontaneous  Best Motor Response: 6-->(M6) obeys commands  Best Verbal Response: 5-->(V5) oriented  [place] Coma Scale Score: 15  Assessment Qualifiers: patient not sedated/intubated  Pupil PERRLA: yes  24 hr Temp:  [98 °F (36.7 °C)-98.6 °F (37 °C)]      CV:  Rhythm: sinus bradycardia  DVT prophylaxis: VTE Required Core Measure: Pharmacological prophylaxis initiated/maintained    Resp:          GI/:  GI prophylaxis: no  Diet/Nutrition Received: NPO, sips of water  Last Bowel Movement: 09/03/23  Voiding Characteristics: external catheter   Intake/Output Summary (Last 24 hours) at 9/5/2023 0638  Last data filed at 9/5/2023 0600  Gross per 24 hour   Intake 3305.61 ml   Output 680 ml   Net 2625.61 ml       Labs/Accuchecks:  Recent Labs   Lab 09/05/23  0407   WBC 14.85*   RBC 3.54*   HGB 10.7*   HCT 32.9*         Recent Labs   Lab 09/04/23  1943 09/05/23  0407    137   K 4.3 4.1   CO2 26  --     109   BUN 10 12   CREATININE 1.1 1.1   ALKPHOS  --  123   ALT  --  6*   AST  --  11   BILITOT  --  0.7    No results for input(s): "PROTIME", "INR", "APTT", "HEPANTIXA" in the last 168 hours.   Recent Labs   Lab 09/04/23  0558   TROPONINI 0.013       Electrolytes: N/A - electrolytes WDL  Accuchecks: Q4H    Gtts/LDAs:   lactated ringers 100 mL/hr at 09/04/23 2328    niCARdipine Stopped (09/04/23 1657)       Lines/Drains/Airways       Drain  Duration             Male External Urinary Catheter 09/04/23 1655 Small <1 day              Peripheral Intravenous Line  Duration "                  Peripheral IV - Single Lumen 09/03/23 1915 18 G Left Antecubital 1 day         Peripheral IV - Single Lumen 09/04/23 0326 20 G Right Antecubital 1 day         Peripheral IV - Single Lumen 09/04/23 1200 20 G Anterior;Right Forearm <1 day                    Skin/Wounds  Bathing/Skin Care: bath, partial;dressed/undressed;incontinence care (09/05/23 0630)  Wounds: No  Wound care consulted: No    Consults  Consults (From admission, onward)          Status Ordering Provider     Inpatient consult to General surgery  Once        Provider:  (Not yet assigned)    Completed ALPHONSO MCNALLY JR

## 2023-09-05 NOTE — PROGRESS NOTES
[facility] Medicine  Progress Note    Patient Name: Neena Kohli  MRN: [id number]  Patient Class: IP- Inpatient   Admission Date: 9/3/2023  Length of Stay: 1 days  Attending Physician: Mary Barfield MD  Primary Care Provider: CLAY Raimrez MD        Subjective:     Principal Problem:Calculus of gallbladder with acute cholecystitis and obstruction      HPI:  66yo M w/ HTN, HLD, PAD, CKD, T2DM, MDD, CAD with hx MI/stents, hx prostate CA p/w abd pain, nausea, vomiting, and diarrhea for last 6 days. Unable to keep down much of anything po, including his BP meds. BP usually well controlled on multiple medications but has been high since his symptoms began.  No fevers (did not take temperature at home) but has had chills. No flank pain, dysuria,or other urinary symptoms. Had some substernal CP when breathing deeply earlier today but denies CP at time of my assessment. Also worsening of abdominal pain with deep breathing. Denies HA, vision changes, weakness, dizziness or lightheadedness. No recent falls. No sick contacts. Has never had an episode like this before. He was unaware that he had gallstones.     He has chronic back pain and takes Oxycodone 15mg several times per day prescribed by pain doctor. He reports compliance with all of his meds prior to getting sick, but since he has been unable to keep down much, has not had all of his meds. Some nonbloody diarrhea. No constipation. Workup significant for imaging revealing gallstones with CBD dilation, possible cholecystitis and possible choledocholithiasis. ED physician spoke to general surgery who requested HIDA scan and admission to internal medicine given multiple co-morbidities. Sinus volodymyr but otherwise hemodynamically stable. Initially admitted pt to med/tele bed but due to inability to control BP with po meds, decision made to transfer him to the ICU for a nicardipine gtt.            Overview/Hospital Course:  No notes on  file    Interval History: BP remains elevated, received po meds. Continues to have abdominal discomfort. Plan for cholecystectomy today.     Review of Systems   Gastrointestinal:  Positive for abdominal pain.     Objective:     Vital Signs (Most Recent):  Temp: 98.4 °F (36.9 °C) (09/05/23 1515)  Pulse: 65 (09/05/23 1515)  Resp: (!) 31 (09/05/23 1515)  BP: (!) 192/86 (09/05/23 1515)  SpO2: (!) 89 % (09/05/23 1515) Vital Signs (24h Range):  Temp:  [97.7 °F (36.5 °C)-98.9 °F (37.2 °C)] 98.4 °F (36.9 °C)  Pulse:  [42-74] 65  Resp:  [10-42] 31  SpO2:  [89 %-97 %] 89 %  BP: (128-223)/() 192/86     Weight: 83 kg (182 lb 15.7 oz)  Body mass index is 28.66 kg/m².    Intake/Output Summary (Last 24 hours) at 9/5/2023 1537  Last data filed at 9/5/2023 1502  Gross per 24 hour   Intake 2474.94 ml   Output 1250 ml   Net 1224.94 ml           Physical Exam  Vitals and nursing note reviewed.   Constitutional:       General: He is in acute distress.      Appearance: He is ill-appearing.   Cardiovascular:      Rate and Rhythm: Normal rate and regular rhythm.      Comments: Bradycardic initially, HR now 80s  Pulmonary:      Effort: Pulmonary effort is normal.   Abdominal:      Palpations: Abdomen is soft.      Tenderness: There is abdominal tenderness.      Comments: +TTP RUQ, LUQ, and epigastric area   Hypoactive BS   Musculoskeletal:      Right lower leg: No edema.      Left lower leg: No edema.   Skin:     General: Skin is warm and dry.   Neurological:      Mental Status: He is alert and oriented to person, place, and time.             Significant Labs: All pertinent labs within the past 24 hours have been reviewed.    Significant Imaging: I have reviewed all pertinent imaging results/findings within the past 24 hours.      Assessment/Plan:      * Calculus of gallbladder with acute cholecystitis and obstruction  P/w n/v/abd pain x6d and imaging findings concerning for acute cholecystitis, CBD dilation, and possible  choledocholithiasis.   ED spoke to gen surg who rec HIDA scan and to admit to internal medicine given co-morbidities   Pt was fed in the ED just before midnight with subsequent n/v     Prn anti-emetics, pain control   Had chills at home. Leukocytosis. Imaging with evidence of obstruction -- will start Zosyn for intra-abdominal ABX coverage  BCx x2 ordered on admission prior to ABX order    General surgery consult  NPO, s/p cholecystectomy 9/5    Obstructive sleep apnea        Sinus bradycardia  Sinus volodymyr to 40s on admission, confirmed sinus on EKG  Asymptomatic     Beta-blocker was on hold.  Heart rate now in the 60s.  Restarted on Coreg.  Telemetry.  Monitor heart rate closely.  Case of recurrent bradycardia, discontinue beta-blocker        Hypokalemia  Replace as needed        Hypertensive urgency  Patient has a current diagnosis of hypertensive urgency (without evidence of end organ damage) which is uncontrolled.  Latest blood pressure and vitals reviewed-   Temp:  [97.7 °F (36.5 °C)-98.9 °F (37.2 °C)]   Pulse:  [42-74]   Resp:  [10-42]   BP: (128-223)/()   SpO2:  [89 %-97 %] .   Patient currently off IV antihypertensives.   Home meds for hypertension were reviewed and noted below.   Hypertension Medications             amLODIPine (NORVASC) 10 MG tablet Take 1 tablet (10 mg total) by mouth once daily.    carvediloL (COREG) 25 MG tablet Take 1 tablet (25 mg total) by mouth 2 (two) times daily.    hydrALAZINE (APRESOLINE) 100 MG tablet Take 1 tablet (100 mg total) by mouth every 8 (eight) hours.    irbesartan (AVAPRO) 300 MG tablet Take 1 tablet (300 mg total) by mouth once daily.    propranoloL (INDERAL) 10 MG tablet Take 10 mg by mouth once daily.          Medication adjustment for hospital antihypertensives is as follows-     Goal MAP reduction of 25-30% in first few hours with goal to avoid rapidly correcting BP  - likely significant component of pain related and inability to tolerate p.o.  medications    Pain control.  Restarted on oral antihypertensives    Will aim for controlled BP reduction by medications noted above. Monitor and mitigate end organ damage as indicated.    Major depressive disorder, recurrent severe without psychotic features  Patient has persistent depression which is unknown and is currently controlled. Will Continue anti-depressant medications. We will not consult psychiatry at this time. Patient does not display psychosis at this time. Continue to monitor closely and adjust plan of care as needed.        Mild aortic valve stenosis  Seen on echo in 2022  Mild MR, LVEF 715 noted on echo       Mixed hyperlipidemia  Statin         Presence of stent in coronary artery        Stage 3b chronic kidney disease  Cr at baseline low-mid 1s on admission   Continue to trend renal function  Will give additional IVF due to hypovolemia from n/v and poor po intake   Renally dose medications and avoid nephrotoxins    PAD (peripheral artery disease)  Continue ASA    Coronary artery disease involving native coronary artery of native heart without angina pectoris  Hx MI with stent  Continue home bASA        Type 2 diabetes mellitus with stage 3 chronic kidney disease, without long-term current use of insulin  Outpatient he is on Amaryl and metformin with good glycemic control   Hold oral DM meds inpatient   Accuchecks q4h when NPO/not eating regular meals with Novolog low dose correction scale prn  If requiring SSI frequently, will need to add scheduled basal/bolus insulin   DM diet when tolerating/able to advance diet      Hypertension, essential  Outpatient regimen includes coreg, irbesartan, hydralazine   Unable to keep po meds down prior to admission     Transferred to ICU for IV nicardipine. Off drip, tolerating po meds. Antihypertensives restarted    DDD (degenerative disc disease), lumbar  Takes oxycodone 15mg multiple times per day for chronic pain prescribed by pain dr  - confirmed on  --  pt recently picked up Rx of oxy 15mg, 175 tabs for a 30d supply  - will continue Oxycodonde 15mg prn inpatient -- increase frequency given acute on chronic pain and will add IV prn pain med for breakthrough pain or inability to tolerate po med         VTE Risk Mitigation (From admission, onward)         Ordered     enoxaparin injection 40 mg  Daily         09/04/23 0104     IP VTE HIGH RISK PATIENT  Once         09/04/23 0104     Place sequential compression device  Until discontinued         09/04/23 0104                Discharge Planning   DONNA:      Code Status: Full Code   Is the patient medically ready for discharge?:     Reason for patient still in hospital (select all that apply): Patient trending condition, Treatment and Consult recommendations           Case discussed with ICU team. Post -op if BP better controlled, plan for step down.    Critical care time spent on the evaluation and treatment of severe organ dysfunction, review of pertinent labs and imaging studies, discussions with consulting providers and discussions with patient/family: 40 minutes.      Mary Barfield MD  Department of [facility] Medicine   [place] - Intensive Care

## 2023-09-05 NOTE — TRANSFER OF CARE
"Anesthesia Transfer of Care Note    Patient: Neena Kohli    Procedure(s) Performed: Procedure(s) (LRB):  CHOLECYSTECTOMY, LAPAROSCOPIC (N/A)    Patient location: ICU    Anesthesia Type: general    Transport from OR: Transported from OR on 6-10 L/min O2 by face mask with adequate spontaneous ventilation. Continuous ECG monitoring in transport. Continuous SpO2 monitoring in transport. Continuos invasive BP monitoring in transport    Post pain: adequate analgesia    Post assessment: no apparent anesthetic complications and tolerated procedure well    Post vital signs: stable    Level of consciousness: awake, alert and oriented    Nausea/Vomiting: no nausea/vomiting    Complications: none    Transfer of care protocol was followed      Last vitals:   Visit Vitals  BP (!) 186/81 (BP Location: Left arm, Patient Position: Lying)   Pulse (!) 57   Temp 37 °C (98.6 °F)   Resp (!) 26   Ht 5' 7" (1.702 m)   Wt 83 kg (182 lb 15.7 oz)   SpO2 (!) 91%   BMI 28.66 kg/m²     "

## 2023-09-05 NOTE — ASSESSMENT & PLAN NOTE
Sinus volodymyr to 40s on admission, confirmed sinus on EKG  Asymptomatic     Beta-blocker was on hold.  Heart rate now in the 60s.  Restarted on Coreg.  Telemetry.  Monitor heart rate closely.  Case of recurrent bradycardia, discontinue beta-blocker

## 2023-09-05 NOTE — PROGRESS NOTES
Individual Follow-Up Form    9/5/2023    Quit Date: To be determined    Clinical Status of Patient: Inpatient    Length of Service: 30 minutes    Comments: Smoking cessation education provided. Pt is a 1 pk/day cigarette smoker x 52 yrs. He states that he switched to vaping with nicotine approx. 3 months ago. Pt states ready to quit. Ambulatory referral to Smoking Cessation clinic following hospital discharge.     Diagnosis: F17.210

## 2023-09-05 NOTE — ASSESSMENT & PLAN NOTE
67M with acute cholecystitis multiple co-morbidities and admitted to ICU for mgt of HTN.     Will need cholecystectomy  Discussing with staff, anesthesia and ICU about safest timing of surgery  Continue IV Abx, IVF and NPO  Please call with questions

## 2023-09-05 NOTE — PLAN OF CARE
The sw met with  ept to complete the assessment. The pt wasn't feeling very well but agreed to answer the questions. The pt lives alone in [place] but list Abhishek Kohli([phone]/Amada Kohli([phone] as his emergency contact. The pt's brother Abhishek will transport him home at d/c. The pt's independent with his ADL's and has a glucometer. The pt still drives and is retired. The sw completed the white board in the pt's room with her name and contact info. The sw encouraged him to call if he has any further questions or concerns. The sw will continue to follow the pt throughout her transitions of care and will assist with any d/c needs.        09/05/23 3124   Discharge Assessment   Assessment Type Discharge Planning Assessment   Confirmed/corrected address, phone number and insurance Yes   Confirmed Demographics Correct on Facesheet   Source of Information patient   When was your last doctors appointment?   (1 week ago)   Communicated DONNA with patient/caregiver Date not available/Unable to determine   Reason For Admission Calculus of gallbladder with acute cholecystitis and obstruction   People in Home alone   Do you expect to return to your current living situation? No   Do you have help at home or someone to help you manage your care at home? Yes   Who are your caregiver(s) and their phone number(s)? Abhishek Kohli(brother)[phone]/Amada Kohli(dtr)[phone]   Prior to hospitilization cognitive status: Alert/Oriented   Current cognitive status: Alert/Oriented   Home Accessibility wheelchair accessible   Home Layout Able to live on 1st floor   Equipment Currently Used at Home glucometer   Readmission within 30 days? No   Patient currently being followed by outpatient case management? No   Do you currently have service(s) that help you manage your care at home? No   Do you take prescription medications? Yes   Do you have prescription coverage? Yes   Coverage [employer]   Do you have any problems  affording any of your prescribed medications? No   Is the patient taking medications as prescribed? yes   Who is going to help you get home at discharge? Abhishek Kohli(brother)[phone]   How do you get to doctors appointments? car, drives self   Are you on dialysis? No   Do you take coumadin? No   DME Needed Upon Discharge  none   Discharge Plan discussed with: Patient   Transition of Care Barriers None   Discharge Plan A Home with family   Discharge Plan B Home Health

## 2023-09-05 NOTE — SUBJECTIVE & OBJECTIVE
Interval History: BP remains elevated, received po meds. Continues to have abdominal discomfort. Plan for cholecystectomy today.     Review of Systems   Gastrointestinal:  Positive for abdominal pain.     Objective:     Vital Signs (Most Recent):  Temp: 98.4 °F (36.9 °C) (09/05/23 1515)  Pulse: 65 (09/05/23 1515)  Resp: (!) 31 (09/05/23 1515)  BP: (!) 192/86 (09/05/23 1515)  SpO2: (!) 89 % (09/05/23 1515) Vital Signs (24h Range):  Temp:  [97.7 °F (36.5 °C)-98.9 °F (37.2 °C)] 98.4 °F (36.9 °C)  Pulse:  [42-74] 65  Resp:  [10-42] 31  SpO2:  [89 %-97 %] 89 %  BP: (128-223)/() 192/86     Weight: 83 kg (182 lb 15.7 oz)  Body mass index is 28.66 kg/m².    Intake/Output Summary (Last 24 hours) at 9/5/2023 1537  Last data filed at 9/5/2023 1502  Gross per 24 hour   Intake 2474.94 ml   Output 1250 ml   Net 1224.94 ml           Physical Exam  Vitals and nursing note reviewed.   Constitutional:       General: He is in acute distress.      Appearance: He is ill-appearing.   Cardiovascular:      Rate and Rhythm: Normal rate and regular rhythm.      Comments: Bradycardic initially, HR now 80s  Pulmonary:      Effort: Pulmonary effort is normal.   Abdominal:      Palpations: Abdomen is soft.      Tenderness: There is abdominal tenderness.      Comments: +TTP RUQ, LUQ, and epigastric area   Hypoactive BS   Musculoskeletal:      Right lower leg: No edema.      Left lower leg: No edema.   Skin:     General: Skin is warm and dry.   Neurological:      Mental Status: He is alert and oriented to person, place, and time.             Significant Labs: All pertinent labs within the past 24 hours have been reviewed.    Significant Imaging: I have reviewed all pertinent imaging results/findings within the past 24 hours.

## 2023-09-05 NOTE — PLAN OF CARE
Problem: Adult Inpatient Plan of Care  Goal: Plan of Care Review  Outcome: Ongoing, Progressing       -POC discussed with patient.        -Surgery consulted.        -ECHO completed.      Problem: Diabetes Comorbidity  Goal: Blood Glucose Level Within Targeted Range  Outcome: Ongoing, Progressing      -Monitored q 4 h. Novolog SSI administered.      Problem: Hypertension Acute  Goal: Blood Pressure Within Desired Range  Outcome: Ongoing, Progressing       -Cardene drip titrated to SBP goal < 200. Cardene turned off at 1700. Pt SBP  150-180.       -Carvedilol 25 mg and HCTZ 12.5 mg administered.     Problem: Electrolyte Imbalance  Goal: Electrolyte Balance  Outcome: Ongoing, Progressing  -K+ 3.1. Total K+ 60 meq IVPB administered.   -Mg++ 1.6. Mg++ 2 grams IVPB administered.      Problem: Pain Acute  Goal: Acceptable Pain Control and Functional Ability  Outcome: Ongoing, Progressing  -Pt received Dilaudid 1mg X 4 this shift. Ketorolac 15mg X 2. APAP 650mg X2. Moderate pain relief achieved.      Problem: Infection  Goal: Absence of Infection Signs and Symptoms  Outcome: Ongoing, Progressing  -Zosyn IVPB adminstered.

## 2023-09-05 NOTE — PLAN OF CARE
Pt on LR at 100ml/hr at shift change, IVF discontinued at 1058. PRN pain medications administered as ordered providing mild relief. Pt taken to OR for surgery at 1215 and returned back to room at 1415, pt had cholecystectomy done, tolerated well. BP remains elevated, dose of hydralazine given as ordered with no relief. Instructed to restart Carnede drip if SBP >200.   PCA pump with dilaudid also initiated at 1644 for better pain management.   Cardene drip restarted at 1814, BP remains elevated.     Problem: Adult Inpatient Plan of Care  Goal: Plan of Care Review  Outcome: Ongoing, Progressing  Goal: Patient-Specific Goal (Individualized)  Outcome: Ongoing, Progressing  Goal: Absence of Hospital-Acquired Illness or Injury  Outcome: Ongoing, Progressing  Goal: Optimal Comfort and Wellbeing  Outcome: Ongoing, Progressing  Goal: Readiness for Transition of Care  Outcome: Ongoing, Progressing     Problem: Diabetes Comorbidity  Goal: Blood Glucose Level Within Targeted Range  Outcome: Ongoing, Progressing     Problem: Hypertension Acute  Goal: Blood Pressure Within Desired Range  Outcome: Ongoing, Progressing     Problem: Pain Acute  Goal: Acceptable Pain Control and Functional Ability  Outcome: Ongoing, Progressing     Problem: Infection  Goal: Absence of Infection Signs and Symptoms  Outcome: Ongoing, Progressing

## 2023-09-05 NOTE — PROGRESS NOTES
Pulm/CC Fellow Consult Note    Attending Physician: Mary Barfield MD    Date of Admit: 9/3/2023    Reason for Consult: Hypertensive crisis    History of Present Illness:  Neena Kohli is a 67 y.o.  male with a PMHx emphysema, DM2, and HTN who presents with RUQ pain, nausea, vomiting. He was found to have symptomatic cholelithiasis and dilated biliary duct with mild pericholecystic fluid. He also had asymptomatic hypertension with SBP to 240 on admission. After pain control and restarting home medications, he was titrated off a Cardene drip.     Interval History  Patient continues to have pain requiring q2 Dilaudid. He otherwise has no complaints and is asking about surgery today.     Objective:   Last 24 Hour Vital Signs:  BP  Min: 128/80  Max: 223/98  Temp  Av.4 °F (36.9 °C)  Min: 98 °F (36.7 °C)  Max: 98.9 °F (37.2 °C)  Pulse  Av.7  Min: 42  Max: 103  Resp  Av  Min: 10  Max: 42  SpO2  Av.3 %  Min: 88 %  Max: 97 %  Weight  Av kg (182 lb 15.7 oz)  Min: 83 kg (182 lb 15.7 oz)  Max: 83 kg (182 lb 15.7 oz)  Body mass index is 28.66 kg/m².  I/O last 3 completed shifts:  In: 5588.1 [P.O.:240; I.V.:3304; IV Piggyback:4.1]  Out: 820 [Urine:820]    Physical Exam:  General: Alert and awake, in distress w/ guarding of the RUQ  HENT:  NCAT; anicteric sclera; OP clear with MMM  Cardio:  Regular rate and rhythm with normal S1 and S2  Resp:  CTAB; respirations unlabored; no wheezes, crackles or rhonchi  Abdom:  Soft, Very tender in the RUQ with some involuntary guarding but without rebound  Extrem:  WWP with clubbing ntoed  Pulses:  2+ and symmetric distally  Neuro:  AAOx3; cooperative and pleasant with no focal deficits    Imaging:   No new imgaging    Assessment & Plan:   66yo M w/ PMH HTN, smoking who presents with symptomatic cholelithiasis and mild cholecystitis    Neuro  - no deficits    [facility]  #Uncontrolled HTN  Starting back on all his home medications with IV hydralazine as needed. Goal BP  < 180 at this time. May have a large component of pain control as well (see GI).   - carvedilol 25mg PO BID  - amlodipine 10mg PO qD  - HCTZ 25mg PO   - losartan 50mg PO   - IV hydralazine 10mg PRN  - pain control    Pulm  #Emphysema  Pt is long time smoker with emphysematous changes on his CT scan/CXR. Would benefit from outpatient follow-up with PFTs.  - goal O2 saturation 88-92%  - PRN albuterol as needed    GI  #Cholelithiasis  #Choledocholithiasis  #Acute cholecystitis w/ gangrene  S/p lap erik today with gangrene. Will continue to follow post-operatively with pain control and antibiotics at this time. Will defer to surgery for antibiotic choice and duration given intra-operative findings.  - Zosyn, day 2  - Pain control    - switching to PCA pump in order to control his pain    - PCA Dilaudid (max 12mg/day) at 0.2 mg q20 w/ 0.6 mg max/hr    Renal  - no current issues  - diurese as needed (net positive 5L this admission)    Heme  - n/a    ID  - see cholelithiasis     Endocrine  - glucose control with SSI    Thank you for this consultation.     Justin Ellerman, MD  Fellow  [facility]    Pt seen and examined with Pulmonary/Critical Care team and this note reviewed and validated with the following additional comments: Did well with surgery.  We have added in his home BP meds. We will titrate down his BP over next few days.  PCA pump.    Medical Decision Making (MDM) was moderate  The number and complexity of problems addressed was 2  The amount and complexity of data reviewed was low  The risk of complications and/or morbidity/mortality was high  The tests ordered were none  I communicated with the following providers Surgery, Anesthesia  Time spent in the care of this patient was 15 minutes    Se Damico MD  Phone [phone]

## 2023-09-05 NOTE — PROGRESS NOTES
[facility]  Intensive Care  General Surgery  Progress Note    Subjective:     History of Present Illness:  No notes on file    Post-Op Info:  * No surgery found *         Interval History: NAEO. BP still elevated.  Pain somewhat better but still very tender. White count marginally better.     Medications:  Continuous Infusions:   niCARdipine Stopped (09/04/23 1657)     Scheduled Meds:   acetaminophen  650 mg Oral Q4H    amLODIPine  10 mg Oral Daily    buPROPion  75 mg Oral Daily    carvediloL  25 mg Oral BID    enoxparin  40 mg Subcutaneous Daily    hydroCHLOROthiazide  25 mg Oral Daily    ketorolac  15 mg Intravenous Q6H    mupirocin   Nasal BID    nicotine  1 patch Transdermal Daily    piperacillin-tazobactam (Zosyn) IV (PEDS and ADULTS) (extended infusion is not appropriate)  4.5 g Intravenous Q8H    QUEtiapine  200 mg Oral QHS    traZODone  50 mg Oral QHS     PRN Meds:sodium chloride 0.9%, acetaminophen, albuterol-ipratropium, dextrose 10%, dextrose 10%, glucagon (human recombinant), glucose, glucose, hydrALAZINE, HYDROmorphone, insulin aspart U-100, melatonin, naloxone, ondansetron, oxyCODONE, polyethylene glycol, prochlorperazine, sodium chloride 0.9%     Review of patient's allergies indicates:  No Known Allergies  Objective:     Vital Signs (Most Recent):  Temp: 98.9 °F (37.2 °C) (09/05/23 0830)  Pulse: 68 (09/05/23 0905)  Resp: (!) 22 (09/05/23 0905)  BP: (!) 199/89 (09/05/23 0900)  SpO2: (!) 91 % (09/05/23 0905) Vital Signs (24h Range):  Temp:  [98 °F (36.7 °C)-98.9 °F (37.2 °C)] 98.9 °F (37.2 °C)  Pulse:  [] 68  Resp:  [10-42] 22  SpO2:  [88 %-97 %] 91 %  BP: (128-223)/() 199/89     Weight: 83 kg (182 lb 15.7 oz)  Body mass index is 28.66 kg/m².    Intake/Output - Last 3 Shifts         09/03 0700 09/04 0659 09/04 0700 09/05 0659 09/05 0700 09/06 0659    P.O.  240     I.V. (mL/kg) 338.9 (4) 2965.1 (35.7) 176.7 (2.1)    IV Piggyback 1099 945.1 9.4    Total Intake(mL/kg) 1437.9 (17.1) 4150.2 (50)  186.1 (2.2)    Urine (mL/kg/hr)  680 (0.3) 220 (1)    Other  0     Total Output  680 220    Net +1437.9 +3470.2 -33.9           Urine Occurrence  1 x              Physical Exam  Vitals reviewed.   Constitutional:       General: He is not in acute distress.  HENT:      Head: Normocephalic and atraumatic.      Nose: Nose normal.   Eyes:      General:         Right eye: No discharge.         Left eye: No discharge.   Cardiovascular:      Rate and Rhythm: Normal rate and regular rhythm.   Pulmonary:      Effort: Pulmonary effort is normal. No respiratory distress.      Breath sounds: No stridor.   Abdominal:      Comments: Soft, focal RUQ tenderness    Lower midline from prior surgery    Musculoskeletal:         General: Normal range of motion.      Cervical back: Normal range of motion.   Skin:     General: Skin is warm and dry.      Capillary Refill: Capillary refill takes 2 to 3 seconds.   Neurological:      General: No focal deficit present.      Mental Status: He is alert and oriented to person, place, and time.   Psychiatric:         Mood and Affect: Mood normal.         Behavior: Behavior normal.          Significant Labs:  I have reviewed all pertinent lab results within the past 24 hours.    Significant Diagnostics:  I have reviewed all pertinent imaging results/findings within the past 24 hours.    Assessment/Plan:     * Calculus of gallbladder with acute cholecystitis and obstruction  67M with acute cholecystitis multiple co-morbidities and admitted to ICU for mgt of HTN.     Will need cholecystectomy  Discussing with staff, anesthesia and ICU  OR today. Needs consent.   Continue IV Abx, IVF and NPO  Please call with questions        Tito Casper MD  General Surgery  [facility] - Intensive Care      Pt seen and examined.  Agree with note and plan.    Still hypertensive but improved, off Cardene gtt  Pain better  OR today if cleared for Anesthesia  Stop Toradol    Alvrao Snyder M.D.,  KARL.  [id number] and General Surgery  [facility]

## 2023-09-05 NOTE — SUBJECTIVE & OBJECTIVE
Interval History: NAEO. BP still elevated.  Pain somewhat better but still very tender. White count marginally better.     Medications:  Continuous Infusions:   niCARdipine Stopped (09/04/23 1657)     Scheduled Meds:   acetaminophen  650 mg Oral Q4H    amLODIPine  10 mg Oral Daily    buPROPion  75 mg Oral Daily    carvediloL  25 mg Oral BID    enoxparin  40 mg Subcutaneous Daily    hydroCHLOROthiazide  25 mg Oral Daily    ketorolac  15 mg Intravenous Q6H    mupirocin   Nasal BID    nicotine  1 patch Transdermal Daily    piperacillin-tazobactam (Zosyn) IV (PEDS and ADULTS) (extended infusion is not appropriate)  4.5 g Intravenous Q8H    QUEtiapine  200 mg Oral QHS    traZODone  50 mg Oral QHS     PRN Meds:sodium chloride 0.9%, acetaminophen, albuterol-ipratropium, dextrose 10%, dextrose 10%, glucagon (human recombinant), glucose, glucose, hydrALAZINE, HYDROmorphone, insulin aspart U-100, melatonin, naloxone, ondansetron, oxyCODONE, polyethylene glycol, prochlorperazine, sodium chloride 0.9%     Review of patient's allergies indicates:  No Known Allergies  Objective:     Vital Signs (Most Recent):  Temp: 98.9 °F (37.2 °C) (09/05/23 0830)  Pulse: 68 (09/05/23 0905)  Resp: (!) 22 (09/05/23 0905)  BP: (!) 199/89 (09/05/23 0900)  SpO2: (!) 91 % (09/05/23 0905) Vital Signs (24h Range):  Temp:  [98 °F (36.7 °C)-98.9 °F (37.2 °C)] 98.9 °F (37.2 °C)  Pulse:  [] 68  Resp:  [10-42] 22  SpO2:  [88 %-97 %] 91 %  BP: (128-223)/() 199/89     Weight: 83 kg (182 lb 15.7 oz)  Body mass index is 28.66 kg/m².    Intake/Output - Last 3 Shifts         09/03 0700 09/04 0659 09/04 0700 09/05 0659 09/05 0700 09/06 0659    P.O.  240     I.V. (mL/kg) 338.9 (4) 2965.1 (35.7) 176.7 (2.1)    IV Piggyback 1099 945.1 9.4    Total Intake(mL/kg) 1437.9 (17.1) 4150.2 (50) 186.1 (2.2)    Urine (mL/kg/hr)  680 (0.3) 220 (1)    Other  0     Total Output  680 220    Net +1437.9 +3470.2 -33.9           Urine Occurrence  1 x               Physical Exam  Vitals reviewed.   Constitutional:       General: He is not in acute distress.  HENT:      Head: Normocephalic and atraumatic.      Nose: Nose normal.   Eyes:      General:         Right eye: No discharge.         Left eye: No discharge.   Cardiovascular:      Rate and Rhythm: Normal rate and regular rhythm.   Pulmonary:      Effort: Pulmonary effort is normal. No respiratory distress.      Breath sounds: No stridor.   Abdominal:      Comments: Soft, focal RUQ tenderness    Lower midline from prior surgery    Musculoskeletal:         General: Normal range of motion.      Cervical back: Normal range of motion.   Skin:     General: Skin is warm and dry.      Capillary Refill: Capillary refill takes 2 to 3 seconds.   Neurological:      General: No focal deficit present.      Mental Status: He is alert and oriented to person, place, and time.   Psychiatric:         Mood and Affect: Mood normal.         Behavior: Behavior normal.          Significant Labs:  I have reviewed all pertinent lab results within the past 24 hours.    Significant Diagnostics:  I have reviewed all pertinent imaging results/findings within the past 24 hours.

## 2023-09-05 NOTE — ASSESSMENT & PLAN NOTE
Outpatient regimen includes coreg, irbesartan, hydralazine   Unable to keep po meds down prior to admission     Transferred to ICU for IV nicardipine. Off drip, tolerating po meds. Antihypertensives restarted

## 2023-09-05 NOTE — ASSESSMENT & PLAN NOTE
P/w n/v/abd pain x6d and imaging findings concerning for acute cholecystitis, CBD dilation, and possible choledocholithiasis.   ED spoke to gen surg who rec HIDA scan and to admit to internal medicine given co-morbidities   Pt was fed in the ED just before midnight with subsequent n/v     Prn anti-emetics, pain control   Had chills at home. Leukocytosis. Imaging with evidence of obstruction -- will start Zosyn for intra-abdominal ABX coverage  BCx x2 ordered on admission prior to ABX order    General surgery consult  NPO, s/p cholecystectomy 9/5

## 2023-09-06 LAB
ALBUMIN SERPL BCP-MCNC: 2.5 G/DL (ref 3.5–5.2)
ALP SERPL-CCNC: 140 U/L (ref 55–135)
ALT SERPL W/O P-5'-P-CCNC: 16 U/L (ref 10–44)
ANION GAP SERPL CALC-SCNC: 9 MMOL/L (ref 8–16)
AST SERPL-CCNC: 24 U/L (ref 10–40)
BASOPHILS # BLD AUTO: 0.04 K/UL (ref 0–0.2)
BASOPHILS NFR BLD: 0.4 % (ref 0–1.9)
BILIRUB SERPL-MCNC: 0.6 MG/DL (ref 0.1–1)
BUN SERPL-MCNC: 15 MG/DL (ref 8–23)
CALCIUM SERPL-MCNC: 9.2 MG/DL (ref 8.7–10.5)
CHLORIDE SERPL-SCNC: 105 MMOL/L (ref 95–110)
CO2 SERPL-SCNC: 24 MMOL/L (ref 23–29)
CREAT SERPL-MCNC: 1.3 MG/DL (ref 0.5–1.4)
DIFFERENTIAL METHOD: ABNORMAL
EOSINOPHIL # BLD AUTO: 0.1 K/UL (ref 0–0.5)
EOSINOPHIL NFR BLD: 1.1 % (ref 0–8)
ERYTHROCYTE [DISTWIDTH] IN BLOOD BY AUTOMATED COUNT: 13.2 % (ref 11.5–14.5)
EST. GFR  (NO RACE VARIABLE): 60 ML/MIN/1.73 M^2
GLUCOSE SERPL-MCNC: 118 MG/DL (ref 70–110)
HCT VFR BLD AUTO: 35.5 % (ref 40–54)
HGB BLD-MCNC: 11.3 G/DL (ref 14–18)
IMM GRANULOCYTES # BLD AUTO: 0.06 K/UL (ref 0–0.04)
IMM GRANULOCYTES NFR BLD AUTO: 0.6 % (ref 0–0.5)
LYMPHOCYTES # BLD AUTO: 1.1 K/UL (ref 1–4.8)
LYMPHOCYTES NFR BLD: 10.6 % (ref 18–48)
MAGNESIUM SERPL-MCNC: 1.6 MG/DL (ref 1.6–2.6)
MCH RBC QN AUTO: 29.2 PG (ref 27–31)
MCHC RBC AUTO-ENTMCNC: 31.8 G/DL (ref 32–36)
MCV RBC AUTO: 92 FL (ref 82–98)
MONOCYTES # BLD AUTO: 0.9 K/UL (ref 0.3–1)
MONOCYTES NFR BLD: 8.8 % (ref 4–15)
NEUTROPHILS # BLD AUTO: 8.4 K/UL (ref 1.8–7.7)
NEUTROPHILS NFR BLD: 78.5 % (ref 38–73)
NRBC BLD-RTO: 0 /100 WBC
PHOSPHATE SERPL-MCNC: 2.3 MG/DL (ref 2.7–4.5)
PLATELET # BLD AUTO: 213 K/UL (ref 150–450)
PMV BLD AUTO: 10.5 FL (ref 9.2–12.9)
POCT GLUCOSE: 111 MG/DL (ref 70–110)
POCT GLUCOSE: 116 MG/DL (ref 70–110)
POCT GLUCOSE: 129 MG/DL (ref 70–110)
POCT GLUCOSE: 147 MG/DL (ref 70–110)
POTASSIUM SERPL-SCNC: 3.3 MMOL/L (ref 3.5–5.1)
PROT SERPL-MCNC: 6.2 G/DL (ref 6–8.4)
RBC # BLD AUTO: 3.87 M/UL (ref 4.6–6.2)
SODIUM SERPL-SCNC: 138 MMOL/L (ref 136–145)
WBC # BLD AUTO: 10.67 K/UL (ref 3.9–12.7)

## 2023-09-06 PROCEDURE — S4991 NICOTINE PATCH NONLEGEND: HCPCS | Performed by: SURGERY

## 2023-09-06 PROCEDURE — 25000003 PHARM REV CODE 250: Performed by: SURGERY

## 2023-09-06 PROCEDURE — 63600175 PHARM REV CODE 636 W HCPCS: Performed by: INTERNAL MEDICINE

## 2023-09-06 PROCEDURE — 63600175 PHARM REV CODE 636 W HCPCS: Performed by: SURGERY

## 2023-09-06 PROCEDURE — 25000003 PHARM REV CODE 250: Performed by: INTERNAL MEDICINE

## 2023-09-06 PROCEDURE — 25000003 PHARM REV CODE 250: Performed by: STUDENT IN AN ORGANIZED HEALTH CARE EDUCATION/TRAINING PROGRAM

## 2023-09-06 PROCEDURE — 80053 COMPREHEN METABOLIC PANEL: CPT | Performed by: SURGERY

## 2023-09-06 PROCEDURE — 84100 ASSAY OF PHOSPHORUS: CPT | Performed by: SURGERY

## 2023-09-06 PROCEDURE — 63600175 PHARM REV CODE 636 W HCPCS: Performed by: STUDENT IN AN ORGANIZED HEALTH CARE EDUCATION/TRAINING PROGRAM

## 2023-09-06 PROCEDURE — 27000221 HC OXYGEN, UP TO 24 HOURS

## 2023-09-06 PROCEDURE — 83735 ASSAY OF MAGNESIUM: CPT | Performed by: SURGERY

## 2023-09-06 PROCEDURE — 20000000 HC ICU ROOM

## 2023-09-06 PROCEDURE — 94761 N-INVAS EAR/PLS OXIMETRY MLT: CPT

## 2023-09-06 PROCEDURE — 99900035 HC TECH TIME PER 15 MIN (STAT)

## 2023-09-06 PROCEDURE — 36415 COLL VENOUS BLD VENIPUNCTURE: CPT | Performed by: SURGERY

## 2023-09-06 PROCEDURE — 85025 COMPLETE CBC W/AUTO DIFF WBC: CPT | Performed by: SURGERY

## 2023-09-06 RX ORDER — HYDRALAZINE HYDROCHLORIDE 25 MG/1
25 TABLET, FILM COATED ORAL EVERY 8 HOURS
Status: DISCONTINUED | OUTPATIENT
Start: 2023-09-06 | End: 2023-09-07

## 2023-09-06 RX ORDER — POLYETHYLENE GLYCOL 3350 17 G/17G
17 POWDER, FOR SOLUTION ORAL 2 TIMES DAILY
Status: DISCONTINUED | OUTPATIENT
Start: 2023-09-06 | End: 2023-09-09 | Stop reason: HOSPADM

## 2023-09-06 RX ORDER — METRONIDAZOLE 500 MG/1
500 TABLET ORAL EVERY 8 HOURS
Status: DISCONTINUED | OUTPATIENT
Start: 2023-09-06 | End: 2023-09-06

## 2023-09-06 RX ORDER — ACETAMINOPHEN 500 MG
1000 TABLET ORAL 3 TIMES DAILY
Status: DISCONTINUED | OUTPATIENT
Start: 2023-09-06 | End: 2023-09-09 | Stop reason: HOSPADM

## 2023-09-06 RX ORDER — CIPROFLOXACIN 2 MG/ML
400 INJECTION, SOLUTION INTRAVENOUS
Status: DISCONTINUED | OUTPATIENT
Start: 2023-09-06 | End: 2023-09-06

## 2023-09-06 RX ORDER — LOSARTAN POTASSIUM 50 MG/1
100 TABLET ORAL DAILY
Status: DISCONTINUED | OUTPATIENT
Start: 2023-09-06 | End: 2023-09-09 | Stop reason: HOSPADM

## 2023-09-06 RX ORDER — SODIUM,POTASSIUM PHOSPHATES 280-250MG
2 POWDER IN PACKET (EA) ORAL ONCE
Status: COMPLETED | OUTPATIENT
Start: 2023-09-06 | End: 2023-09-06

## 2023-09-06 RX ORDER — OXYCODONE HYDROCHLORIDE 5 MG/1
15 TABLET ORAL 3 TIMES DAILY
Status: DISCONTINUED | OUTPATIENT
Start: 2023-09-06 | End: 2023-09-07

## 2023-09-06 RX ORDER — AMOXICILLIN 250 MG
1 CAPSULE ORAL DAILY
Status: DISCONTINUED | OUTPATIENT
Start: 2023-09-06 | End: 2023-09-09 | Stop reason: HOSPADM

## 2023-09-06 RX ORDER — FUROSEMIDE 10 MG/ML
40 INJECTION INTRAMUSCULAR; INTRAVENOUS ONCE
Status: COMPLETED | OUTPATIENT
Start: 2023-09-06 | End: 2023-09-06

## 2023-09-06 RX ORDER — MAGNESIUM SULFATE HEPTAHYDRATE 40 MG/ML
2 INJECTION, SOLUTION INTRAVENOUS ONCE
Status: COMPLETED | OUTPATIENT
Start: 2023-09-06 | End: 2023-09-06

## 2023-09-06 RX ADMIN — POLYETHYLENE GLYCOL 3350 17 G: 17 POWDER, FOR SOLUTION ORAL at 08:09

## 2023-09-06 RX ADMIN — HYDROMORPHONE HYDROCHLORIDE 1 MG: 1 INJECTION, SOLUTION INTRAMUSCULAR; INTRAVENOUS; SUBCUTANEOUS at 02:09

## 2023-09-06 RX ADMIN — ACETAMINOPHEN 325MG 650 MG: 325 TABLET ORAL at 09:09

## 2023-09-06 RX ADMIN — CARVEDILOL 25 MG: 25 TABLET, FILM COATED ORAL at 08:09

## 2023-09-06 RX ADMIN — BUPROPION HYDROCHLORIDE 75 MG: 75 TABLET, FILM COATED ORAL at 09:09

## 2023-09-06 RX ADMIN — ACETAMINOPHEN 1000 MG: 500 TABLET ORAL at 06:09

## 2023-09-06 RX ADMIN — CARVEDILOL 25 MG: 25 TABLET, FILM COATED ORAL at 09:09

## 2023-09-06 RX ADMIN — ACETAMINOPHEN 325MG 650 MG: 325 TABLET ORAL at 01:09

## 2023-09-06 RX ADMIN — NICOTINE 1 PATCH: 14 PATCH, EXTENDED RELEASE TRANSDERMAL at 08:09

## 2023-09-06 RX ADMIN — AMLODIPINE BESYLATE 10 MG: 5 TABLET ORAL at 09:09

## 2023-09-06 RX ADMIN — LOSARTAN POTASSIUM 100 MG: 50 TABLET, FILM COATED ORAL at 09:09

## 2023-09-06 RX ADMIN — CIPROFLOXACIN 400 MG: 400 INJECTION, SOLUTION INTRAVENOUS at 10:09

## 2023-09-06 RX ADMIN — ATORVASTATIN CALCIUM 40 MG: 40 TABLET, FILM COATED ORAL at 09:09

## 2023-09-06 RX ADMIN — OXYCODONE HYDROCHLORIDE 15 MG: 5 TABLET ORAL at 03:09

## 2023-09-06 RX ADMIN — MAGNESIUM SULFATE 2 G: 2 INJECTION INTRAVENOUS at 08:09

## 2023-09-06 RX ADMIN — POTASSIUM & SODIUM PHOSPHATES POWDER PACK 280-160-250 MG 2 PACKET: 280-160-250 PACK at 09:09

## 2023-09-06 RX ADMIN — ONDANSETRON 8 MG: 8 TABLET, ORALLY DISINTEGRATING ORAL at 10:09

## 2023-09-06 RX ADMIN — TRAZODONE HYDROCHLORIDE 50 MG: 50 TABLET ORAL at 10:09

## 2023-09-06 RX ADMIN — DOCUSATE SODIUM AND SENNOSIDES 1 TABLET: 8.6; 5 TABLET, FILM COATED ORAL at 02:09

## 2023-09-06 RX ADMIN — FUROSEMIDE 40 MG: 10 INJECTION, SOLUTION INTRAVENOUS at 12:09

## 2023-09-06 RX ADMIN — MUPIROCIN: 20 OINTMENT TOPICAL at 08:09

## 2023-09-06 RX ADMIN — HYDROCHLOROTHIAZIDE 25 MG: 25 TABLET ORAL at 09:09

## 2023-09-06 RX ADMIN — ENOXAPARIN SODIUM 40 MG: 40 INJECTION SUBCUTANEOUS at 06:09

## 2023-09-06 RX ADMIN — Medication: at 08:09

## 2023-09-06 RX ADMIN — POTASSIUM BICARBONATE 50 MEQ: 978 TABLET, EFFERVESCENT ORAL at 09:09

## 2023-09-06 RX ADMIN — NICARDIPINE HYDROCHLORIDE 5 MG/HR: 0.2 INJECTION, SOLUTION INTRAVENOUS at 06:09

## 2023-09-06 RX ADMIN — METRONIDAZOLE 500 MG: 500 TABLET ORAL at 09:09

## 2023-09-06 RX ADMIN — QUETIAPINE FUMARATE 200 MG: 100 TABLET ORAL at 10:09

## 2023-09-06 RX ADMIN — HYDRALAZINE HYDROCHLORIDE 10 MG: 20 INJECTION, SOLUTION INTRAMUSCULAR; INTRAVENOUS at 02:09

## 2023-09-06 RX ADMIN — ACETAMINOPHEN 325MG 650 MG: 325 TABLET ORAL at 06:09

## 2023-09-06 RX ADMIN — HYDRALAZINE HYDROCHLORIDE 25 MG: 25 TABLET, FILM COATED ORAL at 10:09

## 2023-09-06 RX ADMIN — OXYCODONE HYDROCHLORIDE 15 MG: 5 TABLET ORAL at 08:09

## 2023-09-06 RX ADMIN — PIPERACILLIN AND TAZOBACTAM 4.5 G: 4; .5 INJECTION, POWDER, LYOPHILIZED, FOR SOLUTION INTRAVENOUS; PARENTERAL at 02:09

## 2023-09-06 RX ADMIN — HYDRALAZINE HYDROCHLORIDE 25 MG: 25 TABLET, FILM COATED ORAL at 02:09

## 2023-09-06 NOTE — SUBJECTIVE & OBJECTIVE
Interval History:  S/p cholecystectomy.  Abdominal pain improved.  Started on PCA hydromorphone pump for better pain control.  Advance diet as tolerated.  Blood pressure remains elevated, on nicardipine drip.  Discussed with ICU team.  Added hydralazine.    Review of Systems   Gastrointestinal:  Positive for abdominal pain.     Objective:     Vital Signs (Most Recent):  Temp: 99.2 °F (37.3 °C) (09/06/23 1130)  Pulse: 66 (09/06/23 1245)  Resp: (!) 28 (09/06/23 1245)  BP: (!) 176/77 (09/06/23 1230)  SpO2: (!) 91 % (09/06/23 1245) Vital Signs (24h Range):  Temp:  [97.7 °F (36.5 °C)-99.2 °F (37.3 °C)] 99.2 °F (37.3 °C)  Pulse:  [47-74] 66  Resp:  [12-31] 28  SpO2:  [87 %-97 %] 91 %  BP: (141-231)/() 176/77     Weight: 83 kg (182 lb 15.7 oz)  Body mass index is 28.66 kg/m².    Intake/Output Summary (Last 24 hours) at 9/6/2023 1356  Last data filed at 9/6/2023 1348  Gross per 24 hour   Intake 1153.17 ml   Output 3255 ml   Net -2101.83 ml           Physical Exam  Vitals and nursing note reviewed.   Constitutional:       Appearance: He is ill-appearing.   Cardiovascular:      Rate and Rhythm: Normal rate and regular rhythm.   Pulmonary:      Effort: Pulmonary effort is normal.   Abdominal:      Palpations: Abdomen is soft.      Tenderness: There is abdominal tenderness.   Musculoskeletal:      Right lower leg: No edema.      Left lower leg: No edema.   Skin:     General: Skin is warm and dry.   Neurological:      Mental Status: He is alert and oriented to person, place, and time.             Significant Labs: All pertinent labs within the past 24 hours have been reviewed.    Significant Imaging: I have reviewed all pertinent imaging results/findings within the past 24 hours.

## 2023-09-06 NOTE — ASSESSMENT & PLAN NOTE
Takes oxycodone 15mg multiple times per day for chronic pain prescribed by pain dr  - confirmed on  -- pt recently picked up Rx of oxy 15mg, 175 tabs for a 30d supply  - currently on hydromorphone PCA pump, appears to be lower MME if he is actually taking 5-6 pills of oxy 15 per day. Consider switching back to home opioid regimen

## 2023-09-06 NOTE — ASSESSMENT & PLAN NOTE
Outpatient he is on Amaryl and metformin with good glycemic control   Hold oral DM meds inpatient   Accuchecks ACHS, sliding scale insulin

## 2023-09-06 NOTE — ASSESSMENT & PLAN NOTE
Patient has a current diagnosis of hypertensive urgency (without evidence of end organ damage) which is uncontrolled.  Latest blood pressure and vitals reviewed-   Temp:  [97.7 °F (36.5 °C)-99.2 °F (37.3 °C)]   Pulse:  [47-74]   Resp:  [12-31]   BP: (141-231)/()   SpO2:  [87 %-97 %] .   Patient currently on IV antihypertensives.   Home meds for hypertension were reviewed and noted below.   Hypertension Medications             amLODIPine (NORVASC) 10 MG tablet Take 1 tablet (10 mg total) by mouth once daily.    carvediloL (COREG) 25 MG tablet Take 1 tablet (25 mg total) by mouth 2 (two) times daily.    hydrALAZINE (APRESOLINE) 100 MG tablet Take 1 tablet (100 mg total) by mouth every 8 (eight) hours.    irbesartan (AVAPRO) 300 MG tablet Take 1 tablet (300 mg total) by mouth once daily.    propranoloL (INDERAL) 10 MG tablet Take 10 mg by mouth once daily.          Medication adjustment for hospital antihypertensives is as follows-     Goal MAP reduction of 25-30% in first few hours with goal to avoid rapidly correcting BP  - likely significant component of pain related and inability to tolerate p.o. medications    Pain control.  Restarted on oral antihypertensives. Add hydralazine. Opioid withdrawal likely contributing to HTN since patient is receiving much less MME than he does at home based on  aware. Restart oxycodone with plan to taper off PCA pump.     Will aim for controlled BP reduction by medications noted above. Monitor and mitigate end organ damage as indicated.

## 2023-09-06 NOTE — PROGRESS NOTES
[facility]  Intensive Care  General Surgery  Progress Note    Subjective:     History of Present Illness:  No notes on file    Post-Op Info:  Procedure(s) (LRB):  CHOLECYSTECTOMY, LAPAROSCOPIC (N/A)   1 Day Post-Op     Interval History: NAEO. POD#1 lap erik. White count normal. Put on PCA but states pain is better this am. Tolerating diet without issue. AF. HDS.     Medications:  Continuous Infusions:   hydromorphone in 0.9 % NaCl 6 mg/30 ml      niCARdipine 5 mg/hr (09/06/23 0645)     Scheduled Meds:   acetaminophen  650 mg Oral Q4H    amLODIPine  10 mg Oral Daily    atorvastatin  40 mg Oral Daily    buPROPion  75 mg Oral Daily    carvediloL  25 mg Oral BID    ciprofloxacin  400 mg Intravenous Q12H    enoxparin  40 mg Subcutaneous Daily    hydrALAZINE  25 mg Oral Q8H    hydroCHLOROthiazide  25 mg Oral Daily    losartan  100 mg Oral Daily    metroNIDAZOLE  500 mg Oral Q8H    mupirocin   Nasal BID    nicotine  1 patch Transdermal Daily    QUEtiapine  200 mg Oral QHS    traZODone  50 mg Oral QHS     PRN Meds:sodium chloride 0.9%, acetaminophen, albuterol-ipratropium, dextrose 10%, dextrose 10%, glucagon (human recombinant), glucose, glucose, hydrALAZINE, insulin aspart U-100, melatonin, naloxone, ondansetron, oxyCODONE, polyethylene glycol, prochlorperazine, sodium chloride 0.9%     Review of patient's allergies indicates:  No Known Allergies  Objective:     Vital Signs (Most Recent):  Temp: 99.2 °F (37.3 °C) (09/06/23 1130)  Pulse: (!) 55 (09/06/23 1130)  Resp: (!) 24 (09/06/23 1130)  BP: (!) 177/82 (09/06/23 1115)  SpO2: (!) 94 % (09/06/23 1130) Vital Signs (24h Range):  Temp:  [97.7 °F (36.5 °C)-99.2 °F (37.3 °C)] 99.2 °F (37.3 °C)  Pulse:  [47-74] 55  Resp:  [12-31] 24  SpO2:  [87 %-97 %] 94 %  BP: (141-231)/() 177/82     Weight: 83 kg (182 lb 15.7 oz)  Body mass index is 28.66 kg/m².    Intake/Output - Last 3 Shifts         09/04 0700 09/05 0659 09/05 0700 09/06 0659 09/06 0700 09/07 0659     P.O. 240 120     I.V. (mL/kg) 2965.1 (35.7) 569.6 (6.9)     IV Piggyback 945.1 200.6     Total Intake(mL/kg) 4150.2 (50) 890.2 (10.7)     Urine (mL/kg/hr) 680 (0.3) 3065 (1.5)     Other 0      Total Output 680 3065     Net +3470.2 -2174.8            Urine Occurrence 1 x 1 x              Physical Exam  Vitals reviewed.   Constitutional:       General: He is not in acute distress.  HENT:      Head: Normocephalic and atraumatic.      Nose: Nose normal.   Eyes:      General:         Right eye: No discharge.         Left eye: No discharge.   Cardiovascular:      Rate and Rhythm: Normal rate and regular rhythm.   Pulmonary:      Effort: Pulmonary effort is normal. No respiratory distress.      Breath sounds: No stridor.   Abdominal:      Comments: Soft, aTTP  Incisions with dressing in place. Small SS stained dressings.     Lower midline from prior surgery    Musculoskeletal:         General: Normal range of motion.      Cervical back: Normal range of motion.   Skin:     General: Skin is warm and dry.      Capillary Refill: Capillary refill takes 2 to 3 seconds.   Neurological:      General: No focal deficit present.      Mental Status: He is alert and oriented to person, place, and time.   Psychiatric:         Mood and Affect: Mood normal.         Behavior: Behavior normal.          Significant Labs:  I have reviewed all pertinent lab results within the past 24 hours.    Significant Diagnostics:  I have reviewed all pertinent imaging results/findings within the past 24 hours.    Assessment/Plan:     * Calculus of gallbladder with acute cholecystitis and obstruction  67M with acute cholecystitis multiple co-morbidities and admitted to ICU for mgt of HTN. Now s/p lap eirk on 9/6.     Okay to d/c abx  Wean PCA  IS, OOBTC, PT if needed   Dit as tolerated  Will need follow-up in 2 weeks   Please call with questions    Post-op instructions:   No lifting greater than 15 pounds for 6 weeks from day of surgery.  No  pushing/pulling such as vacuuming or raking.  No straining, avoid constipation and take stool softeners as described and laxatives as needed.  No driving while on narcotics and until you can react quickly without pain.    Okay to shower. No soaking in water for 2 weeks. Gauze and tape or any drainage form wounds.           Tito Casper MD  General Surgery  [facility] - Intensive Care

## 2023-09-06 NOTE — PLAN OF CARE
Problem: Adult Inpatient Plan of Care  Goal: Plan of Care Review  Outcome: Ongoing, Progressing  Goal: Patient-Specific Goal (Individualized)  Outcome: Ongoing, Progressing  Goal: Absence of Hospital-Acquired Illness or Injury  Outcome: Ongoing, Progressing  Goal: Optimal Comfort and Wellbeing  Outcome: Ongoing, Progressing  Goal: Readiness for Transition of Care  Outcome: Ongoing, Progressing     Problem: Diabetes Comorbidity  Goal: Blood Glucose Level Within Targeted Range  Outcome: Ongoing, Progressing     Problem: Hypertension Acute  Goal: Blood Pressure Within Desired Range  Outcome: Ongoing, Progressing     Problem: Electrolyte Imbalance  Goal: Electrolyte Balance  Outcome: Ongoing, Progressing     Problem: Pain Acute  Goal: Acceptable Pain Control and Functional Ability  Outcome: Ongoing, Progressing     Problem: Infection  Goal: Absence of Infection Signs and Symptoms  Outcome: Ongoing, Progressing     Problem: Fall Injury Risk  Goal: Absence of Fall and Fall-Related Injury  Outcome: Ongoing, Progressing     Problem: Skin Injury Risk Increased  Goal: Skin Health and Integrity  Outcome: Ongoing, Progressing  Pt in constant pain on the abdomen, pain medication adjusted as per MD Barfield orders. Pt's SBP trending @ 180's, medication administered per BP goal.

## 2023-09-06 NOTE — SUBJECTIVE & OBJECTIVE
Interval History: NAEO. POD#1 lap erik. White count normal. Put on PCA but states pain is better this am. Tolerating diet without issue. AF. HDS.     Medications:  Continuous Infusions:   hydromorphone in 0.9 % NaCl 6 mg/30 ml      niCARdipine 5 mg/hr (09/06/23 0645)     Scheduled Meds:   acetaminophen  650 mg Oral Q4H    amLODIPine  10 mg Oral Daily    atorvastatin  40 mg Oral Daily    buPROPion  75 mg Oral Daily    carvediloL  25 mg Oral BID    ciprofloxacin  400 mg Intravenous Q12H    enoxparin  40 mg Subcutaneous Daily    hydrALAZINE  25 mg Oral Q8H    hydroCHLOROthiazide  25 mg Oral Daily    losartan  100 mg Oral Daily    metroNIDAZOLE  500 mg Oral Q8H    mupirocin   Nasal BID    nicotine  1 patch Transdermal Daily    QUEtiapine  200 mg Oral QHS    traZODone  50 mg Oral QHS     PRN Meds:sodium chloride 0.9%, acetaminophen, albuterol-ipratropium, dextrose 10%, dextrose 10%, glucagon (human recombinant), glucose, glucose, hydrALAZINE, insulin aspart U-100, melatonin, naloxone, ondansetron, oxyCODONE, polyethylene glycol, prochlorperazine, sodium chloride 0.9%     Review of patient's allergies indicates:  No Known Allergies  Objective:     Vital Signs (Most Recent):  Temp: 99.2 °F (37.3 °C) (09/06/23 1130)  Pulse: (!) 55 (09/06/23 1130)  Resp: (!) 24 (09/06/23 1130)  BP: (!) 177/82 (09/06/23 1115)  SpO2: (!) 94 % (09/06/23 1130) Vital Signs (24h Range):  Temp:  [97.7 °F (36.5 °C)-99.2 °F (37.3 °C)] 99.2 °F (37.3 °C)  Pulse:  [47-74] 55  Resp:  [12-31] 24  SpO2:  [87 %-97 %] 94 %  BP: (141-231)/() 177/82     Weight: 83 kg (182 lb 15.7 oz)  Body mass index is 28.66 kg/m².    Intake/Output - Last 3 Shifts         09/04 0700  09/05 0659 09/05 0700 09/06 0659 09/06 0700 09/07 0659    P.O. 240 120     I.V. (mL/kg) 2965.1 (35.7) 569.6 (6.9)     IV Piggyback 945.1 200.6     Total Intake(mL/kg) 4150.2 (50) 890.2 (10.7)     Urine (mL/kg/hr) 680 (0.3) 3065 (1.5)     Other 0      Total Output 680 3065     Net +3470.2  -3544.8            Urine Occurrence 1 x 1 x              Physical Exam  Vitals reviewed.   Constitutional:       General: He is not in acute distress.  HENT:      Head: Normocephalic and atraumatic.      Nose: Nose normal.   Eyes:      General:         Right eye: No discharge.         Left eye: No discharge.   Cardiovascular:      Rate and Rhythm: Normal rate and regular rhythm.   Pulmonary:      Effort: Pulmonary effort is normal. No respiratory distress.      Breath sounds: No stridor.   Abdominal:      Comments: Soft, aTTP  Incisions with dressing in place. Small SS stained dressings.     Lower midline from prior surgery    Musculoskeletal:         General: Normal range of motion.      Cervical back: Normal range of motion.   Skin:     General: Skin is warm and dry.      Capillary Refill: Capillary refill takes 2 to 3 seconds.   Neurological:      General: No focal deficit present.      Mental Status: He is alert and oriented to person, place, and time.   Psychiatric:         Mood and Affect: Mood normal.         Behavior: Behavior normal.          Significant Labs:  I have reviewed all pertinent lab results within the past 24 hours.    Significant Diagnostics:  I have reviewed all pertinent imaging results/findings within the past 24 hours.

## 2023-09-06 NOTE — ASSESSMENT & PLAN NOTE
67M with acute cholecystitis multiple co-morbidities and admitted to ICU for mgt of HTN. Now s/p lap erik on 9/6.     Okay to d/c abx  Wean PCA  IS, OOBTC, PT if needed   Dit as tolerated  Will need follow-up in 2 weeks   Please call with questions

## 2023-09-06 NOTE — ASSESSMENT & PLAN NOTE
P/w n/v/abd pain x6d and imaging findings concerning for acute cholecystitis, CBD dilation, and possible choledocholithiasis.   ED spoke to gen surg who rec HIDA scan and to admit to internal medicine given co-morbidities     Prn anti-emetics, pain control   BCx x2 NGTD    General surgery consult  s/p cholecystectomy 9/5, advance diet as tolerated.  Antibiotics discontinued per surgery

## 2023-09-06 NOTE — ASSESSMENT & PLAN NOTE
Cr at baseline low-mid 1s on admission   Continue to trend renal function  Renally dose medications and avoid nephrotoxins

## 2023-09-06 NOTE — ASSESSMENT & PLAN NOTE
Takes oxycodone 15mg multiple times per day for chronic pain prescribed by pain physician - suspect minimizing use but states he takes 4 tabs a day and more if needed    - confirmed on  -- pt recently picked up Rx of oxy 15mg, 175 tabs for a 30d supply  Discontinue PCA. Increase oxy 15 to QID    Needs aggressive bowel regimen. If lactulose, miralax, senna/colace ineffective. Consider enema

## 2023-09-06 NOTE — PROGRESS NOTES
[facility] Medicine  Progress Note    Patient Name: Neena Kohli  MRN: [id number]  Patient Class: IP- Inpatient   Admission Date: 9/3/2023  Length of Stay: 2 days  Attending Physician: Mary Barfield MD  Primary Care Provider: CLAY Ramirez MD        Subjective:     Principal Problem:Calculus of gallbladder with acute cholecystitis and obstruction      HPI:  66yo M w/ HTN, HLD, PAD, CKD, T2DM, MDD, CAD with hx MI/stents, hx prostate CA p/w abd pain, nausea, vomiting, and diarrhea for last 6 days. Unable to keep down much of anything po, including his BP meds. BP usually well controlled on multiple medications but has been high since his symptoms began.  No fevers (did not take temperature at home) but has had chills. No flank pain, dysuria,or other urinary symptoms. Had some substernal CP when breathing deeply earlier today but denies CP at time of my assessment. Also worsening of abdominal pain with deep breathing. Denies HA, vision changes, weakness, dizziness or lightheadedness. No recent falls. No sick contacts. Has never had an episode like this before. He was unaware that he had gallstones.     He has chronic back pain and takes Oxycodone 15mg several times per day prescribed by pain doctor. He reports compliance with all of his meds prior to getting sick, but since he has been unable to keep down much, has not had all of his meds. Some nonbloody diarrhea. No constipation. Workup significant for imaging revealing gallstones with CBD dilation, possible cholecystitis and possible choledocholithiasis. ED physician spoke to general surgery who requested HIDA scan and admission to internal medicine given multiple co-morbidities. Sinus volodymyr but otherwise hemodynamically stable. Initially admitted pt to med/tele bed but due to inability to control BP with po meds, decision made to transfer him to the ICU for a nicardipine gtt.            Overview/Hospital Course:  No notes on  file    Interval History:  S/p cholecystectomy.  Abdominal pain improved.  Started on PCA hydromorphone pump for better pain control.  Advance diet as tolerated.  Blood pressure remains elevated, on nicardipine drip.  Discussed with ICU team.  Added hydralazine.    Review of Systems   Gastrointestinal:  Positive for abdominal pain.     Objective:     Vital Signs (Most Recent):  Temp: 99.2 °F (37.3 °C) (09/06/23 1130)  Pulse: 66 (09/06/23 1245)  Resp: (!) 28 (09/06/23 1245)  BP: (!) 176/77 (09/06/23 1230)  SpO2: (!) 91 % (09/06/23 1245) Vital Signs (24h Range):  Temp:  [97.7 °F (36.5 °C)-99.2 °F (37.3 °C)] 99.2 °F (37.3 °C)  Pulse:  [47-74] 66  Resp:  [12-31] 28  SpO2:  [87 %-97 %] 91 %  BP: (141-231)/() 176/77     Weight: 83 kg (182 lb 15.7 oz)  Body mass index is 28.66 kg/m².    Intake/Output Summary (Last 24 hours) at 9/6/2023 1356  Last data filed at 9/6/2023 1348  Gross per 24 hour   Intake 1153.17 ml   Output 3255 ml   Net -2101.83 ml           Physical Exam  Vitals and nursing note reviewed.   Constitutional:       Appearance: He is ill-appearing.   Cardiovascular:      Rate and Rhythm: Normal rate and regular rhythm.   Pulmonary:      Effort: Pulmonary effort is normal.   Abdominal:      Palpations: Abdomen is soft.      Tenderness: There is abdominal tenderness.   Musculoskeletal:      Right lower leg: No edema.      Left lower leg: No edema.   Skin:     General: Skin is warm and dry.   Neurological:      Mental Status: He is alert and oriented to person, place, and time.             Significant Labs: All pertinent labs within the past 24 hours have been reviewed.    Significant Imaging: I have reviewed all pertinent imaging results/findings within the past 24 hours.      Assessment/Plan:      * Calculus of gallbladder with acute cholecystitis and obstruction  P/w n/v/abd pain x6d and imaging findings concerning for acute cholecystitis, CBD dilation, and possible choledocholithiasis.   ED spoke to gen  surg who rec HIDA scan and to admit to internal medicine given co-morbidities     Prn anti-emetics, pain control   BCx x2 NGTD    General surgery consult  s/p cholecystectomy 9/5, advance diet as tolerated.  Antibiotics discontinued per surgery    Obstructive sleep apnea  CPAP QHS      Sinus bradycardia  Sinus volodymyr to 40s on admission, confirmed sinus on EKG  Asymptomatic     Beta-blocker was on hold.  Heart rate now in the 60s.  Restarted on Coreg.  Telemetry.  Monitor heart rate closely.  Case of recurrent bradycardia, discontinue beta-blocker        Hypokalemia  Replace as needed        Hypertensive urgency  Patient has a current diagnosis of hypertensive urgency (without evidence of end organ damage) which is uncontrolled.  Latest blood pressure and vitals reviewed-   Temp:  [97.7 °F (36.5 °C)-99.2 °F (37.3 °C)]   Pulse:  [47-74]   Resp:  [12-31]   BP: (141-231)/()   SpO2:  [87 %-97 %] .   Patient currently on IV antihypertensives.   Home meds for hypertension were reviewed and noted below.   Hypertension Medications               amLODIPine (NORVASC) 10 MG tablet Take 1 tablet (10 mg total) by mouth once daily.    carvediloL (COREG) 25 MG tablet Take 1 tablet (25 mg total) by mouth 2 (two) times daily.    hydrALAZINE (APRESOLINE) 100 MG tablet Take 1 tablet (100 mg total) by mouth every 8 (eight) hours.    irbesartan (AVAPRO) 300 MG tablet Take 1 tablet (300 mg total) by mouth once daily.    propranoloL (INDERAL) 10 MG tablet Take 10 mg by mouth once daily.            Medication adjustment for hospital antihypertensives is as follows-     Goal MAP reduction of 25-30% in first few hours with goal to avoid rapidly correcting BP  - likely significant component of pain related and inability to tolerate p.o. medications    Pain control.  Restarted on oral antihypertensives. Add hydralazine. Opioid withdrawal likely contributing to HTN since patient is receiving much less MME than he does at home based on   aware. Restart oxycodone with plan to taper off PCA pump.     Will aim for controlled BP reduction by medications noted above. Monitor and mitigate end organ damage as indicated.    Major depressive disorder, recurrent severe without psychotic features  Patient has persistent depression which is unknown and is currently controlled. Will Continue anti-depressant medications. We will not consult psychiatry at this time. Patient does not display psychosis at this time. Continue to monitor closely and adjust plan of care as needed.        Mild aortic valve stenosis  Seen on echo in 2022  Mild MR, LVEF 715 noted on echo       Mixed hyperlipidemia  Statin         Presence of stent in coronary artery        Stage 3b chronic kidney disease  Cr at baseline low-mid 1s on admission   Continue to trend renal function  Renally dose medications and avoid nephrotoxins    PAD (peripheral artery disease)  Aspirin on hold in setting of surgery    Coronary artery disease involving native coronary artery of native heart without angina pectoris  Hx MI with stent  Aspirin on hold in setting of surgery        Type 2 diabetes mellitus with stage 3 chronic kidney disease, without long-term current use of insulin  Outpatient he is on Amaryl and metformin with good glycemic control   Hold oral DM meds inpatient   Accuchecks ACHS, sliding scale insulin       Hypertension, essential  Outpatient regimen includes coreg, irbesartan, hydralazine   Unable to keep po meds down prior to admission     Transferred to ICU for uncontrolled hypertension, on IV nicardipine.     Restarted on losartan 100 mg daily, Coreg 25 mg b.i.d., HCTZ 25 mg q.d., amlodipine 10 mg daily  Add hydralazine at lower dose, uptitrated as needed    DDD (degenerative disc disease), lumbar  Takes oxycodone 15mg multiple times per day for chronic pain prescribed by pain dr  - confirmed on  -- pt recently picked up Rx of oxy 15mg, 175 tabs for a 30d supply  - currently on  hydromorphone PCA pump, appears to be lower MME if he is actually taking 5-6 pills of oxy 15 per day. Consider switching back to home opioid regimen    Bowel regimen added      VTE Risk Mitigation (From admission, onward)           Ordered     enoxaparin injection 40 mg  Daily         09/04/23 0104     IP VTE HIGH RISK PATIENT  Once         09/04/23 0104     Place sequential compression device  Until discontinued         09/04/23 0104                    Discharge Planning   DONNA:      Code Status: Full Code   Is the patient medically ready for discharge?:     Reason for patient still in hospital (select all that apply): Patient trending condition, Treatment and Consult recommendations  Discharge Plan A: Home with family        Case discussed with ICU team. Once BP better controlled, off nicardipine drip- patient can step down.     Critical care time spent on the evaluation and treatment of severe organ dysfunction, review of pertinent labs and imaging studies, discussions with consulting providers and discussions with patient/family: 40 minutes.      Mary Barfield MD  Department of Hospital Medicine   [place] - Intensive Care

## 2023-09-06 NOTE — PROGRESS NOTES
Pulm/CC Service Progress Note    Attending Physician: Mary Barfield MD    Date of Admit: 9/3/2023    Reason for Consult: Hypertensive crisis    History of Present Illness:  Neena Kohli is a 67 y.o.  male with a PMHx emphysema, DM2, and HTN who presents with RUQ pain, nausea, vomiting. He was found to have symptomatic cholelithiasis and dilated biliary duct with mild pericholecystic fluid. He also had asymptomatic hypertension with SBP to 240 on admission. After pain control and restarting home medications, he was titrated off a Cardene drip but required resumption.    Interval History  Patient with improved pain but still requiring PCA pump. He is still on Cardene with elevated BP readings.    Objective:   Last 24 Hour Vital Signs:  BP  Min: 141/68  Max: 231/95  Temp  Av.6 °F (37 °C)  Min: 97.7 °F (36.5 °C)  Max: 99.2 °F (37.3 °C)  Pulse  Av.6  Min: 47  Max: 74  Resp  Av.2  Min: 12  Max: 31  SpO2  Av %  Min: 87 %  Max: 97 %  Body mass index is 28.66 kg/m².  I/O last 3 completed shifts:  In: 2349.7 [P.O.:360; I.V.:1596.3; IV Piggyback:393.4]  Out: 3375 [Urine:3375]    Physical Exam:  General: Alert and awake, in distress 2/2 pain but improved  HENT:  NCAT; anicteric sclera; OP clear with MMM  Cardio:  Regular rate and rhythm with normal S1 and S2  Resp:  respirations unlabored; no wheezes, crackles or rhonchi on 3L NC  Abdom:  Soft, Very tender in the RUQ with some involuntary guarding but without rebound  Extrem:  WWP with clubbing ntoed  Pulses:  2+ and symmetric distally  Neuro:  AAOx3; cooperative and pleasant with no focal deficits    Imaging:   No new imgaging    Assessment & Plan:   68yo M w/ PMH HTN, smoking who presents with symptomatic cholelithiasis and cholecystitis now s/p lap erik on 23. Intra-op, he was noted to have gangrenous gallbladder with leakage of biliary fluid.     Neuro  - no acute issues    [facility]  #Uncontrolled HTN  Starting back on all his home medications with  IV hydralazine as needed. Goal BP < 180 at this time. May have a large component of pain control as well (see GI).   - carvedilol 25mg PO BID  - amlodipine 10mg PO qD  - HCTZ 25mg PO   - losartan 100mg PO  - adding hydralazine 25mg TID today  - IV hydralazine 10mg PRN  - pain control  - renal artery ultrasound today    Pulm  #Emphysema  Pt is long time smoker with emphysematous changes on his CT scan/CXR. Would benefit from outpatient follow-up with PFTs.  - goal O2 saturation 88-92%  - PRN albuterol as needed    GI  #Cholelithiasis  #Choledocholithiasis  #Acute cholecystitis w/ gangrene  S/p lap erik today with gangrene. Will continue to follow post-operatively with pain control and antibiotics at this time. Will defer to surgery for antibiotic choice and duration given intra-operative findings.  - Zosyn transitioned to Cipro on 9/6/23  - Pain control    - continuing PCA pump in order to control his pain    - PCA Dilaudid (max 12mg/day) at 0.2 mg q20 w/ 0.6 mg max/hr    Renal  - no current issues  - diurese as needed (net positive 4.4L this admission)    Heme  - n/a    ID  - see cholelithiasis     Endocrine  - glucose control with SSI    Thank you for this consultation.     Cesar Owusu MD  [facility], PGY-3  [facility]    Pt seen and examined with Pulmonary/Critical Care team and this note reviewed and validated with the following additional comments: Symptoms improved.  BP slowly being titrated toward normal.  Can step down.    Medical Decision Making (MDM) was moderate  The number and complexity of problems addressed was 2 (BP, gall bladder)  The amount and complexity of data reviewed was moderate  The risk of complications and/or morbidity/mortality was moderate  The tests ordered were none  I communicated with the following providers , Surgery  Time spent in the care of this patient was 20 minutes    Se Damico MD  Phone [phone]

## 2023-09-06 NOTE — PLAN OF CARE
Problem: Adult Inpatient Plan of Care  Goal: Plan of Care Review  Outcome: Ongoing, Progressing  Goal: Patient-Specific Goal (Individualized)  Outcome: Ongoing, Progressing  Goal: Absence of Hospital-Acquired Illness or Injury  Outcome: Ongoing, Progressing  Goal: Optimal Comfort and Wellbeing  Outcome: Ongoing, Progressing  Goal: Readiness for Transition of Care  Outcome: Ongoing, Progressing     Problem: Pain Acute  Goal: Acceptable Pain Control and Functional Ability  Outcome: Ongoing, Progressing     Problem: Hypertension Acute  Goal: Blood Pressure Within Desired Range  Outcome: Ongoing, Progressing

## 2023-09-06 NOTE — ASSESSMENT & PLAN NOTE
Outpatient regimen includes coreg, irbesartan, hydralazine   Unable to keep po meds down prior to admission     Transferred to ICU for uncontrolled hypertension, on IV nicardipine.     Restarted on losartan 100 mg daily, Coreg 25 mg b.i.d., HCTZ 25 mg q.d., amlodipine 10 mg daily  Add hydralazine at lower dose, uptitrated as needed

## 2023-09-07 ENCOUNTER — CLINICAL SUPPORT (OUTPATIENT)
Dept: SMOKING CESSATION | Facility: CLINIC | Age: 68
End: 2023-09-07

## 2023-09-07 DIAGNOSIS — F17.200 NICOTINE DEPENDENCE: Primary | ICD-10-CM

## 2023-09-07 LAB
ALBUMIN SERPL BCP-MCNC: 2.3 G/DL (ref 3.5–5.2)
ALP SERPL-CCNC: 122 U/L (ref 55–135)
ALT SERPL W/O P-5'-P-CCNC: 13 U/L (ref 10–44)
ANION GAP SERPL CALC-SCNC: 8 MMOL/L (ref 8–16)
AST SERPL-CCNC: 13 U/L (ref 10–40)
BASOPHILS # BLD AUTO: 0.04 K/UL (ref 0–0.2)
BASOPHILS NFR BLD: 0.5 % (ref 0–1.9)
BILIRUB SERPL-MCNC: 0.5 MG/DL (ref 0.1–1)
BUN SERPL-MCNC: 18 MG/DL (ref 8–23)
CALCIUM SERPL-MCNC: 8.8 MG/DL (ref 8.7–10.5)
CHLORIDE SERPL-SCNC: 103 MMOL/L (ref 95–110)
CO2 SERPL-SCNC: 28 MMOL/L (ref 23–29)
CREAT SERPL-MCNC: 1.3 MG/DL (ref 0.5–1.4)
DIFFERENTIAL METHOD: ABNORMAL
EOSINOPHIL # BLD AUTO: 0.3 K/UL (ref 0–0.5)
EOSINOPHIL NFR BLD: 3.7 % (ref 0–8)
ERYTHROCYTE [DISTWIDTH] IN BLOOD BY AUTOMATED COUNT: 13.1 % (ref 11.5–14.5)
EST. GFR  (NO RACE VARIABLE): 60 ML/MIN/1.73 M^2
GLUCOSE SERPL-MCNC: 100 MG/DL (ref 70–110)
HCT VFR BLD AUTO: 32.9 % (ref 40–54)
HGB BLD-MCNC: 10.8 G/DL (ref 14–18)
IMM GRANULOCYTES # BLD AUTO: 0.03 K/UL (ref 0–0.04)
IMM GRANULOCYTES NFR BLD AUTO: 0.4 % (ref 0–0.5)
LYMPHOCYTES # BLD AUTO: 1.2 K/UL (ref 1–4.8)
LYMPHOCYTES NFR BLD: 15.5 % (ref 18–48)
MAGNESIUM SERPL-MCNC: 1.8 MG/DL (ref 1.6–2.6)
MCH RBC QN AUTO: 29.7 PG (ref 27–31)
MCHC RBC AUTO-ENTMCNC: 32.8 G/DL (ref 32–36)
MCV RBC AUTO: 90 FL (ref 82–98)
MONOCYTES # BLD AUTO: 0.9 K/UL (ref 0.3–1)
MONOCYTES NFR BLD: 10.9 % (ref 4–15)
NEUTROPHILS # BLD AUTO: 5.4 K/UL (ref 1.8–7.7)
NEUTROPHILS NFR BLD: 69 % (ref 38–73)
NRBC BLD-RTO: 0 /100 WBC
PLATELET # BLD AUTO: 205 K/UL (ref 150–450)
PMV BLD AUTO: 10.3 FL (ref 9.2–12.9)
POCT GLUCOSE: 125 MG/DL (ref 70–110)
POCT GLUCOSE: 148 MG/DL (ref 70–110)
POCT GLUCOSE: 93 MG/DL (ref 70–110)
POCT GLUCOSE: 99 MG/DL (ref 70–110)
POTASSIUM SERPL-SCNC: 3.3 MMOL/L (ref 3.5–5.1)
PROT SERPL-MCNC: 5.8 G/DL (ref 6–8.4)
RBC # BLD AUTO: 3.64 M/UL (ref 4.6–6.2)
SODIUM SERPL-SCNC: 139 MMOL/L (ref 136–145)
WBC # BLD AUTO: 7.8 K/UL (ref 3.9–12.7)

## 2023-09-07 PROCEDURE — 99406 BEHAV CHNG SMOKING 3-10 MIN: CPT | Mod: S$GLB,,,

## 2023-09-07 PROCEDURE — 25000003 PHARM REV CODE 250: Performed by: SURGERY

## 2023-09-07 PROCEDURE — 83735 ASSAY OF MAGNESIUM: CPT | Performed by: STUDENT IN AN ORGANIZED HEALTH CARE EDUCATION/TRAINING PROGRAM

## 2023-09-07 PROCEDURE — 80053 COMPREHEN METABOLIC PANEL: CPT | Performed by: STUDENT IN AN ORGANIZED HEALTH CARE EDUCATION/TRAINING PROGRAM

## 2023-09-07 PROCEDURE — 94761 N-INVAS EAR/PLS OXIMETRY MLT: CPT

## 2023-09-07 PROCEDURE — 99900035 HC TECH TIME PER 15 MIN (STAT)

## 2023-09-07 PROCEDURE — 63600175 PHARM REV CODE 636 W HCPCS: Performed by: STUDENT IN AN ORGANIZED HEALTH CARE EDUCATION/TRAINING PROGRAM

## 2023-09-07 PROCEDURE — 36415 COLL VENOUS BLD VENIPUNCTURE: CPT | Performed by: STUDENT IN AN ORGANIZED HEALTH CARE EDUCATION/TRAINING PROGRAM

## 2023-09-07 PROCEDURE — 94799 UNLISTED PULMONARY SVC/PX: CPT

## 2023-09-07 PROCEDURE — 63600175 PHARM REV CODE 636 W HCPCS: Performed by: SURGERY

## 2023-09-07 PROCEDURE — 25000003 PHARM REV CODE 250: Performed by: STUDENT IN AN ORGANIZED HEALTH CARE EDUCATION/TRAINING PROGRAM

## 2023-09-07 PROCEDURE — 99406 PT REFUSED TOBACCO CESSATION: ICD-10-PCS | Mod: S$GLB,,,

## 2023-09-07 PROCEDURE — 85025 COMPLETE CBC W/AUTO DIFF WBC: CPT | Performed by: STUDENT IN AN ORGANIZED HEALTH CARE EDUCATION/TRAINING PROGRAM

## 2023-09-07 PROCEDURE — S4991 NICOTINE PATCH NONLEGEND: HCPCS | Performed by: SURGERY

## 2023-09-07 PROCEDURE — 11000001 HC ACUTE MED/SURG PRIVATE ROOM

## 2023-09-07 RX ORDER — SPIRONOLACTONE 25 MG/1
25 TABLET ORAL DAILY
Status: DISCONTINUED | OUTPATIENT
Start: 2023-09-07 | End: 2023-09-09 | Stop reason: HOSPADM

## 2023-09-07 RX ORDER — HYDRALAZINE HYDROCHLORIDE 25 MG/1
100 TABLET, FILM COATED ORAL EVERY 8 HOURS
Status: DISCONTINUED | OUTPATIENT
Start: 2023-09-07 | End: 2023-09-09 | Stop reason: HOSPADM

## 2023-09-07 RX ORDER — BISACODYL 5 MG
5 TABLET, DELAYED RELEASE (ENTERIC COATED) ORAL DAILY
Status: DISCONTINUED | OUTPATIENT
Start: 2023-09-07 | End: 2023-09-09 | Stop reason: HOSPADM

## 2023-09-07 RX ORDER — HYDRALAZINE HYDROCHLORIDE 20 MG/ML
10 INJECTION INTRAMUSCULAR; INTRAVENOUS EVERY 8 HOURS PRN
Status: DISCONTINUED | OUTPATIENT
Start: 2023-09-07 | End: 2023-09-09 | Stop reason: HOSPADM

## 2023-09-07 RX ORDER — HYDRALAZINE HYDROCHLORIDE 25 MG/1
50 TABLET, FILM COATED ORAL ONCE
Status: COMPLETED | OUTPATIENT
Start: 2023-09-07 | End: 2023-09-07

## 2023-09-07 RX ORDER — SYRING-NEEDL,DISP,INSUL,0.3 ML 29 G X1/2"
296 SYRINGE, EMPTY DISPOSABLE MISCELLANEOUS ONCE
Status: COMPLETED | OUTPATIENT
Start: 2023-09-07 | End: 2023-09-07

## 2023-09-07 RX ORDER — AMLODIPINE BESYLATE 5 MG/1
10 TABLET ORAL DAILY
Status: DISCONTINUED | OUTPATIENT
Start: 2023-09-08 | End: 2023-09-09 | Stop reason: HOSPADM

## 2023-09-07 RX ORDER — MAGNESIUM SULFATE HEPTAHYDRATE 40 MG/ML
2 INJECTION, SOLUTION INTRAVENOUS ONCE
Status: COMPLETED | OUTPATIENT
Start: 2023-09-07 | End: 2023-09-07

## 2023-09-07 RX ORDER — HYDRALAZINE HYDROCHLORIDE 25 MG/1
50 TABLET, FILM COATED ORAL EVERY 8 HOURS
Status: DISCONTINUED | OUTPATIENT
Start: 2023-09-07 | End: 2023-09-07

## 2023-09-07 RX ORDER — NIFEDIPINE 60 MG/1
60 TABLET, EXTENDED RELEASE ORAL DAILY
Status: DISCONTINUED | OUTPATIENT
Start: 2023-09-08 | End: 2023-09-07

## 2023-09-07 RX ORDER — CLONIDINE HYDROCHLORIDE 0.1 MG/1
0.1 TABLET ORAL EVERY 6 HOURS PRN
Status: DISCONTINUED | OUTPATIENT
Start: 2023-09-07 | End: 2023-09-09 | Stop reason: HOSPADM

## 2023-09-07 RX ORDER — OXYCODONE HYDROCHLORIDE 5 MG/1
15 TABLET ORAL 4 TIMES DAILY
Status: DISCONTINUED | OUTPATIENT
Start: 2023-09-07 | End: 2023-09-09 | Stop reason: HOSPADM

## 2023-09-07 RX ORDER — LACTULOSE 10 G/15ML
15 SOLUTION ORAL 3 TIMES DAILY
Status: DISCONTINUED | OUTPATIENT
Start: 2023-09-07 | End: 2023-09-09 | Stop reason: HOSPADM

## 2023-09-07 RX ORDER — HYDRALAZINE HYDROCHLORIDE 25 MG/1
100 TABLET, FILM COATED ORAL EVERY 8 HOURS
Status: DISCONTINUED | OUTPATIENT
Start: 2023-09-07 | End: 2023-09-07

## 2023-09-07 RX ORDER — HYDRALAZINE HYDROCHLORIDE 20 MG/ML
10 INJECTION INTRAMUSCULAR; INTRAVENOUS EVERY 8 HOURS PRN
Status: DISCONTINUED | OUTPATIENT
Start: 2023-09-07 | End: 2023-09-07

## 2023-09-07 RX ORDER — HYDRALAZINE HYDROCHLORIDE 25 MG/1
75 TABLET, FILM COATED ORAL EVERY 8 HOURS
Status: DISCONTINUED | OUTPATIENT
Start: 2023-09-07 | End: 2023-09-07

## 2023-09-07 RX ADMIN — POLYETHYLENE GLYCOL 3350 17 G: 17 POWDER, FOR SOLUTION ORAL at 10:09

## 2023-09-07 RX ADMIN — SPIRONOLACTONE 25 MG: 25 TABLET ORAL at 05:09

## 2023-09-07 RX ADMIN — HYDRALAZINE HYDROCHLORIDE 100 MG: 25 TABLET, FILM COATED ORAL at 09:09

## 2023-09-07 RX ADMIN — NICOTINE 1 PATCH: 14 PATCH, EXTENDED RELEASE TRANSDERMAL at 08:09

## 2023-09-07 RX ADMIN — OXYCODONE HYDROCHLORIDE 15 MG: 5 TABLET ORAL at 09:09

## 2023-09-07 RX ADMIN — ATORVASTATIN CALCIUM 40 MG: 40 TABLET, FILM COATED ORAL at 08:09

## 2023-09-07 RX ADMIN — BE HEALTH MAGNESIUM CITRATE ORAL SOLUTION - LEMON 296 ML: 1.75 LIQUID ORAL at 06:09

## 2023-09-07 RX ADMIN — BUPROPION HYDROCHLORIDE 75 MG: 75 TABLET, FILM COATED ORAL at 08:09

## 2023-09-07 RX ADMIN — TRAZODONE HYDROCHLORIDE 50 MG: 50 TABLET ORAL at 09:09

## 2023-09-07 RX ADMIN — CARVEDILOL 25 MG: 25 TABLET, FILM COATED ORAL at 09:09

## 2023-09-07 RX ADMIN — HYDRALAZINE HYDROCHLORIDE 50 MG: 25 TABLET, FILM COATED ORAL at 08:09

## 2023-09-07 RX ADMIN — BISACODYL 5 MG: 5 TABLET, COATED ORAL at 06:09

## 2023-09-07 RX ADMIN — LACTULOSE 15 G: 20 SOLUTION ORAL at 02:09

## 2023-09-07 RX ADMIN — ACETAMINOPHEN 1000 MG: 500 TABLET ORAL at 05:09

## 2023-09-07 RX ADMIN — MUPIROCIN: 20 OINTMENT TOPICAL at 09:09

## 2023-09-07 RX ADMIN — HYDRALAZINE HYDROCHLORIDE 75 MG: 25 TABLET, FILM COATED ORAL at 02:09

## 2023-09-07 RX ADMIN — POLYETHYLENE GLYCOL 3350 17 G: 17 POWDER, FOR SOLUTION ORAL at 08:09

## 2023-09-07 RX ADMIN — HYDRALAZINE HYDROCHLORIDE 10 MG: 20 INJECTION, SOLUTION INTRAMUSCULAR; INTRAVENOUS at 01:09

## 2023-09-07 RX ADMIN — CARVEDILOL 25 MG: 25 TABLET, FILM COATED ORAL at 08:09

## 2023-09-07 RX ADMIN — AMLODIPINE BESYLATE 10 MG: 5 TABLET ORAL at 08:09

## 2023-09-07 RX ADMIN — HYDRALAZINE HYDROCHLORIDE 25 MG: 25 TABLET, FILM COATED ORAL at 05:09

## 2023-09-07 RX ADMIN — HYDRALAZINE HYDROCHLORIDE 10 MG: 20 INJECTION, SOLUTION INTRAMUSCULAR; INTRAVENOUS at 03:09

## 2023-09-07 RX ADMIN — LOSARTAN POTASSIUM 100 MG: 50 TABLET, FILM COATED ORAL at 08:09

## 2023-09-07 RX ADMIN — DOCUSATE SODIUM AND SENNOSIDES 1 TABLET: 8.6; 5 TABLET, FILM COATED ORAL at 08:09

## 2023-09-07 RX ADMIN — LACTULOSE 15 G: 20 SOLUTION ORAL at 09:09

## 2023-09-07 RX ADMIN — HYDRALAZINE HYDROCHLORIDE 100 MG: 25 TABLET, FILM COATED ORAL at 05:09

## 2023-09-07 RX ADMIN — MAGNESIUM SULFATE 2 G: 2 INJECTION INTRAVENOUS at 09:09

## 2023-09-07 RX ADMIN — OXYCODONE HYDROCHLORIDE 15 MG: 5 TABLET ORAL at 12:09

## 2023-09-07 RX ADMIN — MUPIROCIN: 20 OINTMENT TOPICAL at 08:09

## 2023-09-07 RX ADMIN — POTASSIUM BICARBONATE 50 MEQ: 978 TABLET, EFFERVESCENT ORAL at 09:09

## 2023-09-07 RX ADMIN — OXYCODONE HYDROCHLORIDE 15 MG: 5 TABLET ORAL at 08:09

## 2023-09-07 RX ADMIN — ENOXAPARIN SODIUM 40 MG: 40 INJECTION SUBCUTANEOUS at 04:09

## 2023-09-07 RX ADMIN — HYDROCHLOROTHIAZIDE 25 MG: 25 TABLET ORAL at 08:09

## 2023-09-07 RX ADMIN — ACETAMINOPHEN 1000 MG: 500 TABLET ORAL at 09:09

## 2023-09-07 RX ADMIN — ACETAMINOPHEN 1000 MG: 500 TABLET ORAL at 01:09

## 2023-09-07 RX ADMIN — QUETIAPINE FUMARATE 200 MG: 100 TABLET ORAL at 09:09

## 2023-09-07 RX ADMIN — OXYCODONE HYDROCHLORIDE 15 MG: 5 TABLET ORAL at 04:09

## 2023-09-07 NOTE — ASSESSMENT & PLAN NOTE
Outpatient regimen includes coreg, irbesartan, hydralazine   Unable to keep po meds down prior to admission     Transferred to ICU for uncontrolled hypertension, on IV nicardipine.     Restarted on losartan 100 mg daily, Coreg 25 mg b.i.d., HCTZ 25 mg q.d., amlodipine 10 mg daily  Uptitrate hydralazine

## 2023-09-07 NOTE — SUBJECTIVE & OBJECTIVE
Interval History:  BP remains elevated. Hydralazine uptitrated. Again discussed opioid use to ensure patient is receiving equivalent to what he receives at home. States he usually takes atleast 4 oxycodones of 15 mg each and sometimes more. Suspect he is minimizing use. Plan to discontinue PCA and increase oxy to QID. Patient in agreement. No bowel movement since admission. Added lactulose scheduled.     Review of Systems   Gastrointestinal:  Positive for abdominal pain (improving) and constipation.     Objective:     Vital Signs (Most Recent):  Temp: 99 °F (37.2 °C) (09/07/23 1100)  Pulse: (!) 51 (09/07/23 1045)  Resp: (!) 37 (09/07/23 1015)  BP: (!) 183/79 (09/07/23 1045)  SpO2: (!) 92 % (09/07/23 1045) Vital Signs (24h Range):  Temp:  [98.6 °F (37 °C)-99.2 °F (37.3 °C)] 99 °F (37.2 °C)  Pulse:  [45-67] 51  Resp:  [10-45] 37  SpO2:  [85 %-98 %] 92 %  BP: (153-228)/() 183/79     Weight: 87 kg (191 lb 12.8 oz)  Body mass index is 30.04 kg/m².    Intake/Output Summary (Last 24 hours) at 9/7/2023 1212  Last data filed at 9/7/2023 0600  Gross per 24 hour   Intake 1089.39 ml   Output 2040 ml   Net -950.61 ml           Physical Exam  Vitals and nursing note reviewed.   Constitutional:       Appearance: He is obese.   Cardiovascular:      Rate and Rhythm: Normal rate and regular rhythm.   Pulmonary:      Effort: Pulmonary effort is normal.   Abdominal:      Palpations: Abdomen is soft.      Tenderness: There is abdominal tenderness.   Musculoskeletal:      Right lower leg: No edema.      Left lower leg: No edema.   Skin:     General: Skin is warm and dry.   Neurological:      Mental Status: He is alert and oriented to person, place, and time.             Significant Labs: All pertinent labs within the past 24 hours have been reviewed.    Significant Imaging: I have reviewed all pertinent imaging results/findings within the past 24 hours.

## 2023-09-07 NOTE — ASSESSMENT & PLAN NOTE
Patient has a current diagnosis of hypertensive urgency (without evidence of end organ damage) which is uncontrolled.  Latest blood pressure and vitals reviewed-   Temp:  [98.6 °F (37 °C)-99.2 °F (37.3 °C)]   Pulse:  [45-67]   Resp:  [10-45]   BP: (153-228)/()   SpO2:  [85 %-98 %] .   Patient currently off IV antihypertensives.   Home meds for hypertension were reviewed and noted below.   Hypertension Medications             amLODIPine (NORVASC) 10 MG tablet Take 1 tablet (10 mg total) by mouth once daily.    carvediloL (COREG) 25 MG tablet Take 1 tablet (25 mg total) by mouth 2 (two) times daily.    hydrALAZINE (APRESOLINE) 100 MG tablet Take 1 tablet (100 mg total) by mouth every 8 (eight) hours.    irbesartan (AVAPRO) 300 MG tablet Take 1 tablet (300 mg total) by mouth once daily.    propranoloL (INDERAL) 10 MG tablet Take 10 mg by mouth once daily.          Medication adjustment for hospital antihypertensives is as follows-     Goal MAP reduction of 25-30% in first few hours with goal to avoid rapidly correcting BP  - likely significant component of pain related and inability to tolerate p.o. medications    Pain control.  Restarted on oral antihypertensives. Increase hydralazine dose. Opioid withdrawal likely contributing to HTN since patient is receiving much less MME than he does at home based on  aware. Placed back on oxycodone    Will aim for controlled BP reduction by medications noted above. Monitor and mitigate end organ damage as indicated.

## 2023-09-07 NOTE — PROGRESS NOTES
[facility] Medicine  Progress Note    Patient Name: Neena Kohli  MRN: [id number]  Patient Class: IP- Inpatient   Admission Date: 9/3/2023  Length of Stay: 3 days  Attending Physician: Mary Barfield MD  Primary Care Provider: CLAY Ramirez MD        Subjective:     Principal Problem:Calculus of gallbladder with acute cholecystitis and obstruction      HPI:  66yo M w/ HTN, HLD, PAD, CKD, T2DM, MDD, CAD with hx MI/stents, hx prostate CA p/w abd pain, nausea, vomiting, and diarrhea for last 6 days. Unable to keep down much of anything po, including his BP meds. BP usually well controlled on multiple medications but has been high since his symptoms began.  No fevers (did not take temperature at home) but has had chills. No flank pain, dysuria,or other urinary symptoms. Had some substernal CP when breathing deeply earlier today but denies CP at time of my assessment. Also worsening of abdominal pain with deep breathing. Denies HA, vision changes, weakness, dizziness or lightheadedness. No recent falls. No sick contacts. Has never had an episode like this before. He was unaware that he had gallstones.     He has chronic back pain and takes Oxycodone 15mg several times per day prescribed by pain doctor. He reports compliance with all of his meds prior to getting sick, but since he has been unable to keep down much, has not had all of his meds. Some nonbloody diarrhea. No constipation. Workup significant for imaging revealing gallstones with CBD dilation, possible cholecystitis and possible choledocholithiasis. ED physician spoke to general surgery who requested HIDA scan and admission to internal medicine given multiple co-morbidities. Sinus volodymyr but otherwise hemodynamically stable. Initially admitted pt to med/tele bed but due to inability to control BP with po meds, decision made to transfer him to the ICU for a nicardipine gtt.            Overview/Hospital Course:  No notes on  file    Interval History:  BP remains elevated. Hydralazine uptitrated. Again discussed opioid use to ensure patient is receiving equivalent to what he receives at home. States he usually takes atleast 4 oxycodones of 15 mg each and sometimes more. Suspect he is minimizing use. Plan to discontinue PCA and increase oxy to QID. Patient in agreement. No bowel movement since admission. Added lactulose scheduled.     Review of Systems   Gastrointestinal:  Positive for abdominal pain (improving) and constipation.     Objective:     Vital Signs (Most Recent):  Temp: 99 °F (37.2 °C) (09/07/23 1100)  Pulse: (!) 51 (09/07/23 1045)  Resp: (!) 37 (09/07/23 1015)  BP: (!) 183/79 (09/07/23 1045)  SpO2: (!) 92 % (09/07/23 1045) Vital Signs (24h Range):  Temp:  [98.6 °F (37 °C)-99.2 °F (37.3 °C)] 99 °F (37.2 °C)  Pulse:  [45-67] 51  Resp:  [10-45] 37  SpO2:  [85 %-98 %] 92 %  BP: (153-228)/() 183/79     Weight: 87 kg (191 lb 12.8 oz)  Body mass index is 30.04 kg/m².    Intake/Output Summary (Last 24 hours) at 9/7/2023 1212  Last data filed at 9/7/2023 0600  Gross per 24 hour   Intake 1089.39 ml   Output 2040 ml   Net -950.61 ml           Physical Exam  Vitals and nursing note reviewed.   Constitutional:       Appearance: He is obese.   Cardiovascular:      Rate and Rhythm: Normal rate and regular rhythm.   Pulmonary:      Effort: Pulmonary effort is normal.   Abdominal:      Palpations: Abdomen is soft.      Tenderness: There is abdominal tenderness.   Musculoskeletal:      Right lower leg: No edema.      Left lower leg: No edema.   Skin:     General: Skin is warm and dry.   Neurological:      Mental Status: He is alert and oriented to person, place, and time.             Significant Labs: All pertinent labs within the past 24 hours have been reviewed.    Significant Imaging: I have reviewed all pertinent imaging results/findings within the past 24 hours.      Assessment/Plan:      * Calculus of gallbladder with acute  cholecystitis and obstruction  P/w n/v/abd pain x6d and imaging findings concerning for acute cholecystitis, CBD dilation, and possible choledocholithiasis.   ED spoke to gen surg who rec HIDA scan and to admit to internal medicine given co-morbidities     Prn anti-emetics, pain control   BCx x2 NGTD    General surgery consult  s/p cholecystectomy 9/5, advance diet as tolerated.  Antibiotics discontinued per surgery    Hypertensive urgency  Patient has a current diagnosis of hypertensive urgency (without evidence of end organ damage) which is uncontrolled.  Latest blood pressure and vitals reviewed-   Temp:  [98.6 °F (37 °C)-99.2 °F (37.3 °C)]   Pulse:  [45-67]   Resp:  [10-45]   BP: (153-228)/()   SpO2:  [85 %-98 %] .   Patient currently off IV antihypertensives.   Home meds for hypertension were reviewed and noted below.   Hypertension Medications             amLODIPine (NORVASC) 10 MG tablet Take 1 tablet (10 mg total) by mouth once daily.    carvediloL (COREG) 25 MG tablet Take 1 tablet (25 mg total) by mouth 2 (two) times daily.    hydrALAZINE (APRESOLINE) 100 MG tablet Take 1 tablet (100 mg total) by mouth every 8 (eight) hours.    irbesartan (AVAPRO) 300 MG tablet Take 1 tablet (300 mg total) by mouth once daily.    propranoloL (INDERAL) 10 MG tablet Take 10 mg by mouth once daily.          Medication adjustment for hospital antihypertensives is as follows-     Goal MAP reduction of 25-30% in first few hours with goal to avoid rapidly correcting BP  - likely significant component of pain related and inability to tolerate p.o. medications    Pain control.  Restarted on oral antihypertensives. Increase hydralazine dose. Opioid withdrawal likely contributing to HTN since patient is receiving much less MME than he does at home based on  aware. Placed back on oxycodone    Will aim for controlled BP reduction by medications noted above. Monitor and mitigate end organ damage as indicated.    Obstructive  sleep apnea  CPAP QHS      Sinus bradycardia  Sinus volodymyr to 40s on admission, confirmed sinus on EKG  Asymptomatic     Beta-blocker was on hold.  Heart rate now in the 60s.  Restarted on Coreg.  Telemetry.  Monitor heart rate closely.  Case of recurrent bradycardia, discontinue beta-blocker        Hypokalemia  Replace as needed        Major depressive disorder, recurrent severe without psychotic features  Patient has persistent depression which is unknown and is currently controlled. Will Continue anti-depressant medications. We will not consult psychiatry at this time. Patient does not display psychosis at this time. Continue to monitor closely and adjust plan of care as needed.        Mild aortic valve stenosis  Seen on echo in 2022  Mild MR, LVEF 715 noted on echo       Mixed hyperlipidemia  Statin         Presence of stent in coronary artery        Stage 3b chronic kidney disease  Cr at baseline low-mid 1s on admission   Continue to trend renal function  Renally dose medications and avoid nephrotoxins    PAD (peripheral artery disease)  Aspirin on hold in setting of surgery    Coronary artery disease involving native coronary artery of native heart without angina pectoris  Hx MI with stent  Aspirin on hold in setting of surgery        Type 2 diabetes mellitus with stage 3 chronic kidney disease, without long-term current use of insulin  Outpatient he is on Amaryl and metformin with good glycemic control   Hold oral DM meds inpatient   Accuchecks ACHS, sliding scale insulin       Hypertension, essential  Outpatient regimen includes coreg, irbesartan, hydralazine   Unable to keep po meds down prior to admission     Transferred to ICU for uncontrolled hypertension, on IV nicardipine.     Restarted on losartan 100 mg daily, Coreg 25 mg b.i.d., HCTZ 25 mg q.d., amlodipine 10 mg daily  Uptitrate hydralazine     DDD (degenerative disc disease), lumbar  Takes oxycodone 15mg multiple times per day for chronic pain  prescribed by pain physician - suspect minimizing use but states he takes 4 tabs a day and more if needed    - confirmed on  -- pt recently picked up Rx of oxy 15mg, 175 tabs for a 30d supply  Discontinue PCA. Increase oxy 15 to QID    Needs aggressive bowel regimen. If lactulose, miralax, senna/colace ineffective. Consider enema    VTE Risk Mitigation (From admission, onward)         Ordered     enoxaparin injection 40 mg  Daily         09/04/23 0104     IP VTE HIGH RISK PATIENT  Once         09/04/23 0104     Place sequential compression device  Until discontinued         09/04/23 0104                Discharge Planning   DONNA:      Code Status: Full Code   Is the patient medically ready for discharge?:     Reason for patient still in hospital (select all that apply): Patient trending condition and Treatment  Discharge Plan A: Home with family      Discussed with ICU team. Stable for transfer to the floor. Transfer orders placed.     Mary Barfield MD  Department of Hospital Medicine   [place] - Intensive Care

## 2023-09-07 NOTE — PLAN OF CARE
Patient on oxygen with documented flow.  Will attempt to wean per O2 order protocol. Pt on CPAP with documented settings. Alarms are set and functioning. Will continue to monitor.

## 2023-09-07 NOTE — PLAN OF CARE
Problem: Adult Inpatient Plan of Care  Goal: Plan of Care Review  Outcome: Ongoing, Progressing  Goal: Patient-Specific Goal (Individualized)  Outcome: Ongoing, Progressing  Goal: Absence of Hospital-Acquired Illness or Injury  Outcome: Ongoing, Progressing  Goal: Optimal Comfort and Wellbeing  Outcome: Ongoing, Progressing  Goal: Readiness for Transition of Care  Outcome: Ongoing, Progressing     Problem: Diabetes Comorbidity  Goal: Blood Glucose Level Within Targeted Range  Outcome: Ongoing, Progressing     Problem: Hypertension Acute  Goal: Blood Pressure Within Desired Range  Outcome: Ongoing, Progressing     Problem: Electrolyte Imbalance  Goal: Electrolyte Balance  Outcome: Ongoing, Progressing     Problem: Pain Acute  Goal: Acceptable Pain Control and Functional Ability  Outcome: Ongoing, Progressing     Problem: Infection  Goal: Absence of Infection Signs and Symptoms  Outcome: Ongoing, Progressing     Problem: Fall Injury Risk  Goal: Absence of Fall and Fall-Related Injury  Outcome: Ongoing, Progressing     Problem: Skin Injury Risk Increased  Goal: Skin Health and Integrity  Outcome: Ongoing, Progressing

## 2023-09-07 NOTE — PROGRESS NOTES
Pulm/CC Service Progress Note    Attending Physician: Mary Barfield MD    Date of Admit: 9/3/2023    Reason for Consult: Hypertensive crisis    History of Present Illness:  Neena Kohli is a 67 y.o.  male with a PMHx emphysema, DM2, and HTN who presents with RUQ pain, nausea, vomiting. He was found to have symptomatic cholelithiasis and dilated biliary duct with mild pericholecystic fluid. He also had asymptomatic hypertension with SBP to 240 on admission. After pain control and restarting home medications, he was titrated off a Cardene drip but required resumption. Now, he has been off of Cardene since the evening of 23.    Interval History  This morning, his pain is improved but still prevalent. He has been resumed on his home opioid medications. Still intermittently requires PCA pump use but largely did not use it throughout the night.    Objective:   Last 24 Hour Vital Signs:  BP  Min: 141/68  Max: 228/90  Temp  Av.9 °F (37.2 °C)  Min: 98.6 °F (37 °C)  Max: 99.2 °F (37.3 °C)  Pulse  Av.1  Min: 45  Max: 70  Resp  Av.7  Min: 10  Max: 44  SpO2  Av.2 %  Min: 90 %  Max: 98 %  Weight  Av kg (191 lb 12.8 oz)  Min: 87 kg (191 lb 12.8 oz)  Max: 87 kg (191 lb 12.8 oz)  Body mass index is 30.04 kg/m².  I/O last 3 completed shifts:  In: 1427.4 [P.O.:720; I.V.:360.9; IV Piggyback:346.5]  Out: 3285 [Urine:3285]    Physical Exam:  General: Alert and awake, in distress 2/2 pain but improved  HENT:  NCAT; anicteric sclera; OP clear with MMM  Cardio:  Regular rate and rhythm with normal S1 and S2  Resp:  respirations unlabored; no wheezes, crackles or rhonchi  Abdom:  Soft, Very tender in the RUQ with some involuntary guarding but without rebound  Extrem:  WWP with clubbing noted  Pulses:  2+ and symmetric distally  Neuro:  AAOx3; cooperative and pleasant with no focal deficits    Imaging:   No new imgaging    Assessment & Plan:   68yo M w/ PMH HTN, smoking who presents with symptomatic  cholelithiasis and cholecystitis now s/p lap erik on 9/6/23. Intra-op, he was noted to have gangrenous gallbladder with leakage of biliary fluid.     Neuro  - no acute issues    [facility]  #Uncontrolled HTN  Starting back on all his home medications with IV hydralazine as needed. Goal BP < 180 at this time. May have a large component of pain control as well (see GI).   - carvedilol 25mg PO BID  - amlodipine 10mg PO qD  - HCTZ 25mg PO   - losartan 100mg PO  - increasing hydralazine 75mg TID  - IV hydralazine 10mg PRN  - pain control  - consider renin-aldosterone ratio outpatient    Pulm  #Emphysema  Pt is long time smoker with emphysematous changes on his CT scan/CXR. Would benefit from outpatient follow-up with PFTs.  - goal O2 saturation 88-92%  - PRN albuterol as needed    GI  #Cholelithiasis  #Choledocholithiasis  #Acute cholecystitis w/ gangrene  S/p lap erik today with gangrene. Will continue to follow post-operatively with pain control and antibiotics at this time. Will defer to surgery for antibiotic choice and duration given intra-operative findings.  - Zosyn transitioned to Cipro on 9/6/23, continue for now  - Pain control    - continuing PCA pump in order to control his pain, although trying to decrease    - PCA Dilaudid (max 12mg/day) at 0.2 mg q20 w/ 0.6 mg max/hr    Renal  - no current issues  - diurese as needed (net positive 4.4L this admission)    Heme  - n/a    ID  - see cholelithiasis     Endocrine  - glucose control with SSI    Thank you for this consultation.     Cesar Owusu MD  [facility], PGY-3  [facility] P[facility]M Service    Pt seen and examined with Pulmonary/Critical Care team and this note reviewed and validated with the following additional comments:  BP improving but will need slow titration downward.  Abx stopped. OK to step down.      Medical Decision Making (MDM) was straight forward.  The number and complexity of problems addressed was 2.  The amount and complexity of data  reviewed was low.  The risk of complications and/or morbidity/mortality was moderate.  The tests ordered were none.  I communicated with the following providers Surgery, .  Time spent in the care of this patient was 10 minutes    Se Damico MD  Phone [phone]

## 2023-09-07 NOTE — PROGRESS NOTES
Individual Follow-Up Form    9/7/2023    Quit Date: To be determined    Clinical Status of Patient: Inpatient    Length of Service: 30 minutes    Comments: Smoking cessation education follow-up: No reported nicotine withdrawal symptoms with patch in use. Order requested upon discharge for 14 mg nicotine patch Q day. Reviewed details of pt's Initial Ambulatory Smoking Cessation clinic appointnent and pt states no conflicts. Appointment scheduled 9/25/23 at 1400, [facility] Smoking Cessation clinic.    Diagnosis: F17.210

## 2023-09-07 NOTE — ANESTHESIA POSTPROCEDURE EVALUATION
Anesthesia Post Evaluation    Patient: Neena Kohli    Procedure(s) Performed: Procedure(s) (LRB):  CHOLECYSTECTOMY, LAPAROSCOPIC (N/A)    Final Anesthesia Type: general      Patient location during evaluation: PACU  Patient participation: Yes- Able to Participate  Level of consciousness: awake and alert  Post-procedure vital signs: reviewed and stable  Pain management: adequate  Airway patency: patent    PONV status at discharge: No PONV  Anesthetic complications: no      Cardiovascular status: stable  Respiratory status: spontaneous ventilation  Hydration status: euvolemic  Follow-up not needed.          Vitals Value Taken Time   /130 09/07/23 0917   Temp 37.1 °C (98.7 °F) 09/07/23 0700   Pulse 57 09/07/23 0918   Resp 14 09/07/23 0918   SpO2 92 % 09/07/23 0918   Vitals shown include unvalidated device data.      No case tracking events are documented in the log.      Pain/Janessa Score: Pain Rating Prior to Med Admin: 8 (9/7/2023  8:13 AM)  Pain Rating Post Med Admin: 5 (9/7/2023  3:01 AM)

## 2023-09-07 NOTE — PROGRESS NOTES
Orders to be transferred to the floor. SBP maintaining 180-190's. Spoke with MD regarding BP parameters. Orders to be placed for SBP goal <200; PRN hydralazine orders will be adjusted to reflect this.

## 2023-09-07 NOTE — NURSING
Patient arrived to unit from ICU.   Patient in room 219.  Transferred into bed with assist.   Telephone report given.  Charge nurse advised of patient arrival.   Patient hypertensive, SBP > 200's  Primary MD notified, new orders given   Tele monitor on.   Patient oriented to room, unit, and call bell.   Bed in lowest position, call light in reach.  Encouraged to notify of all needs.   Will continue to monitor.

## 2023-09-08 LAB
ALBUMIN SERPL BCP-MCNC: 2.4 G/DL (ref 3.5–5.2)
ALP SERPL-CCNC: 126 U/L (ref 55–135)
ALT SERPL W/O P-5'-P-CCNC: 15 U/L (ref 10–44)
ANION GAP SERPL CALC-SCNC: 11 MMOL/L (ref 8–16)
AST SERPL-CCNC: 12 U/L (ref 10–40)
BASOPHILS # BLD AUTO: 0.04 K/UL (ref 0–0.2)
BASOPHILS NFR BLD: 0.5 % (ref 0–1.9)
BILIRUB SERPL-MCNC: 0.4 MG/DL (ref 0.1–1)
BUN SERPL-MCNC: 20 MG/DL (ref 8–23)
CALCIUM SERPL-MCNC: 8.8 MG/DL (ref 8.7–10.5)
CHLORIDE SERPL-SCNC: 102 MMOL/L (ref 95–110)
CO2 SERPL-SCNC: 27 MMOL/L (ref 23–29)
CREAT SERPL-MCNC: 1.3 MG/DL (ref 0.5–1.4)
DIFFERENTIAL METHOD: ABNORMAL
EOSINOPHIL # BLD AUTO: 0.4 K/UL (ref 0–0.5)
EOSINOPHIL NFR BLD: 5.7 % (ref 0–8)
ERYTHROCYTE [DISTWIDTH] IN BLOOD BY AUTOMATED COUNT: 13.2 % (ref 11.5–14.5)
EST. GFR  (NO RACE VARIABLE): 60 ML/MIN/1.73 M^2
GLUCOSE SERPL-MCNC: 113 MG/DL (ref 70–110)
HCT VFR BLD AUTO: 31 % (ref 40–54)
HGB BLD-MCNC: 10 G/DL (ref 14–18)
IMM GRANULOCYTES # BLD AUTO: 0.02 K/UL (ref 0–0.04)
IMM GRANULOCYTES NFR BLD AUTO: 0.3 % (ref 0–0.5)
LYMPHOCYTES # BLD AUTO: 1.6 K/UL (ref 1–4.8)
LYMPHOCYTES NFR BLD: 21.2 % (ref 18–48)
MCH RBC QN AUTO: 29.6 PG (ref 27–31)
MCHC RBC AUTO-ENTMCNC: 32.3 G/DL (ref 32–36)
MCV RBC AUTO: 92 FL (ref 82–98)
MONOCYTES # BLD AUTO: 1 K/UL (ref 0.3–1)
MONOCYTES NFR BLD: 14.2 % (ref 4–15)
NEUTROPHILS # BLD AUTO: 4.3 K/UL (ref 1.8–7.7)
NEUTROPHILS NFR BLD: 58.1 % (ref 38–73)
NRBC BLD-RTO: 0 /100 WBC
PLATELET # BLD AUTO: 225 K/UL (ref 150–450)
PMV BLD AUTO: 10.1 FL (ref 9.2–12.9)
POCT GLUCOSE: 107 MG/DL (ref 70–110)
POCT GLUCOSE: 108 MG/DL (ref 70–110)
POCT GLUCOSE: 146 MG/DL (ref 70–110)
POCT GLUCOSE: 153 MG/DL (ref 70–110)
POCT GLUCOSE: 177 MG/DL (ref 70–110)
POTASSIUM SERPL-SCNC: 3.7 MMOL/L (ref 3.5–5.1)
PROT SERPL-MCNC: 5.8 G/DL (ref 6–8.4)
RBC # BLD AUTO: 3.38 M/UL (ref 4.6–6.2)
SODIUM SERPL-SCNC: 140 MMOL/L (ref 136–145)
WBC # BLD AUTO: 7.32 K/UL (ref 3.9–12.7)

## 2023-09-08 PROCEDURE — 25000003 PHARM REV CODE 250: Performed by: SURGERY

## 2023-09-08 PROCEDURE — 94799 UNLISTED PULMONARY SVC/PX: CPT

## 2023-09-08 PROCEDURE — S4991 NICOTINE PATCH NONLEGEND: HCPCS | Performed by: SURGERY

## 2023-09-08 PROCEDURE — 85025 COMPLETE CBC W/AUTO DIFF WBC: CPT | Performed by: STUDENT IN AN ORGANIZED HEALTH CARE EDUCATION/TRAINING PROGRAM

## 2023-09-08 PROCEDURE — 99223 PR INITIAL HOSPITAL CARE,LEVL III: ICD-10-PCS | Mod: ,,, | Performed by: NURSE PRACTITIONER

## 2023-09-08 PROCEDURE — 11000001 HC ACUTE MED/SURG PRIVATE ROOM

## 2023-09-08 PROCEDURE — 99223 1ST HOSP IP/OBS HIGH 75: CPT | Mod: ,,, | Performed by: NURSE PRACTITIONER

## 2023-09-08 PROCEDURE — 99900035 HC TECH TIME PER 15 MIN (STAT)

## 2023-09-08 PROCEDURE — 94761 N-INVAS EAR/PLS OXIMETRY MLT: CPT

## 2023-09-08 PROCEDURE — 80053 COMPREHEN METABOLIC PANEL: CPT | Performed by: STUDENT IN AN ORGANIZED HEALTH CARE EDUCATION/TRAINING PROGRAM

## 2023-09-08 PROCEDURE — 63600175 PHARM REV CODE 636 W HCPCS: Performed by: SURGERY

## 2023-09-08 PROCEDURE — 99233 PR SUBSEQUENT HOSPITAL CARE,LEVL III: ICD-10-PCS | Mod: ,,, | Performed by: INTERNAL MEDICINE

## 2023-09-08 PROCEDURE — 99233 SBSQ HOSP IP/OBS HIGH 50: CPT | Mod: ,,, | Performed by: INTERNAL MEDICINE

## 2023-09-08 PROCEDURE — 25000003 PHARM REV CODE 250: Performed by: STUDENT IN AN ORGANIZED HEALTH CARE EDUCATION/TRAINING PROGRAM

## 2023-09-08 PROCEDURE — 36415 COLL VENOUS BLD VENIPUNCTURE: CPT | Performed by: STUDENT IN AN ORGANIZED HEALTH CARE EDUCATION/TRAINING PROGRAM

## 2023-09-08 RX ADMIN — BISACODYL 5 MG: 5 TABLET, COATED ORAL at 08:09

## 2023-09-08 RX ADMIN — CLONIDINE HYDROCHLORIDE 0.1 MG: 0.1 TABLET ORAL at 11:09

## 2023-09-08 RX ADMIN — POLYETHYLENE GLYCOL 3350 17 G: 17 POWDER, FOR SOLUTION ORAL at 08:09

## 2023-09-08 RX ADMIN — OXYCODONE HYDROCHLORIDE 15 MG: 5 TABLET ORAL at 09:09

## 2023-09-08 RX ADMIN — QUETIAPINE FUMARATE 200 MG: 100 TABLET ORAL at 09:09

## 2023-09-08 RX ADMIN — DOCUSATE SODIUM AND SENNOSIDES 1 TABLET: 8.6; 5 TABLET, FILM COATED ORAL at 08:09

## 2023-09-08 RX ADMIN — ATORVASTATIN CALCIUM 40 MG: 40 TABLET, FILM COATED ORAL at 08:09

## 2023-09-08 RX ADMIN — SPIRONOLACTONE 25 MG: 25 TABLET ORAL at 08:09

## 2023-09-08 RX ADMIN — OXYCODONE HYDROCHLORIDE 15 MG: 5 TABLET ORAL at 08:09

## 2023-09-08 RX ADMIN — LOSARTAN POTASSIUM 100 MG: 50 TABLET, FILM COATED ORAL at 08:09

## 2023-09-08 RX ADMIN — CARVEDILOL 25 MG: 25 TABLET, FILM COATED ORAL at 08:09

## 2023-09-08 RX ADMIN — CARVEDILOL 25 MG: 25 TABLET, FILM COATED ORAL at 09:09

## 2023-09-08 RX ADMIN — BUPROPION HYDROCHLORIDE 75 MG: 75 TABLET, FILM COATED ORAL at 08:09

## 2023-09-08 RX ADMIN — ACETAMINOPHEN 1000 MG: 500 TABLET ORAL at 05:09

## 2023-09-08 RX ADMIN — LACTULOSE 15 G: 20 SOLUTION ORAL at 08:09

## 2023-09-08 RX ADMIN — MUPIROCIN: 20 OINTMENT TOPICAL at 09:09

## 2023-09-08 RX ADMIN — CLONIDINE HYDROCHLORIDE 0.1 MG: 0.1 TABLET ORAL at 01:09

## 2023-09-08 RX ADMIN — HYDRALAZINE HYDROCHLORIDE 100 MG: 25 TABLET, FILM COATED ORAL at 02:09

## 2023-09-08 RX ADMIN — MUPIROCIN: 20 OINTMENT TOPICAL at 08:09

## 2023-09-08 RX ADMIN — AMLODIPINE BESYLATE 10 MG: 5 TABLET ORAL at 08:09

## 2023-09-08 RX ADMIN — HYDRALAZINE HYDROCHLORIDE 100 MG: 25 TABLET, FILM COATED ORAL at 09:09

## 2023-09-08 RX ADMIN — HYDRALAZINE HYDROCHLORIDE 100 MG: 25 TABLET, FILM COATED ORAL at 05:09

## 2023-09-08 RX ADMIN — HYDROCHLOROTHIAZIDE 25 MG: 25 TABLET ORAL at 08:09

## 2023-09-08 RX ADMIN — OXYCODONE HYDROCHLORIDE 15 MG: 5 TABLET ORAL at 02:09

## 2023-09-08 RX ADMIN — TRAZODONE HYDROCHLORIDE 50 MG: 50 TABLET ORAL at 09:09

## 2023-09-08 RX ADMIN — ENOXAPARIN SODIUM 40 MG: 40 INJECTION SUBCUTANEOUS at 05:09

## 2023-09-08 RX ADMIN — ACETAMINOPHEN 1000 MG: 500 TABLET ORAL at 01:09

## 2023-09-08 RX ADMIN — NICOTINE 1 PATCH: 14 PATCH, EXTENDED RELEASE TRANSDERMAL at 08:09

## 2023-09-08 RX ADMIN — OXYCODONE HYDROCHLORIDE 15 MG: 5 TABLET ORAL at 05:09

## 2023-09-08 NOTE — PROGRESS NOTES
[facility] Medicine  Progress Note    Patient Name: Neena Kohli  MRN: [id number]  Patient Class: IP- Inpatient   Admission Date: 9/3/2023  Length of Stay: 4 days  Attending Physician: Mary Barfield MD  Primary Care Provider: CLAY Ramirez MD        Subjective:     Principal Problem:Hypertensive urgency        HPI:  66yo M w/ HTN, HLD, PAD, CKD, T2DM, MDD, CAD with hx MI/stents, hx prostate CA p/w abd pain, nausea, vomiting, and diarrhea for last 6 days. Unable to keep down much of anything po, including his BP meds. BP usually well controlled on multiple medications but has been high since his symptoms began.  No fevers (did not take temperature at home) but has had chills. No flank pain, dysuria,or other urinary symptoms. Had some substernal CP when breathing deeply earlier today but denies CP at time of my assessment. Also worsening of abdominal pain with deep breathing. Denies HA, vision changes, weakness, dizziness or lightheadedness. No recent falls. No sick contacts. Has never had an episode like this before. He was unaware that he had gallstones.     He has chronic back pain and takes Oxycodone 15mg several times per day prescribed by pain doctor. He reports compliance with all of his meds prior to getting sick, but since he has been unable to keep down much, has not had all of his meds. Some nonbloody diarrhea. No constipation. Workup significant for imaging revealing gallstones with CBD dilation, possible cholecystitis and possible choledocholithiasis. ED physician spoke to general surgery who requested HIDA scan and admission to internal medicine given multiple co-morbidities. Sinus volodymyr but otherwise hemodynamically stable. Initially admitted pt to med/tele bed but due to inability to control BP with po meds, decision made to transfer him to the ICU for a nicardipine gtt.            Overview/Hospital Course:  No notes on file    Interval History:  BP remains elevated to systolic  180 this morning. Asymptomatic. Received PRN clonidine last night and this morning for systolic BP>170. Clonidine effective.   Patient had a bowel movement last night with magnesium citrate. Has chronic back pain. Abdominal pain improved.     Review of Systems   Gastrointestinal:  Positive for abdominal pain (improving).     Objective:     Vital Signs (Most Recent):  Temp: 98.9 °F (37.2 °C) (09/08/23 1132)  Pulse: (!) 54 (09/08/23 1553)  Resp: 18 (09/08/23 1412)  BP: (!) 160/78 (09/08/23 1536)  SpO2: 95 % (09/08/23 1143) Vital Signs (24h Range):  Temp:  [98.1 °F (36.7 °C)-100.1 °F (37.8 °C)] 98.9 °F (37.2 °C)  Pulse:  [42-61] 54  Resp:  [16-20] 18  SpO2:  [92 %-98 %] 95 %  BP: (160-218)/(68-84) 160/78     Weight: 87 kg (191 lb 12.8 oz)  Body mass index is 30.04 kg/m².    Intake/Output Summary (Last 24 hours) at 9/8/2023 1648  Last data filed at 9/8/2023 1230  Gross per 24 hour   Intake 780 ml   Output 200 ml   Net 580 ml           Physical Exam  Vitals and nursing note reviewed.   Constitutional:       Appearance: He is obese.   Cardiovascular:      Rate and Rhythm: Normal rate and regular rhythm.   Pulmonary:      Effort: Pulmonary effort is normal.   Abdominal:      Palpations: Abdomen is soft.      Tenderness: There is abdominal tenderness (improving).   Musculoskeletal:      Right lower leg: No edema.      Left lower leg: No edema.   Skin:     General: Skin is warm and dry.   Neurological:      Mental Status: He is alert and oriented to person, place, and time.             Significant Labs: All pertinent labs within the past 24 hours have been reviewed.    Significant Imaging: I have reviewed all pertinent imaging results/findings within the past 24 hours.      Assessment/Plan:      * Hypertensive urgency  Patient has a current diagnosis of hypertensive urgency (without evidence of end organ damage) which is uncontrolled.  Latest blood pressure and vitals reviewed-   Temp:  [98.1 °F (36.7 °C)-100.1 °F (37.8 °C)]    Pulse:  [42-61]   Resp:  [16-20]   BP: (160-218)/(68-84)   SpO2:  [92 %-98 %] .   Patient currently off IV antihypertensives.   Home meds for hypertension were reviewed and noted below.   Hypertension Medications             amLODIPine (NORVASC) 10 MG tablet Take 1 tablet (10 mg total) by mouth once daily.    carvediloL (COREG) 25 MG tablet Take 1 tablet (25 mg total) by mouth 2 (two) times daily.    hydrALAZINE (APRESOLINE) 100 MG tablet Take 1 tablet (100 mg total) by mouth every 8 (eight) hours.    irbesartan (AVAPRO) 300 MG tablet Take 1 tablet (300 mg total) by mouth once daily.    propranoloL (INDERAL) 10 MG tablet Take 10 mg by mouth once daily.          Medication adjustment for hospital antihypertensives is as follows-     Goal MAP reduction of 25-30% in first few hours with goal to avoid rapidly correcting BP  - likely significant component of pain related and inability to tolerate p.o. medications    Pain under reasonable control as patient is back on his home opioid regimen.     Continue coreg 25 mg BID, HCTZ 25 mg QD, hydralazine 100 mg TID, amlodipine 10 mg QD, losartan 100 mg QD, spironolactone 25 mg QD  Appears to have good effect with clonidine. Though clonidine not a preferred agent due to risk of rebound HTN, if clonidine is effective in reducing BP- consider scheduled clonidine  Bradycardia with HR in the 40's noted which limits uptitration of coreg.     Cardiology consult    Will aim for controlled BP reduction by medications noted above. Monitor and mitigate end organ damage as indicated.    Calculus of gallbladder with acute cholecystitis and obstruction  P/w n/v/abd pain x6d and imaging findings concerning for acute cholecystitis, CBD dilation, and possible choledocholithiasis.   ED spoke to gen surg who rec HIDA scan and to admit to internal medicine given co-morbidities     Prn anti-emetics, pain control   BCx x2 NGTD    General surgery consult  s/p cholecystectomy 9/5, advance diet as  tolerated.  Antibiotics discontinued per surgery    Obstructive sleep apnea  CPAP QHS      Sinus bradycardia  Sinus volodymyr to 40s on admission, confirmed sinus on EKG  Asymptomatic     Beta-blocker was on hold.  Heart rate now in the 60s.  Restarted on Coreg.  Telemetry.  Monitor heart rate closely.  Case of recurrent bradycardia, discontinue beta-blocker        Hypokalemia  Replace as needed        Major depressive disorder, recurrent severe without psychotic features  Patient has persistent depression which is unknown and is currently controlled. Will Continue anti-depressant medications. We will not consult psychiatry at this time. Patient does not display psychosis at this time. Continue to monitor closely and adjust plan of care as needed.        Mild aortic valve stenosis  Seen on echo in 2022  Mild MR, LVEF 715 noted on echo       Mixed hyperlipidemia  Statin         Presence of stent in coronary artery        Stage 3b chronic kidney disease  Cr at baseline low-mid 1s on admission   Continue to trend renal function  Renally dose medications and avoid nephrotoxins    PAD (peripheral artery disease)  Aspirin on hold in setting of surgery    Coronary artery disease involving native coronary artery of native heart without angina pectoris  Hx MI with stent  Aspirin on hold in setting of surgery        Type 2 diabetes mellitus with stage 3 chronic kidney disease, without long-term current use of insulin  Outpatient he is on Amaryl and metformin with good glycemic control   Hold oral DM meds inpatient   Accuchecks ACHS, sliding scale insulin       Hypertension, essential  Outpatient regimen includes coreg, irbesartan, hydralazine   Unable to keep po meds down prior to admission     See HTN urgency for regimen    DDD (degenerative disc disease), lumbar  Takes oxycodone 15mg multiple times per day for chronic pain prescribed by pain physician - suspect minimizing use but states he takes 4 tabs a day and more if  needed    - confirmed on  -- pt recently picked up Rx of oxy 15mg, 175 tabs for a 30d supply  Off PCA. Continue oxy 15 to QID    Aggressive bowel regimen    VTE Risk Mitigation (From admission, onward)         Ordered     enoxaparin injection 40 mg  Daily         09/04/23 0104     IP VTE HIGH RISK PATIENT  Once         09/04/23 0104     Place sequential compression device  Until discontinued         09/04/23 0104                Discharge Planning   DONNA:      Code Status: Full Code   Is the patient medically ready for discharge?:     Reason for patient still in hospital (select all that apply): Patient trending condition and Treatment  Discharge Plan A: Home with family          Mary Barfield MD  Department of Hospital Medicine   [facility]

## 2023-09-08 NOTE — SUBJECTIVE & OBJECTIVE
Past Medical History:   Diagnosis Date    Arthritis     Cancer     Prostate    Diabetes mellitus     Hyperthyroidism     PAD (peripheral artery disease)        Past Surgical History:   Procedure Laterality Date    LAPAROSCOPIC CHOLECYSTECTOMY N/A 9/5/2023    Procedure: CHOLECYSTECTOMY, LAPAROSCOPIC;  Surgeon: Alvaro Snyder MD;  Location: [facility];  Service: General;  Laterality: N/A;    PROSTATE SURGERY         Review of patient's allergies indicates:  No Known Allergies    No current facility-administered medications on file prior to encounter.     Current Outpatient Medications on File Prior to Encounter   Medication Sig    allopurinoL (ZYLOPRIM) 100 MG tablet Take 1 tablet (100 mg total) by mouth once daily.    amLODIPine (NORVASC) 10 MG tablet Take 1 tablet (10 mg total) by mouth once daily.    aspirin (ECOTRIN) 81 MG EC tablet Take 81 mg by mouth once daily.    atorvastatin (LIPITOR) 40 MG tablet Take 1 tablet (40 mg total) by mouth once daily.    buPROPion (WELLBUTRIN) 75 MG tablet TAKE 1 TABLET EVERY DAY    carvediloL (COREG) 25 MG tablet Take 1 tablet (25 mg total) by mouth 2 (two) times daily.    cholecalciferol, vitamin D3, 1,250 mcg (50,000 unit) capsule Take 50,000 Units by mouth every 14 (fourteen) days.    citalopram (CELEXA) 20 MG tablet Take 20 mg by mouth once daily.    ergocalciferol (ERGOCALCIFEROL) 50,000 unit Cap Take 50,000 Units by mouth every 7 days.    glimepiride (AMARYL) 1 MG tablet TAKE 2 TABLETS EVERY DAY    hydrALAZINE (APRESOLINE) 100 MG tablet Take 1 tablet (100 mg total) by mouth every 8 (eight) hours.    irbesartan (AVAPRO) 300 MG tablet Take 1 tablet (300 mg total) by mouth once daily.    metFORMIN (GLUCOPHAGE) 1000 MG tablet Take 1 tablet (1,000 mg total) by mouth 2 (two) times daily with meals.    oxyCODONE (ROXICODONE) 15 MG Tab Take 15 mg by mouth every 4 to 6 hours as needed.    oxyCODONE myristate (XTAMPZA ER) 13.5 mg CSpT take 1 capsule  by mouth every morning with a full  meal as needed for pain    propranoloL (INDERAL) 10 MG tablet Take 10 mg by mouth once daily.    QUEtiapine (SEROQUEL) 100 MG Tab Take 1 tablet (100 mg total) by mouth 2 (two) times daily. At bedtime    topiramate (TOPAMAX) 100 MG tablet Take 100 mg by mouth once daily.    traZODone (DESYREL) 100 MG tablet Take 100 mg by mouth every evening.    TRUE METRIX GLUCOSE TEST STRIP Strp     TRUEPLUS LANCETS 33 gauge Misc      Family History       Problem Relation (Age of Onset)    Cancer Paternal Uncle    Diabetes Mother, Father    Gout Father    Hyperlipidemia Mother, Father    Hypertension Mother, Father    Stroke Maternal Grandmother          Tobacco Use    Smoking status: Every Day     Current packs/day: 1.00     Average packs/day: 1 pack/day for 51.7 years (51.7 ttl pk-yrs)     Types: Vaping with nicotine, Cigarettes     Start date: 1972    Smokeless tobacco: Current    Tobacco comments:     Enrolled in the [employer] on 2/20/19 ([employer] Member ID # [id number])  Ambulatory referral to Smoking Cessation clinic following hospital discharge.  Pt is a 1 pk/day cigarette smoker x 52 yrs.- switched to vaping with nicotine approx. 3 months ago.    Substance and Sexual Activity    Alcohol use: Not Currently     Alcohol/week: 0.0 standard drinks of alcohol     Comment: Former User    Drug use: Not Currently     Types: Cocaine, Marijuana     Comment: Former User    Sexual activity: Not Currently     Review of Systems   Constitutional: Negative for diaphoresis and fever.   HENT: Negative.     Cardiovascular:  Negative for chest pain, irregular heartbeat, leg swelling, near-syncope, orthopnea, palpitations, paroxysmal nocturnal dyspnea and syncope.   Respiratory:  Positive for cough. Negative for shortness of breath.    Endocrine: Negative.    Musculoskeletal:  Positive for back pain and myalgias.   Gastrointestinal:  Positive for abdominal pain, bowel incontinence and diarrhea. Negative for nausea and vomiting.   Genitourinary:  Negative.    Neurological: Negative.      Objective:     Vital Signs (Most Recent):  Temp: 98.9 °F (37.2 °C) (09/08/23 1132)  Pulse: (!) 42 (09/08/23 1208)  Resp: 16 (09/08/23 1132)  BP: (!) 172/68 (09/08/23 1136)  SpO2: 95 % (09/08/23 1143) Vital Signs (24h Range):  Temp:  [98.1 °F (36.7 °C)-100.1 °F (37.8 °C)] 98.9 °F (37.2 °C)  Pulse:  [42-59] 42  Resp:  [11-25] 16  SpO2:  [90 %-99 %] 95 %  BP: (172-218)/() 172/68     Weight: 87 kg (191 lb 12.8 oz)  Body mass index is 30.04 kg/m².    SpO2: 95 %         Intake/Output Summary (Last 24 hours) at 9/8/2023 1256  Last data filed at 9/8/2023 0830  Gross per 24 hour   Intake 649.67 ml   Output 625 ml   Net 24.67 ml       Lines/Drains/Airways       Peripheral Intravenous Line  Duration                  Peripheral IV - Single Lumen 09/04/23 0326 20 G Right Antecubital 4 days         Peripheral IV - Single Lumen 09/04/23 1200 20 G Anterior;Right Forearm 4 days                     Physical Exam  Constitutional:       General: He is not in acute distress.     Appearance: He is not diaphoretic.   Eyes:      General:         Right eye: No discharge.         Left eye: No discharge.   Cardiovascular:      Rate and Rhythm: Normal rate and regular rhythm.      Heart sounds: Murmur heard.   Pulmonary:      Effort: Pulmonary effort is normal.      Breath sounds: No rales.   Abdominal:      General: There is distension.      Palpations: Abdomen is soft.   Skin:     General: Skin is warm and dry.   Neurological:      Mental Status: He is alert. Mental status is at baseline.          Significant Labs: BMP:   Recent Labs   Lab 09/07/23  0712 09/08/23  0508    113*    140   K 3.3* 3.7    102   CO2 28 27   BUN 18 20   CREATININE 1.3 1.3   CALCIUM 8.8 8.8   MG 1.8  --    , CMP   Recent Labs   Lab 09/07/23  0712 09/08/23  0508    140   K 3.3* 3.7    102   CO2 28 27    113*   BUN 18 20   CREATININE 1.3 1.3   CALCIUM 8.8 8.8   PROT 5.8* 5.8*  "  ALBUMIN 2.3* 2.4*   BILITOT 0.5 0.4   ALKPHOS 122 126   AST 13 12   ALT 13 15   ANIONGAP 8 11   , CBC   Recent Labs   Lab 09/07/23  0712 09/08/23  0508   WBC 7.80 7.32   HGB 10.8* 10.0*   HCT 32.9* 31.0*    225   , INR No results for input(s): "INR", "PROTIME" in the last 48 hours., Lipid Panel No results for input(s): "CHOL", "HDL", "LDLCALC", "TRIG", "CHOLHDL" in the last 48 hours., Troponin No results for input(s): "TROPONINI" in the last 48 hours., and All pertinent lab results from the last 24 hours have been reviewed.    Significant Imaging: Echocardiogram: Transthoracic echo (TTE) complete (Cupid Only):   Results for orders placed or performed during the hospital encounter of 09/03/23   Echo Saline Bubble? No   Result Value Ref Range    BSA 1.99 m2    Ahn's Biplane MOD Ejection Fraction 71 %    LVOT stroke volume 105.67 cm3    LVIDd 5.01 3.5 - 6.0 cm    LV Systolic Volume 49.73 mL    LV Systolic Volume Index 25.4 mL/m2    LVIDs 3.47 2.1 - 4.0 cm    LV Diastolic Volume 119.04 mL    LV Diastolic Volume Index 60.73 mL/m2    IVS 1.23 (A) 0.6 - 1.1 cm    LVOT diameter 1.95 cm    LVOT area 3.0 cm2    FS 31 28 - 44 %    Left Ventricle Relative Wall Thickness 0.33 cm    Posterior Wall 0.82 0.6 - 1.1 cm    LV mass 188.75 g    LV Mass Index 96 g/m2    MV Peak E Trent 1.00 m/s    TDI LATERAL 0.11 m/s    TDI SEPTAL 0.08 m/s    E/E' ratio 10.53 m/s    MV Peak A Trent 1.19 m/s    TR Max Trent 2.05 m/s    E/A ratio 0.84     E wave deceleration time 155.08 msec    MV "A" wave duration [id number] msec    LV SEPTAL E/E' RATIO 12.50 m/s    LV LATERAL E/E' RATIO 9.09 m/s    PV Peak S Trent 1.04 m/s    PV Peak D Trent 0.57 m/s    Pulm vein S/D ratio 1.82     LVOT peak trent 1.72 m/s    Left Ventricular Outflow Tract Mean Velocity 1.07 cm/s    Left Ventricular Outflow Tract Mean Gradient 5.25 mmHg    LA volume (mod) 64.56 cm3    LA Volume Index (Mod) 32.9 mL/m2    LA size 4.66 cm    Left Atrium Major Axis 4.97 cm    Left " Atrium Minor Axis 5.32 cm    RVDD 3.23 cm    RV S' 19.25 cm/s    TAPSE 3.00 cm    RA Major Axis 3.92 cm    RA Width 3.61 cm    AV mean gradient 11 mmHg    AV peak gradient 19 mmHg    Ao peak jenise 2.20 m/s    Ao VTI 47.80 cm    LVOT peak VTI 35.40 cm    AV valve area 2.21 cm²    AV Velocity Ratio 0.78     AV index (prosthetic) 0.74     ASPEN by Velocity Ratio 2.33 cm²    MV mean gradient 3 mmHg    MV peak gradient 6 mmHg    MV stenosis pressure 1/2 time 44.97 ms    MV valve area p 1/2 method 4.89 cm2    MV valve area by continuity eq 3.97 cm2    MV VTI 26.6 cm    Triscuspid Valve Regurgitation Peak Gradient 17 mmHg    PV PEAK VELOCITY 1.37 m/s    PV peak gradient 8 mmHg    Pulmonary Valve Mean Velocity 1.15 m/s    IVC diameter 1.85 cm    Mean e' 0.10 m/s    ZLVIDS 0.05     ZLVIDD -1.13     LA Volume Index 45.7 mL/m2    LA volume 89.57 cm3    LA WIDTH 4.4 cm    TV resting pulmonary artery pressure 20 mmHg    RV TB RVSP 5 mmHg    Est. RA pres 3 mmHg    Narrative      Left Ventricle: The left ventricle is normal in size. Normal wall   thickness. Normal wall motion. There is normal systolic function. Biplane   (2D) method of discs ejection fraction is 71%. There is normal diastolic   function.    Left Atrium: Left atrium is moderately dilated.    Right Ventricle: Normal right ventricular cavity size. Wall thickness   is normal. Right ventricle wall motion  is normal. Systolic function is   normal.    Mitral Valve: There is mild regurgitation.    Tricuspid Valve: There is mild regurgitation.    Pulmonary Artery: The estimated pulmonary artery systolic pressure is   20 mmHg.    IVC/SVC: Normal venous pressure at 3 mmHg.

## 2023-09-08 NOTE — ASSESSMENT & PLAN NOTE
S/p RCA BMS at outside facility >10 years ago  No cardiac chest pain reported  Continue medical management with asa, statin, BB

## 2023-09-08 NOTE — HPI
66yo M w/ HTN, HLD, PAD, CKD, T2DM, MDD, CAD with hx MI/stents, hx prostate CA p/w abd pain, nausea, vomiting, and diarrhea for 6 days. He is s/p cholecystectomy. Patient hypertensive post procedure and required Cardene gtt which has since been discontinued. His antihypertensives have been adjusted and his SBP was noted in the 170s-180s overnight. Cardiology consulted for hypertension.   Patient denies chest pain, SOB, edema. + cough with abdominal pain and chronic back pain which he feels is at his baseline.

## 2023-09-08 NOTE — ASSESSMENT & PLAN NOTE
Outpatient regimen includes coreg, irbesartan, hydralazine   Unable to keep po meds down prior to admission     See HTN urgency for regimen

## 2023-09-08 NOTE — CONSULTS
[place] [facility]  Cardiology  Consult Note    Patient Name: Neena Kohli  MRN: [id number]  Admission Date: 9/3/2023  Hospital Length of Stay: 4 days  Code Status: Full Code   Attending Provider: Mary Barfield MD   Consulting Provider: Reggie Cazares NP  Primary Care Physician: CLAY Ramirez MD  Principal Problem:Calculus of gallbladder with acute cholecystitis and obstruction    Patient information was obtained from patient, past medical records and ER records.     Inpatient consult to [facility]iology[facility]  Consult performed by: Reggie Cazares NP  Consult ordered by: Mary Barfield MD        Subjective:     Chief Complaint:  HTN     HPI:   68yo M w/ HTN, HLD, PAD, CKD, T2DM, MDD, CAD with hx MI/stents, hx prostate CA p/w abd pain, nausea, vomiting, and diarrhea for 6 days. He is s/p cholecystectomy. Patient hypertensive post procedure and required Cardene gtt which has since been discontinued. His antihypertensives have been adjusted and his SBP was noted in the 170s-180s overnight. Cardiology consulted for hypertension.   Patient denies chest pain, SOB, edema. + cough with abdominal pain and chronic back pain which he feels is at his baseline.            Past Medical History:   Diagnosis Date    Arthritis     Cancer     Prostate    Diabetes mellitus     Hyperthyroidism     PAD (peripheral artery disease)        Past Surgical History:   Procedure Laterality Date    LAPAROSCOPIC CHOLECYSTECTOMY N/A 9/5/2023    Procedure: CHOLECYSTECTOMY, LAPAROSCOPIC;  Surgeon: Alvaro Snyder MD;  Location: [facility];  Service: General;  Laterality: N/A;    PROSTATE SURGERY         Review of patient's allergies indicates:  No Known Allergies    No current facility-administered medications on file prior to encounter.     Current Outpatient Medications on File Prior to Encounter   Medication Sig    allopurinoL (ZYLOPRIM) 100 MG tablet Take 1 tablet (100 mg total) by mouth once daily.    amLODIPine (NORVASC)  10 MG tablet Take 1 tablet (10 mg total) by mouth once daily.    aspirin (ECOTRIN) 81 MG EC tablet Take 81 mg by mouth once daily.    atorvastatin (LIPITOR) 40 MG tablet Take 1 tablet (40 mg total) by mouth once daily.    buPROPion (WELLBUTRIN) 75 MG tablet TAKE 1 TABLET EVERY DAY    carvediloL (COREG) 25 MG tablet Take 1 tablet (25 mg total) by mouth 2 (two) times daily.    cholecalciferol, vitamin D3, 1,250 mcg (50,000 unit) capsule Take 50,000 Units by mouth every 14 (fourteen) days.    citalopram (CELEXA) 20 MG tablet Take 20 mg by mouth once daily.    ergocalciferol (ERGOCALCIFEROL) 50,000 unit Cap Take 50,000 Units by mouth every 7 days.    glimepiride (AMARYL) 1 MG tablet TAKE 2 TABLETS EVERY DAY    hydrALAZINE (APRESOLINE) 100 MG tablet Take 1 tablet (100 mg total) by mouth every 8 (eight) hours.    irbesartan (AVAPRO) 300 MG tablet Take 1 tablet (300 mg total) by mouth once daily.    metFORMIN (GLUCOPHAGE) 1000 MG tablet Take 1 tablet (1,000 mg total) by mouth 2 (two) times daily with meals.    oxyCODONE (ROXICODONE) 15 MG Tab Take 15 mg by mouth every 4 to 6 hours as needed.    oxyCODONE myristate (XTAMPZA ER) 13.5 mg CSpT take 1 capsule  by mouth every morning with a full meal as needed for pain    propranoloL (INDERAL) 10 MG tablet Take 10 mg by mouth once daily.    QUEtiapine (SEROQUEL) 100 MG Tab Take 1 tablet (100 mg total) by mouth 2 (two) times daily. At bedtime    topiramate (TOPAMAX) 100 MG tablet Take 100 mg by mouth once daily.    traZODone (DESYREL) 100 MG tablet Take 100 mg by mouth every evening.    TRUE METRIX GLUCOSE TEST STRIP Strp     TRUEPLUS LANCETS 33 gauge Misc      Family History       Problem Relation (Age of Onset)    Cancer Paternal Uncle    Diabetes Mother, Father    Gout Father    Hyperlipidemia Mother, Father    Hypertension Mother, Father    Stroke Maternal Grandmother          Tobacco Use    Smoking status: Every Day     Current packs/day: 1.00     Average  packs/day: 1 pack/day for 51.7 years (51.7 ttl pk-yrs)     Types: Vaping with nicotine, Cigarettes     Start date: 1972    Smokeless tobacco: Current    Tobacco comments:     Enrolled in the [employer] on 2/20/19 ([employer] Member ID # [id number])  Ambulatory referral to Smoking Cessation clinic following hospital discharge.  Pt is a 1 pk/day cigarette smoker x 52 yrs.- switched to vaping with nicotine approx. 3 months ago.    Substance and Sexual Activity    Alcohol use: Not Currently     Alcohol/week: 0.0 standard drinks of alcohol     Comment: Former User    Drug use: Not Currently     Types: Cocaine, Marijuana     Comment: Former User    Sexual activity: Not Currently     Review of Systems   Constitutional: Negative for diaphoresis and fever.   HENT: Negative.     Cardiovascular:  Negative for chest pain, irregular heartbeat, leg swelling, near-syncope, orthopnea, palpitations, paroxysmal nocturnal dyspnea and syncope.   Respiratory:  Positive for cough. Negative for shortness of breath.    Endocrine: Negative.    Musculoskeletal:  Positive for back pain and myalgias.   Gastrointestinal:  Positive for abdominal pain, bowel incontinence and diarrhea. Negative for nausea and vomiting.   Genitourinary: Negative.    Neurological: Negative.      Objective:     Vital Signs (Most Recent):  Temp: 98.9 °F (37.2 °C) (09/08/23 1132)  Pulse: (!) 42 (09/08/23 1208)  Resp: 16 (09/08/23 1132)  BP: (!) 172/68 (09/08/23 1136)  SpO2: 95 % (09/08/23 1143) Vital Signs (24h Range):  Temp:  [98.1 °F (36.7 °C)-100.1 °F (37.8 °C)] 98.9 °F (37.2 °C)  Pulse:  [42-59] 42  Resp:  [11-25] 16  SpO2:  [90 %-99 %] 95 %  BP: (172-218)/() 172/68     Weight: 87 kg (191 lb 12.8 oz)  Body mass index is 30.04 kg/m².    SpO2: 95 %         Intake/Output Summary (Last 24 hours) at 9/8/2023 1256  Last data filed at 9/8/2023 0830  Gross per 24 hour   Intake 649.67 ml   Output 625 ml   Net 24.67 ml       Lines/Drains/Airways       Peripheral  "Intravenous Line  Duration                  Peripheral IV - Single Lumen 09/04/23 0326 20 G Right Antecubital 4 days         Peripheral IV - Single Lumen 09/04/23 1200 20 G Anterior;Right Forearm 4 days                     Physical Exam  Constitutional:       General: He is not in acute distress.     Appearance: He is not diaphoretic.   Eyes:      General:         Right eye: No discharge.         Left eye: No discharge.   Cardiovascular:      Rate and Rhythm: Normal rate and regular rhythm.      Heart sounds: Murmur heard.   Pulmonary:      Effort: Pulmonary effort is normal.      Breath sounds: No rales.   Abdominal:      General: There is distension.      Palpations: Abdomen is soft.   Skin:     General: Skin is warm and dry.   Neurological:      Mental Status: He is alert. Mental status is at baseline.          Significant Labs: BMP:   Recent Labs   Lab 09/07/23  0712 09/08/23  0508    113*    140   K 3.3* 3.7    102   CO2 28 27   BUN 18 20   CREATININE 1.3 1.3   CALCIUM 8.8 8.8   MG 1.8  --    , CMP   Recent Labs   Lab 09/07/23  0712 09/08/23  0508    140   K 3.3* 3.7    102   CO2 28 27    113*   BUN 18 20   CREATININE 1.3 1.3   CALCIUM 8.8 8.8   PROT 5.8* 5.8*   ALBUMIN 2.3* 2.4*   BILITOT 0.5 0.4   ALKPHOS 122 126   AST 13 12   ALT 13 15   ANIONGAP 8 11   , CBC   Recent Labs   Lab 09/07/23  0712 09/08/23  0508   WBC 7.80 7.32   HGB 10.8* 10.0*   HCT 32.9* 31.0*    225   , INR No results for input(s): "INR", "PROTIME" in the last 48 hours., Lipid Panel No results for input(s): "CHOL", "HDL", "LDLCALC", "TRIG", "CHOLHDL" in the last 48 hours., Troponin No results for input(s): "TROPONINI" in the last 48 hours., and All pertinent lab results from the last 24 hours have been reviewed.    Significant Imaging: Echocardiogram: Transthoracic echo (TTE) complete (Cupid Only):   Results for orders placed or performed during the hospital encounter of 09/03/23   Echo Saline " "Bubble? No   Result Value Ref Range    BSA 1.99 m2    Ahn's Biplane MOD Ejection Fraction 71 %    LVOT stroke volume 105.67 cm3    LVIDd 5.01 3.5 - 6.0 cm    LV Systolic Volume 49.73 mL    LV Systolic Volume Index 25.4 mL/m2    LVIDs 3.47 2.1 - 4.0 cm    LV Diastolic Volume 119.04 mL    LV Diastolic Volume Index 60.73 mL/m2    IVS 1.23 (A) 0.6 - 1.1 cm    LVOT diameter 1.95 cm    LVOT area 3.0 cm2    FS 31 28 - 44 %    Left Ventricle Relative Wall Thickness 0.33 cm    Posterior Wall 0.82 0.6 - 1.1 cm    LV mass 188.75 g    LV Mass Index 96 g/m2    MV Peak E Trent 1.00 m/s    TDI LATERAL 0.11 m/s    TDI SEPTAL 0.08 m/s    E/E' ratio 10.53 m/s    MV Peak A Trent 1.19 m/s    TR Max Trent 2.05 m/s    E/A ratio 0.84     E wave deceleration time 155.08 msec    MV "A" wave duration [id number] msec    LV SEPTAL E/E' RATIO 12.50 m/s    LV LATERAL E/E' RATIO 9.09 m/s    PV Peak S Trent 1.04 m/s    PV Peak D Trent 0.57 m/s    Pulm vein S/D ratio 1.82     LVOT peak trent 1.72 m/s    Left Ventricular Outflow Tract Mean Velocity 1.07 cm/s    Left Ventricular Outflow Tract Mean Gradient 5.25 mmHg    LA volume (mod) 64.56 cm3    LA Volume Index (Mod) 32.9 mL/m2    LA size 4.66 cm    Left Atrium Major Axis 4.97 cm    Left Atrium Minor Axis 5.32 cm    RVDD 3.23 cm    RV S' 19.25 cm/s    TAPSE 3.00 cm    RA Major Axis 3.92 cm    RA Width 3.61 cm    AV mean gradient 11 mmHg    AV peak gradient 19 mmHg    Ao peak trent 2.20 m/s    Ao VTI 47.80 cm    LVOT peak VTI 35.40 cm    AV valve area 2.21 cm²    AV Velocity Ratio 0.78     AV index (prosthetic) 0.74     ASPEN by Velocity Ratio 2.33 cm²    MV mean gradient 3 mmHg    MV peak gradient 6 mmHg    MV stenosis pressure 1/2 time 44.97 ms    MV valve area p 1/2 method 4.89 cm2    MV valve area by continuity eq 3.97 cm2    MV VTI 26.6 cm    Triscuspid Valve Regurgitation Peak Gradient 17 mmHg    PV PEAK VELOCITY 1.37 m/s    PV peak gradient 8 mmHg    Pulmonary Valve Mean Velocity 1.15 m/s    IVC " diameter 1.85 cm    Mean e' 0.10 m/s    ZLVIDS 0.05     ZLVIDD -1.13     LA Volume Index 45.7 mL/m2    LA volume 89.57 cm3    LA WIDTH 4.4 cm    TV resting pulmonary artery pressure 20 mmHg    RV TB RVSP 5 mmHg    Est. RA pres 3 mmHg    Narrative      Left Ventricle: The left ventricle is normal in size. Normal wall   thickness. Normal wall motion. There is normal systolic function. Biplane   (2D) method of discs ejection fraction is 71%. There is normal diastolic   function.    Left Atrium: Left atrium is moderately dilated.    Right Ventricle: Normal right ventricular cavity size. Wall thickness   is normal. Right ventricle wall motion  is normal. Systolic function is   normal.    Mitral Valve: There is mild regurgitation.    Tricuspid Valve: There is mild regurgitation.    Pulmonary Artery: The estimated pulmonary artery systolic pressure is   20 mmHg.    IVC/SVC: Normal venous pressure at 3 mmHg.       Assessment and Plan:     Obstructive sleep apnea  CPAP at HS    PAD (peripheral artery disease)  Stable  Continue medical therapy     Coronary artery disease involving native coronary artery of native heart without angina pectoris  S/p RCA BMS at outside facility >10 years ago  No cardiac chest pain reported  Continue medical management with asa, statin, BB       Hypertension, essential  Difficult to control HTN while hospitalized   Reports checking BP BID at home and notes SBP readings in the 140s-150s  Multiple changes noted to antihypertensive regimen since hospitalized. Recommend continuing current regimen of Losartan 100 mg daily, Norvasc 10 mg daily, hydralazine 100 mg TID, HCTZ 25 mg daily,  Aldactone 25, Coreg 25 mg BID- if BP remains significantly elevated for the next 24 hours and pain is reported as well controlled- consider up titration of Coreg to 37.5 mg BID.  Consider US to eval for LEANN         VTE Risk Mitigation (From admission, onward)         Ordered     enoxaparin injection 40 mg  Daily          09/04/23 0104     IP VTE HIGH RISK PATIENT  Once         09/04/23 0104     Place sequential compression device  Until discontinued         09/04/23 0104                Thank you for your consult. I will sign off. Please contact us if you have any additional questions.    Reggie Cazares NP  Cardiology   [place] [facility]

## 2023-09-08 NOTE — ASSESSMENT & PLAN NOTE
Takes oxycodone 15mg multiple times per day for chronic pain prescribed by pain physician - suspect minimizing use but states he takes 4 tabs a day and more if needed    - confirmed on  -- pt recently picked up Rx of oxy 15mg, 175 tabs for a 30d supply  Off PCA. Continue oxy 15 to QID    Aggressive bowel regimen

## 2023-09-08 NOTE — ASSESSMENT & PLAN NOTE
Patient has a current diagnosis of hypertensive urgency (without evidence of end organ damage) which is uncontrolled.  Latest blood pressure and vitals reviewed-   Temp:  [98.1 °F (36.7 °C)-100.1 °F (37.8 °C)]   Pulse:  [42-61]   Resp:  [16-20]   BP: (160-218)/(68-84)   SpO2:  [92 %-98 %] .   Patient currently off IV antihypertensives.   Home meds for hypertension were reviewed and noted below.   Hypertension Medications             amLODIPine (NORVASC) 10 MG tablet Take 1 tablet (10 mg total) by mouth once daily.    carvediloL (COREG) 25 MG tablet Take 1 tablet (25 mg total) by mouth 2 (two) times daily.    hydrALAZINE (APRESOLINE) 100 MG tablet Take 1 tablet (100 mg total) by mouth every 8 (eight) hours.    irbesartan (AVAPRO) 300 MG tablet Take 1 tablet (300 mg total) by mouth once daily.    propranoloL (INDERAL) 10 MG tablet Take 10 mg by mouth once daily.          Medication adjustment for hospital antihypertensives is as follows-     Goal MAP reduction of 25-30% in first few hours with goal to avoid rapidly correcting BP  - likely significant component of pain related and inability to tolerate p.o. medications    Pain under reasonable control as patient is back on his home opioid regimen.     Continue coreg 25 mg BID, HCTZ 25 mg QD, hydralazine 100 mg TID, amlodipine 10 mg QD, losartan 100 mg QD, spironolactone 25 mg QD  Appears to have good effect with clonidine. Though clonidine not a preferred agent due to risk of rebound HTN, if clonidine is effective in reducing BP- consider scheduled clonidine  Bradycardia with HR in the 40's noted which limits uptitration of coreg.     Cardiology consult    Will aim for controlled BP reduction by medications noted above. Monitor and mitigate end organ damage as indicated.

## 2023-09-08 NOTE — ASSESSMENT & PLAN NOTE
Difficult to control HTN while hospitalized   Reports checking BP BID at home and notes SBP readings in the 140s-150s  Multiple changes noted to antihypertensive regimen since hospitalized. Recommend continuing current regimen of Losartan 100 mg daily, Norvasc 10 mg daily, hydralazine 100 mg TID, HCTZ 25 mg daily,  Aldactone 25, Coreg 25 mg BID- if BP remains significantly elevated for the next 24 hours and pain is reported as well controlled- consider up titration of Coreg to 37.5 mg BID.  Consider US to eval for LEANN

## 2023-09-08 NOTE — PLAN OF CARE
SW met with pt at bedside during rounding with MD. SW made aware of possible dc today. Pt family will provide transport home. SW will continue to follow pt throughout his transitions of care and assist with any dc needs.     SW requested Cardi follow up.    Future Appointments   Date Time Provider Department Center   9/25/2023  1:20 PM Alvaro Snyder MD [facility] GENSUR [place] [facility]   9/25/2023  2:00 PM Declan Vazquez, CTTS [facility] SMOKE [place] [facility]   9/26/2023  2:40 PM Solitario Keller Jr., MD [facility] CARDIO [place]   9/27/2023  1:30 PM Chantel Calle MD [facility] IM [place]   10/5/2023 11:00 AM Gregorio Watson MD [facility] NEPHRO [place]        09/08/23 0940   Post-Acute Status   Post-Acute Authorization Other

## 2023-09-08 NOTE — NURSING
Assumed care of pt from PRABHU Anderson. Pt AAOx4, lying in bed with HOB at 45o. No report of pain or discomfort at this time. Bed in low/locked position with alarm set, 2 side rails raised and call light within reach.

## 2023-09-08 NOTE — NURSING
Rapid Response Nurse Follow-up Note     Followed up with patient for proactive rounding.   No acute issues at this time. Reviewed plan of care with Charge RN,   Team will continue to follow, patient for any changes in assessment  Please call Rapid Response RN, Jackie Triana RN with any questions or concerns at {RRN Phone:[phone]

## 2023-09-08 NOTE — SUBJECTIVE & OBJECTIVE
Interval History:  BP remains elevated to systolic 180 this morning. Asymptomatic. Received PRN clonidine last night and this morning for systolic BP>170. Clonidine effective.   Patient had a bowel movement last night with magnesium citrate. Has chronic back pain. Abdominal pain improved.     Review of Systems   Gastrointestinal:  Positive for abdominal pain (improving).     Objective:     Vital Signs (Most Recent):  Temp: 98.9 °F (37.2 °C) (09/08/23 1132)  Pulse: (!) 54 (09/08/23 1553)  Resp: 18 (09/08/23 1412)  BP: (!) 160/78 (09/08/23 1536)  SpO2: 95 % (09/08/23 1143) Vital Signs (24h Range):  Temp:  [98.1 °F (36.7 °C)-100.1 °F (37.8 °C)] 98.9 °F (37.2 °C)  Pulse:  [42-61] 54  Resp:  [16-20] 18  SpO2:  [92 %-98 %] 95 %  BP: (160-218)/(68-84) 160/78     Weight: 87 kg (191 lb 12.8 oz)  Body mass index is 30.04 kg/m².    Intake/Output Summary (Last 24 hours) at 9/8/2023 1648  Last data filed at 9/8/2023 1230  Gross per 24 hour   Intake 780 ml   Output 200 ml   Net 580 ml           Physical Exam  Vitals and nursing note reviewed.   Constitutional:       Appearance: He is obese.   Cardiovascular:      Rate and Rhythm: Normal rate and regular rhythm.   Pulmonary:      Effort: Pulmonary effort is normal.   Abdominal:      Palpations: Abdomen is soft.      Tenderness: There is abdominal tenderness (improving).   Musculoskeletal:      Right lower leg: No edema.      Left lower leg: No edema.   Skin:     General: Skin is warm and dry.   Neurological:      Mental Status: He is alert and oriented to person, place, and time.             Significant Labs: All pertinent labs within the past 24 hours have been reviewed.    Significant Imaging: I have reviewed all pertinent imaging results/findings within the past 24 hours.

## 2023-09-08 NOTE — PHYSICIAN QUERY
PT Name: Neena Kohli  MR #: [id number]     DOCUMENTATION CLARIFICATION     CDS/: Tracy Boyle               Contact information: [email or web address]  This form is a permanent document in the medical record.     Query Date: September 8, 2023    By submitting this query, we are merely seeking further clarification of documentation.  Please utilize your independent clinical judgment when addressing the question(s) below.  The Medical Record contains the following   Indicators   Supporting Clinical Findings Location in Medical Record   x Documentation of Respiratory Failure, ARDS Acute hypoxemic respiratory failure Pulmonary & CCM Consult 09/04   x Subjective Respiratory Signs/Symptoms Pulmonary:   Effort: Pulmonary effort is normal. No respiratory distress.   Breath sounds: No wheezing or rales.   Respiratory:  Positive for shortness of breath (2/2 abd pain when inhaling). Negative for cough and choking.     No increased work of breathing noted.     Pt on documented O2, no respiratory distress noted    Pt on RA, no respiratory distress noted     Hospital Medicine H&P 09/03              Pulmonary & CCM Consult 09/04    Respiratory Therapy Plan of Care 09/05    Respiratory Therapy Plan of Care 09/07   x Objective Respiratory Signs/Symptoms Respiratory: Auscultation clear bilaterally.   lung exam with clear lung sounds   Pulmonary & CCM Consult 09/04     x RR     O2 sat     O2 use Resp:  [14-28]     SpO2:  [94 %-99 %]     Resp:  [14-36]     SpO2:  [87 %-99 %]    Resp:  [10-42]     SpO2:  [89 %-97 %]    Resp:  [12-31]     SpO2:  [87 %-97 %]    Resp:  [10-45]     SpO2:  [85 %-98 %]    Hospital Medicine H&P 09/03    Hospital Medicine PN 09/04    Hospital Medicine PN 09/05    [facility] PN 09/06    [facility] PN 09/07      Blood Gas (ABG or VBG)      Hypoxia/Hypercapnia      BiPAP/Intubation/Mechanical Ventilation     x Home O2, Oxygen Dependence Not on O2 at home   Pulmonary & CCM Consult  09/04     x Acute/Chronic Illness 66yo M w/ HTN, HLD, PAD, CKD, T2DM, MDD, CAD with hx MI/stents, hx prostate CA p/w abd pain, nausea, vomiting, and diarrhea for last 6 days.  Calculus of gallbladder with acute cholecystitis and obstruction    PMH HTN, smoking who presents with symptomatic cholelithiasis and mild cholecystitis  Emphysema  Pt is long time smoker with emphysematous changes on his CT scan/CXR.    Obstructive sleep apnea     [facility] Medicine H&P 09/03        Pulmonology/CC PN 09/05        [facility] Medicine PN 09/05   x Treatment Patient on low-dose oxygen to maintain SpO2 in 90%s  Could be 2/2 splinting given severe abdominal pain  Wean oxygen as tolerated  Obtaining CXR    Goal O2 saturation 88-92%  PRN albuterol as needed  Emphysematous changes on his CT scan/CXR. Would benefit from outpatient follow-up with PFTs.    Obstructive sleep apnea- CPAP QHS    NC @ 3 L weaned to RA   Pulmonary & CCM Consult 09/04          Pulmonology/CC PN 09/05          [facility] Medicine PN 09/06    VS Flowsheet    Other         The clinical guidelines noted are only a system guideline. It does not replace the providers clinical judgment.    [facility] Approved Diagnostic Criteria      Acute Respiratory Failure    Hypoxic: ABG pO2<60 mmHg or O2 sat of <91% on RA   AND/OR   Hypercapnic: ABG pCO2>50 mmHg with pH <7.35   AND   Respiratory symptoms documented (Subjective: SOB; Objective: Tachypnea, respiratory distress, increased work of breathing, unable to speak in complete sentences, labored breathing, use of accessory muscles, RR>26, cyanosis, dyspnea, wheezing, stridor, lethargy)      Chronic Respiratory Failure   Hypoxic: Continuous home oxygen    AND/OR   Hypercapnic: Normal pH with high CO2 (ex. COPD)      Acute on Chronic Respiratory Failure   Hypoxic: ABG pO2>10mmHg below baseline OR ABG pO2<60 mmHg OR SpO2<91% on usual home O2  OR O2>2L/min over baseline home O2   AND/OR   Hypercapnic: ABG pCO2>50 mmHg OR  pCO2>10mmHg over baseline and pH <7.35   AND   Respiratory symptoms documented      Acute Respiratory Distress Syndrome (ARDS) - an acute, diffuse, inflammatory form of lung injury. Suspect with progressive dyspnea, hypoxemic respiratory failure, and bilateral alveolar infiltrates on chest imaging within 6 to 72 hours of an inciting event.   Acute Respiratory Distress - Generally describes less severe respiratory symptoms (tachypnea, in respiratory distress, increased work of breathing, unable to speak in complete sentences, labored breathing, use of accessory muscles, RR> 24, cyanosis, dyspnea, wheezing, stridor, lethargy) without sufficient measurements (pO2, SpO2, pH, and pCO2) to meet criteria for respiratory failure)   Acute Respiratory Insufficiency - Generally describes less severe respiratory symptoms and measurements (pO2, SpO2, pH, and pCO2) not meeting criteria for respiratory failure         Due to the conflicting clinical picture, please clinically validate the diagnosis of __Acute hypoxemic respiratory failure__.    If validated, please provide additional clinical support for the diagnosis.     [  x  ] Above stated diagnosis is not confirmed and/or it has been ruled out     [    ] Above stated diagnosis is not confirmed and/or it has been ruled out, other diagnosis ruled in (please specify):_______________     [    ] Acute Respiratory Failure with Hypoxia diagnosis is confirmed and additional clinical support/decision-making indicators for the diagnosis include (please specify):___________________________________________________________________________________________________________________     [    ] Other clarification (please specify): _____________________________________________________________________________________________       Present on admission (POA) status:  [  ] Yes (Y)   [  ] No (N)   [  ] Documentation insufficient to determine if condition is POA (U)   [  ] Clinically  Undetermined (W)     Please document in your progress notes daily for the duration of treatment until resolved and include in your discharge summary.     Reference:    MAURISIO Carey MD. (2020, March 13). Acute respiratory distress syndrome: Clinical features, diagnosis, and complications in adults ([id number] CLAY Renee MD & [id number] SEBASTIÁN Hammond MD, Eds.). Retrieved November 13, 2020, from [email or web address]-[email or web address]-distress-syndrome-[email or web address]-features-diagnosis-and-complications-[email or web address]-adults?search=ards&source=search_result&selectedTitle=1~150&usage_type=default&display_rank=1  Form No. [id number]

## 2023-09-08 NOTE — NURSING
Plan of care and safety protocols reviewed with patient. Medications given per MAR. Per pt request, bowel care not completed in order to give PO medications time to work.     0022 - Large liquid BM produced.

## 2023-09-09 VITALS
HEIGHT: 67 IN | OXYGEN SATURATION: 95 % | BODY MASS INDEX: 28.34 KG/M2 | DIASTOLIC BLOOD PRESSURE: 71 MMHG | WEIGHT: 180.56 LBS | HEART RATE: 52 BPM | RESPIRATION RATE: 18 BRPM | TEMPERATURE: 99 F | SYSTOLIC BLOOD PRESSURE: 164 MMHG

## 2023-09-09 LAB
ALBUMIN SERPL BCP-MCNC: 2.6 G/DL (ref 3.5–5.2)
ALP SERPL-CCNC: 119 U/L (ref 55–135)
ALT SERPL W/O P-5'-P-CCNC: 11 U/L (ref 10–44)
ANION GAP SERPL CALC-SCNC: 11 MMOL/L (ref 8–16)
AST SERPL-CCNC: 10 U/L (ref 10–40)
BACTERIA BLD CULT: NORMAL
BACTERIA BLD CULT: NORMAL
BASOPHILS # BLD AUTO: 0.05 K/UL (ref 0–0.2)
BASOPHILS NFR BLD: 0.7 % (ref 0–1.9)
BILIRUB SERPL-MCNC: 0.3 MG/DL (ref 0.1–1)
BUN SERPL-MCNC: 18 MG/DL (ref 8–23)
CALCIUM SERPL-MCNC: 8.9 MG/DL (ref 8.7–10.5)
CHLORIDE SERPL-SCNC: 104 MMOL/L (ref 95–110)
CO2 SERPL-SCNC: 24 MMOL/L (ref 23–29)
CREAT SERPL-MCNC: 1.2 MG/DL (ref 0.5–1.4)
DIFFERENTIAL METHOD: ABNORMAL
EOSINOPHIL # BLD AUTO: 0.4 K/UL (ref 0–0.5)
EOSINOPHIL NFR BLD: 5.3 % (ref 0–8)
ERYTHROCYTE [DISTWIDTH] IN BLOOD BY AUTOMATED COUNT: 13.2 % (ref 11.5–14.5)
EST. GFR  (NO RACE VARIABLE): >60 ML/MIN/1.73 M^2
GLUCOSE SERPL-MCNC: 121 MG/DL (ref 70–110)
HCT VFR BLD AUTO: 31.2 % (ref 40–54)
HGB BLD-MCNC: 10.1 G/DL (ref 14–18)
IMM GRANULOCYTES # BLD AUTO: 0.03 K/UL (ref 0–0.04)
IMM GRANULOCYTES NFR BLD AUTO: 0.4 % (ref 0–0.5)
LYMPHOCYTES # BLD AUTO: 1.5 K/UL (ref 1–4.8)
LYMPHOCYTES NFR BLD: 20.9 % (ref 18–48)
MCH RBC QN AUTO: 29.8 PG (ref 27–31)
MCHC RBC AUTO-ENTMCNC: 32.4 G/DL (ref 32–36)
MCV RBC AUTO: 92 FL (ref 82–98)
MONOCYTES # BLD AUTO: 1 K/UL (ref 0.3–1)
MONOCYTES NFR BLD: 14.3 % (ref 4–15)
NEUTROPHILS # BLD AUTO: 4 K/UL (ref 1.8–7.7)
NEUTROPHILS NFR BLD: 58.4 % (ref 38–73)
NRBC BLD-RTO: 0 /100 WBC
PLATELET # BLD AUTO: 241 K/UL (ref 150–450)
PMV BLD AUTO: 10.7 FL (ref 9.2–12.9)
POCT GLUCOSE: 120 MG/DL (ref 70–110)
POTASSIUM SERPL-SCNC: 3.6 MMOL/L (ref 3.5–5.1)
PROT SERPL-MCNC: 5.8 G/DL (ref 6–8.4)
RBC # BLD AUTO: 3.39 M/UL (ref 4.6–6.2)
SODIUM SERPL-SCNC: 139 MMOL/L (ref 136–145)
WBC # BLD AUTO: 6.93 K/UL (ref 3.9–12.7)

## 2023-09-09 PROCEDURE — 25000003 PHARM REV CODE 250: Performed by: SURGERY

## 2023-09-09 PROCEDURE — 85025 COMPLETE CBC W/AUTO DIFF WBC: CPT | Performed by: STUDENT IN AN ORGANIZED HEALTH CARE EDUCATION/TRAINING PROGRAM

## 2023-09-09 PROCEDURE — 25000003 PHARM REV CODE 250: Performed by: STUDENT IN AN ORGANIZED HEALTH CARE EDUCATION/TRAINING PROGRAM

## 2023-09-09 PROCEDURE — 36415 COLL VENOUS BLD VENIPUNCTURE: CPT | Performed by: STUDENT IN AN ORGANIZED HEALTH CARE EDUCATION/TRAINING PROGRAM

## 2023-09-09 PROCEDURE — 99900035 HC TECH TIME PER 15 MIN (STAT)

## 2023-09-09 PROCEDURE — 94799 UNLISTED PULMONARY SVC/PX: CPT

## 2023-09-09 PROCEDURE — 94761 N-INVAS EAR/PLS OXIMETRY MLT: CPT

## 2023-09-09 PROCEDURE — S4991 NICOTINE PATCH NONLEGEND: HCPCS | Performed by: SURGERY

## 2023-09-09 PROCEDURE — 80053 COMPREHEN METABOLIC PANEL: CPT | Performed by: STUDENT IN AN ORGANIZED HEALTH CARE EDUCATION/TRAINING PROGRAM

## 2023-09-09 RX ORDER — IBUPROFEN 200 MG
1 TABLET ORAL DAILY
Qty: 30 PATCH | Refills: 2 | Status: SHIPPED | OUTPATIENT
Start: 2023-09-09

## 2023-09-09 RX ORDER — SPIRONOLACTONE 25 MG/1
25 TABLET ORAL DAILY
Qty: 30 TABLET | Refills: 2 | Status: SHIPPED | OUTPATIENT
Start: 2023-09-09

## 2023-09-09 RX ORDER — HYDROCHLOROTHIAZIDE 25 MG/1
25 TABLET ORAL DAILY
Qty: 30 TABLET | Refills: 2 | Status: SHIPPED | OUTPATIENT
Start: 2023-09-10

## 2023-09-09 RX ORDER — AMOXICILLIN 250 MG
2 CAPSULE ORAL DAILY
Qty: 60 TABLET | Refills: 2 | Status: SHIPPED | OUTPATIENT
Start: 2023-09-10

## 2023-09-09 RX ADMIN — OXYCODONE HYDROCHLORIDE 15 MG: 5 TABLET ORAL at 08:09

## 2023-09-09 RX ADMIN — LOSARTAN POTASSIUM 100 MG: 50 TABLET, FILM COATED ORAL at 08:09

## 2023-09-09 RX ADMIN — ATORVASTATIN CALCIUM 40 MG: 40 TABLET, FILM COATED ORAL at 08:09

## 2023-09-09 RX ADMIN — ACETAMINOPHEN 1000 MG: 500 TABLET ORAL at 03:09

## 2023-09-09 RX ADMIN — ONDANSETRON 8 MG: 8 TABLET, ORALLY DISINTEGRATING ORAL at 05:09

## 2023-09-09 RX ADMIN — SPIRONOLACTONE 25 MG: 25 TABLET ORAL at 08:09

## 2023-09-09 RX ADMIN — HYDRALAZINE HYDROCHLORIDE 100 MG: 25 TABLET, FILM COATED ORAL at 05:09

## 2023-09-09 RX ADMIN — MUPIROCIN: 20 OINTMENT TOPICAL at 08:09

## 2023-09-09 RX ADMIN — AMLODIPINE BESYLATE 10 MG: 5 TABLET ORAL at 08:09

## 2023-09-09 RX ADMIN — CLONIDINE HYDROCHLORIDE 0.1 MG: 0.1 TABLET ORAL at 04:09

## 2023-09-09 RX ADMIN — BUPROPION HYDROCHLORIDE 75 MG: 75 TABLET, FILM COATED ORAL at 08:09

## 2023-09-09 RX ADMIN — HYDROCHLOROTHIAZIDE 25 MG: 25 TABLET ORAL at 08:09

## 2023-09-09 NOTE — PLAN OF CARE
Pt will dc with no needs noted. Pts family will provide transport home. SW will continue to follow pt throughout his transitions of care and assist with any dc needs.     Cleared from CM . Bedside Nurse and VN notified.    Future Appointments   Date Time Provider Department Center   9/25/2023  1:20 PM Alvaro Snyder MD [facility] GENSUR [place] [facility]   9/25/2023  2:00 PM Declan Vazquez CTTS [facility] SMOKE [place] [facility]   9/26/2023  2:40 PM Solitario Keller Jr., MD [facility] CARDIO [place]   9/27/2023  1:30 PM Chantel Calle MD [facility] IM [place]   10/5/2023 11:00 AM Gregorio Watson MD [facility] NEPHRO [place]        09/09/23 0809   Final Note   Assessment Type Final Discharge Note   Anticipated Discharge Disposition Home   Hospital Resources/Appts/Education Provided Appointments scheduled and added to AVS   Post-Acute Status   Discharge Delays None known at this time

## 2023-09-09 NOTE — NURSING
AVS reviewed with patient by virtual nurse. Verbalized understanding of upcoming appointments and home medications. IV's and Cardiac monitoring removed. Prescriptions sent to preferred pharmacy. W/c transport to escort patient and belongings to main lobby. Voiced no concerns.

## 2023-09-09 NOTE — HOSPITAL COURSE
67-year-old male with PMH of hypertension, hyperlipidemia, PAD, type 2 diabetes mellitus, major depressive disorder, chronic pain on narcotics, CAD with history of PCI, history of prostate cancer presented with abdominal pain, nausea/vomiting and diarrhea with poor p.o. intake and found to have acute cholecystitis with CBD dilatation and possible choledocholithiasis on imaging.  Underwent cholecystectomy on 09/05.  Antibiotics discontinued as per surgery recommendation.  Patient is able to tolerate a diet without difficulty.  Since admission, noted to have blood pressure elevated but asymptomatic in the 200s systolic requiring IV antihypertensives.  Initially on nicardipine drip, subsequently weaned off with addition of antihypertensives.  Initial hypertension thought to be in setting of opiate withdrawal, subsequently started on home equivalent oxycodone dosing.  Discharged on amlodipine 10 mg, irbesartan 300 mg, carvedilol 25 mg b.i.d., hydralazine 100 mg t.i.d. (these were home medications prior to admission) with added hydrochlorothiazide 25 mg q.d. and Aldactone 25 mg q.d. patient was seen by Cardiology and recommended gradual uptitration of medications as outpatient.  Patient reports difficult to treat hypertension most of his life.  Consider workup for secondary hypertension in the outpatient setting. Surgery & cardiology follow ups ordered.     Physical Exam  Vitals and nursing note reviewed.   Constitutional:       Appearance: He is obese.   Cardiovascular:      Rate and Rhythm: Normal rate and regular rhythm.   Pulmonary:      Effort: Pulmonary effort is normal.   Abdominal:      Palpations: Abdomen is soft.      Tenderness: There is no abdominal tenderness   Musculoskeletal:      Right lower leg: No edema.      Left lower leg: No edema.   Skin:     General: Skin is warm and dry.   Neurological:      Mental Status: He is alert and oriented to person, place, and time.

## 2023-09-09 NOTE — NURSING
"Assumed care of pt from PRABHU Anderson. Pt sitting up in bed, AAOx4. Pt reports pain 3/10 at this time. Discussed how pt's day had gone. Pt agitated and expressed frustration at still being here. Insistent that he was "leaving tomorrow" and "son't care what the doctor says". Explained to pt that we were concerned about his elevated blood pressure and did not want him to go home and possibly have a sroke. Pt asked "why the machine said one number and the other thing (I believe he was talking about the manual cuff) said a different number." I explained that while the machine was ty pically reliable, the manual method usually produces the most accurate reading. Pt seemed to accept this explanation and calmed down a bit. Reviewed tonight's plan of care and safety protocols with pt. Bed in low/locked position with call light within reach, bed alarm set and call light within reach.  "

## 2023-09-09 NOTE — PROGRESS NOTES
VN cued into room to review discharge instructions.  Went over doctor specific instructions, new medications, where to , follow up appointments, when to call the doctor.  All questions answered.  Informed pt of survey.  Transport requested.     09/09/23 1040   Nurse Notification   Charge Nurse Notified? Yes   Name of Charge Nurse Boone   Bedside Nurse Notified? Yes   Name of Bedside Nurse Meredith    Notification    Notified Of Discharge Status   Shift   Virtual Nurse - Rounding Complete   Virtual Nurse - Patient Verbalized Approval Of Camera Use;VN Rounding   Type of Frequent Check   Type Other (see comments)  (Discharge)   Safety/Activity   Patient Rounds bed in low position;placement of personal items at bedside;bed wheels locked;call light in patient/parent reach;visualized patient;clutter free environment maintained   Safety Promotion/Fall Prevention medications reviewed;assistive device/personal item within reach

## 2023-09-09 NOTE — DISCHARGE SUMMARY
[facility] Medicine  Discharge Summary      Patient Name: Neena Kohli  MRN: [id number]  AJAY: [id number]  Patient Class: IP- Inpatient  Admission Date: 9/3/2023  Hospital Length of Stay: 5 days  Discharge Date and Time: 9/9/2023 10:54 AM  Attending Physician: No att. providers found   Discharging Provider: Mary Barfield MD  Primary Care Provider: CLAY Ramirez MD    Primary Care Team: Networked reference to record PCT     HPI:   66yo M w/ HTN, HLD, PAD, CKD, T2DM, MDD, CAD with hx MI/stents, hx prostate CA p/w abd pain, nausea, vomiting, and diarrhea for last 6 days. Unable to keep down much of anything po, including his BP meds. BP usually well controlled on multiple medications but has been high since his symptoms began.  No fevers (did not take temperature at home) but has had chills. No flank pain, dysuria,or other urinary symptoms. Had some substernal CP when breathing deeply earlier today but denies CP at time of my assessment. Also worsening of abdominal pain with deep breathing. Denies HA, vision changes, weakness, dizziness or lightheadedness. No recent falls. No sick contacts. Has never had an episode like this before. He was unaware that he had gallstones.     He has chronic back pain and takes Oxycodone 15mg several times per day prescribed by pain doctor. He reports compliance with all of his meds prior to getting sick, but since he has been unable to keep down much, has not had all of his meds. Some nonbloody diarrhea. No constipation. Workup significant for imaging revealing gallstones with CBD dilation, possible cholecystitis and possible choledocholithiasis. ED physician spoke to general surgery who requested HIDA scan and admission to internal medicine given multiple co-morbidities. Sinus volodymyr but otherwise hemodynamically stable. Initially admitted pt to med/tele bed but due to inability to control BP with po meds, decision made to transfer him to the ICU for a  nicardipine gtt.            Procedure(s) (LRB):  CHOLECYSTECTOMY, LAPAROSCOPIC (N/A)      Hospital Course:   67-year-old male with PMH of hypertension, hyperlipidemia, PAD, type 2 diabetes mellitus, major depressive disorder, chronic pain on narcotics, CAD with history of PCI, history of prostate cancer presented with abdominal pain, nausea/vomiting and diarrhea with poor p.o. intake and found to have acute cholecystitis with CBD dilatation and possible choledocholithiasis on imaging.  Underwent cholecystectomy on 09/05.  Antibiotics discontinued as per surgery recommendation.  Patient is able to tolerate a diet without difficulty.  Since admission, noted to have blood pressure elevated but asymptomatic in the 200s systolic requiring IV antihypertensives.  Initially on nicardipine drip, subsequently weaned off with addition of antihypertensives.  Initial hypertension thought to be in setting of opiate withdrawal, subsequently started on home equivalent oxycodone dosing.  Discharged on amlodipine 10 mg, irbesartan 300 mg, carvedilol 25 mg b.i.d., hydralazine 100 mg t.i.d. (these were home medications prior to admission) with added hydrochlorothiazide 25 mg q.d. and Aldactone 25 mg q.d. patient was seen by Cardiology and recommended gradual uptitration of medications as outpatient.  Patient reports difficult to treat hypertension most of his life.  Consider workup for secondary hypertension in the outpatient setting. Surgery & cardiology follow ups ordered.     Physical Exam  Vitals and nursing note reviewed.   Constitutional:       Appearance: He is obese.   Cardiovascular:      Rate and Rhythm: Normal rate and regular rhythm.   Pulmonary:      Effort: Pulmonary effort is normal.   Abdominal:      Palpations: Abdomen is soft.      Tenderness: There is no abdominal tenderness   Musculoskeletal:      Right lower leg: No edema.      Left lower leg: No edema.   Skin:     General: Skin is warm and dry.   Neurological:       Mental Status: He is alert and oriented to person, place, and time.        Goals of Care Treatment Preferences:  Code Status: Full Code      Consults:   Consults (From admission, onward)        Status Ordering Provider     Inpatient consult to Cardiology-[facility]  Once        Provider:  (Not yet assigned)    Completed COLEMAN LINARES     Inpatient consult to General surgery  Once        Provider:  (Not yet assigned)    Completed ALPHONSO MCNALLY JR          Final Active Diagnoses:    Diagnosis Date Noted POA    PRINCIPAL PROBLEM:  Hypertensive urgency [I16.0] 09/04/2023 Yes    Calculus of gallbladder with acute cholecystitis and obstruction [K80.01] 09/04/2023 Yes    Hypokalemia [E87.6] 09/04/2023 Yes    Sinus bradycardia [R00.1] 09/04/2023 Yes    Acute respiratory failure with hypoxemia [J96.01] 09/04/2023 Yes    Obstructive sleep apnea [G47.33] 09/04/2023 Yes    Major depressive disorder, recurrent severe without psychotic features [F33.2] 08/23/2023 Yes    Mild aortic valve stenosis [I35.0] 06/14/2022 Yes    Mixed hyperlipidemia [E78.2] 05/28/2021 Yes     Chronic    Presence of stent in coronary artery [Z95.5] 05/28/2021 Not Applicable     Chronic    Coronary artery disease involving native coronary artery of native heart without angina pectoris [I25.10] 12/11/2020 Yes     Chronic    PAD (peripheral artery disease) [I73.9] 12/11/2020 Yes     Chronic    Type 2 diabetes mellitus with stage 3 chronic kidney disease, without long-term current use of insulin [E11.22, N18.30] 12/11/2020 Yes    Hypertension, essential [I10] 12/09/2019 Yes     Chronic    DDD (degenerative disc disease), lumbar [M51.36] 10/05/2015 Yes    Stage 3b chronic kidney disease [N18.32] 01/02/2013 Yes      Problems Resolved During this Admission:       Discharged Condition: stable    Disposition: Home or Self Care    Follow Up:   Follow-up Information     Alvaro Snyder MD Follow up on 9/25/2023.    Specialty: General  Surgery  Why: 1:20pm  Contact information:  [address]  [address]  [place] [address]  [phone]             Dr. Chantel Calle([facility]) Follow up on 9/27/2023.    Why: 1:30pm  Contact information:  [address]   [place] [address]    [phone]           Gregorio Watson MD Follow up on 10/5/2023.    Specialty: Nephrology  Why: 11:00am  Contact information:  [address]  [facility] [address]  [phone]                       Patient Instructions:      Ambulatory referral/consult to Cardiology   Standing Status: Future   Referral Priority: Routine Referral Type: Consultation   Referral Reason: Specialty Services Required   Requested Specialty: Cardiology   Number of Visits Requested: 1     Diet Cardiac     Diet diabetic       Significant Diagnostic Studies: Labs:   BMP:   Recent Labs   Lab 09/08/23  0508 09/09/23  0433   * 121*    139   K 3.7 3.6    104   CO2 27 24   BUN 20 18   CREATININE 1.3 1.2   CALCIUM 8.8 8.9    and CBC   Recent Labs   Lab 09/08/23  0508 09/09/23  0433   WBC 7.32 6.93   HGB 10.0* 10.1*   HCT 31.0* 31.2*    241       Pending Diagnostic Studies:     Procedure Component Value Units Date/Time    Specimen to Pathology, Surgery General Surgery [[id number]] Collected: 09/05/23 1346    Order Status: Sent Lab Status: In process Updated: 09/06/23 0849    Specimen: Tissue          Medications:  Reconciled Home Medications:      Medication List      START taking these medications    hydroCHLOROthiazide 25 MG tablet  Commonly known as: HYDRODIURIL  Take 1 tablet (25 mg total) by mouth once daily.  Start taking on: September 10, 2023     nicotine 14 mg/24 hr  Commonly known as: NICODERM CQ  Place 1 patch onto the skin once daily.     senna-docusate 8.6-50 mg 8.6-50 mg per tablet  Commonly known as: PERICOLACE  Take 2 tablets by mouth once daily.  Start taking on: September 10, 2023     spironolactone 25 MG tablet  Commonly  known as: ALDACTONE  Take 1 tablet (25 mg total) by mouth once daily.        CONTINUE taking these medications    allopurinoL 100 MG tablet  Commonly known as: ZYLOPRIM  Take 1 tablet (100 mg total) by mouth once daily.     amLODIPine 10 MG tablet  Commonly known as: NORVASC  Take 1 tablet (10 mg total) by mouth once daily.     aspirin 81 MG EC tablet  Commonly known as: ECOTRIN  Take 81 mg by mouth once daily.     atorvastatin 40 MG tablet  Commonly known as: LIPITOR  Take 1 tablet (40 mg total) by mouth once daily.     buPROPion 75 MG tablet  Commonly known as: WELLBUTRIN  TAKE 1 TABLET EVERY DAY     carvediloL 25 MG tablet  Commonly known as: COREG  Take 1 tablet (25 mg total) by mouth 2 (two) times daily.     cholecalciferol (vitamin D3) 1,250 mcg (50,000 unit) capsule  Take 50,000 Units by mouth every 14 (fourteen) days.     citalopram 20 MG tablet  Commonly known as: CeleXA  Take 20 mg by mouth once daily.     ergocalciferol 50,000 unit Cap  Commonly known as: ERGOCALCIFEROL  Take 50,000 Units by mouth every 7 days.     glimepiride 1 MG tablet  Commonly known as: AMARYL  TAKE 2 TABLETS EVERY DAY     hydrALAZINE 100 MG tablet  Commonly known as: APRESOLINE  Take 1 tablet (100 mg total) by mouth every 8 (eight) hours.     irbesartan 300 MG tablet  Commonly known as: AVAPRO  Take 1 tablet (300 mg total) by mouth once daily.     metFORMIN 1000 MG tablet  Commonly known as: GLUCOPHAGE  Take 1 tablet (1,000 mg total) by mouth 2 (two) times daily with meals.     oxyCODONE 15 MG Tab  Commonly known as: ROXICODONE  Take 15 mg by mouth every 4 to 6 hours as needed.     QUEtiapine 100 MG Tab  Commonly known as: SEROQUEL  Take 1 tablet (100 mg total) by mouth 2 (two) times daily. At bedtime     topiramate 100 MG tablet  Commonly known as: TOPAMAX  Take 100 mg by mouth once daily.     traZODone 100 MG tablet  Commonly known as: DESYREL  Take 100 mg by mouth every evening.     TRUE METRIX GLUCOSE TEST STRIP Strp  Generic  drug: blood sugar diagnostic     TRUEPLUS LANCETS 33 gauge Misc  Generic drug: lancets     XTAMPZA ER 13.5 mg Cspt  Generic drug: oxyCODONE myristate  take 1 capsule  by mouth every morning with a full meal as needed for pain        STOP taking these medications    propranoloL 10 MG tablet  Commonly known as: INDERAL            Indwelling Lines/Drains at time of discharge:   Lines/Drains/Airways     None                 Time spent on the discharge of patient: 36 minutes         Mary Barfield MD  Department of Hospital Medicine  [facility]

## 2023-09-11 ENCOUNTER — PATIENT OUTREACH (OUTPATIENT)
Dept: ADMINISTRATIVE | Facility: CLINIC | Age: 68
End: 2023-09-11
Payer: MEDICARE

## 2023-09-11 LAB
FINAL PATHOLOGIC DIAGNOSIS: NORMAL
Lab: NORMAL

## 2023-09-11 NOTE — PROGRESS NOTES
C3 nurse attempted to contact Neena Kohli for a TCC post hospital discharge follow up call. No answer. Left voicemail with callback information. The patient has a scheduled HOSFU appointment with Dr. Chantel aClle on 9/27/2023@ [phone].

## 2023-09-11 NOTE — PROGRESS NOTES
C3 nurse spoke with Neena Kohli for a TCC post hospital discharge follow up call. The patient has a scheduled [facility] appointment with Dr. Chantel Calle on 9/27/2023@ [phone].

## 2023-09-17 ENCOUNTER — TELEPHONE (OUTPATIENT)
Dept: ADMINISTRATIVE | Facility: CLINIC | Age: 68
End: 2023-09-17
Payer: MEDICARE

## 2023-09-17 ENCOUNTER — PATIENT OUTREACH (OUTPATIENT)
Dept: ADMINISTRATIVE | Facility: OTHER | Age: 68
End: 2023-09-17
Payer: MEDICARE

## 2023-09-17 NOTE — PROGRESS NOTES
CHW - Outreach Attempt    Community Health Worker left a voicemail message for 1st attempt to contact patient regardinst missed initial outreach attempt call.

## 2023-09-17 NOTE — PROGRESS NOTES
CHW - Outreach Attempt    Community Health Worker left a voicemail message for 2nd attempt to contact patient regardinnd missed initial outreach attempt call.

## 2023-09-26 ENCOUNTER — OFFICE VISIT (OUTPATIENT)
Dept: CARDIOLOGY | Facility: CLINIC | Age: 68
End: 2023-09-26
Payer: MEDICARE

## 2023-09-26 VITALS
WEIGHT: 172.19 LBS | DIASTOLIC BLOOD PRESSURE: 64 MMHG | BODY MASS INDEX: 27.02 KG/M2 | HEIGHT: 67 IN | HEART RATE: 52 BPM | SYSTOLIC BLOOD PRESSURE: 122 MMHG

## 2023-09-26 DIAGNOSIS — I25.10 CORONARY ARTERY DISEASE INVOLVING NATIVE CORONARY ARTERY OF NATIVE HEART WITHOUT ANGINA PECTORIS: Chronic | ICD-10-CM

## 2023-09-26 DIAGNOSIS — E78.2 MIXED HYPERLIPIDEMIA: Chronic | ICD-10-CM

## 2023-09-26 DIAGNOSIS — I10 HYPERTENSION, ESSENTIAL: Chronic | ICD-10-CM

## 2023-09-26 DIAGNOSIS — I25.2 OLD MI (MYOCARDIAL INFARCTION): Chronic | ICD-10-CM

## 2023-09-26 PROCEDURE — 3044F PR MOST RECENT HEMOGLOBIN A1C LEVEL <7.0%: ICD-10-PCS | Mod: CPTII,S$GLB,, | Performed by: INTERNAL MEDICINE

## 2023-09-26 PROCEDURE — 99999 PR PBB SHADOW E&M-EST. PATIENT-LVL IV: CPT | Mod: PBBFAC,,, | Performed by: INTERNAL MEDICINE

## 2023-09-26 PROCEDURE — 3074F SYST BP LT 130 MM HG: CPT | Mod: CPTII,S$GLB,, | Performed by: INTERNAL MEDICINE

## 2023-09-26 PROCEDURE — 1101F PT FALLS ASSESS-DOCD LE1/YR: CPT | Mod: CPTII,S$GLB,, | Performed by: INTERNAL MEDICINE

## 2023-09-26 PROCEDURE — 1126F PR PAIN SEVERITY QUANTIFIED, NO PAIN PRESENT: ICD-10-PCS | Mod: CPTII,S$GLB,, | Performed by: INTERNAL MEDICINE

## 2023-09-26 PROCEDURE — 1159F PR MEDICATION LIST DOCUMENTED IN MEDICAL RECORD: ICD-10-PCS | Mod: CPTII,S$GLB,, | Performed by: INTERNAL MEDICINE

## 2023-09-26 PROCEDURE — 1111F PR DISCHARGE MEDS RECONCILED W/ CURRENT OUTPATIENT MED LIST: ICD-10-PCS | Mod: CPTII,S$GLB,, | Performed by: INTERNAL MEDICINE

## 2023-09-26 PROCEDURE — 99214 PR OFFICE/OUTPT VISIT, EST, LEVL IV, 30-39 MIN: ICD-10-PCS | Mod: S$GLB,,, | Performed by: INTERNAL MEDICINE

## 2023-09-26 PROCEDURE — 1159F MED LIST DOCD IN RCRD: CPT | Mod: CPTII,S$GLB,, | Performed by: INTERNAL MEDICINE

## 2023-09-26 PROCEDURE — 99999 PR PBB SHADOW E&M-EST. PATIENT-LVL IV: ICD-10-PCS | Mod: PBBFAC,,, | Performed by: INTERNAL MEDICINE

## 2023-09-26 PROCEDURE — 1111F DSCHRG MED/CURRENT MED MERGE: CPT | Mod: CPTII,S$GLB,, | Performed by: INTERNAL MEDICINE

## 2023-09-26 PROCEDURE — 4010F PR ACE/ARB THEARPY RXD/TAKEN: ICD-10-PCS | Mod: CPTII,S$GLB,, | Performed by: INTERNAL MEDICINE

## 2023-09-26 PROCEDURE — 3061F PR NEG MICROALBUMINURIA RESULT DOCUMENTED/REVIEW: ICD-10-PCS | Mod: CPTII,S$GLB,, | Performed by: INTERNAL MEDICINE

## 2023-09-26 PROCEDURE — 1101F PR PT FALLS ASSESS DOC 0-1 FALLS W/OUT INJ PAST YR: ICD-10-PCS | Mod: CPTII,S$GLB,, | Performed by: INTERNAL MEDICINE

## 2023-09-26 PROCEDURE — 1126F AMNT PAIN NOTED NONE PRSNT: CPT | Mod: CPTII,S$GLB,, | Performed by: INTERNAL MEDICINE

## 2023-09-26 PROCEDURE — 99214 OFFICE O/P EST MOD 30 MIN: CPT | Mod: S$GLB,,, | Performed by: INTERNAL MEDICINE

## 2023-09-26 PROCEDURE — 3008F BODY MASS INDEX DOCD: CPT | Mod: CPTII,S$GLB,, | Performed by: INTERNAL MEDICINE

## 2023-09-26 PROCEDURE — 3288F FALL RISK ASSESSMENT DOCD: CPT | Mod: CPTII,S$GLB,, | Performed by: INTERNAL MEDICINE

## 2023-09-26 PROCEDURE — 3078F PR MOST RECENT DIASTOLIC BLOOD PRESSURE < 80 MM HG: ICD-10-PCS | Mod: CPTII,S$GLB,, | Performed by: INTERNAL MEDICINE

## 2023-09-26 PROCEDURE — 4010F ACE/ARB THERAPY RXD/TAKEN: CPT | Mod: CPTII,S$GLB,, | Performed by: INTERNAL MEDICINE

## 2023-09-26 PROCEDURE — 3066F NEPHROPATHY DOC TX: CPT | Mod: CPTII,S$GLB,, | Performed by: INTERNAL MEDICINE

## 2023-09-26 PROCEDURE — 3008F PR BODY MASS INDEX (BMI) DOCUMENTED: ICD-10-PCS | Mod: CPTII,S$GLB,, | Performed by: INTERNAL MEDICINE

## 2023-09-26 PROCEDURE — 3074F PR MOST RECENT SYSTOLIC BLOOD PRESSURE < 130 MM HG: ICD-10-PCS | Mod: CPTII,S$GLB,, | Performed by: INTERNAL MEDICINE

## 2023-09-26 PROCEDURE — 3066F PR DOCUMENTATION OF TREATMENT FOR NEPHROPATHY: ICD-10-PCS | Mod: CPTII,S$GLB,, | Performed by: INTERNAL MEDICINE

## 2023-09-26 PROCEDURE — 3288F PR FALLS RISK ASSESSMENT DOCUMENTED: ICD-10-PCS | Mod: CPTII,S$GLB,, | Performed by: INTERNAL MEDICINE

## 2023-09-26 PROCEDURE — 3044F HG A1C LEVEL LT 7.0%: CPT | Mod: CPTII,S$GLB,, | Performed by: INTERNAL MEDICINE

## 2023-09-26 PROCEDURE — 3061F NEG MICROALBUMINURIA REV: CPT | Mod: CPTII,S$GLB,, | Performed by: INTERNAL MEDICINE

## 2023-09-26 PROCEDURE — 1160F RVW MEDS BY RX/DR IN RCRD: CPT | Mod: CPTII,S$GLB,, | Performed by: INTERNAL MEDICINE

## 2023-09-26 PROCEDURE — 1160F PR REVIEW ALL MEDS BY PRESCRIBER/CLIN PHARMACIST DOCUMENTED: ICD-10-PCS | Mod: CPTII,S$GLB,, | Performed by: INTERNAL MEDICINE

## 2023-09-26 PROCEDURE — 3078F DIAST BP <80 MM HG: CPT | Mod: CPTII,S$GLB,, | Performed by: INTERNAL MEDICINE

## 2023-09-26 RX ORDER — ATORVASTATIN CALCIUM 80 MG/1
80 TABLET, FILM COATED ORAL NIGHTLY
Qty: 90 TABLET | Refills: 3 | Status: SHIPPED | OUTPATIENT
Start: 2023-09-26 | End: 2024-09-25

## 2023-09-26 NOTE — PROGRESS NOTES
Subjective:   09/26/2023     Patient ID:  Neena Kohli is a 67 y.o. male who presents for evaulation of Hypertension, Hyperlipidemia, and Old MI      Patient known to me with hypertension, hyperlipidemia and peripheral arterial disease.  He has a history of coronary artery disease and PCI.  He was recent admitted to the hospital with hypertensive crisis.  His medicines were resumed in he did well.  Blood pressure control is good.  Echocardiography during the hospitalization showed normal systolic function.      Peripheral arterial disease is stable.      He has quit smoking, but still vapes.    No chest pains or tightness.      Cholesterol very well controlled, would like to see it even better, increase atorvastatin to 80 mg nightly    Recent hospitalization:   67-year-old male with PMH of hypertension, hyperlipidemia, PAD, type 2 diabetes mellitus, major depressive disorder, chronic pain on narcotics, CAD with history of PCI, history of prostate cancer presented with abdominal pain, nausea/vomiting and diarrhea with poor p.o. intake and found to have acute cholecystitis with CBD dilatation and possible choledocholithiasis on imaging.  Underwent cholecystectomy on 09/05.  Antibiotics discontinued as per surgery recommendation.  Patient is able to tolerate a diet without difficulty.  Since admission, noted to have blood pressure elevated but asymptomatic in the 200s systolic requiring IV antihypertensives.  Initially on nicardipine drip, subsequently weaned off with addition of antihypertensives.  Initial hypertension thought to be in setting of opiate withdrawal, subsequently started on home equivalent oxycodone dosing.  Discharged on amlodipine 10 mg, irbesartan 300 mg, carvedilol 25 mg b.i.d., hydralazine 100 mg t.i.d. (these were home medications prior to admission) with added hydrochlorothiazide 25 mg q.d. and Aldactone 25 mg q.d. patient was seen by Cardiology and recommended gradual uptitration of  medications as outpatient.  Patient reports difficult to treat hypertension most of his life.  Consider workup for secondary hypertension in the outpatient setting. Surgery & cardiology follow ups ordered.     Echocardiogram during that hospitalization:   ·  Left Ventricle: The left ventricle is normal in size. Normal wall thickness. Normal wall motion. There is normal systolic function. Biplane (2D) method of discs ejection fraction is 71%. There is normal diastolic function.  ·  Left Atrium: Left atrium is moderately dilated.  ·  Right Ventricle: Normal right ventricular cavity size. Wall thickness is normal. Right ventricle wall motion  is normal. Systolic function is normal.  ·  Mitral Valve: There is mild regurgitation.  ·  Tricuspid Valve: There is mild regurgitation.  ·  Pulmonary Artery: The estimated pulmonary artery systolic pressure is 20 mmHg.  ·  IVC/SVC: Normal venous pressure at 3 mmHg.       Prior note:  Patient comes in with a history of heart problems.  He had an episode of chest pain last year and was seen in the emergency room at [facility].  His initial cardiac evaluation was negative and he was discharged.  A stress test was subsequently performed.     ·  Normal myocardial perfusion scan. There is no evidence of myocardial ischemia or infarction.  ·  The gated perfusion images showed an ejection fraction of 63% at rest. The gated perfusion images showed an ejection fraction of 72% post stress. Normal ejection fraction is greater than 51%.  ·  There is normal wall motion at rest and post stress.  ·  LV cavity size is normal at rest and normal at stress.  ·  The EKG portion of this study is negative for ischemia.  ·  The patient reported no chest pain during the stress test.  ·  During stress, rare PACs are noted.  ·  There are no prior studies for comparison.      He has not had further chest pain since that time.  He did have prior heart attacks, prior coronary stents.  These were done [place].    Records obtained from [facility] include an office visit with Dr. Putnam in 2020.  It was noted that he had coronary disease and was status post right coronary artery bare metal stent.  He developed a late stent thrombosis treated with PTCA in January of 2010. In 2016 he had abnormal Lexiscan stress test and was treated with medical therapy.  He had had a syncopal episode last year, head CT was benign.  Orthostatics were negative.  His troponin was negative.  Carvedilol was discontinued because of bradycardia.  A 30 day event detector was pending results at that time.  On that visit, he was sent for an adenosine stress test, and a tilt test.  The results of those tests are unknown.    His activity is quite limited because of severe peripheral arterial disease.  He notes that he is status post peripheral stents, he does not know who put those in.  These were done [facility]. He continues to have right leg pain with activity, 1 block walking.  The pain is more in the upper thigh then the calf.  This is unchanged.    Hypertension is present, his blood pressure appears elevated on current medications.    He does have chronic kidney disease, stage III, will try to avoid nephrotoxic agents including angiographic contrast.  Followed by Nephrology.    Hypercholesterolemia is present.  His triglycerides are normal.  Fenofibrate was discontinued, pravastatin dose increased, his last LDL was 36.  Needs to be rechecked    He has resumed smoking cigarettes.  Smoking cessation strongly stressed    Mild aortic valve stenosis present noted on recent echocardiography:  · The left ventricle is normal in size with normal systolic function.  · The estimated ejection fraction is 70%.  · Indeterminate left ventricular diastolic function.  · The estimated PA systolic pressure is 28 mmHg.  · Normal right ventricular size with normal right ventricular systolic function.  · Normal central venous pressure (3  mmHg).  · There is no pulmonary hypertension.  · There is mild aortic valve sclerosis without stenosis                Past Medical History:   Diagnosis Date    Arthritis     Cancer     Prostate    Diabetes mellitus     Hyperthyroidism     PAD (peripheral artery disease)        Review of patient's allergies indicates:  No Known Allergies      Current Outpatient Medications:     allopurinoL (ZYLOPRIM) 100 MG tablet, Take 1 tablet (100 mg total) by mouth once daily., Disp: 90 tablet, Rfl: 0    amLODIPine (NORVASC) 10 MG tablet, Take 1 tablet (10 mg total) by mouth once daily., Disp: 90 tablet, Rfl: 2    aspirin (ECOTRIN) 81 MG EC tablet, Take 81 mg by mouth once daily., Disp: , Rfl:     buPROPion (WELLBUTRIN) 75 MG tablet, TAKE 1 TABLET EVERY DAY, Disp: 90 tablet, Rfl: 0    carvediloL (COREG) 25 MG tablet, Take 1 tablet (25 mg total) by mouth 2 (two) times daily., Disp: 180 tablet, Rfl: 3    cholecalciferol, vitamin D3, 1,250 mcg (50,000 unit) capsule, Take 50,000 Units by mouth every 14 (fourteen) days., Disp: , Rfl:     citalopram (CELEXA) 20 MG tablet, Take 20 mg by mouth once daily., Disp: , Rfl:     ergocalciferol (ERGOCALCIFEROL) 50,000 unit Cap, Take 50,000 Units by mouth every 7 days., Disp: , Rfl:     glimepiride (AMARYL) 1 MG tablet, TAKE 2 TABLETS EVERY DAY, Disp: 180 tablet, Rfl: 1    hydrALAZINE (APRESOLINE) 100 MG tablet, Take 1 tablet (100 mg total) by mouth every 8 (eight) hours., Disp: 270 tablet, Rfl: 3    hydroCHLOROthiazide (HYDRODIURIL) 25 MG tablet, Take 1 tablet (25 mg total) by mouth once daily., Disp: 30 tablet, Rfl: 2    irbesartan (AVAPRO) 300 MG tablet, Take 1 tablet (300 mg total) by mouth once daily., Disp: 90 tablet, Rfl: 0    metFORMIN (GLUCOPHAGE) 1000 MG tablet, Take 1 tablet (1,000 mg total) by mouth 2 (two) times daily with meals., Disp: 180 tablet, Rfl: 0    nicotine (NICODERM CQ) 14 mg/24 hr, Place 1 patch onto the skin once daily., Disp: 30 patch, Rfl: 2    oxyCODONE (ROXICODONE)  15 MG Tab, Take 15 mg by mouth every 4 to 6 hours as needed., Disp: , Rfl:     QUEtiapine (SEROQUEL) 100 MG Tab, Take 1 tablet (100 mg total) by mouth 2 (two) times daily. At bedtime, Disp: 60 tablet, Rfl: 2    spironolactone (ALDACTONE) 25 MG tablet, Take 1 tablet (25 mg total) by mouth once daily., Disp: 30 tablet, Rfl: 2    topiramate (TOPAMAX) 100 MG tablet, Take 100 mg by mouth once daily., Disp: , Rfl:     traZODone (DESYREL) 100 MG tablet, Take 100 mg by mouth every evening., Disp: , Rfl:     TRUE METRIX GLUCOSE TEST STRIP Strp, , Disp: , Rfl:     TRUEPLUS LANCETS 33 gauge Misc, , Disp: , Rfl:     atorvastatin (LIPITOR) 80 MG tablet, Take 1 tablet (80 mg total) by mouth every evening., Disp: 90 tablet, Rfl: 3    oxyCODONE myristate (XTAMPZA ER) 13.5 mg CSpT, take 1 capsule  by mouth every morning with a full meal as needed for pain (Patient not taking: Reported on 9/11/2023), Disp: 30 each, Rfl: 0    senna-docusate 8.6-50 mg (PERICOLACE) 8.6-50 mg per tablet, Take 2 tablets by mouth once daily., Disp: 60 tablet, Rfl: 2     Objective:   Review of Systems   Cardiovascular:  Positive for dyspnea on exertion. Negative for chest pain, claudication, cyanosis, irregular heartbeat, leg swelling, near-syncope, orthopnea, palpitations, paroxysmal nocturnal dyspnea and syncope.       Vitals:    09/26/23 1503   BP: 122/64   Pulse: (!) 52           Physical Exam  Vitals reviewed.   Constitutional:       General: He is not in acute distress.     Appearance: He is well-developed.   HENT:      Head: Normocephalic and atraumatic.      Nose: Nose normal.   Eyes:      Conjunctiva/sclera: Conjunctivae normal.      Pupils: Pupils are equal, round, and reactive to light.   Neck:      Vascular: No carotid bruit or JVD.   Cardiovascular:      Rate and Rhythm: Normal rate and regular rhythm.      Pulses: Intact distal pulses. Decreased pulses.           Carotid pulses are 2+ on the right side and 3+ on the left side.       Radial  pulses are 2+ on the right side and 2+ on the left side.        Femoral pulses are 2+ on the right side and 2+ on the left side.       Popliteal pulses are 1+ on the right side and 1+ on the left side.        Dorsalis pedis pulses are 0 on the right side and 0 on the left side.        Posterior tibial pulses are 0 on the right side and 0 on the left side.      Heart sounds: Normal heart sounds. No murmur heard.     No friction rub. No gallop.   Pulmonary:      Effort: Pulmonary effort is normal. No respiratory distress.      Breath sounds: Normal breath sounds. No wheezing or rales.   Chest:      Chest wall: No tenderness.   Abdominal:      General: Bowel sounds are normal. There is no distension.      Palpations: Abdomen is soft.      Tenderness: There is no abdominal tenderness.   Musculoskeletal:         General: No tenderness or deformity. Normal range of motion.      Cervical back: Normal range of motion and neck supple.      Right lower leg: No edema.      Left lower leg: No edema.   Skin:     General: Skin is warm and dry.      Findings: No erythema or rash.   Neurological:      Mental Status: He is alert and oriented to person, place, and time.      Cranial Nerves: No cranial nerve deficit.      Motor: No abnormal muscle tone.      Coordination: Coordination normal.   Psychiatric:         Behavior: Behavior normal.         Thought Content: Thought content normal.         Judgment: Judgment normal.         No results displayed because visit has over 200 results.      Office Visit on 09/03/2023   Component Date Value    POC Blood, Urine 09/03/2023 Negative     POC Bilirubin, Urine 09/03/2023 Negative     POC Urobilinogen, Urine 09/03/2023 normal     POC Ketones, Urine 09/03/2023 Negative     POC Protein, Urine 09/03/2023 Positive (A)     POC Nitrates, Urine 09/03/2023 Negative     POC Glucose, Urine 09/03/2023 Negative     pH, UA 09/03/2023 5.5     POC Specific Gravity, Ur* 09/03/2023 1.025     POC Leukocytes,  Urine 09/03/2023 Negative    Lab Visit on 08/23/2023   Component Date Value    PSA, Screen 08/23/2023 <0.01     WBC 08/23/2023 8.28     RBC 08/23/2023 4.29 (L)     Hemoglobin 08/23/2023 12.7 (L)     Hematocrit 08/23/2023 41.7     MCV 08/23/2023 97     MCH 08/23/2023 29.6     MCHC 08/23/2023 30.5 (L)     RDW 08/23/2023 13.5     Platelets 08/23/2023 286     MPV 08/23/2023 11.1     Immature Granulocytes 08/23/2023 0.4     Gran # (ANC) 08/23/2023 4.8     Immature Grans (Abs) 08/23/2023 0.03     Lymph # 08/23/2023 2.2     Mono # 08/23/2023 0.8     Eos # 08/23/2023 0.4     Baso # 08/23/2023 0.05     nRBC 08/23/2023 0     Gran % 08/23/2023 58.2     Lymph % 08/23/2023 26.3     Mono % 08/23/2023 9.4     Eosinophil % 08/23/2023 5.1     Basophil % 08/23/2023 0.6     Differential Method 08/23/2023 Automated     Microalbumin, Urine 08/23/2023 12.0     Creatinine, Urine 08/23/2023 220.0     Microalb/Creat Ratio 08/23/2023 5.5         Assessment and Plan:     Coronary artery disease involving native coronary artery of native heart without angina pectoris  Comments:  No angina  Orders:  -     Ambulatory referral/consult to Cardiology    Hypertension, essential  Comments:  Well controlled today  Orders:  -     Ambulatory referral/consult to Cardiology    Mixed hyperlipidemia  Comments:  Increase atorvastatin 80 mg nightly  Orders:  -     atorvastatin (LIPITOR) 80 MG tablet; Take 1 tablet (80 mg total) by mouth every evening.  Dispense: 90 tablet; Refill: 3    Old MI (myocardial infarction)  Comments:  Systolic function preserved  Orders:  -     atorvastatin (LIPITOR) 80 MG tablet; Take 1 tablet (80 mg total) by mouth every evening.  Dispense: 90 tablet; Refill: 3          Follow up in about 6 months (around 3/26/2024).

## 2023-09-27 ENCOUNTER — OFFICE VISIT (OUTPATIENT)
Dept: INTERNAL MEDICINE | Facility: CLINIC | Age: 68
End: 2023-09-27
Payer: MEDICARE

## 2023-09-27 VITALS
SYSTOLIC BLOOD PRESSURE: 130 MMHG | RESPIRATION RATE: 17 BRPM | OXYGEN SATURATION: 95 % | HEART RATE: 51 BPM | WEIGHT: 172.63 LBS | TEMPERATURE: 97 F | BODY MASS INDEX: 27.09 KG/M2 | HEIGHT: 67 IN | DIASTOLIC BLOOD PRESSURE: 60 MMHG

## 2023-09-27 DIAGNOSIS — E11.51 DIABETES MELLITUS WITH PERIPHERAL CIRCULATORY DISORDER: Primary | ICD-10-CM

## 2023-09-27 DIAGNOSIS — I10 HYPERTENSION, ESSENTIAL: Chronic | ICD-10-CM

## 2023-09-27 DIAGNOSIS — Z90.49 S/P LAPAROSCOPIC CHOLECYSTECTOMY: ICD-10-CM

## 2023-09-27 DIAGNOSIS — I25.10 CORONARY ARTERY DISEASE INVOLVING NATIVE CORONARY ARTERY OF NATIVE HEART WITHOUT ANGINA PECTORIS: Chronic | ICD-10-CM

## 2023-09-27 PROCEDURE — 3075F SYST BP GE 130 - 139MM HG: CPT | Mod: CPTII,S$GLB,, | Performed by: INTERNAL MEDICINE

## 2023-09-27 PROCEDURE — 3066F NEPHROPATHY DOC TX: CPT | Mod: CPTII,S$GLB,, | Performed by: INTERNAL MEDICINE

## 2023-09-27 PROCEDURE — 1126F AMNT PAIN NOTED NONE PRSNT: CPT | Mod: CPTII,S$GLB,, | Performed by: INTERNAL MEDICINE

## 2023-09-27 PROCEDURE — 3078F PR MOST RECENT DIASTOLIC BLOOD PRESSURE < 80 MM HG: ICD-10-PCS | Mod: CPTII,S$GLB,, | Performed by: INTERNAL MEDICINE

## 2023-09-27 PROCEDURE — 1111F DSCHRG MED/CURRENT MED MERGE: CPT | Mod: CPTII,S$GLB,, | Performed by: INTERNAL MEDICINE

## 2023-09-27 PROCEDURE — 90694 VACC AIIV4 NO PRSRV 0.5ML IM: CPT | Mod: S$GLB,,, | Performed by: INTERNAL MEDICINE

## 2023-09-27 PROCEDURE — 3061F PR NEG MICROALBUMINURIA RESULT DOCUMENTED/REVIEW: ICD-10-PCS | Mod: CPTII,S$GLB,, | Performed by: INTERNAL MEDICINE

## 2023-09-27 PROCEDURE — 3075F PR MOST RECENT SYSTOLIC BLOOD PRESS GE 130-139MM HG: ICD-10-PCS | Mod: CPTII,S$GLB,, | Performed by: INTERNAL MEDICINE

## 2023-09-27 PROCEDURE — 1111F PR DISCHARGE MEDS RECONCILED W/ CURRENT OUTPATIENT MED LIST: ICD-10-PCS | Mod: CPTII,S$GLB,, | Performed by: INTERNAL MEDICINE

## 2023-09-27 PROCEDURE — 4010F PR ACE/ARB THEARPY RXD/TAKEN: ICD-10-PCS | Mod: CPTII,S$GLB,, | Performed by: INTERNAL MEDICINE

## 2023-09-27 PROCEDURE — 3288F FALL RISK ASSESSMENT DOCD: CPT | Mod: CPTII,S$GLB,, | Performed by: INTERNAL MEDICINE

## 2023-09-27 PROCEDURE — 3288F PR FALLS RISK ASSESSMENT DOCUMENTED: ICD-10-PCS | Mod: CPTII,S$GLB,, | Performed by: INTERNAL MEDICINE

## 2023-09-27 PROCEDURE — 3008F BODY MASS INDEX DOCD: CPT | Mod: CPTII,S$GLB,, | Performed by: INTERNAL MEDICINE

## 2023-09-27 PROCEDURE — 90694 FLU VACCINE - QUADRIVALENT - ADJUVANTED: ICD-10-PCS | Mod: S$GLB,,, | Performed by: INTERNAL MEDICINE

## 2023-09-27 PROCEDURE — 99999 PR PBB SHADOW E&M-EST. PATIENT-LVL V: ICD-10-PCS | Mod: PBBFAC,,, | Performed by: INTERNAL MEDICINE

## 2023-09-27 PROCEDURE — 1126F PR PAIN SEVERITY QUANTIFIED, NO PAIN PRESENT: ICD-10-PCS | Mod: CPTII,S$GLB,, | Performed by: INTERNAL MEDICINE

## 2023-09-27 PROCEDURE — 3066F PR DOCUMENTATION OF TREATMENT FOR NEPHROPATHY: ICD-10-PCS | Mod: CPTII,S$GLB,, | Performed by: INTERNAL MEDICINE

## 2023-09-27 PROCEDURE — 3078F DIAST BP <80 MM HG: CPT | Mod: CPTII,S$GLB,, | Performed by: INTERNAL MEDICINE

## 2023-09-27 PROCEDURE — 1159F PR MEDICATION LIST DOCUMENTED IN MEDICAL RECORD: ICD-10-PCS | Mod: CPTII,S$GLB,, | Performed by: INTERNAL MEDICINE

## 2023-09-27 PROCEDURE — 3044F HG A1C LEVEL LT 7.0%: CPT | Mod: CPTII,S$GLB,, | Performed by: INTERNAL MEDICINE

## 2023-09-27 PROCEDURE — 3008F PR BODY MASS INDEX (BMI) DOCUMENTED: ICD-10-PCS | Mod: CPTII,S$GLB,, | Performed by: INTERNAL MEDICINE

## 2023-09-27 PROCEDURE — G0008 ADMIN INFLUENZA VIRUS VAC: HCPCS | Mod: S$GLB,,, | Performed by: INTERNAL MEDICINE

## 2023-09-27 PROCEDURE — G0008 FLU VACCINE - QUADRIVALENT - ADJUVANTED: ICD-10-PCS | Mod: S$GLB,,, | Performed by: INTERNAL MEDICINE

## 2023-09-27 PROCEDURE — 3061F NEG MICROALBUMINURIA REV: CPT | Mod: CPTII,S$GLB,, | Performed by: INTERNAL MEDICINE

## 2023-09-27 PROCEDURE — 99214 PR OFFICE/OUTPT VISIT, EST, LEVL IV, 30-39 MIN: ICD-10-PCS | Mod: S$GLB,,, | Performed by: INTERNAL MEDICINE

## 2023-09-27 PROCEDURE — 1101F PT FALLS ASSESS-DOCD LE1/YR: CPT | Mod: CPTII,S$GLB,, | Performed by: INTERNAL MEDICINE

## 2023-09-27 PROCEDURE — 4010F ACE/ARB THERAPY RXD/TAKEN: CPT | Mod: CPTII,S$GLB,, | Performed by: INTERNAL MEDICINE

## 2023-09-27 PROCEDURE — 1159F MED LIST DOCD IN RCRD: CPT | Mod: CPTII,S$GLB,, | Performed by: INTERNAL MEDICINE

## 2023-09-27 PROCEDURE — 1101F PR PT FALLS ASSESS DOC 0-1 FALLS W/OUT INJ PAST YR: ICD-10-PCS | Mod: CPTII,S$GLB,, | Performed by: INTERNAL MEDICINE

## 2023-09-27 PROCEDURE — 99999 PR PBB SHADOW E&M-EST. PATIENT-LVL V: CPT | Mod: PBBFAC,,, | Performed by: INTERNAL MEDICINE

## 2023-09-27 PROCEDURE — 3044F PR MOST RECENT HEMOGLOBIN A1C LEVEL <7.0%: ICD-10-PCS | Mod: CPTII,S$GLB,, | Performed by: INTERNAL MEDICINE

## 2023-09-27 PROCEDURE — 99214 OFFICE O/P EST MOD 30 MIN: CPT | Mod: S$GLB,,, | Performed by: INTERNAL MEDICINE

## 2023-09-27 NOTE — PROGRESS NOTES
History of present illness:  67-year-old gentleman several chronic medical conditions including hypertension, diabetes mellitus, PA D, CAD, and others following up on recent hospitalization status post laparoscopic cholecystectomy on September 5th.  The patient reports that he is doing fine.  No abdominal pain.  No nausea no vomiting.  No fever no chills.  Bowels are moving occasionally slightly loose at times but no overt diarrhea.  We saw him in August of this year for his health assessment    Current medications:   Medications are all noted and reviewed.      Review of systems:  Constitutional:  No fever no chills no generalized body aches.    HEENT:  No hoarseness no dysphagia.    Respiratory:  No cough shortness of breath.    Cardiovascular:  No chest pain or palpitations and no syncope.  GI: See HPI.      Physical examination:  General:  Pleasant alert appropriately groomed gentleman acute distress.    Vital signs:  All noted and reviewed is normal.    Eyes:  Sclerae white and nonicteric.    HEENT: Neck supple no masses no cervical adenopathy.    Lungs: Clear to auscultation.    Cardiovascular:  Regular rate rhythm.  No significant murmur.  Carotids full bilaterally bruits.  No JVD.  GI:  Soft benign no masses no organomegaly.  No significant tenderness.  Minimal discomfort groomed laparoscopic surgical sites.  No evidence of any infection with the wounds healing appropriately    Data:  Reviewed the recent discharge summary        Impression:  Status post cholecystectomy uncomplicated postop course.    Hypertension currently controlled.    Type 2 diabetes mellitus well controlled.    CAD clinically stable.    Other chronic medical conditions as noted in the electronic medical record history sections are clinically stable.      Plan:  Continue current pharmacologic regimens and appropriate follow-up.    Influenza vaccine today.  Return clinic six months follow-up
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
## 2023-12-11 PROBLEM — J96.01 ACUTE RESPIRATORY FAILURE WITH HYPOXEMIA: Status: RESOLVED | Noted: 2023-09-04 | Resolved: 2023-12-11

## 2023-12-18 DIAGNOSIS — I10 HYPERTENSION, ESSENTIAL: Chronic | ICD-10-CM

## 2023-12-18 RX ORDER — ALLOPURINOL 100 MG/1
100 TABLET ORAL DAILY
Qty: 90 TABLET | Refills: 0 | Status: SHIPPED | OUTPATIENT
Start: 2023-12-18 | End: 2024-03-01

## 2023-12-18 RX ORDER — GLIMEPIRIDE 1 MG/1
TABLET ORAL
Qty: 180 TABLET | Refills: 3 | Status: SHIPPED | OUTPATIENT
Start: 2023-12-18

## 2023-12-18 RX ORDER — IRBESARTAN 300 MG/1
300 TABLET ORAL DAILY
Qty: 90 TABLET | Refills: 0 | Status: SHIPPED | OUTPATIENT
Start: 2023-12-18 | End: 2024-03-01

## 2023-12-18 RX ORDER — HYDRALAZINE HYDROCHLORIDE 100 MG/1
100 TABLET, FILM COATED ORAL EVERY 8 HOURS
Qty: 270 TABLET | Refills: 3 | Status: SHIPPED | OUTPATIENT
Start: 2023-12-18

## 2023-12-20 RX ORDER — METFORMIN HYDROCHLORIDE 1000 MG/1
1000 TABLET ORAL 2 TIMES DAILY WITH MEALS
Qty: 180 TABLET | Refills: 0 | Status: SHIPPED | OUTPATIENT
Start: 2023-12-20 | End: 2024-03-01

## 2024-01-31 DIAGNOSIS — E11.9 TYPE 2 DIABETES MELLITUS WITHOUT COMPLICATION: ICD-10-CM

## 2024-03-01 RX ORDER — ALLOPURINOL 100 MG/1
100 TABLET ORAL
Qty: 90 TABLET | Refills: 3 | Status: SHIPPED | OUTPATIENT
Start: 2024-03-01

## 2024-03-01 RX ORDER — IRBESARTAN 300 MG/1
300 TABLET ORAL
Qty: 90 TABLET | Refills: 3 | Status: SHIPPED | OUTPATIENT
Start: 2024-03-01 | End: 2024-05-16 | Stop reason: SDUPTHER

## 2024-03-01 RX ORDER — METFORMIN HYDROCHLORIDE 1000 MG/1
1000 TABLET ORAL 2 TIMES DAILY WITH MEALS
Qty: 180 TABLET | Refills: 3 | Status: SHIPPED | OUTPATIENT
Start: 2024-03-01 | End: 2024-05-16 | Stop reason: SDUPTHER

## 2024-03-06 ENCOUNTER — TELEPHONE (OUTPATIENT)
Dept: INTERNAL MEDICINE | Facility: CLINIC | Age: 69
End: 2024-03-06
Payer: MEDICARE

## 2024-03-06 NOTE — TELEPHONE ENCOUNTER
----- Message from Flaca Triana sent at 3/6/2024 11:13 AM CST -----  Contact: [phone] pt  [email or web address]     Patient is calling for Medical Advice regarding:pt is calling to see about getting a sooner appt he has gout and needs to be seen. I did let him know we have urgent care and I could not schedule him with   Florinda Mcclendon. Please call him.    How long has patient had these symptoms: a couple of days    Pharmacy name and phone#:n/a    Would like response via [employer]:  callback    Comments:

## 2024-03-06 NOTE — TELEPHONE ENCOUNTER
I booked him to see dr argueta tomorrow.    He has appt 5/16 2pm to get estab with dr dillard     Problem: Patient Care Overview  Goal: Plan of Care Review  Outcome: Ongoing (interventions implemented as appropriate)   10/04/18 0222   Coping/Psychosocial   Plan of Care Reviewed With patient   Plan of Care Review   Progress no change   OTHER   Outcome Summary Patient c/o generalized abdominal pain. PRN pain medication decreased pain. C/o nausea that is relieved with prn nausea medication. Up ad jose. Voiding without difficulty. Patient stated she was prescribed Buspar and also an antifungal, for a yeast infection, but she does not know the dosages. NPO. No signs of distress at this time. Will continue to monitor.     Goal: Individualization and Mutuality  Outcome: Ongoing (interventions implemented as appropriate)   10/04/18 0222   Individualization   Patient Specific Preferences none   Patient Specific Goals (Include Timeframe) The patient would like less pain.    Patient Specific Interventions PRN pain medication administered as prescribed.   Mutuality/Individual Preferences   What Anxieties, Fears, Concerns, or Questions Do You Have About Your Care? none   What Information Would Help Us Give You More Personalized Care? none   Mutuality/Individual Preferences   How to Address Anxieties/Fears none     Goal: Discharge Needs Assessment  Outcome: Ongoing (interventions implemented as appropriate)   10/04/18 0222   Discharge Needs Assessment   Patient/Family Anticipates Transition to home   Patient/Family Anticipated Services at Transition none   Transportation Concerns car, none   Transportation Anticipated car, drives self   Disability   Equipment Currently Used at Home none       Problem: Pancreatitis, Acute/Chronic (Adult)  Goal: Signs and Symptoms of Listed Potential Problems Will be Absent, Minimized or Managed (Pancreatitis, Acute/Chronic)  Outcome: Ongoing (interventions implemented as appropriate)   10/04/18 0222   Goal/Outcome Evaluation   Problems Assessed (Pancreatitis) all   Problems Present  (Pancreatitis) pain;nausea and vomiting

## 2024-03-07 ENCOUNTER — OFFICE VISIT (OUTPATIENT)
Dept: INTERNAL MEDICINE | Facility: CLINIC | Age: 69
End: 2024-03-07
Payer: MEDICARE

## 2024-03-07 ENCOUNTER — LAB VISIT (OUTPATIENT)
Dept: LAB | Facility: HOSPITAL | Age: 69
End: 2024-03-07
Attending: INTERNAL MEDICINE
Payer: MEDICARE

## 2024-03-07 VITALS
WEIGHT: 176.94 LBS | HEIGHT: 67 IN | RESPIRATION RATE: 19 BRPM | OXYGEN SATURATION: 98 % | SYSTOLIC BLOOD PRESSURE: 161 MMHG | HEART RATE: 66 BPM | BODY MASS INDEX: 27.77 KG/M2 | TEMPERATURE: 98 F | DIASTOLIC BLOOD PRESSURE: 64 MMHG

## 2024-03-07 DIAGNOSIS — E11.9 TYPE 2 DIABETES MELLITUS WITHOUT COMPLICATION: ICD-10-CM

## 2024-03-07 DIAGNOSIS — M10.072 IDIOPATHIC GOUT OF LEFT ANKLE, UNSPECIFIED CHRONICITY: ICD-10-CM

## 2024-03-07 DIAGNOSIS — M10.072 IDIOPATHIC GOUT OF LEFT ANKLE, UNSPECIFIED CHRONICITY: Primary | ICD-10-CM

## 2024-03-07 DIAGNOSIS — E11.22 TYPE 2 DIABETES MELLITUS WITH STAGE 3A CHRONIC KIDNEY DISEASE, WITHOUT LONG-TERM CURRENT USE OF INSULIN: ICD-10-CM

## 2024-03-07 DIAGNOSIS — N18.31 TYPE 2 DIABETES MELLITUS WITH STAGE 3A CHRONIC KIDNEY DISEASE, WITHOUT LONG-TERM CURRENT USE OF INSULIN: ICD-10-CM

## 2024-03-07 LAB
ALBUMIN SERPL BCP-MCNC: 3.4 G/DL (ref 3.5–5.2)
ALP SERPL-CCNC: 192 U/L (ref 55–135)
ALT SERPL W/O P-5'-P-CCNC: 7 U/L (ref 10–44)
ANION GAP SERPL CALC-SCNC: 8 MMOL/L (ref 8–16)
AST SERPL-CCNC: 10 U/L (ref 10–40)
BILIRUB SERPL-MCNC: 0.2 MG/DL (ref 0.1–1)
BUN SERPL-MCNC: 16 MG/DL (ref 8–23)
CALCIUM SERPL-MCNC: 9.5 MG/DL (ref 8.7–10.5)
CHLORIDE SERPL-SCNC: 109 MMOL/L (ref 95–110)
CHOLEST SERPL-MCNC: 86 MG/DL (ref 120–199)
CHOLEST/HDLC SERPL: 3.1 {RATIO} (ref 2–5)
CO2 SERPL-SCNC: 21 MMOL/L (ref 23–29)
CREAT SERPL-MCNC: 1.4 MG/DL (ref 0.5–1.4)
EST. GFR  (NO RACE VARIABLE): 54.4 ML/MIN/1.73 M^2
ESTIMATED AVG GLUCOSE: 117 MG/DL (ref 68–131)
GLUCOSE SERPL-MCNC: 103 MG/DL (ref 70–110)
HBA1C MFR BLD: 5.7 % (ref 4–5.6)
HDLC SERPL-MCNC: 28 MG/DL (ref 40–75)
HDLC SERPL: 32.6 % (ref 20–50)
LDLC SERPL CALC-MCNC: 40.2 MG/DL (ref 63–159)
NONHDLC SERPL-MCNC: 58 MG/DL
POTASSIUM SERPL-SCNC: 4.2 MMOL/L (ref 3.5–5.1)
PROT SERPL-MCNC: 7.1 G/DL (ref 6–8.4)
SODIUM SERPL-SCNC: 138 MMOL/L (ref 136–145)
TRIGL SERPL-MCNC: 89 MG/DL (ref 30–150)
URATE SERPL-MCNC: 6.5 MG/DL (ref 3.4–7)

## 2024-03-07 PROCEDURE — 80061 LIPID PANEL: CPT | Performed by: INTERNAL MEDICINE

## 2024-03-07 PROCEDURE — 3008F BODY MASS INDEX DOCD: CPT | Mod: CPTII,S$GLB,, | Performed by: INTERNAL MEDICINE

## 2024-03-07 PROCEDURE — 3077F SYST BP >= 140 MM HG: CPT | Mod: CPTII,S$GLB,, | Performed by: INTERNAL MEDICINE

## 2024-03-07 PROCEDURE — 1160F RVW MEDS BY RX/DR IN RCRD: CPT | Mod: CPTII,S$GLB,, | Performed by: INTERNAL MEDICINE

## 2024-03-07 PROCEDURE — 99999 PR PBB SHADOW E&M-EST. PATIENT-LVL IV: CPT | Mod: PBBFAC,,, | Performed by: INTERNAL MEDICINE

## 2024-03-07 PROCEDURE — 3078F DIAST BP <80 MM HG: CPT | Mod: CPTII,S$GLB,, | Performed by: INTERNAL MEDICINE

## 2024-03-07 PROCEDURE — 1159F MED LIST DOCD IN RCRD: CPT | Mod: CPTII,S$GLB,, | Performed by: INTERNAL MEDICINE

## 2024-03-07 PROCEDURE — 36415 COLL VENOUS BLD VENIPUNCTURE: CPT | Mod: PO | Performed by: INTERNAL MEDICINE

## 2024-03-07 PROCEDURE — 80053 COMPREHEN METABOLIC PANEL: CPT | Performed by: INTERNAL MEDICINE

## 2024-03-07 PROCEDURE — 4010F ACE/ARB THERAPY RXD/TAKEN: CPT | Mod: CPTII,S$GLB,, | Performed by: INTERNAL MEDICINE

## 2024-03-07 PROCEDURE — 99214 OFFICE O/P EST MOD 30 MIN: CPT | Mod: S$GLB,,, | Performed by: INTERNAL MEDICINE

## 2024-03-07 PROCEDURE — 83036 HEMOGLOBIN GLYCOSYLATED A1C: CPT | Performed by: INTERNAL MEDICINE

## 2024-03-07 PROCEDURE — 84550 ASSAY OF BLOOD/URIC ACID: CPT | Performed by: INTERNAL MEDICINE

## 2024-03-07 RX ORDER — COLCHICINE 0.6 MG/1
TABLET ORAL
Qty: 30 TABLET | Refills: 0 | Status: SHIPPED | OUTPATIENT
Start: 2024-03-07 | End: 2024-06-07 | Stop reason: SDUPTHER

## 2024-03-07 NOTE — PROGRESS NOTES
History of present illness:   68-year-old gentleman with hypertension, diabetes mellitus, dyslipidemia, CAD, gout, chronic musculoskeletal pain is in today with several days of left ankle pain.  The patient has history of gout.  He states development of pain slight swelling lateral aspect left ankle.  No history of trauma.  He states he has been taking his allopurinol.  No dietary changes.  No fever no chills.    Current medications:  Medications are all noted and reviewed.      Review of systems:   Constitutional:  No fever no chills no generalized body aches.  Cardiovascular:  No chest pain palpitations or syncope.    Respiratory:  No cough shortness of breath.    Musculoskeletal :  Other than referred to in the HPI no new musculoskeletal complaints.    Physical examination:   General:  Alert appropriately groomed gentleman acute distress.    Vital signs:  All noted reviewed.  Afebrile.    Cardiovascular: Regular rate rhythm.  No significant murmur.  Full bilaterally without bruits.  There are no ischemic changes of the lower extremities.  Lungs: Clear to auscultation.    Musculoskeletal: Left foot demonstrate no gross abnormality on inspection.  There is mild-to-moderate tenderness palpation over the lateral malleolar area of left ankle.  There is no warmth or redness.  Slight fullness.  Also mildly tender with medial flexion.      Impression:  Suspect mild acute gout flare.      Plan:  Check metabolic profile glycohemoglobin uric acid level.    Colchicine 1.2 mg followed by 0.6 mg 2 hours then one daily.    Communicate regarding lab results.  Let us know if without significant improvement in the next few days.

## 2024-04-26 RX ORDER — AMLODIPINE BESYLATE 10 MG/1
10 TABLET ORAL DAILY
Qty: 90 TABLET | Refills: 2 | Status: SHIPPED | OUTPATIENT
Start: 2024-04-26 | End: 2024-05-16 | Stop reason: SDUPTHER

## 2024-05-02 ENCOUNTER — PATIENT OUTREACH (OUTPATIENT)
Dept: ADMINISTRATIVE | Facility: HOSPITAL | Age: 69
End: 2024-05-02
Payer: MEDICARE

## 2024-05-02 NOTE — PROGRESS NOTES
Population Health Chart Review & Patient Outreach Details      Additional [facility] Health Notes:               Updates Requested / Reviewed:      Care Everywhere, , and Immunizations Reconciliation Completed or Queried: [place]         Health Maintenance Topics Overdue:      [facility] Score: 3     Colon Cancer Screening  Uncontrolled BP  LDCT Lung Screen    Shingles/Zoster Vaccine  RSV Vaccine                  Health Maintenance Topic(s) Outreach Outcomes & Actions Taken:    Primary Care Appt - Outreach Outcomes & Actions Taken  : Primary Care Appt Scheduled

## 2024-05-16 ENCOUNTER — LAB VISIT (OUTPATIENT)
Dept: LAB | Facility: HOSPITAL | Age: 69
End: 2024-05-16
Attending: STUDENT IN AN ORGANIZED HEALTH CARE EDUCATION/TRAINING PROGRAM
Payer: MEDICARE

## 2024-05-16 ENCOUNTER — OFFICE VISIT (OUTPATIENT)
Dept: INTERNAL MEDICINE | Facility: CLINIC | Age: 69
End: 2024-05-16
Payer: MEDICARE

## 2024-05-16 DIAGNOSIS — F17.200 SMOKING: ICD-10-CM

## 2024-05-16 DIAGNOSIS — Z00.00 PHYSICAL EXAM, ANNUAL: Primary | ICD-10-CM

## 2024-05-16 DIAGNOSIS — I25.119 CORONARY ARTERY DISEASE INVOLVING NATIVE CORONARY ARTERY OF NATIVE HEART WITH ANGINA PECTORIS: ICD-10-CM

## 2024-05-16 DIAGNOSIS — F33.2 MAJOR DEPRESSIVE DISORDER, RECURRENT SEVERE WITHOUT PSYCHOTIC FEATURES: ICD-10-CM

## 2024-05-16 DIAGNOSIS — N18.32 STAGE 3B CHRONIC KIDNEY DISEASE: ICD-10-CM

## 2024-05-16 DIAGNOSIS — I10 HYPERTENSION, ESSENTIAL: Chronic | ICD-10-CM

## 2024-05-16 DIAGNOSIS — J43.9 PULMONARY EMPHYSEMA, UNSPECIFIED EMPHYSEMA TYPE: ICD-10-CM

## 2024-05-16 DIAGNOSIS — Z12.11 SCREENING FOR COLORECTAL CANCER: ICD-10-CM

## 2024-05-16 DIAGNOSIS — F17.210 NICOTINE DEPENDENCE, CIGARETTES, UNCOMPLICATED: ICD-10-CM

## 2024-05-16 DIAGNOSIS — Z12.12 SCREENING FOR COLORECTAL CANCER: ICD-10-CM

## 2024-05-16 DIAGNOSIS — E11.51 DIABETES MELLITUS WITH PERIPHERAL CIRCULATORY DISORDER: ICD-10-CM

## 2024-05-16 DIAGNOSIS — Z91.81 AT HIGH RISK FOR INJURY RELATED TO FALL: ICD-10-CM

## 2024-05-16 DIAGNOSIS — R29.6 FREQUENT FALLS: ICD-10-CM

## 2024-05-16 DIAGNOSIS — C61 CARCINOMA OF PROSTATE: ICD-10-CM

## 2024-05-16 DIAGNOSIS — E11.22 TYPE 2 DIABETES MELLITUS WITH STAGE 3A CHRONIC KIDNEY DISEASE, WITHOUT LONG-TERM CURRENT USE OF INSULIN: ICD-10-CM

## 2024-05-16 DIAGNOSIS — N18.31 TYPE 2 DIABETES MELLITUS WITH STAGE 3A CHRONIC KIDNEY DISEASE, WITHOUT LONG-TERM CURRENT USE OF INSULIN: ICD-10-CM

## 2024-05-16 LAB
BASOPHILS # BLD AUTO: 0.06 K/UL (ref 0–0.2)
BASOPHILS NFR BLD: 0.8 % (ref 0–1.9)
DIFFERENTIAL METHOD BLD: ABNORMAL
EOSINOPHIL # BLD AUTO: 0.3 K/UL (ref 0–0.5)
EOSINOPHIL NFR BLD: 4.4 % (ref 0–8)
ERYTHROCYTE [DISTWIDTH] IN BLOOD BY AUTOMATED COUNT: 13.6 % (ref 11.5–14.5)
HCT VFR BLD AUTO: 39.5 % (ref 40–54)
HGB BLD-MCNC: 12.1 G/DL (ref 14–18)
IMM GRANULOCYTES # BLD AUTO: 0.02 K/UL (ref 0–0.04)
IMM GRANULOCYTES NFR BLD AUTO: 0.3 % (ref 0–0.5)
LYMPHOCYTES # BLD AUTO: 2.2 K/UL (ref 1–4.8)
LYMPHOCYTES NFR BLD: 28.2 % (ref 18–48)
MCH RBC QN AUTO: 29.7 PG (ref 27–31)
MCHC RBC AUTO-ENTMCNC: 30.6 G/DL (ref 32–36)
MCV RBC AUTO: 97 FL (ref 82–98)
MONOCYTES # BLD AUTO: 0.7 K/UL (ref 0.3–1)
MONOCYTES NFR BLD: 8.7 % (ref 4–15)
NEUTROPHILS # BLD AUTO: 4.4 K/UL (ref 1.8–7.7)
NEUTROPHILS NFR BLD: 57.6 % (ref 38–73)
NRBC BLD-RTO: 0 /100 WBC
PLATELET # BLD AUTO: 265 K/UL (ref 150–450)
PMV BLD AUTO: 10.8 FL (ref 9.2–12.9)
RBC # BLD AUTO: 4.07 M/UL (ref 4.6–6.2)
TSH SERPL DL<=0.005 MIU/L-ACNC: 0.63 UIU/ML (ref 0.4–4)
WBC # BLD AUTO: 7.66 K/UL (ref 3.9–12.7)

## 2024-05-16 PROCEDURE — 4010F ACE/ARB THERAPY RXD/TAKEN: CPT | Mod: CPTII,S$GLB,, | Performed by: STUDENT IN AN ORGANIZED HEALTH CARE EDUCATION/TRAINING PROGRAM

## 2024-05-16 PROCEDURE — 36415 COLL VENOUS BLD VENIPUNCTURE: CPT | Mod: PO | Performed by: STUDENT IN AN ORGANIZED HEALTH CARE EDUCATION/TRAINING PROGRAM

## 2024-05-16 PROCEDURE — 99999 PR PBB SHADOW E&M-EST. PATIENT-LVL II: CPT | Mod: PBBFAC,,, | Performed by: STUDENT IN AN ORGANIZED HEALTH CARE EDUCATION/TRAINING PROGRAM

## 2024-05-16 PROCEDURE — 84443 ASSAY THYROID STIM HORMONE: CPT | Performed by: STUDENT IN AN ORGANIZED HEALTH CARE EDUCATION/TRAINING PROGRAM

## 2024-05-16 PROCEDURE — 3044F HG A1C LEVEL LT 7.0%: CPT | Mod: CPTII,S$GLB,, | Performed by: STUDENT IN AN ORGANIZED HEALTH CARE EDUCATION/TRAINING PROGRAM

## 2024-05-16 PROCEDURE — 99397 PER PM REEVAL EST PAT 65+ YR: CPT | Mod: S$GLB,,, | Performed by: STUDENT IN AN ORGANIZED HEALTH CARE EDUCATION/TRAINING PROGRAM

## 2024-05-16 PROCEDURE — 85025 COMPLETE CBC W/AUTO DIFF WBC: CPT | Performed by: STUDENT IN AN ORGANIZED HEALTH CARE EDUCATION/TRAINING PROGRAM

## 2024-05-16 RX ORDER — METFORMIN HYDROCHLORIDE 1000 MG/1
1000 TABLET ORAL 2 TIMES DAILY WITH MEALS
Qty: 180 TABLET | Refills: 1 | Status: SHIPPED | OUTPATIENT
Start: 2024-05-16

## 2024-05-16 RX ORDER — IRBESARTAN 300 MG/1
300 TABLET ORAL DAILY
Qty: 90 TABLET | Refills: 1 | Status: SHIPPED | OUTPATIENT
Start: 2024-05-16

## 2024-05-16 RX ORDER — CARVEDILOL 25 MG/1
25 TABLET ORAL 2 TIMES DAILY
Qty: 180 TABLET | Refills: 1 | Status: SHIPPED | OUTPATIENT
Start: 2024-05-16

## 2024-05-16 RX ORDER — AMLODIPINE BESYLATE 10 MG/1
10 TABLET ORAL DAILY
Qty: 90 TABLET | Refills: 1 | Status: SHIPPED | OUTPATIENT
Start: 2024-05-16 | End: 2025-05-16

## 2024-05-16 RX ORDER — GLIMEPIRIDE 1 MG/1
TABLET ORAL
Qty: 180 TABLET | Refills: 1 | Status: SHIPPED | OUTPATIENT
Start: 2024-05-16

## 2024-05-16 RX ORDER — HYDROCHLOROTHIAZIDE 25 MG/1
25 TABLET ORAL DAILY
Qty: 90 TABLET | Refills: 1 | Status: SHIPPED | OUTPATIENT
Start: 2024-05-16

## 2024-05-16 NOTE — PROGRESS NOTES
Subjective:         Chief Complaint:  Transition primary care    HPI  Mr. Neena Kohli is a 68 y.o. man with diffuse osteoarthritis (lumbar degenerative disc disease, carpal tunnel syndrome, etc.;), hypertension (HCTZ 25, irbesartan 300, amlodipine 10, and carvedilol 25), hyperlipidemia (atorvastatin 80), diabetes (glimepiride 1 and metformin 1000 b.i.d.), MDD (Seroquel 100 q.h.s., Celexa 20, hydralazine 100 t.i.d. p.r.n., and Wellbutrin 75), gout (allopurinol 100 and colchicine 0.6 daily), osteoporosis (VitD 50 K/week), & coronary artery disease (ASA 81) presenting as a new patient to transition primary care.      Frequent falls:   -reporting increased frequency of falls  -previously refer to physical therapy but was unable to establish (concerns for cost)    Family, social, surgical Hx reviewed     Health Maintenance:  Due for completion of annual screening labs, colorectal cancer screening, Low dose CT chest, vaccinations, and scheduling diabetic eye exam    Past Medical History:   Diagnosis Date    Arthritis     Cancer     Prostate    Diabetes mellitus     Hyperthyroidism     PAD (peripheral artery disease)      Past Surgical History:   Procedure Laterality Date    LAPAROSCOPIC CHOLECYSTECTOMY N/A 9/5/2023    Procedure: CHOLECYSTECTOMY, LAPAROSCOPIC;  Surgeon: Alvaro Snyder MD;  Location: [facility];  Service: General;  Laterality: N/A;    PROSTATE SURGERY       Family History   Problem Relation Name Age of Onset    Diabetes Mother L.M Kohli     Hypertension Mother L.M Kohli     Hyperlipidemia Mother L.M Kohli     Diabetes Father L.C Kohli     Hyperlipidemia Father L.C Kohli     Hypertension Father L.C Kohli     Gout Father L.C Kohli     Cancer Paternal Uncle      Stroke Maternal Grandmother      Amblyopia Neg Hx      Blindness Neg Hx      Cataracts Neg Hx      Glaucoma Neg Hx      Macular degeneration Neg Hx      Retinal detachment Neg Hx      Strabismus Neg Hx      Thyroid disease Neg Hx       Social History      Socioeconomic History    Marital status: Single   Tobacco Use    Smoking status: Every Day     Current packs/day: 1.00     Average packs/day: 1 pack/day for 52.4 years (52.4 ttl pk-yrs)     Types: Vaping with nicotine, Cigarettes     Start date: 1972    Smokeless tobacco: Current    Tobacco comments:     Enrolled in the [employer] on 2/20/19 ([employer] Member ID # [id number])  Ambulatory referral to Smoking Cessation clinic following hospital discharge.  Pt is a 1 pk/day cigarette smoker x 52 yrs.- switched to vaping with nicotine approx. 3 months ago.    Substance and Sexual Activity    Alcohol use: Not Currently     Alcohol/week: 0.0 standard drinks of alcohol     Comment: Former User    Drug use: Not Currently     Types: Cocaine, Marijuana     Comment: Former User    Sexual activity: Not Currently     Review of patient's allergies indicates:  No Known Allergies  Neena Kohli had no medications administered during this visit.     Review of Systems   Constitutional:  Negative for appetite change, chills and fever.   HENT: Negative.     Respiratory:  Negative for cough, chest tightness and shortness of breath.    Cardiovascular:  Negative for chest pain, palpitations and leg swelling.   Gastrointestinal:  Negative for abdominal distention, abdominal pain, blood in stool, constipation, diarrhea, nausea and vomiting.   Endocrine: Negative.    Genitourinary:  Negative for difficulty urinating, dysuria, frequency and hematuria.   Musculoskeletal: Negative.    Integumentary:  Negative.   Neurological: Negative.    Psychiatric/Behavioral: Negative.           Objective:      There were no vitals filed for this visit.   Physical Exam  Vitals reviewed.   Constitutional:       General: He is not in acute distress.     Appearance: Normal appearance.   HENT:      Head: Normocephalic and atraumatic.      Comments: Facial features are symmetric      Nose: Nose normal. No congestion or rhinorrhea.      Mouth/Throat:       Mouth: Mucous membranes are moist.      Pharynx: Oropharynx is clear. No oropharyngeal exudate or posterior oropharyngeal erythema.   Eyes:      General: No scleral icterus.     Extraocular Movements: Extraocular movements intact.      Conjunctiva/sclera: Conjunctivae normal.   Cardiovascular:      Rate and Rhythm: Normal rate and regular rhythm.      Pulses: Normal pulses.      Heart sounds: Normal heart sounds.   Pulmonary:      Effort: Pulmonary effort is normal. No respiratory distress.      Breath sounds: Normal breath sounds.   Musculoskeletal:         General: No deformity or signs of injury. Normal range of motion.      Cervical back: Normal range of motion.      Comments: Gait normal    Skin:     General: Skin is warm and dry.      Findings: No rash.   Neurological:      General: No focal deficit present.      Mental Status: He is alert and oriented to person, place, and time. Mental status is at baseline.   Psychiatric:         Mood and Affect: Mood normal.         Behavior: Behavior normal.         Thought Content: Thought content normal.       Current Outpatient Medications on File Prior to Visit   Medication Sig Dispense Refill    allopurinoL (ZYLOPRIM) 100 MG tablet TAKE 1 TABLET ONE TIME DAILY 90 tablet 3    aspirin (ECOTRIN) 81 MG EC tablet Take 81 mg by mouth once daily.      atorvastatin (LIPITOR) 80 MG tablet Take 1 tablet (80 mg total) by mouth every evening. 90 tablet 3    buPROPion (WELLBUTRIN) 75 MG tablet TAKE 1 TABLET EVERY DAY 90 tablet 0    cholecalciferol, vitamin D3, 1,250 mcg (50,000 unit) capsule Take 50,000 Units by mouth every 14 (fourteen) days.      citalopram (CELEXA) 20 MG tablet Take 20 mg by mouth once daily.      colchicine (COLCRYS) 0.6 mg tablet 2 po , then 1 po 2 hours later.  Then 1 po qd 30 tablet 0    ergocalciferol (ERGOCALCIFEROL) 50,000 unit Cap Take 50,000 Units by mouth every 7 days.      hydrALAZINE (APRESOLINE) 100 MG tablet TAKE 1 TABLET EVERY 8 HOURS 270 tablet  3    QUEtiapine (SEROQUEL) 100 MG Tab Take 1 tablet (100 mg total) by mouth 2 (two) times daily. At bedtime 60 tablet 2    topiramate (TOPAMAX) 100 MG tablet Take 100 mg by mouth once daily.      traZODone (DESYREL) 100 MG tablet Take 100 mg by mouth every evening.      TRUE METRIX GLUCOSE TEST STRIP Strp       TRUEPLUS LANCETS 33 gauge Misc       [DISCONTINUED] amLODIPine (NORVASC) 10 MG tablet Take 1 tablet (10 mg total) by mouth once daily. 90 tablet 2    [DISCONTINUED] carvediloL (COREG) 25 MG tablet Take 1 tablet (25 mg total) by mouth 2 (two) times daily. 180 tablet 3    [DISCONTINUED] glimepiride (AMARYL) 1 MG tablet TAKE 2 TABLETS EVERY  tablet 3    [DISCONTINUED] hydroCHLOROthiazide (HYDRODIURIL) 25 MG tablet Take 1 tablet (25 mg total) by mouth once daily. 30 tablet 2    [DISCONTINUED] irbesartan (AVAPRO) 300 MG tablet TAKE 1 TABLET ONE TIME DAILY 90 tablet 3    [DISCONTINUED] metFORMIN (GLUCOPHAGE) 1000 MG tablet TAKE 1 TABLET TWICE DAILY WITH MEALS 180 tablet 3    [DISCONTINUED] nicotine (NICODERM CQ) 14 mg/24 hr Place 1 patch onto the skin once daily. (Patient not taking: Reported on 3/7/2024) 30 patch 2    [DISCONTINUED] oxyCODONE (ROXICODONE) 15 MG Tab Take 15 mg by mouth every 4 to 6 hours as needed.      [DISCONTINUED] oxyCODONE myristate (XTAMPZA ER) 13.5 mg CSpT take 1 capsule  by mouth every morning with a full meal as needed for pain (Patient not taking: Reported on 3/7/2024) 30 each 0    [DISCONTINUED] senna-docusate 8.6-50 mg (PERICOLACE) 8.6-50 mg per tablet Take 2 tablets by mouth once daily. (Patient not taking: Reported on 3/7/2024) 60 tablet 2    [DISCONTINUED] spironolactone (ALDACTONE) 25 MG tablet Take 1 tablet (25 mg total) by mouth once daily. 30 tablet 2     No current facility-administered medications on file prior to visit.         Assessment:       1. Physical exam, annual    2. Coronary artery disease involving native coronary artery of native heart with angina pectoris     3. Pulmonary emphysema, unspecified emphysema type    4. Carcinoma of prostate    5. Major depressive disorder, recurrent severe without psychotic features    6. Stage 3b chronic kidney disease    7. Diabetes mellitus with peripheral circulatory disorder    8. Frequent falls    9. At high risk for injury related to fall    10. Smoking    11. Screening for colorectal cancer    12. Nicotine dependence, cigarettes, uncomplicated    13. Hypertension, essential    14. Type 2 diabetes mellitus with stage 3a chronic kidney disease, without long-term current use of insulin        Plan:       Physical exam, annual   - primary care assumed    Coronary artery disease involving native coronary artery of native heart with angina pectoris   - will continue to intervened upon all modifiable risk factors     Pulmonary emphysema, unspecified emphysema type   - extensive conversation had regarding smoking cessation necessity   - smoking cessation program referral placed; recommendations and interventions appreciated     Carcinoma of prostate   - status post prostatectomy    Major depressive disorder, recurrent severe without psychotic features   - will continue to follow along with outpatient psychiatry     Stage 3b chronic kidney disease   - as of last check, had improved to CKD 3A    Diabetes mellitus with peripheral circulatory disorder   - very well controlled as of last labs    Frequent falls  -     Ambulatory referral/consult to Physical/Occupational Therapy; Future; Expected date: 05/23/2024    At high risk for injury related to fall  -     Ambulatory referral/consult to Physical/Occupational Therapy; Future; Expected date: 05/23/2024   - will facilitate formal physical therapy via external PT order (to allow patient to shop around for better price comparison).  Would be happy to facilitate internal PT upon request    Smoking  -     Ambulatory referral/consult to Smoking Cessation Program; Future; Expected date:  05/23/2024  -     CT Chest Lung Screening Low Dose; Future; Expected date: 05/16/2024    Screening for colorectal cancer  -     Cologuard Screening (Multitarget Stool DNA); Future; Expected date: 05/16/2024    Nicotine dependence, cigarettes, uncomplicated  -     CT Chest Lung Screening Low Dose; Future; Expected date: 05/16/2024    Hypertension, essential  -     TSH; Future; Expected date: 05/16/2024  -     CBC Auto Differential; Future; Expected date: 05/16/2024   - uncontrolled despite for drug regimen   - handwritten directions for blood pressure medicines provided and will keep upcoming appointment to reassess control (provided log and encouraged to bring home cuff).      Type 2 diabetes mellitus with stage 3a chronic kidney disease, without long-term current use of insulin  -     Ambulatory referral/consult to Optometry; Future; Expected date: 05/23/2024    Other orders  -     amLODIPine (NORVASC) 10 MG tablet; Take 1 tablet (10 mg total) by mouth once daily.  Dispense: 90 tablet; Refill: 1  -     carvediloL (COREG) 25 MG tablet; Take 1 tablet (25 mg total) by mouth 2 (two) times daily.  Dispense: 180 tablet; Refill: 1  -     glimepiride (AMARYL) 1 MG tablet; TAKE 2 TABLETS EVERY DAY  Dispense: 180 tablet; Refill: 1  -     hydroCHLOROthiazide (HYDRODIURIL) 25 MG tablet; Take 1 tablet (25 mg total) by mouth once daily.  Dispense: 90 tablet; Refill: 1  -     irbesartan (AVAPRO) 300 MG tablet; Take 1 tablet (300 mg total) by mouth once daily.  Dispense: 90 tablet; Refill: 1  -     metFORMIN (GLUCOPHAGE) 1000 MG tablet; Take 1 tablet (1,000 mg total) by mouth 2 (two) times daily with meals.  Dispense: 180 tablet; Refill: 1

## 2024-05-16 NOTE — Clinical Note
Can weplease offer the patient diabetic eye exam scheduling with optometry please.   Thank you for your time.

## 2024-05-21 ENCOUNTER — HOSPITAL ENCOUNTER (OUTPATIENT)
Dept: RADIOLOGY | Facility: HOSPITAL | Age: 69
Discharge: HOME | End: 2024-05-21
Attending: STUDENT IN AN ORGANIZED HEALTH CARE EDUCATION/TRAINING PROGRAM
Payer: MEDICARE

## 2024-05-21 DIAGNOSIS — F17.200 SMOKING: ICD-10-CM

## 2024-05-21 DIAGNOSIS — F17.210 NICOTINE DEPENDENCE, CIGARETTES, UNCOMPLICATED: ICD-10-CM

## 2024-05-21 PROCEDURE — 71271 CT THORAX LUNG CANCER SCR C-: CPT | Mod: 26,,, | Performed by: STUDENT IN AN ORGANIZED HEALTH CARE EDUCATION/TRAINING PROGRAM

## 2024-05-21 PROCEDURE — 71271 CT THORAX LUNG CANCER SCR C-: CPT | Mod: TC

## 2024-05-22 ENCOUNTER — TELEPHONE (OUTPATIENT)
Dept: INTERNAL MEDICINE | Facility: CLINIC | Age: 69
End: 2024-05-22
Payer: MEDICARE

## 2024-05-22 NOTE — TELEPHONE ENCOUNTER
----- Message from Roxane Harp MD sent at 5/21/2024  5:10 PM CDT -----  No patient portal.  Please convey reassuring low-dose CT chest results remind him that we will do this annually until the age of 79.    Thank you for your time.

## 2024-05-29 ENCOUNTER — OFFICE VISIT (OUTPATIENT)
Dept: INTERNAL MEDICINE | Facility: CLINIC | Age: 69
End: 2024-05-29
Payer: MEDICARE

## 2024-05-29 ENCOUNTER — TELEPHONE (OUTPATIENT)
Dept: INTERNAL MEDICINE | Facility: CLINIC | Age: 69
End: 2024-05-29
Payer: MEDICARE

## 2024-05-29 VITALS
DIASTOLIC BLOOD PRESSURE: 60 MMHG | HEART RATE: 51 BPM | HEIGHT: 67 IN | SYSTOLIC BLOOD PRESSURE: 158 MMHG | WEIGHT: 163.81 LBS | TEMPERATURE: 98 F | RESPIRATION RATE: 18 BRPM | BODY MASS INDEX: 25.71 KG/M2 | OXYGEN SATURATION: 99 %

## 2024-05-29 DIAGNOSIS — I10 HYPERTENSION, ESSENTIAL: Primary | Chronic | ICD-10-CM

## 2024-05-29 PROCEDURE — 1126F AMNT PAIN NOTED NONE PRSNT: CPT | Mod: CPTII,S$GLB,, | Performed by: STUDENT IN AN ORGANIZED HEALTH CARE EDUCATION/TRAINING PROGRAM

## 2024-05-29 PROCEDURE — 3008F BODY MASS INDEX DOCD: CPT | Mod: CPTII,S$GLB,, | Performed by: STUDENT IN AN ORGANIZED HEALTH CARE EDUCATION/TRAINING PROGRAM

## 2024-05-29 PROCEDURE — 99999 PR PBB SHADOW E&M-EST. PATIENT-LVL IV: CPT | Mod: PBBFAC,,, | Performed by: STUDENT IN AN ORGANIZED HEALTH CARE EDUCATION/TRAINING PROGRAM

## 2024-05-29 PROCEDURE — 3288F FALL RISK ASSESSMENT DOCD: CPT | Mod: CPTII,S$GLB,, | Performed by: STUDENT IN AN ORGANIZED HEALTH CARE EDUCATION/TRAINING PROGRAM

## 2024-05-29 PROCEDURE — 3078F DIAST BP <80 MM HG: CPT | Mod: CPTII,S$GLB,, | Performed by: STUDENT IN AN ORGANIZED HEALTH CARE EDUCATION/TRAINING PROGRAM

## 2024-05-29 PROCEDURE — 3077F SYST BP >= 140 MM HG: CPT | Mod: CPTII,S$GLB,, | Performed by: STUDENT IN AN ORGANIZED HEALTH CARE EDUCATION/TRAINING PROGRAM

## 2024-05-29 PROCEDURE — 3044F HG A1C LEVEL LT 7.0%: CPT | Mod: CPTII,S$GLB,, | Performed by: STUDENT IN AN ORGANIZED HEALTH CARE EDUCATION/TRAINING PROGRAM

## 2024-05-29 PROCEDURE — 4010F ACE/ARB THERAPY RXD/TAKEN: CPT | Mod: CPTII,S$GLB,, | Performed by: STUDENT IN AN ORGANIZED HEALTH CARE EDUCATION/TRAINING PROGRAM

## 2024-05-29 PROCEDURE — 1101F PT FALLS ASSESS-DOCD LE1/YR: CPT | Mod: CPTII,S$GLB,, | Performed by: STUDENT IN AN ORGANIZED HEALTH CARE EDUCATION/TRAINING PROGRAM

## 2024-05-29 PROCEDURE — 99214 OFFICE O/P EST MOD 30 MIN: CPT | Mod: S$GLB,,, | Performed by: STUDENT IN AN ORGANIZED HEALTH CARE EDUCATION/TRAINING PROGRAM

## 2024-05-29 PROCEDURE — 1159F MED LIST DOCD IN RCRD: CPT | Mod: CPTII,S$GLB,, | Performed by: STUDENT IN AN ORGANIZED HEALTH CARE EDUCATION/TRAINING PROGRAM

## 2024-05-29 RX ORDER — LIDOCAINE 36 MG/1
PATCH TOPICAL
COMMUNITY
Start: 2024-03-21

## 2024-05-29 RX ORDER — OXYCODONE HYDROCHLORIDE 30 MG/1
TABLET ORAL
COMMUNITY
Start: 2024-05-13

## 2024-05-29 NOTE — PROGRESS NOTES
Subjective:         Chief Complaint: Follow-up (HTN) and lab results    HPI  Mr. Neena Kohli is a 68 y.o. man with diffuse osteoarthritis (lumbar degenerative disc disease, carpal tunnel syndrome, etc.;), hypertension (HCTZ 25, irbesartan 300, amlodipine 10, and carvedilol 25), hyperlipidemia (atorvastatin 80), diabetes (glimepiride 1 and metformin 1000 b.i.d.), MDD (Seroquel 100 q.h.s., Celexa 20, hydralazine 100 t.i.d. p.r.n., and Wellbutrin 75), gout (allopurinol 100 and colchicine 0.6 daily), osteoporosis (VitD 50 K/week), & coronary artery disease (ASA 81) presenting for short interval hypertensive follow-up after recently establishing primary care.    Hypertension:   -HCTZ 25, amlodipine 10, carvedilol 25 b.i.d., and irbesartan 300  -presented without blood pressure cuff for home log  -seemingly lost to cardiology follow-up (2 months overdue for six-month recommended follow-up)    Review of Systems   Constitutional:  Negative for appetite change, chills and fever.   HENT: Negative.     Respiratory:  Negative for cough, chest tightness and shortness of breath.    Cardiovascular:  Negative for chest pain, palpitations and leg swelling.   Gastrointestinal:  Negative for abdominal distention, abdominal pain, blood in stool, constipation, diarrhea, nausea and vomiting.   Endocrine: Negative.    Genitourinary:  Negative for difficulty urinating, dysuria, frequency and hematuria.   Musculoskeletal: Negative.    Integumentary:  Negative.   Neurological: Negative.    Psychiatric/Behavioral: Negative.           Objective:      Vitals:    05/29/24 1439   BP: (!) 158/60   Pulse: (!) 51   Resp: 18   Temp: 97.7 °F (36.5 °C)      Physical Exam  Vitals reviewed.   Constitutional:       General: He is not in acute distress.     Appearance: Normal appearance.   HENT:      Head: Normocephalic and atraumatic.      Comments: Facial features are symmetric      Nose: Nose normal. No congestion or rhinorrhea.      Mouth/Throat:       Mouth: Mucous membranes are moist.      Pharynx: Oropharynx is clear. No oropharyngeal exudate or posterior oropharyngeal erythema.   Eyes:      General: No scleral icterus.     Extraocular Movements: Extraocular movements intact.      Conjunctiva/sclera: Conjunctivae normal.   Cardiovascular:      Rate and Rhythm: Normal rate and regular rhythm.      Pulses: Normal pulses.      Heart sounds: Normal heart sounds.   Pulmonary:      Effort: Pulmonary effort is normal. No respiratory distress.      Breath sounds: Normal breath sounds.   Musculoskeletal:         General: No deformity or signs of injury. Normal range of motion.      Cervical back: Normal range of motion.      Comments: Gait normal    Skin:     General: Skin is warm and dry.      Findings: No rash.   Neurological:      General: No focal deficit present.      Mental Status: He is alert and oriented to person, place, and time. Mental status is at baseline.   Psychiatric:         Mood and Affect: Mood normal.         Behavior: Behavior normal.         Thought Content: Thought content normal.       Current Outpatient Medications on File Prior to Visit   Medication Sig Dispense Refill    allopurinoL (ZYLOPRIM) 100 MG tablet TAKE 1 TABLET ONE TIME DAILY 90 tablet 3    amLODIPine (NORVASC) 10 MG tablet Take 1 tablet (10 mg total) by mouth once daily. 90 tablet 1    aspirin (ECOTRIN) 81 MG EC tablet Take 81 mg by mouth once daily.      atorvastatin (LIPITOR) 80 MG tablet Take 1 tablet (80 mg total) by mouth every evening. 90 tablet 3    buPROPion (WELLBUTRIN) 75 MG tablet TAKE 1 TABLET EVERY DAY 90 tablet 0    carvediloL (COREG) 25 MG tablet Take 1 tablet (25 mg total) by mouth 2 (two) times daily. 180 tablet 1    cholecalciferol, vitamin D3, 1,250 mcg (50,000 unit) capsule Take 50,000 Units by mouth every 14 (fourteen) days.      citalopram (CELEXA) 20 MG tablet Take 20 mg by mouth once daily.      colchicine (COLCRYS) 0.6 mg tablet 2 po , then 1 po 2 hours  later.  Then 1 po qd 30 tablet 0    ergocalciferol (ERGOCALCIFEROL) 50,000 unit Cap Take 50,000 Units by mouth every 7 days.      glimepiride (AMARYL) 1 MG tablet TAKE 2 TABLETS EVERY  tablet 1    hydrALAZINE (APRESOLINE) 100 MG tablet TAKE 1 TABLET EVERY 8 HOURS 270 tablet 3    hydroCHLOROthiazide (HYDRODIURIL) 25 MG tablet Take 1 tablet (25 mg total) by mouth once daily. 90 tablet 1    irbesartan (AVAPRO) 300 MG tablet Take 1 tablet (300 mg total) by mouth once daily. 90 tablet 1    metFORMIN (GLUCOPHAGE) 1000 MG tablet Take 1 tablet (1,000 mg total) by mouth 2 (two) times daily with meals. 180 tablet 1    oxyCODONE (ROXICODONE) 30 MG Tab SMARTSI.5-1 Tablet(s) By Mouth 3-4 Times Daily PRN      QUEtiapine (SEROQUEL) 100 MG Tab Take 1 tablet (100 mg total) by mouth 2 (two) times daily. At bedtime 60 tablet 2    topiramate (TOPAMAX) 100 MG tablet Take 100 mg by mouth once daily.      traZODone (DESYREL) 100 MG tablet Take 100 mg by mouth every evening.      TRUE METRIX GLUCOSE TEST STRIP Strp       TRUEPLUS LANCETS 33 gauge Misc       ZTLIDO 1.8 % PtMd        No current facility-administered medications on file prior to visit.         Assessment:       1. Hypertension, essential        Plan:       Hypertension, essential   - broad antihypertensive regimen is seemingly ineffective   - rather jorge conversation had regarding minimal remaining antihypertensive options (has been to encourage hydrochlorothiazide given recent GFR decline); would greatly appreciate any recommendations/interventions via Cardiology (will facilitate overdue follow-up)    - strongly encourage to continue working with the smoking cessation program

## 2024-05-29 NOTE — TELEPHONE ENCOUNTER
----- Message from Diana Franco sent at 5/29/2024 10:09 AM CDT -----  Contact: [phone]  Patient is calling to confirm appt for today at 2:30 pm.    Thank you.

## 2024-05-29 NOTE — TELEPHONE ENCOUNTER
Pt scheduled for appt today at 2:30 pm.  I tried calling pt to confirm appt.  No answer. Left VM for pt to call the clinic

## 2024-06-07 ENCOUNTER — TELEPHONE (OUTPATIENT)
Dept: INTERNAL MEDICINE | Facility: CLINIC | Age: 69
End: 2024-06-07
Payer: MEDICARE

## 2024-06-07 LAB — NONINV COLON CA DNA+OCC BLD SCRN STL QL: NORMAL

## 2024-06-07 RX ORDER — COLCHICINE 0.6 MG/1
TABLET ORAL
Qty: 30 TABLET | Refills: 0 | Status: SHIPPED | OUTPATIENT
Start: 2024-06-07

## 2024-06-07 NOTE — TELEPHONE ENCOUNTER
----- Message from Kayleigh Cook sent at 6/7/2024 11:38 AM CDT -----  Contact: [phone]  [email or web address]     Patient is calling for Medical Advice regarding: Gout     How long has patient had these symptoms:this morning     Pharmacy name and phone#:  [facility] - [place] - [address] [id number] [facility].  [place] [address]  Phone: [phone] Fax: [phone]    Would like response via [facility]:  call back     Comments:

## 2024-06-07 NOTE — TELEPHONE ENCOUNTER
LOV  5-29-24   3-7-24    I spoke to pt, he is reporting a gout attack and is requesting Rx below.  Med uploaded for your approval.  Thanks

## 2024-06-25 ENCOUNTER — TELEPHONE (OUTPATIENT)
Dept: INTERNAL MEDICINE | Facility: CLINIC | Age: 69
End: 2024-06-25
Payer: MEDICARE

## 2024-06-25 RX ORDER — METHYLPREDNISOLONE 4 MG/1
TABLET ORAL
Qty: 21 EACH | Refills: 0 | Status: SHIPPED | OUTPATIENT
Start: 2024-06-25 | End: 2024-07-16

## 2024-06-25 NOTE — TELEPHONE ENCOUNTER
----- Message from Roxane Harp MD sent at 6/24/2024  8:58 AM CDT -----  No patient portal; please inform him of normal Cologuard result.  Thank you for your time.

## 2024-06-25 NOTE — TELEPHONE ENCOUNTER
Pt reports that he has been taking colchicine (COLCRYS) 0.6 mg tablet has about 2/3 pills left . States his left foot little toe is still giving him pain when sitting and layig down . He kraft not feel that the med I working .    I spoke with pt pt in regards to cologuard results , mara joaquin

## 2024-06-25 NOTE — TELEPHONE ENCOUNTER
----- Message from Brianne Nevarez sent at 6/25/2024 11:57 AM CDT -----  Contact: Pt [phone]  Patient is returning a phone call.    Who left a message for the patient: Maryes Magallanes LPN      Does patient know what this is regarding:  Test Results     Would you like a call back, or a response through your [facility] portal?:   Call back     Comments:

## 2024-06-25 NOTE — TELEPHONE ENCOUNTER
I tried calling pt to discuss results of cologuard test-normal.  No answer. Left VM for pt to call the clinic

## 2024-06-25 NOTE — TELEPHONE ENCOUNTER
----- Message from Diana Franco sent at 6/25/2024 11:34 AM CDT -----  Contact: [phone]  Type: Returning a call    Who left a message? Maryse Magallanes LPN    When did the practice call? Today     Does patient know what this is regarding: test result       Would the patient rather a call back or a response via [facility]?call back   Comments:

## 2024-07-04 ENCOUNTER — HOSPITAL ENCOUNTER (EMERGENCY)
Facility: HOSPITAL | Age: 69
Discharge: HOME | End: 2024-07-04
Attending: EMERGENCY MEDICINE
Payer: MEDICARE

## 2024-07-04 VITALS
HEART RATE: 53 BPM | WEIGHT: 172 LBS | HEIGHT: 67 IN | SYSTOLIC BLOOD PRESSURE: 149 MMHG | DIASTOLIC BLOOD PRESSURE: 69 MMHG | TEMPERATURE: 99 F | OXYGEN SATURATION: 97 % | BODY MASS INDEX: 27 KG/M2 | RESPIRATION RATE: 18 BRPM

## 2024-07-04 DIAGNOSIS — L03.032 CELLULITIS OF FIFTH TOE OF LEFT FOOT: Primary | ICD-10-CM

## 2024-07-04 LAB
ALBUMIN SERPL BCP-MCNC: 3.9 G/DL (ref 3.5–5.2)
ALP SERPL-CCNC: 206 U/L (ref 55–135)
ALT SERPL W/O P-5'-P-CCNC: 12 U/L (ref 10–44)
ANION GAP SERPL CALC-SCNC: 9 MMOL/L (ref 8–16)
AST SERPL-CCNC: 11 U/L (ref 10–40)
BASOPHILS # BLD AUTO: 0.05 K/UL (ref 0–0.2)
BASOPHILS NFR BLD: 0.7 % (ref 0–1.9)
BILIRUB SERPL-MCNC: 0.3 MG/DL (ref 0.1–1)
BUN SERPL-MCNC: 22 MG/DL (ref 8–23)
CALCIUM SERPL-MCNC: 9.9 MG/DL (ref 8.7–10.5)
CHLORIDE SERPL-SCNC: 104 MMOL/L (ref 95–110)
CO2 SERPL-SCNC: 27 MMOL/L (ref 23–29)
CREAT SERPL-MCNC: 1.5 MG/DL (ref 0.5–1.4)
DIFFERENTIAL METHOD BLD: ABNORMAL
EOSINOPHIL # BLD AUTO: 0.2 K/UL (ref 0–0.5)
EOSINOPHIL NFR BLD: 3.6 % (ref 0–8)
ERYTHROCYTE [DISTWIDTH] IN BLOOD BY AUTOMATED COUNT: 13.1 % (ref 11.5–14.5)
EST. GFR  (NO RACE VARIABLE): 50 ML/MIN/1.73 M^2
GLUCOSE SERPL-MCNC: 92 MG/DL (ref 70–110)
HCT VFR BLD AUTO: 37.7 % (ref 40–54)
HGB BLD-MCNC: 12.2 G/DL (ref 14–18)
IMM GRANULOCYTES # BLD AUTO: 0.02 K/UL (ref 0–0.04)
IMM GRANULOCYTES NFR BLD AUTO: 0.3 % (ref 0–0.5)
LYMPHOCYTES # BLD AUTO: 2.2 K/UL (ref 1–4.8)
LYMPHOCYTES NFR BLD: 32.3 % (ref 18–48)
MCH RBC QN AUTO: 30.4 PG (ref 27–31)
MCHC RBC AUTO-ENTMCNC: 32.4 G/DL (ref 32–36)
MCV RBC AUTO: 94 FL (ref 82–98)
MONOCYTES # BLD AUTO: 0.5 K/UL (ref 0.3–1)
MONOCYTES NFR BLD: 7.3 % (ref 4–15)
NEUTROPHILS # BLD AUTO: 3.7 K/UL (ref 1.8–7.7)
NEUTROPHILS NFR BLD: 55.8 % (ref 38–73)
NRBC BLD-RTO: 0 /100 WBC
PLATELET # BLD AUTO: 228 K/UL (ref 150–450)
PMV BLD AUTO: 10.2 FL (ref 9.2–12.9)
POTASSIUM SERPL-SCNC: 4.2 MMOL/L (ref 3.5–5.1)
PROT SERPL-MCNC: 7.7 G/DL (ref 6–8.4)
RBC # BLD AUTO: 4.01 M/UL (ref 4.6–6.2)
SODIUM SERPL-SCNC: 140 MMOL/L (ref 136–145)
WBC # BLD AUTO: 6.71 K/UL (ref 3.9–12.7)

## 2024-07-04 PROCEDURE — 25000003 PHARM REV CODE 250: Mod: HCNC | Performed by: EMERGENCY MEDICINE

## 2024-07-04 PROCEDURE — 63600175 PHARM REV CODE 636 W HCPCS: Mod: HCNC | Performed by: EMERGENCY MEDICINE

## 2024-07-04 PROCEDURE — 85025 COMPLETE CBC W/AUTO DIFF WBC: CPT | Mod: HCNC | Performed by: EMERGENCY MEDICINE

## 2024-07-04 PROCEDURE — 80053 COMPREHEN METABOLIC PANEL: CPT | Mod: HCNC | Performed by: EMERGENCY MEDICINE

## 2024-07-04 PROCEDURE — 96372 THER/PROPH/DIAG INJ SC/IM: CPT | Performed by: EMERGENCY MEDICINE

## 2024-07-04 PROCEDURE — 99284 EMERGENCY DEPT VISIT MOD MDM: CPT | Mod: 25,HCNC

## 2024-07-04 RX ORDER — MELOXICAM 7.5 MG/1
7.5 TABLET ORAL DAILY
Qty: 3 TABLET | Refills: 0 | Status: SHIPPED | OUTPATIENT
Start: 2024-07-04 | End: 2024-07-07

## 2024-07-04 RX ORDER — KETOROLAC TROMETHAMINE 30 MG/ML
15 INJECTION, SOLUTION INTRAMUSCULAR; INTRAVENOUS
Status: COMPLETED | OUTPATIENT
Start: 2024-07-04 | End: 2024-07-04

## 2024-07-04 RX ORDER — CLINDAMYCIN HYDROCHLORIDE 150 MG/1
450 CAPSULE ORAL EVERY 8 HOURS
Qty: 60 CAPSULE | Refills: 0 | Status: SHIPPED | OUTPATIENT
Start: 2024-07-04 | End: 2024-07-11

## 2024-07-04 RX ORDER — CLINDAMYCIN HYDROCHLORIDE 150 MG/1
450 CAPSULE ORAL
Status: COMPLETED | OUTPATIENT
Start: 2024-07-04 | End: 2024-07-04

## 2024-07-04 RX ADMIN — CLINDAMYCIN HYDROCHLORIDE 450 MG: 150 CAPSULE ORAL at 06:07

## 2024-07-04 RX ADMIN — KETOROLAC TROMETHAMINE 15 MG: 30 INJECTION, SOLUTION INTRAMUSCULAR; INTRAVENOUS at 05:07

## 2024-07-04 NOTE — DISCHARGE INSTRUCTIONS

## 2024-07-04 NOTE — ED PROVIDER NOTES
Encounter Date: 7/4/2024       History     Chief Complaint   Patient presents with    Toe Pain     Pt arrives with complaints of left pinky toe pain, swelling and redness. States his symptoms have been present for around 3 days. Denies any injuries.      Neena Kohli is a 68 y.o. male who  has a past medical history of Arthritis, Cancer, Diabetes mellitus, Hyperthyroidism, gout, and PAD (peripheral artery disease).    The patient presents to the ED due to left toe pain.  Patient has been having pain to his left toe for the past couple of weeks that has worsened over the past couple of days.  Denies any open wounds or drainage.  He reports redness and swelling.  Denies any fever chest pain numbness weakness or other symptoms.  He states it hurts to walk.  He has not tried anything for pain today but has been using Epsom salt at home.  He denies any other concerns at this time.    The history is provided by the patient.     Review of patient's allergies indicates:  No Known Allergies  Past Medical History:   Diagnosis Date    Arthritis     Cancer     Prostate    Diabetes mellitus     Hyperthyroidism     PAD (peripheral artery disease)      Past Surgical History:   Procedure Laterality Date    LAPAROSCOPIC CHOLECYSTECTOMY N/A 9/5/2023    Procedure: CHOLECYSTECTOMY, LAPAROSCOPIC;  Surgeon: Alvaro Snyder MD;  Location: [facility] OR;  Service: General;  Laterality: N/A;    PROSTATE SURGERY       Family History   Problem Relation Name Age of Onset    Diabetes Mother L.M Kohli     Hypertension Mother L.M Kohli     Hyperlipidemia Mother L.M Kohli     Diabetes Father L.C Kohli     Hyperlipidemia Father L.C Kohli     Hypertension Father L.C Kohli     Gout Father L.C Kohli     Cancer Paternal Uncle      Stroke Maternal Grandmother      Amblyopia Neg Hx      Blindness Neg Hx      Cataracts Neg Hx      Glaucoma Neg Hx      Macular degeneration Neg Hx      Retinal detachment Neg Hx      Strabismus Neg Hx      Thyroid disease Neg  Hx       Social History     Tobacco Use    Smoking status: Every Day     Current packs/day: 1.00     Average packs/day: 1 pack/day for 52.5 years (52.5 ttl pk-yrs)     Types: Vaping with nicotine, Cigarettes     Start date: 1972    Smokeless tobacco: Current    Tobacco comments:     Enrolled in the [employer]: turn off the lights on 2/20/19 ([employer] Member ID # [id number])  Ambulatory referral to Smoking Cessation clinic following hospital discharge.  Pt is a 1 pk/day cigarette smoker x 52 yrs.- switched to vaping with nicotine approx. 3 months ago.    Substance Use Topics    Alcohol use: Not Currently     Alcohol/week: 0.0 standard drinks of alcohol     Comment: Former User    Drug use: Not Currently     Types: Cocaine, Marijuana     Comment: Former User     Review of Systems   Respiratory:  Negative for shortness of breath.    Cardiovascular:  Negative for chest pain.   Gastrointestinal:  Negative for abdominal pain.   Musculoskeletal:  Positive for gait problem and joint swelling.   Skin:  Positive for color change.   Neurological:  Negative for weakness and numbness.       Physical Exam     Initial Vitals [07/04/24 1632]   BP Pulse Resp Temp SpO2   (!) 149/69 (!) 53 18 98.5 °F (36.9 °C) 97 %      MAP       --         Physical Exam    Nursing note and vitals reviewed.  Constitutional: He appears well-developed and well-nourished. He is not diaphoretic. No distress.   HENT:   Head: Normocephalic and atraumatic.   Eyes: Conjunctivae are normal.   Cardiovascular:  Regular rhythm.           Pulmonary/Chest: No respiratory distress.   Musculoskeletal:      Comments: Left lower extremity: Sensation intact to light touch.  DP/PT pulses dopplerable.  No open wounds.  Patient has tenderness to palpation of his left toe.  It is mildly edematous and erythematous.  There are no open wounds.  There was no fluctuance.       Neurological: He is alert.   Skin: Skin is warm and dry. Capillary refill takes less than 2 seconds. No rash noted.    Psychiatric: He has a normal mood and affect.         ED Course   Procedures  Labs Reviewed   CBC W/ AUTO DIFFERENTIAL - Abnormal; Notable for the following components:       Result Value    RBC 4.01 (*)     Hemoglobin 12.2 (*)     Hematocrit 37.7 (*)     All other components within normal limits   COMPREHENSIVE METABOLIC PANEL - Abnormal; Notable for the following components:    Creatinine 1.5 (*)     Alkaline Phosphatase 206 (*)     eGFR 50 (*)     All other components within normal limits          Imaging Results              X-Ray Toe 2 or More Views Left (Final result)  Result time 07/04/24 17:55:15      Final result by Cassi Alegre MD (07/04/24 17:55:15)                   Impression:      No acute abnormalities involving the left toes.      Electronically signed by: Cassi Alegre  Date:    07/04/2024  Time:    17:55               Narrative:    EXAMINATION:  XR TOE 2 OR MORE VIEWS LEFT    CLINICAL HISTORY:  left toe pain;    TECHNIQUE:  Three views of the left toes were performed    COMPARISON:  None.    FINDINGS:  There is no acute fracture or dislocation involving the left toes.  Joint spaces appear well maintained.  Soft tissues grossly appear intact.                                       Medications   ketorolac injection 15 mg (15 mg Intramuscular Given 7/4/24 1733)   clindamycin capsule 450 mg (450 mg Oral Given 7/4/24 1833)     Medical Decision Making  Patient with described atraumatic left toe pain.  Vital signs are stable.  He states it hurts to walk.  His pulses are dopplerable (previous known hx PAD, has required doppler for pulses on past ED visits) and he is neurovascularly intact without any open wounds.  Patient with swelling noted, consistent with soft tissue infection. There is no fluctuance at this time to suggest a felon/warrant emergent incision and drainage.      Will plan labs, x-ray, reassess    Differential Diagnosis includes, but is not limited to:  Cellulitis, abscess,  necrotizing fasciitis, osteomyelitis, septic joint, MRSA, DVT, drug eruption, allergic/contact dermatitis, EM/SJS, viral exanthem, local trauma/contusion      Risk  Decision regarding hospitalization.  Diagnosis or treatment significantly limited by social determinants of health.  Risk Details: Labs without significant findings.  X-ray reviewed, no significant findings.  Patient reports improvement of pain after symptomatic treatment.  No absolute indication for admission identified, no emergent process identified today.  Will plan to treat for cellulitis, referral for Podiatry placed.  Discussed checking his feet daily for wounds or increased signs of swelling drainage or infection.  Return precautions discussed if he develops any of these symptoms or fever.  Patient understands he needs to follow up with Podiatry.    After taking into careful account the historical factors and physical exam findings of the patient's presentation today, in conjunction with the empirical and objective data obtained on ED workup, no acute emergent medical condition has been identified. The patient appears to be low risk for an emergent medical condition and I feel it is safe and appropriate at this time for the patient to be discharged to follow-up as detailed in their discharge instructions for reevaluation and possible continued outpatient workup and management. I have discussed the specifics of the workup with the patient and the patient has verbalized understanding of the details of the workup, the diagnosis, the treatment plan, and the need for outpatient follow-up.  Although the patient has no emergent etiology today this does not preclude the development of an emergent condition so in addition, I have advised the patient that they can return to the ED and/or activate EMS at any time with worsening of their symptoms, change of their symptoms, or with any other medical complaint.  The patient remained comfortable and stable  during their visit in the ED.  Discharge and follow-up instructions discussed with the patient who expressed understanding and willingness to comply with my recommendations.                 ED Course as of 07/04/24 1837   Thu Jul 04, 2024   1740 CBC auto differential(!)  No significant abnormality.    [RN]   1759 X-Ray Toe 2 or More Views Left  Independently reviewed, agree with Radiology interpretation.  No bony erosion or gas, no significant abnormality [RN]   1803 Comprehensive metabolic panel(!)  No significant abnormality.    [RN]      ED Course User Index  [RN] James Bagley Jr., MD                           Clinical Impression:  Final diagnoses:  [L03.032] Cellulitis of fifth toe of left foot (Primary)          ED Disposition Condition    Discharge Stable          ED Prescriptions       Medication Sig Dispense Start Date End Date Auth. Provider    meloxicam (MOBIC) 7.5 MG tablet Take 1 tablet (7.5 mg total) by mouth once daily. for 3 days 3 tablet 7/4/2024 7/7/2024 James Bagley Jr., MD    clindamycin (CLEOCIN) 150 MG capsule Take 3 capsules (450 mg total) by mouth every 8 (eight) hours. for 7 days 60 capsule 7/4/2024 7/11/2024 James Bagley Jr., MD          Follow-up Information       Follow up With Specialties Details Why Contact Info Additional Information    Roxane Harp MD Family Medicine Schedule an appointment as soon as possible for a visit in 2 days For wound re-check [address] [facility] [address]  [phone]       [facility] Schedule an appointment as soon as possible for a visit  For wound re-check [address]  [address]  [facility] [address] [phone] Please park in [address] or [address] and use Corey richardson. Take Medical [facility] elevators.            Portions of this note were dictated using voice recognition software and may contain dictation related errors in spelling/grammar/syntax not found on text review       Kevyn  James GARY Jr., MD  07/04/24 8532

## 2024-07-04 NOTE — ED NOTES
Patient identifiers verified and correct.      LOC: The patient is awake, alert and aware of environment with an appropriate affect, the patient is oriented x 4 and speaking appropriately.      APPEARANCE: Patient appears comfortable and in no acute distress, patient is clean and well groomed.     HEENT: Head symmetrical. Eyes bilateral.  Bilateral ears without drainage. Bilateral nares patent, throat clear.     SKIN: The skin is warm and dry, color consistent with ethnicity, patient has normal skin turgor and moist mucus membranes, skin intact, no breakdown or bruising noted.      MUSCULOSKELETAL: Patient moving all extremities spontaneously. Reports redness, swelling, and pain to left pinky toe.     Genitourinary: Patient denies any dysuria, bleeding, and discharge. Patient denies any reports of incontinence.      RESPIRATORY: Airway is open and patent, respirations are spontaneous, patient has a normal effort and rate, no accessory muscle use noted.      CARDIAC: Patient has a normal rate, no edema noted, capillary refill < 3 seconds.      GASTRO: Abdomen soft and non-distended.      NEURO: Pt opens eyes spontaneously pupils equal, round, and reactive. behavior appropriate to situation, follows commands, facial expression symmetrical,   bilateral hand grasp equal and even, purposeful motor response noted, normal sensation in all extremities when touched with a finger.     NEUROVASCULAR: All extremities are warm and pink.

## 2024-07-04 NOTE — ED NOTES
Pt presents to ED with C/O L foot 5th to pain and swelling with onset x3 days. Pt denies any falls or injury. Pt states the pain is 10/10 and denies taking any medication for his pain.

## 2024-07-05 ENCOUNTER — OUTPATIENT CASE MANAGEMENT (OUTPATIENT)
Dept: ADMINISTRATIVE | Facility: OTHER | Age: 69
End: 2024-07-05
Payer: MEDICARE

## 2024-07-05 ENCOUNTER — PATIENT OUTREACH (OUTPATIENT)
Dept: EMERGENCY MEDICINE | Facility: HOSPITAL | Age: 69
End: 2024-07-05
Payer: MEDICARE

## 2024-07-05 NOTE — LETTER
Neena Kohli  [address] [facility] [address] [place] [id number]      Dear Mr. Neena Kohli,     I work with [facility] Outpatient [facility] Management Department. After your recent ER visit, we received a referral from Dr. Bagley to call you to discuss your medical history. These services are free of charge and are offered to [facility] patients who have recently been discharged from any of our facilities or who have medical conditions that may require the skill of a nurse to assist with management.     I am a Registered Nurse who specializes in connecting patients with available medical and financial resources as well as addressing any educational or transportation needs that may be indicated.    I attempted to reach you by telephone, but I was unsuccessful. Please call our department so that we can go over some questions with you, regarding your health.    The Outpatient Care Management Department can be reached at [phone] from 8:00 AM to 4:30 PM on Monday through Friday.     Additionally, [facility] also has a program where a nurse is available 24/7 to answer questions or provide medical advice, their number is [phone].      Kind Regards,        Chacha Patricia RN  Outpatient Care Management  Phone: [phone]

## 2024-07-05 NOTE — PROGRESS NOTES
7/5/24 1st attempt to complete initial assessment for [facility]: Left message for patient requesting a return call. Letter has been sent to patient with [facility] contact information. Will attempt to contact patient again at a later date.

## 2024-07-08 ENCOUNTER — TELEPHONE (OUTPATIENT)
Dept: CARDIOLOGY | Facility: CLINIC | Age: 69
End: 2024-07-08
Payer: MEDICARE

## 2024-07-08 NOTE — PROGRESS NOTES
Subjective:   07/09/2024     Patient ID:  Neena Kohli is a 68 y.o. male who presents for evaulation of Coronary Artery Disease, Peripheral Arterial Disease, and Hyperlipidemia      Comes back in for follow-up.  He was recently seen in the ER with cellulitis of left small toe, this appears to be healing.  He is known to have peripheral arterial disease, he does not have palpable pulses in his feet, but previously noted to have Doppler pulses.    He does have coronary artery disease, he is status post right coronary artery bare metal stent.  He had a late stent thrombosis in 2010.  Echocardiography from 1 year ago shows systolic function be normal.    Hypercholesterolemia is treated with max dose atorvastatin, LDL cholesterol was 40 when checked earlier this year.      Blood pressure is very well controlled on combination of amlodipine, carvedilol, hydralazine, hydrochlorothiazide and irbesartan.      Prior note reviewed:  Patient known to me with hypertension, hyperlipidemia and peripheral arterial disease.  He has a history of coronary artery disease and PCI.  He was recent admitted to the hospital with hypertensive crisis.  His medicines were resumed in he did well.  Blood pressure control is good.  Echocardiography during the hospitalization showed normal systolic function.      Peripheral arterial disease is stable.      He has quit smoking, but still vapes.    No chest pains or tightness.      Cholesterol very well controlled, would like to see it even better, increase atorvastatin to 80 mg nightly    Assessment and Plan:     Coronary artery disease involving native coronary artery of native heart without angina pectoris  Comments:  No angina  Orders:  -     Ambulatory referral/consult to Cardiology     Hypertension, essential  Comments:  Well controlled today  Orders:  -     Ambulatory referral/consult to Cardiology     Mixed hyperlipidemia  Comments:  Increase atorvastatin 80 mg nightly  Orders:  -      atorvastatin (LIPITOR) 80 MG tablet; Take 1 tablet (80 mg total) by mouth every evening.  Dispense: 90 tablet; Refill: 3     Old MI (myocardial infarction)  Comments:  Systolic function preserved  Orders:  -     atorvastatin (LIPITOR) 80 MG tablet; Take 1 tablet (80 mg total) by mouth every evening.  Dispense: 90 tablet; Refill: 3           Recent hospitalization:   67-year-old male with PMH of hypertension, hyperlipidemia, PAD, type 2 diabetes mellitus, major depressive disorder, chronic pain on narcotics, CAD with history of PCI, history of prostate cancer presented with abdominal pain, nausea/vomiting and diarrhea with poor p.o. intake and found to have acute cholecystitis with CBD dilatation and possible choledocholithiasis on imaging.  Underwent cholecystectomy on 09/05.  Antibiotics discontinued as per surgery recommendation.  Patient is able to tolerate a diet without difficulty.  Since admission, noted to have blood pressure elevated but asymptomatic in the 200s systolic requiring IV antihypertensives.  Initially on nicardipine drip, subsequently weaned off with addition of antihypertensives.  Initial hypertension thought to be in setting of opiate withdrawal, subsequently started on home equivalent oxycodone dosing.  Discharged on amlodipine 10 mg, irbesartan 300 mg, carvedilol 25 mg b.i.d., hydralazine 100 mg t.i.d. (these were home medications prior to admission) with added hydrochlorothiazide 25 mg q.d. and Aldactone 25 mg q.d. patient was seen by Cardiology and recommended gradual uptitration of medications as outpatient.  Patient reports difficult to treat hypertension most of his life.  Consider workup for secondary hypertension in the outpatient setting. Surgery & cardiology follow ups ordered.     Echocardiogram during that hospitalization:   ·  Left Ventricle: The left ventricle is normal in size. Normal wall thickness. Normal wall motion. There is normal systolic function. Biplane (2D) method of  discs ejection fraction is 71%. There is normal diastolic function.  ·  Left Atrium: Left atrium is moderately dilated.  ·  Right Ventricle: Normal right ventricular cavity size. Wall thickness is normal. Right ventricle wall motion  is normal. Systolic function is normal.  ·  Mitral Valve: There is mild regurgitation.  ·  Tricuspid Valve: There is mild regurgitation.  ·  Pulmonary Artery: The estimated pulmonary artery systolic pressure is 20 mmHg.  ·  IVC/SVC: Normal venous pressure at 3 mmHg.       Prior note:  Patient comes in with a history of heart problems.  He had an episode of chest pain last year and was seen in the emergency room at [facility].  His initial cardiac evaluation was negative and he was discharged.  A stress test was subsequently performed.     ·  Normal myocardial perfusion scan. There is no evidence of myocardial ischemia or infarction.  ·  The gated perfusion images showed an ejection fraction of 63% at rest. The gated perfusion images showed an ejection fraction of 72% post stress. Normal ejection fraction is greater than 51%.  ·  There is normal wall motion at rest and post stress.  ·  LV cavity size is normal at rest and normal at stress.  ·  The EKG portion of this study is negative for ischemia.  ·  The patient reported no chest pain during the stress test.  ·  During stress, rare PACs are noted.  ·  There are no prior studies for comparison.      He has not had further chest pain since that time.  He did have prior heart attacks, prior coronary stents.  These were done [facility].    Records obtained from [facility] include an office visit with Dr. Putnam in 2020.  It was noted that he had coronary disease and was status post right coronary artery bare metal stent.  He developed a late stent thrombosis treated with PTCA in January of 2010. In 2016 he had abnormal Lexiscan stress test and was treated with medical therapy.  He had had a syncopal episode last year, head CT was  benign.  Orthostatics were negative.  His troponin was negative.  Carvedilol was discontinued because of bradycardia.  A 30 day event detector was pending results at that time.  On that visit, he was sent for an adenosine stress test, and a tilt test.  The results of those tests are unknown.    His activity is quite limited because of severe peripheral arterial disease.  He notes that he is status post peripheral stents, he does not know who put those in.  These were done [place]. He continues to have right leg pain with activity, 1 block walking.  The pain is more in the upper thigh then the calf.  This is unchanged.    Hypertension is present, his blood pressure appears elevated on current medications.    He does have chronic kidney disease, stage III, will try to avoid nephrotoxic agents including angiographic contrast.  Followed by Nephrology.    Hypercholesterolemia is present.  His triglycerides are normal.  Fenofibrate was discontinued, pravastatin dose increased, his last LDL was 36.  Needs to be rechecked    He has resumed smoking cigarettes.  Smoking cessation strongly stressed    Mild aortic valve stenosis present noted on recent echocardiography:  · The left ventricle is normal in size with normal systolic function.  · The estimated ejection fraction is 70%.  · Indeterminate left ventricular diastolic function.  · The estimated PA systolic pressure is 28 mmHg.  · Normal right ventricular size with normal right ventricular systolic function.  · Normal central venous pressure (3 mmHg).  · There is no pulmonary hypertension.  · There is mild aortic valve sclerosis without stenosis                Past Medical History:   Diagnosis Date    Arthritis     Cancer     Prostate    Diabetes mellitus     Hyperthyroidism     PAD (peripheral artery disease)        Review of patient's allergies indicates:  No Known Allergies      Current Outpatient Medications:     allopurinoL (ZYLOPRIM) 100 MG tablet, TAKE 1  TABLET ONE TIME DAILY, Disp: 90 tablet, Rfl: 3    amLODIPine (NORVASC) 10 MG tablet, Take 1 tablet (10 mg total) by mouth once daily., Disp: 90 tablet, Rfl: 1    aspirin (ECOTRIN) 81 MG EC tablet, Take 81 mg by mouth once daily., Disp: , Rfl:     atorvastatin (LIPITOR) 80 MG tablet, TAKE 1 TABLET EVERY EVENING, Disp: 90 tablet, Rfl: 3    carvediloL (COREG) 25 MG tablet, Take 1 tablet (25 mg total) by mouth 2 (two) times daily., Disp: 180 tablet, Rfl: 1    cholecalciferol, vitamin D3, 1,250 mcg (50,000 unit) capsule, Take 50,000 Units by mouth every 14 (fourteen) days., Disp: , Rfl:     citalopram (CELEXA) 20 MG tablet, Take 20 mg by mouth once daily., Disp: , Rfl:     colchicine (COLCRYS) 0.6 mg tablet, 2 po , then 1 po 2 hours later.  Then 1 po qd, Disp: 30 tablet, Rfl: 0    ergocalciferol (ERGOCALCIFEROL) 50,000 unit Cap, Take 50,000 Units by mouth every 7 days., Disp: , Rfl:     glimepiride (AMARYL) 1 MG tablet, TAKE 2 TABLETS EVERY DAY, Disp: 180 tablet, Rfl: 1    hydrALAZINE (APRESOLINE) 100 MG tablet, TAKE 1 TABLET EVERY 8 HOURS, Disp: 270 tablet, Rfl: 3    hydroCHLOROthiazide (HYDRODIURIL) 25 MG tablet, Take 1 tablet (25 mg total) by mouth once daily., Disp: 90 tablet, Rfl: 1    irbesartan (AVAPRO) 300 MG tablet, Take 1 tablet (300 mg total) by mouth once daily., Disp: 90 tablet, Rfl: 1    metFORMIN (GLUCOPHAGE) 1000 MG tablet, Take 1 tablet (1,000 mg total) by mouth 2 (two) times daily with meals., Disp: 180 tablet, Rfl: 1    methylPREDNISolone (MEDROL DOSEPACK) 4 mg tablet, use as directed, Disp: 21 each, Rfl: 0    oxyCODONE (ROXICODONE) 30 MG Tab, SMARTSI.5-1 Tablet(s) By Mouth 3-4 Times Daily PRN, Disp: , Rfl:     QUEtiapine (SEROQUEL) 100 MG Tab, Take 1 tablet (100 mg total) by mouth 2 (two) times daily. At bedtime, Disp: 60 tablet, Rfl: 2    topiramate (TOPAMAX) 100 MG tablet, Take 100 mg by mouth once daily., Disp: , Rfl:     traZODone (DESYREL) 100 MG tablet, Take 100 mg by mouth every evening.,  Disp: , Rfl:     TRUE METRIX GLUCOSE TEST STRIP Strp, , Disp: , Rfl:     TRUEPLUS LANCETS 33 gauge Misc, , Disp: , Rfl:     clindamycin (CLEOCIN) 150 MG capsule, Take 3 capsules (450 mg total) by mouth every 8 (eight) hours. for 7 days (Patient not taking: Reported on 7/9/2024), Disp: 60 capsule, Rfl: 0    ZTLIDO 1.8 % PtMd, , Disp: , Rfl:      Objective:   Review of Systems   Cardiovascular:  Negative for chest pain, claudication, cyanosis, dyspnea on exertion, irregular heartbeat, leg swelling, near-syncope, orthopnea, palpitations, paroxysmal nocturnal dyspnea and syncope.       Vitals:    07/09/24 1542   BP: 129/72   Pulse: (!) 56             Physical Exam  Vitals reviewed.   Constitutional:       General: He is not in acute distress.     Appearance: He is well-developed.   HENT:      Head: Normocephalic and atraumatic.      Nose: Nose normal.   Eyes:      Conjunctiva/sclera: Conjunctivae normal.      Pupils: Pupils are equal, round, and reactive to light.   Neck:      Vascular: No carotid bruit or JVD.   Cardiovascular:      Rate and Rhythm: Normal rate and regular rhythm.      Pulses: Intact distal pulses. Decreased pulses.           Carotid pulses are 2+ on the right side and 3+ on the left side.       Radial pulses are 2+ on the right side and 2+ on the left side.        Femoral pulses are 2+ on the right side and 2+ on the left side.       Popliteal pulses are 1+ on the right side and 1+ on the left side.        Dorsalis pedis pulses are 0 on the right side and 0 on the left side.        Posterior tibial pulses are 0 on the right side and 0 on the left side.      Heart sounds: Normal heart sounds. No murmur heard.     No friction rub. No gallop.   Pulmonary:      Effort: Pulmonary effort is normal. No respiratory distress.      Breath sounds: Normal breath sounds. No wheezing or rales.   Chest:      Chest wall: No tenderness.   Abdominal:      General: Bowel sounds are normal. There is no distension.       Palpations: Abdomen is soft.      Tenderness: There is no abdominal tenderness.   Musculoskeletal:         General: No tenderness or deformity. Normal range of motion.      Cervical back: Normal range of motion and neck supple.      Right lower leg: No edema.      Left lower leg: No edema.   Skin:     General: Skin is warm and dry.      Findings: No erythema or rash.   Neurological:      Mental Status: He is alert and oriented to person, place, and time.      Cranial Nerves: No cranial nerve deficit.      Motor: No abnormal muscle tone.      Coordination: Coordination normal.   Psychiatric:         Behavior: Behavior normal.         Thought Content: Thought content normal.         Judgment: Judgment normal.         Admission on 07/04/2024, Discharged on 07/04/2024   Component Date Value    WBC 07/04/2024 6.71     RBC 07/04/2024 4.01 (L)     Hemoglobin 07/04/2024 12.2 (L)     Hematocrit 07/04/2024 37.7 (L)     MCV 07/04/2024 94     MCH 07/04/2024 30.4     MCHC 07/04/2024 32.4     RDW 07/04/2024 13.1     Platelets 07/04/2024 228     MPV 07/04/2024 10.2     Immature Granulocytes 07/04/2024 0.3     Gran # (ANC) 07/04/2024 3.7     Immature Grans (Abs) 07/04/2024 0.02     Lymph # 07/04/2024 2.2     Mono # 07/04/2024 0.5     Eos # 07/04/2024 0.2     Baso # 07/04/2024 0.05     nRBC 07/04/2024 0     Gran % 07/04/2024 55.8     Lymph % 07/04/2024 32.3     Mono % 07/04/2024 7.3     Eosinophil % 07/04/2024 3.6     Basophil % 07/04/2024 0.7     Differential Method 07/04/2024 Automated     Sodium 07/04/2024 140     Potassium 07/04/2024 4.2     Chloride 07/04/2024 104     CO2 07/04/2024 27     Glucose 07/04/2024 92     BUN 07/04/2024 22     Creatinine 07/04/2024 1.5 (H)     Calcium 07/04/2024 9.9     Total Protein 07/04/2024 7.7     Albumin 07/04/2024 3.9     Total Bilirubin 07/04/2024 0.3     Alkaline Phosphatase 07/04/2024 206 (H)     AST 07/04/2024 11     ALT 07/04/2024 12     eGFR 07/04/2024 50 (A)     Anion Gap 07/04/2024 9     Lab Visit on 05/16/2024   Component Date Value    TSH 05/16/2024 0.628     WBC 05/16/2024 7.66     RBC 05/16/2024 4.07 (L)     Hemoglobin 05/16/2024 12.1 (L)     Hematocrit 05/16/2024 39.5 (L)     MCV 05/16/2024 97     MCH 05/16/2024 29.7     MCHC 05/16/2024 30.6 (L)     RDW 05/16/2024 13.6     Platelets 05/16/2024 265     MPV 05/16/2024 10.8     Immature Granulocytes 05/16/2024 0.3     Gran # (ANC) 05/16/2024 4.4     Immature Grans (Abs) 05/16/2024 0.02     Lymph # 05/16/2024 2.2     Mono # 05/16/2024 0.7     Eos # 05/16/2024 0.3     Baso # 05/16/2024 0.06     nRBC 05/16/2024 0     Gran % 05/16/2024 57.6     Lymph % 05/16/2024 28.2     Mono % 05/16/2024 8.7     Eosinophil % 05/16/2024 4.4     Basophil % 05/16/2024 0.8     Differential Method 05/16/2024 Automated    Office Visit on 05/16/2024   Component Date Value    Cologuard Result 06/07/2024 Sample Could Not Be Processed 10     Cologuard Result 06/17/2024 Negative    Lab Visit on 03/07/2024   Component Date Value    Cholesterol 03/07/2024 86 (L)     Triglycerides 03/07/2024 89     HDL 03/07/2024 28 (L)     LDL Cholesterol 03/07/2024 40.2 (L)     HDL/Cholesterol Ratio 03/07/2024 32.6     Total Cholesterol/HDL Ra* 03/07/2024 3.1     Non-HDL Cholesterol 03/07/2024 58     Hemoglobin A1C 03/07/2024 5.7 (H)     Estimated Avg Glucose 03/07/2024 117     Sodium 03/07/2024 138     Potassium 03/07/2024 4.2     Chloride 03/07/2024 109     CO2 03/07/2024 21 (L)     Glucose 03/07/2024 103     BUN 03/07/2024 16     Creatinine 03/07/2024 1.4     Calcium 03/07/2024 9.5     Total Protein 03/07/2024 7.1     Albumin 03/07/2024 3.4 (L)     Total Bilirubin 03/07/2024 0.2     Alkaline Phosphatase 03/07/2024 192 (H)     AST 03/07/2024 10     ALT 03/07/2024 7 (L)     eGFR 03/07/2024 54.4 (A)     Anion Gap 03/07/2024 8     Uric Acid 03/07/2024 6.5         Assessment and Plan:     Coronary artery disease involving native coronary artery of native heart without angina  pectoris  Comments:  Asymptomatic at present    Hypertension, essential  Comments:  Blood pressure well controlled on current meds    Mixed hyperlipidemia  Comments:  LDL very well controlled on max dose atorvastatin    Old MI (myocardial infarction)  Comments:  Systolic function preserved    Presence of stent in coronary artery  Comments:  Continue aspirin    Claudication of both lower extremities  Comments:  Will need to see vascular surgery if symptoms worsen.            Follow up in about 1 year (around 7/9/2025).

## 2024-07-09 ENCOUNTER — OFFICE VISIT (OUTPATIENT)
Dept: CARDIOLOGY | Facility: CLINIC | Age: 69
End: 2024-07-09
Payer: MEDICARE

## 2024-07-09 VITALS
BODY MASS INDEX: 25.81 KG/M2 | SYSTOLIC BLOOD PRESSURE: 129 MMHG | DIASTOLIC BLOOD PRESSURE: 72 MMHG | WEIGHT: 164.44 LBS | HEIGHT: 67 IN | HEART RATE: 56 BPM

## 2024-07-09 DIAGNOSIS — I10 HYPERTENSION, ESSENTIAL: Chronic | ICD-10-CM

## 2024-07-09 DIAGNOSIS — I25.10 CORONARY ARTERY DISEASE INVOLVING NATIVE CORONARY ARTERY OF NATIVE HEART WITHOUT ANGINA PECTORIS: Primary | Chronic | ICD-10-CM

## 2024-07-09 DIAGNOSIS — I73.9 CLAUDICATION OF BOTH LOWER EXTREMITIES: ICD-10-CM

## 2024-07-09 DIAGNOSIS — Z95.5 PRESENCE OF STENT IN CORONARY ARTERY: Chronic | ICD-10-CM

## 2024-07-09 DIAGNOSIS — I25.2 OLD MI (MYOCARDIAL INFARCTION): Chronic | ICD-10-CM

## 2024-07-09 DIAGNOSIS — E78.2 MIXED HYPERLIPIDEMIA: Chronic | ICD-10-CM

## 2024-07-09 PROCEDURE — 3074F SYST BP LT 130 MM HG: CPT | Mod: CPTII,S$GLB,, | Performed by: INTERNAL MEDICINE

## 2024-07-09 PROCEDURE — 1125F AMNT PAIN NOTED PAIN PRSNT: CPT | Mod: CPTII,S$GLB,, | Performed by: INTERNAL MEDICINE

## 2024-07-09 PROCEDURE — 1159F MED LIST DOCD IN RCRD: CPT | Mod: CPTII,S$GLB,, | Performed by: INTERNAL MEDICINE

## 2024-07-09 PROCEDURE — 3078F DIAST BP <80 MM HG: CPT | Mod: CPTII,S$GLB,, | Performed by: INTERNAL MEDICINE

## 2024-07-09 PROCEDURE — 1100F PTFALLS ASSESS-DOCD GE2>/YR: CPT | Mod: CPTII,S$GLB,, | Performed by: INTERNAL MEDICINE

## 2024-07-09 PROCEDURE — 99999 PR PBB SHADOW E&M-EST. PATIENT-LVL IV: CPT | Mod: PBBFAC,,, | Performed by: INTERNAL MEDICINE

## 2024-07-09 PROCEDURE — 4010F ACE/ARB THERAPY RXD/TAKEN: CPT | Mod: CPTII,S$GLB,, | Performed by: INTERNAL MEDICINE

## 2024-07-09 PROCEDURE — 3044F HG A1C LEVEL LT 7.0%: CPT | Mod: CPTII,S$GLB,, | Performed by: INTERNAL MEDICINE

## 2024-07-09 PROCEDURE — 99214 OFFICE O/P EST MOD 30 MIN: CPT | Mod: S$GLB,,, | Performed by: INTERNAL MEDICINE

## 2024-07-09 PROCEDURE — 3288F FALL RISK ASSESSMENT DOCD: CPT | Mod: CPTII,S$GLB,, | Performed by: INTERNAL MEDICINE

## 2024-07-09 PROCEDURE — 3008F BODY MASS INDEX DOCD: CPT | Mod: CPTII,S$GLB,, | Performed by: INTERNAL MEDICINE

## 2024-07-09 RX ORDER — ATORVASTATIN CALCIUM 80 MG/1
80 TABLET, FILM COATED ORAL NIGHTLY
Qty: 90 TABLET | Refills: 3 | Status: SHIPPED | OUTPATIENT
Start: 2024-07-09

## 2024-07-17 ENCOUNTER — OUTPATIENT CASE MANAGEMENT (OUTPATIENT)
Dept: ADMINISTRATIVE | Facility: OTHER | Age: 69
End: 2024-07-17
Payer: MEDICARE

## 2024-07-17 NOTE — PROGRESS NOTES
Outpatient Care Management  Initial Patient Assessment    Patient: Neena Kohli  MRN: [id number]  Date of Service: 07/17/2024  Completed by: Chacha Patricia RN  Referral Date: 07/04/2024  Date of Eligibility: 7/5/2024  Program:   High Risk  Status: Ongoing  Effective Dates: 7/17/2024 - present  Responsible Staff: Chacha Patricia, RN        Reason for Visit   Patient presents with    OPCM Chart Review     7/17/24    Nursing Assessment     7/17/24    OPCM Enrollment Call     7/17/24       Brief Summary:  Neena Kohli was referred by Dr. Bagley for toe pain. Patient qualifies for program based on risk score of 75%.   Active problem list, medical, surgical and social history reviewed. Active comorbidities include HTN, Hyperlipidemia, PAD, CKD, Depression, Tobacco use, and cellulitis of 5th left toe. Areas of need identified by patient include help managing Depression and smoking cessation.   Next steps: Follow up with patient on or around 7/24/24.    Disability Status  Is the patient alert and oriented (person, place, time, and situation)?: Alert and oriented x 4  Hearing Difficulty or Deaf: no  Visual Difficulty or Blind: yes  Visual and Hearing Needs Conclusion: wears readers  Vision Management: Other (wears readers)  Difficulty Concentrating, Remembering or Making Decisions: no  Communication Difficulty: no  Eating/Swallowing Difficulty: no  Walking or Climbing Stairs Difficulty: no  Dressing/Bathing Difficulty: no  Toileting : Independent  Continence : Continence - Not a problem  Difficulty Managing Errands Independently: no  Equipment Currently Used at Home: blood pressure machine; CPAP; glucometer  ADL Conclusion Statement: Indpendent with all ADLS. Still drives. Lives alone.  Service Animal Required: no  Change in Functional Status Since Onset of Current Illness/Injury: no        Spiritual Beliefs  Spiritual, Cultural Beliefs, Judaism Practices, Values that Affect Care: no      Social History      Socioeconomic History    Marital status: Single   Tobacco Use    Smoking status: Every Day     Current packs/day: 1.00     Average packs/day: 1 pack/day for 52.5 years (52.5 ttl pk-yrs)     Types: Vaping with nicotine, Cigarettes     Start date: 1972    Smokeless tobacco: Current    Tobacco comments:     Enrolled in the [employer] on 2/20/19 ([employer] Member ID # [id number])  Ambulatory referral to Smoking Cessation clinic following hospital discharge.  Pt is a 1 pk/day cigarette smoker x 52 yrs.- switched to vaping with nicotine approx. 3 months ago.    Substance and Sexual Activity    Alcohol use: Not Currently     Alcohol/week: 0.0 standard drinks of alcohol     Comment: Former User    Drug use: Not Currently     Types: Cocaine, Marijuana     Comment: Former User    Sexual activity: Not Currently     Social Determinants of Health     Financial Resource Strain: Low Risk  (7/17/2024)    Overall Financial Resource Strain (CARDIA)     Difficulty of Paying Living Expenses: Not very hard   Food Insecurity: No Food Insecurity (7/17/2024)    Hunger Vital Sign     Worried About Running Out of Food in the Last Year: Never true     Ran Out of Food in the Last Year: Never true   Transportation Needs: No Transportation Needs (7/17/2024)    TRANSPORTATION NEEDS     Transportation : No   Physical Activity: Insufficiently Active (7/17/2024)    Exercise Vital Sign     Days of Exercise per Week: 3 days     Minutes of Exercise per Session: 10 min   Stress: Stress Concern Present (7/17/2024)    [employer] [place] [employer] - Occupational Stress Questionnaire     Feeling of Stress : To some extent   Housing Stability: Low Risk  (7/17/2024)    Housing Stability Vital Sign     Unable to Pay for Housing in the Last Year: No     Homeless in the Last Year: No       Roles and Relationships  Primary Source of Support/Comfort: child(suzan)  Name of Support/Comfort Primary Source: Amada Kohli daughter  Primary  Roles/Responsibilities: wage earner, full-time  Secondary Source of Support/Comfort: sibling(s)  Name of Support/Comfort Secondary Source: Abhishek Kohli      Advance Directives (For Healthcare)  Advance Directive  (If Adv Dir status is received, view document under Adv Dir in header or Chart Review Media tab): Patient does not have Advance Directive, requests information.  Patient Requests Assistance: Handouts provided; Patient will do independently        Patient Reported Insurance  Verified current insurance plan:: [employer]age  [employer] benefits discussed:: Mail Order Pharmacy            7/17/2024     4:14 PM 7/9/2024     3:35 PM 3/7/2024     9:20 AM 9/26/2023     3:00 PM 12/12/2022     2:41 PM 6/14/2022     1:45 PM   Depression Patient Health Questionnaire   Over the last two weeks how often have you been bothered by little interest or pleasure in doing things Several days Not at all Not at all Not at all Several days Not at all   Over the last two weeks how often have you been bothered by feeling down, depressed or hopeless Several days Not at all Not at all Not at all Not at all Several days   PHQ-2 Total Score 2 0 0 0 1 1       Learning Assessment       07/17/2024 1750 [facility] (7/17/2024 - Present)   Created by Chacha Patricia, RN -  (Nurse) Status: Complete                 PRIMARY LEARNER     Primary Learner Name:  Neena Kohli BB - 07/17/2024 1750    Relationship:  Patient BB - 07/17/2024 1750    Does the primary learner have any barriers to learning?:  No Barriers BB - 07/17/2024 1750    What is the preferred language of the primary learner?:  English BB - 07/17/2024 1750    Is an  required?:  No BB - 07/17/2024 1750    How does the primary learner prefer to learn new concepts?:  Listening BB - 07/17/2024 1750    How often do you need to have someone help you read instructions, pamphlets, or written material from your doctor or pharmacy?:  Never BB -  07/17/2024 1750        CO-LEARNER #1     No question answered        CO-LEARNER #2     No question answered        SPECIAL TOPICS     No question answered        ANSWERED BY:     No question answered        Comments         Edit History       Chacha Patricia, RN -  (Nurse)   07/17/2024 2241

## 2024-07-17 NOTE — LETTER
Neena Kohli  [address] [facility] [address] [place], [place] [address] [address]    Dear Mr. Neena Kohli,     Welcome to [facility] Outpatient Care Management Program. We are here to assist patients with multiple long-term (chronic) conditions who often need more personalized healthcare.    It was a pleasure talking with you today. My name is Chacha Dmdesire. I look forward to working with you as your Care Manager. I will be contacting you by telephone routinely to help coordinate care and resolve issues.    My goal is to help you function at the healthiest and highest level possible. You can contact me directly at [phone].    As an [facility] patient with [employer], some of the services we provide, at no cost to you, include:     Development of an individualized care plan with a Registered Nurse   Connection with a   Assistance from a Community Health Worker  Connection with available resources and services    Coordinate communication among your care team members   Provide coaching and education  Help you understand your doctor's treatment plan  Help you obtain information about your insurance coverage.    All services provided by [facility]s Outpatient Care Managers and other care team members are coordinated with and communicated to your primary care team.      As part of your enrollment, you will be receiving education materials and more information about these services in your [facility] account, by phone, or through the mail. If you do not wish to participate or receive information, you can Opt Out by contacting our office at [phone]. For after hours information or emergencies, you can contact the [facility] number at [phone].      Kind Regards,        Chacha Patricia RN  [facility]   Outpatient Care Management

## 2024-07-23 ENCOUNTER — TELEPHONE (OUTPATIENT)
Dept: INTERNAL MEDICINE | Facility: CLINIC | Age: 69
End: 2024-07-23
Payer: MEDICARE

## 2024-07-23 NOTE — TELEPHONE ENCOUNTER
"----- Message from Mariah Morton sent at 7/23/2024  1:24 PM CDT -----  Contact: Pt  [phone]  Patient is requesting a call back with the information about the "Smoking Sensation", he has miss placed his care with the information.    Please call and advise.    Thank You  "

## 2024-07-24 ENCOUNTER — HOSPITAL ENCOUNTER (EMERGENCY)
Facility: HOSPITAL | Age: 69
Discharge: HOME | End: 2024-07-24
Attending: EMERGENCY MEDICINE
Payer: MEDICARE

## 2024-07-24 ENCOUNTER — OUTPATIENT CASE MANAGEMENT (OUTPATIENT)
Dept: ADMINISTRATIVE | Facility: OTHER | Age: 69
End: 2024-07-24
Payer: MEDICARE

## 2024-07-24 ENCOUNTER — OFFICE VISIT (OUTPATIENT)
Dept: OPTOMETRY | Facility: CLINIC | Age: 69
End: 2024-07-24
Payer: COMMERCIAL

## 2024-07-24 VITALS
RESPIRATION RATE: 18 BRPM | HEART RATE: 51 BPM | OXYGEN SATURATION: 95 % | SYSTOLIC BLOOD PRESSURE: 184 MMHG | DIASTOLIC BLOOD PRESSURE: 104 MMHG | TEMPERATURE: 98 F | BODY MASS INDEX: 27.31 KG/M2 | WEIGHT: 174 LBS | HEIGHT: 67 IN

## 2024-07-24 DIAGNOSIS — E11.9 TYPE 2 DIABETES MELLITUS WITHOUT RETINOPATHY: Primary | ICD-10-CM

## 2024-07-24 DIAGNOSIS — E11.36 TYPE 2 DIABETES MELLITUS WITH CATARACT: ICD-10-CM

## 2024-07-24 DIAGNOSIS — H52.203 MYOPIA WITH ASTIGMATISM AND PRESBYOPIA, BILATERAL: ICD-10-CM

## 2024-07-24 DIAGNOSIS — H52.4 MYOPIA WITH ASTIGMATISM AND PRESBYOPIA, BILATERAL: ICD-10-CM

## 2024-07-24 DIAGNOSIS — M79.675 TOE PAIN, LEFT: ICD-10-CM

## 2024-07-24 DIAGNOSIS — N18.31 TYPE 2 DIABETES MELLITUS WITH STAGE 3A CHRONIC KIDNEY DISEASE, WITHOUT LONG-TERM CURRENT USE OF INSULIN: ICD-10-CM

## 2024-07-24 DIAGNOSIS — E11.22 TYPE 2 DIABETES MELLITUS WITH STAGE 3A CHRONIC KIDNEY DISEASE, WITHOUT LONG-TERM CURRENT USE OF INSULIN: ICD-10-CM

## 2024-07-24 DIAGNOSIS — H25.13 SENILE NUCLEAR SCLEROSIS, BILATERAL: ICD-10-CM

## 2024-07-24 DIAGNOSIS — H52.13 MYOPIA WITH ASTIGMATISM AND PRESBYOPIA, BILATERAL: ICD-10-CM

## 2024-07-24 LAB
ALBUMIN SERPL BCP-MCNC: 3.4 G/DL (ref 3.5–5.2)
ALP SERPL-CCNC: 543 U/L (ref 55–135)
ALT SERPL W/O P-5'-P-CCNC: 207 U/L (ref 10–44)
ANION GAP SERPL CALC-SCNC: 10 MMOL/L (ref 8–16)
AST SERPL-CCNC: 132 U/L (ref 10–40)
BASOPHILS # BLD AUTO: 0.05 K/UL (ref 0–0.2)
BASOPHILS NFR BLD: 0.6 % (ref 0–1.9)
BILIRUB SERPL-MCNC: 0.4 MG/DL (ref 0.1–1)
BUN SERPL-MCNC: 13 MG/DL (ref 8–23)
CALCIUM SERPL-MCNC: 9.3 MG/DL (ref 8.7–10.5)
CHLORIDE SERPL-SCNC: 107 MMOL/L (ref 95–110)
CO2 SERPL-SCNC: 22 MMOL/L (ref 23–29)
CREAT SERPL-MCNC: 1.3 MG/DL (ref 0.5–1.4)
CRP SERPL-MCNC: 13.1 MG/L (ref 0–8.2)
DIFFERENTIAL METHOD BLD: ABNORMAL
EOSINOPHIL # BLD AUTO: 0.3 K/UL (ref 0–0.5)
EOSINOPHIL NFR BLD: 3.8 % (ref 0–8)
ERYTHROCYTE [DISTWIDTH] IN BLOOD BY AUTOMATED COUNT: 13.8 % (ref 11.5–14.5)
EST. GFR  (NO RACE VARIABLE): 59 ML/MIN/1.73 M^2
GLUCOSE SERPL-MCNC: 97 MG/DL (ref 70–110)
HCT VFR BLD AUTO: 36.8 % (ref 40–54)
HGB BLD-MCNC: 11.6 G/DL (ref 14–18)
IMM GRANULOCYTES # BLD AUTO: 0.02 K/UL (ref 0–0.04)
IMM GRANULOCYTES NFR BLD AUTO: 0.2 % (ref 0–0.5)
LYMPHOCYTES # BLD AUTO: 1.8 K/UL (ref 1–4.8)
LYMPHOCYTES NFR BLD: 21.5 % (ref 18–48)
MCH RBC QN AUTO: 30.7 PG (ref 27–31)
MCHC RBC AUTO-ENTMCNC: 31.5 G/DL (ref 32–36)
MCV RBC AUTO: 97 FL (ref 82–98)
MONOCYTES # BLD AUTO: 0.7 K/UL (ref 0.3–1)
MONOCYTES NFR BLD: 8.1 % (ref 4–15)
NEUTROPHILS # BLD AUTO: 5.4 K/UL (ref 1.8–7.7)
NEUTROPHILS NFR BLD: 65.8 % (ref 38–73)
NRBC BLD-RTO: 0 /100 WBC
PLATELET # BLD AUTO: 171 K/UL (ref 150–450)
PMV BLD AUTO: 10.2 FL (ref 9.2–12.9)
POTASSIUM SERPL-SCNC: 4.1 MMOL/L (ref 3.5–5.1)
PROT SERPL-MCNC: 6.7 G/DL (ref 6–8.4)
RBC # BLD AUTO: 3.78 M/UL (ref 4.6–6.2)
SODIUM SERPL-SCNC: 139 MMOL/L (ref 136–145)
WBC # BLD AUTO: 8.17 K/UL (ref 3.9–12.7)

## 2024-07-24 PROCEDURE — 80053 COMPREHEN METABOLIC PANEL: CPT | Mod: HCNC | Performed by: EMERGENCY MEDICINE

## 2024-07-24 PROCEDURE — 85025 COMPLETE CBC W/AUTO DIFF WBC: CPT | Mod: HCNC | Performed by: EMERGENCY MEDICINE

## 2024-07-24 PROCEDURE — 86140 C-REACTIVE PROTEIN: CPT | Mod: HCNC | Performed by: EMERGENCY MEDICINE

## 2024-07-24 PROCEDURE — 99999 PR PBB SHADOW E&M-EST. PATIENT-LVL IV: CPT | Mod: PBBFAC,,, | Performed by: OPTOMETRIST

## 2024-07-24 PROCEDURE — 63600175 PHARM REV CODE 636 W HCPCS: Mod: HCNC | Performed by: EMERGENCY MEDICINE

## 2024-07-24 PROCEDURE — 96372 THER/PROPH/DIAG INJ SC/IM: CPT | Performed by: EMERGENCY MEDICINE

## 2024-07-24 PROCEDURE — 99284 EMERGENCY DEPT VISIT MOD MDM: CPT | Mod: 25,HCNC

## 2024-07-24 RX ORDER — KETOROLAC TROMETHAMINE 30 MG/ML
30 INJECTION, SOLUTION INTRAMUSCULAR; INTRAVENOUS
Status: COMPLETED | OUTPATIENT
Start: 2024-07-24 | End: 2024-07-24

## 2024-07-24 RX ORDER — KETOROLAC TROMETHAMINE 10 MG/1
10 TABLET, FILM COATED ORAL EVERY 6 HOURS PRN
Qty: 12 TABLET | Refills: 0 | Status: SHIPPED | OUTPATIENT
Start: 2024-07-24

## 2024-07-24 RX ADMIN — KETOROLAC TROMETHAMINE 30 MG: 30 INJECTION, SOLUTION INTRAMUSCULAR; INTRAVENOUS at 07:07

## 2024-07-24 NOTE — LETTER
Neena Kohli  [address] [facility] [address] [place] [id number]      Dear Mr. Kohli,    I am your nurse with [facility] Outpatient Care Management Department. I was unsuccessful in reaching you today. At your earliest convenience, I would like to discuss your healthcare progress.      Please contact me at [phone] from 8:00 AM to 4:30 PM on Monday through Friday. I will be out of the office from July 25th to July 31st and will return on August 1st.    As you know, [facility] is a program offered to you through [facility] where a nurse is available 24/7 to answer questions or provide medical advice, their number is [phone].    Kind Regards,      Chacha Patricia, RN  Outpatient Care Management

## 2024-07-24 NOTE — PROGRESS NOTES
7/24/24 1st attempt to complete follow-up for Outpatient Care Management: Left message for patient requesting a return call. OPCM letter has been sent. Will attempt to contact patient again at a later date.

## 2024-07-24 NOTE — PROGRESS NOTES
HPI    DLS: 3/04/2021    Pt here for Diabetic Eye Exam;  Pt states no eye pain or discomfort and no vision changes. Pt states blood   sugar has been stable. Pt denies any floaters, flashes or diplopia.     Meds;  No GTTS    Hemoglobin A1C       Date                     Value               Ref Range             Status                03/07/2024               5.7 (H)             4.0 - 5.6 %           Final              Comment:    ADA Screening Guidelines:  5.7-6.4%  Consistent with   prediabetes  >or=6.5%  Consistent with diabetes    High levels of fetal   hemoglobin interfere with the HbA1C  assay. Heterozygous hemoglobin   variants (HbS, HgC, etc)do  not significantly interfere with this assay.     However, presence of multiple variants may affect accuracy.         09/04/2023               5.1                 4.0 - 5.6 %           Final              Comment:    ADA Screening Guidelines:  5.7-6.4%  Consistent with   prediabetes  >or=6.5%  Consistent with diabetes    High levels of fetal   hemoglobin interfere with the HbA1C  assay. Heterozygous hemoglobin   variants (HbS, HgC, etc)do  not significantly interfere with this assay.     However, presence of multiple variants may affect accuracy.         01/21/2022               6.0 (H)             4.0 - 5.6 %           Final              Comment:    ADA Screening Guidelines:  5.7-6.4%  Consistent with   prediabetes  >or=6.5%  Consistent with diabetes    High levels of fetal   hemoglobin interfere with the HbA1C  assay. Heterozygous hemoglobin   variants (HbS, HgC, etc)do  not significantly interfere with this assay.     However, presence of multiple variants may affect accuracy.         10/21/2019               6.5 (H)             <5.7 %                Final            ----------   Last edited by Lexus Patrick on 7/24/2024  9:53 AM.            Assessment /Plan     For exam results, see Encounter Report.      Type 2 diabetes mellitus without retinopathy  Type 2 diabetes  mellitus with stage 3a chronic kidney disease, without long-term current use of insulin  -     Ambulatory referral/consult to Optometry  BS control. No signs of diabetic retinopathy. Monitor with annual exam.     Senile nuclear sclerosis, bilateral  Type 2 diabetes mellitus with cataract  Nuclear sclerotic cataract - not visually significant. Observe.    Myopia with astigmatism and presbyopia, bilateral  SRx released to patient. Patient educated on lens options. Normal ocular health. RTC 1 year for routine exam.         Pt c/o foot pain and states he is going to ER after apptmt. Pt encouraged to go though his next podiatry apptmt is Mon 8/5

## 2024-07-24 NOTE — ED PROVIDER NOTES
Emergency Department Provider Note    Neena Kohli   68 y.o. male   [id number]      7/24/2024       History     This history was obtained from the patient without limitations.  I also reviewed the chart from his ED visit earlier this month.  He drove himself to the ED.    He is a 68-year-old with the below past medical history.  He developed atraumatic pain to the first and fifth digits of his left foot on 07/04.  He was evaluated here in the ED and prescribed a course of antibiotics which he has completed.  He has had no improvement since onset.  The pain is improved with oxycodone which he takes for chronic back pain.  He denies fever, chills, nausea, vomiting, headache, dizziness, and lethargy.  He smokes a half pack of cigarettes daily.        Past Medical History:   Diagnosis Date    Arthritis     Cancer     Prostate    Diabetes mellitus     Hyperthyroidism     PAD (peripheral artery disease)       Past Surgical History:   Procedure Laterality Date    LAPAROSCOPIC CHOLECYSTECTOMY N/A 9/5/2023    Procedure: CHOLECYSTECTOMY, LAPAROSCOPIC;  Surgeon: Alvaro Snyder MD;  Location: [facility];  Service: General;  Laterality: N/A;    PROSTATE SURGERY        Family History   Problem Relation Name Age of Onset    Diabetes Mother L.M Kohli     Hypertension Mother L.M Kohli     Hyperlipidemia Mother L.M Kohli     Diabetes Father L.C Kohli     Hyperlipidemia Father L.C Kohli     Hypertension Father L.C Kohli     Gout Father L.C Kohli     Cancer Paternal Uncle      Stroke Maternal Grandmother      Amblyopia Neg Hx      Blindness Neg Hx      Cataracts Neg Hx      Glaucoma Neg Hx      Macular degeneration Neg Hx      Retinal detachment Neg Hx      Strabismus Neg Hx      Thyroid disease Neg Hx        Social History     Socioeconomic History    Marital status: Single   Tobacco Use    Smoking status: Every Day     Current packs/day: 1.00     Average packs/day: 1 pack/day for 52.6 years (52.6 ttl pk-yrs)     Types: Vaping with  nicotine, Cigarettes     Start date: 1972    Smokeless tobacco: Current    Tobacco comments:     Enrolled in the [employer] on 2/20/19 ([employer] Member ID # [id number])  Ambulatory referral to Smoking Cessation clinic following hospital discharge.  Pt is a 1 pk/day cigarette smoker x 52 yrs.- switched to vaping with nicotine approx. 3 months ago.    Substance and Sexual Activity    Alcohol use: Not Currently     Alcohol/week: 0.0 standard drinks of alcohol     Comment: Former User    Drug use: Not Currently     Types: Cocaine, Marijuana     Comment: Former User    Sexual activity: Not Currently     Social Determinants of Health     Financial Resource Strain: Low Risk  (7/17/2024)    Overall Financial Resource Strain (CARDIA)     Difficulty of Paying Living Expenses: Not very hard   Food Insecurity: No Food Insecurity (7/17/2024)    Hunger Vital Sign     Worried About Running Out of Food in the Last Year: Never true     Ran Out of Food in the Last Year: Never true   Transportation Needs: No Transportation Needs (7/17/2024)    TRANSPORTATION NEEDS     Transportation : No   Physical Activity: Insufficiently Active (7/17/2024)    Exercise Vital Sign     Days of Exercise per Week: 3 days     Minutes of Exercise per Session: 10 min   Stress: Stress Concern Present (7/17/2024)    [employer] [place] [employer] - Occupational Stress Questionnaire     Feeling of Stress : To some extent   Housing Stability: Low Risk  (7/17/2024)    Housing Stability Vital Sign     Unable to Pay for Housing in the Last Year: No     Homeless in the Last Year: No      Review of patient's allergies indicates:  No Known Allergies        Physical Examination     Initial Vitals [07/24/24 1529]   BP Pulse Resp Temp SpO2   (!) 180/79 60 18 98.7 °F (37.1 °C) 99 %      MAP       --           Physical Exam    Nursing note and vitals reviewed.  Constitutional: He is not diaphoretic. No distress.   Cardiovascular:  Normal rate, regular rhythm and  normal heart sounds.     Exam reveals no gallop and no friction rub.       No murmur heard.  Pulses:       Dorsalis pedis pulses are 1+ on the left side.        Posterior tibial pulses are 1+ on the left side.   Pulmonary/Chest: No respiratory distress. He has no wheezes. He has no rhonchi. He has no rales.   Musculoskeletal:      Comments: Left foot:  No swelling or erythema.  No tenderness to the foot.  No swelling, erythema, or other visible deformities to the great toe but the digit is tender distally.  Bruising to the plantar surface of the fifth digit with diffuse tenderness.     Neurological: He is alert and oriented to person, place, and time. GCS score is 15. GCS eye subscore is 4. GCS verbal subscore is 5. GCS motor subscore is 6.   Skin: Skin is warm and dry. No pallor.            Labs     Labs Reviewed   CBC W/ AUTO DIFFERENTIAL - Abnormal       Result Value    WBC 8.17      RBC 3.78 (*)     Hemoglobin 11.6 (*)     Hematocrit 36.8 (*)     MCV 97      MCH 30.7      MCHC 31.5 (*)     RDW 13.8      Platelets 171      MPV 10.2      Immature Granulocytes 0.2      Gran # (ANC) 5.4      Immature Grans (Abs) 0.02      Lymph # 1.8      Mono # 0.7      Eos # 0.3      Baso # 0.05      nRBC 0      Gran % 65.8      Lymph % 21.5      Mono % 8.1      Eosinophil % 3.8      Basophil % 0.6      Differential Method Automated     COMPREHENSIVE METABOLIC PANEL - Abnormal    Sodium 139      Potassium 4.1      Chloride 107      CO2 22 (*)     Glucose 97      BUN 13      Creatinine 1.3      Calcium 9.3      Total Protein 6.7      Albumin 3.4 (*)     Total Bilirubin 0.4      Alkaline Phosphatase 543 (*)      (*)      (*)     eGFR 59 (*)     Anion Gap 10     C-REACTIVE PROTEIN - Abnormal    CRP 13.1 (*)         Imaging     Imaging Results              X-Ray Foot Complete Left (Final result)  Result time 07/24/24 19:09:28      Final result by Cassi Alegre MD (07/24/24 19:09:28)                   Impression:       No acute abnormality involving the left foot.  Left toes have a stable appearance compared to prior exam 07/04/2024.      Electronically signed by: Cassi Alegre  Date:    07/24/2024  Time:    19:09               Narrative:    EXAMINATION:  XR FOOT COMPLETE 3 VIEW LEFT    CLINICAL HISTORY:  .  Pain in left toe(s)    TECHNIQUE:  AP, lateral and oblique views of the left foot were performed.    COMPARISON:  07/04/2024 is left toe views    FINDINGS:  There is extension of the great toe limiting evaluation.  Is there is no evidence of acute abnormality as compared to the prior exam.  No fracture or dislocation or lytic or sclerotic osseous lesions are seen.  Joint spaces appear fairly well maintained.  No convincing erosive changes, radiopaque foreign bodies or significant focal soft tissue swelling.                                        ED Course     The patient received the following medications:  Medications   ketorolac injection 30 mg (30 mg Intramuscular Given 7/24/24 1945)         ED Course as of 07/24/24 2046 Wed Jul 24, 2024   1647 Hypertension on initial vital signs.  The patient did not appear to be in distress during my initial evaluation.  He has tenderness to the first and fifth digits of the left foot with skin changes on the plantar surface of the fifth digit.  This is likely cellulitis or pain related to peripheral vascular disease.  Osteomyelitis is also a possibility.  I ordered labs (CBC, CMP, CRP) and x-rays of the left foot. [LP]   1807 CBC auto differential(!) [LP]   1807 RBC(!): 3.78 [LP]   1807 Hemoglobin(!): 11.6 [LP]   1807 Hematocrit(!): 36.8 [LP]   1807 MCHC(!): 31.5 [LP]   1807 WBC: 8.17 [LP]   1807 Platelet Count: 171 [LP]   1807 Gran %: 65.8 [LP]   1807 Lymph %: 21.5 [LP]   1807 Comprehensive metabolic panel(!) [LP]   1807 CO2(!): 22 [LP]   1807 Albumin(!): 3.4 [LP]   1807 ALP(!): 543 [LP]   1807 AST(!): 132 [LP]   1807 ALT(!): 207 [LP]   1807 CRP(!): 13.1 [LP]      ED Course  User Index  [LP] Steve Begum III, MD        Medical Decision Making                 Medical Decision Making  The patient underwent evaluation for weeks of left foot toe pain that did not improve after completing a course of oral antibiotics.  Elevated CRP on labs today but normal WBC and no critical anemia.  The patient continues to smoke, so I suspect he has some degree of peripheral vascular disease.  X-rays of the foot were negative for subcutaneous emphysema, bony erosions, and other acute findings.  His pain improved with IV Toradol.  I instructed him to contact podiatry clinic to discuss moving his upcoming appointment to a closer date.  I prescribed a course of oral Toradol.    Amount and/or Complexity of Data Reviewed  Labs: ordered. Decision-making details documented in ED Course.  Radiology: ordered.    Risk  Prescription drug management.              Diagnoses       ICD-10-CM ICD-9-CM   1. Toe pain, left  M79.675 729.5         Dispostion      ED Disposition Condition    Discharge Stable            ED Prescriptions       Medication Sig Dispense Start Date End Date Auth. Provider    ketorolac (TORADOL) 10 mg tablet Take 1 tablet (10 mg total) by mouth every 6 (six) hours as needed for Pain. 12 tablet 7/24/2024 -- Steve Begum III, MD            Follow-up Information       Follow up With Specialties Details Why Contact Info    Roxane Harp MD Family Medicine  Schedule a close in-person or virtual ER follow-up visit with your primary care provider. [address] [facility]  [place] [address]  [phone]      ER   Return to the ER if your condition worsens or you have any other concerns that you feel need immediate attention.               Steve Begum III, MD  07/24/24 2046

## 2024-07-25 NOTE — DISCHARGE INSTRUCTIONS
[facility] to discuss moving your upcoming appointment to a closer date.    Do not combine the prescribed pain medication, Toradol (Ketoralac), with medications like ibuprofen and naproxen as this can increase the risk of developing dangerous kidney failure.    We know that you have many choices and are honored that you chose us. We hope that we met or exceeded your expectations and goals for this visit and will keep the [facility] family in mind for your future needs and those of your family and friends.     - Dr. Begum

## 2024-07-25 NOTE — ED NOTES
Patient is conscious, coherent and well oriented, able to communicate well and Resting comfortably.

## 2024-08-02 ENCOUNTER — TELEPHONE (OUTPATIENT)
Dept: PODIATRY | Facility: CLINIC | Age: 69
End: 2024-08-02
Payer: MEDICARE

## 2024-08-05 ENCOUNTER — HOSPITAL ENCOUNTER (EMERGENCY)
Facility: HOSPITAL | Age: 69
Discharge: HOME | End: 2024-08-06
Attending: STUDENT IN AN ORGANIZED HEALTH CARE EDUCATION/TRAINING PROGRAM
Payer: MEDICARE

## 2024-08-05 ENCOUNTER — OFFICE VISIT (OUTPATIENT)
Dept: PODIATRY | Facility: CLINIC | Age: 69
End: 2024-08-05
Payer: MEDICARE

## 2024-08-05 VITALS
HEART RATE: 43 BPM | BODY MASS INDEX: 27.94 KG/M2 | RESPIRATION RATE: 19 BRPM | SYSTOLIC BLOOD PRESSURE: 194 MMHG | DIASTOLIC BLOOD PRESSURE: 87 MMHG | TEMPERATURE: 98 F | OXYGEN SATURATION: 100 % | WEIGHT: 178 LBS | HEIGHT: 67 IN

## 2024-08-05 VITALS
HEIGHT: 67 IN | BODY MASS INDEX: 26.06 KG/M2 | WEIGHT: 166 LBS | SYSTOLIC BLOOD PRESSURE: 216 MMHG | DIASTOLIC BLOOD PRESSURE: 80 MMHG | HEART RATE: 41 BPM

## 2024-08-05 DIAGNOSIS — R00.1 BRADYCARDIA: ICD-10-CM

## 2024-08-05 DIAGNOSIS — M79.671 FOOT PAIN, BILATERAL: ICD-10-CM

## 2024-08-05 DIAGNOSIS — L03.032 CELLULITIS OF FIFTH TOE OF LEFT FOOT: ICD-10-CM

## 2024-08-05 DIAGNOSIS — M79.672 FOOT PAIN, BILATERAL: ICD-10-CM

## 2024-08-05 DIAGNOSIS — I73.9 PAD (PERIPHERAL ARTERY DISEASE): Primary | ICD-10-CM

## 2024-08-05 LAB
ALBUMIN SERPL BCP-MCNC: 3.7 G/DL (ref 3.5–5.2)
ALP SERPL-CCNC: 383 U/L (ref 55–135)
ALT SERPL W/O P-5'-P-CCNC: 40 U/L (ref 10–44)
ANION GAP SERPL CALC-SCNC: 10 MMOL/L (ref 8–16)
APTT PPP: 27.9 SEC (ref 21–32)
AST SERPL-CCNC: 19 U/L (ref 10–40)
BASOPHILS # BLD AUTO: 0.03 K/UL (ref 0–0.2)
BASOPHILS NFR BLD: 0.4 % (ref 0–1.9)
BILIRUB SERPL-MCNC: 0.3 MG/DL (ref 0.1–1)
BUN SERPL-MCNC: 12 MG/DL (ref 8–23)
CALCIUM SERPL-MCNC: 9.8 MG/DL (ref 8.7–10.5)
CHLORIDE SERPL-SCNC: 114 MMOL/L (ref 95–110)
CO2 SERPL-SCNC: 21 MMOL/L (ref 23–29)
CREAT SERPL-MCNC: 1.2 MG/DL (ref 0.5–1.4)
DIFFERENTIAL METHOD BLD: ABNORMAL
EOSINOPHIL # BLD AUTO: 0.2 K/UL (ref 0–0.5)
EOSINOPHIL NFR BLD: 2.6 % (ref 0–8)
ERYTHROCYTE [DISTWIDTH] IN BLOOD BY AUTOMATED COUNT: 13.7 % (ref 11.5–14.5)
EST. GFR  (NO RACE VARIABLE): >60 ML/MIN/1.73 M^2
GLUCOSE SERPL-MCNC: 108 MG/DL (ref 70–110)
HCT VFR BLD AUTO: 40.2 % (ref 40–54)
HCV AB SERPL QL IA: NORMAL
HGB BLD-MCNC: 11.9 G/DL (ref 14–18)
HIV 1+2 AB+HIV1 P24 AG SERPL QL IA: NORMAL
IMM GRANULOCYTES # BLD AUTO: 0.02 K/UL (ref 0–0.04)
IMM GRANULOCYTES NFR BLD AUTO: 0.3 % (ref 0–0.5)
INR PPP: 1 (ref 0.8–1.2)
LYMPHOCYTES # BLD AUTO: 1.8 K/UL (ref 1–4.8)
LYMPHOCYTES NFR BLD: 23 % (ref 18–48)
MCH RBC QN AUTO: 29.6 PG (ref 27–31)
MCHC RBC AUTO-ENTMCNC: 29.6 G/DL (ref 32–36)
MCV RBC AUTO: 100 FL (ref 82–98)
MONOCYTES # BLD AUTO: 0.5 K/UL (ref 0.3–1)
MONOCYTES NFR BLD: 6.3 % (ref 4–15)
NEUTROPHILS # BLD AUTO: 5.3 K/UL (ref 1.8–7.7)
NEUTROPHILS NFR BLD: 67.4 % (ref 38–73)
NRBC BLD-RTO: 0 /100 WBC
PLATELET # BLD AUTO: 292 K/UL (ref 150–450)
PMV BLD AUTO: 11 FL (ref 9.2–12.9)
POTASSIUM SERPL-SCNC: 4 MMOL/L (ref 3.5–5.1)
PROT SERPL-MCNC: 7.6 G/DL (ref 6–8.4)
PROTHROMBIN TIME: 10.7 SEC (ref 9–12.5)
RBC # BLD AUTO: 4.02 M/UL (ref 4.6–6.2)
SODIUM SERPL-SCNC: 145 MMOL/L (ref 136–145)
WBC # BLD AUTO: 7.79 K/UL (ref 3.9–12.7)

## 2024-08-05 PROCEDURE — 85610 PROTHROMBIN TIME: CPT | Mod: HCNC | Performed by: STUDENT IN AN ORGANIZED HEALTH CARE EDUCATION/TRAINING PROGRAM

## 2024-08-05 PROCEDURE — 99285 EMERGENCY DEPT VISIT HI MDM: CPT | Mod: 25,HCNC

## 2024-08-05 PROCEDURE — 99999 PR PBB SHADOW E&M-EST. PATIENT-LVL IV: CPT | Mod: PBBFAC,HCNC,, | Performed by: PODIATRIST

## 2024-08-05 PROCEDURE — 96374 THER/PROPH/DIAG INJ IV PUSH: CPT | Mod: HCNC

## 2024-08-05 PROCEDURE — 85730 THROMBOPLASTIN TIME PARTIAL: CPT | Mod: HCNC | Performed by: STUDENT IN AN ORGANIZED HEALTH CARE EDUCATION/TRAINING PROGRAM

## 2024-08-05 PROCEDURE — 93010 ELECTROCARDIOGRAM REPORT: CPT | Mod: HCNC,,, | Performed by: INTERNAL MEDICINE

## 2024-08-05 PROCEDURE — 80053 COMPREHEN METABOLIC PANEL: CPT | Mod: HCNC | Performed by: EMERGENCY MEDICINE

## 2024-08-05 PROCEDURE — 85025 COMPLETE CBC W/AUTO DIFF WBC: CPT | Mod: HCNC | Performed by: EMERGENCY MEDICINE

## 2024-08-05 PROCEDURE — 25000003 PHARM REV CODE 250: Mod: HCNC

## 2024-08-05 PROCEDURE — 86803 HEPATITIS C AB TEST: CPT | Mod: HCNC | Performed by: PHYSICIAN ASSISTANT

## 2024-08-05 PROCEDURE — 99499 UNLISTED E&M SERVICE: CPT | Mod: HCNC,S$GLB,, | Performed by: PODIATRIST

## 2024-08-05 PROCEDURE — 63600175 PHARM REV CODE 636 W HCPCS: Mod: HCNC | Performed by: STUDENT IN AN ORGANIZED HEALTH CARE EDUCATION/TRAINING PROGRAM

## 2024-08-05 PROCEDURE — 87389 HIV-1 AG W/HIV-1&-2 AB AG IA: CPT | Mod: HCNC | Performed by: PHYSICIAN ASSISTANT

## 2024-08-05 PROCEDURE — 93005 ELECTROCARDIOGRAM TRACING: CPT | Mod: HCNC

## 2024-08-05 RX ORDER — PROPRANOLOL HYDROCHLORIDE 10 MG/1
10 TABLET ORAL
COMMUNITY
Start: 2024-07-17

## 2024-08-05 RX ORDER — OXYCODONE AND ACETAMINOPHEN 5; 325 MG/1; MG/1
1 TABLET ORAL EVERY 6 HOURS PRN
Qty: 12 TABLET | Refills: 0 | Status: SHIPPED | OUTPATIENT
Start: 2024-08-05

## 2024-08-05 RX ORDER — OXYCODONE HYDROCHLORIDE 5 MG/1
10 TABLET ORAL
Status: COMPLETED | OUTPATIENT
Start: 2024-08-05 | End: 2024-08-05

## 2024-08-05 RX ORDER — BUPROPION HYDROCHLORIDE 75 MG/1
TABLET ORAL
COMMUNITY
Start: 2024-07-17

## 2024-08-05 RX ORDER — HEPARIN SODIUM,PORCINE/D5W 25000/250
0-30 INTRAVENOUS SOLUTION INTRAVENOUS CONTINUOUS
Status: DISCONTINUED | OUTPATIENT
Start: 2024-08-05 | End: 2024-08-05

## 2024-08-05 RX ADMIN — OXYCODONE 10 MG: 5 TABLET ORAL at 08:08

## 2024-08-05 RX ADMIN — HEPARIN SODIUM AND DEXTROSE 18 UNITS/KG/HR: 10000; 5 INJECTION INTRAVENOUS at 10:08

## 2024-08-05 NOTE — FIRST PROVIDER EVALUATION
"Medical screening examination initiated.  I have conducted a focused provider triage encounter, findings are as follows:    Brief history of present illness:  69yo M hx htn, hld, PAD, CAD, DM presenting from podiatry clinic for high blood pressure, concern about no circulation to feet. Pt endorses pain and cold feet x2 months.     Vitals:    08/05/24 1515   BP: (!) 228/95   Pulse: (!) 41   Resp: 20   Temp: 98.4 °F (36.9 °C)   TempSrc: Oral   SpO2: 97%   Weight: 80.7 kg (178 lb)   Height: 5' 7" (1.702 m)       Pertinent physical exam:  well appearing, NAD, +DP signal on doppler    Brief workup plan:  CBC, CMP, arterial US, ekg    Preliminary workup initiated; this workup will be continued and followed by the physician or advanced practice provider that is assigned to the patient when roomed.  "

## 2024-08-05 NOTE — ED PROVIDER NOTES
"Encounter Date: 8/5/2024       History     Chief Complaint   Patient presents with    Hypertension     Sent from podiatry , pain to  foot for 2 months, L doppler Post tibial     68 M presents from podiatry clinic for HTN and "no blood flow to feet". He has a PMHx for Hypertension (on amlodipine, hydralazine, hydrochlorothiazide, propranolol), DM, PAD, prostate cancer, and MI s/p stent.       Patient states that he has been having LLE pain for a few months now, and he had 2 ED visits in July for this issue, but states he has only got pain pills and antibiotics. On review for 7/4 visit he had dopperable pulses and was prescribed clindamycin and meloxicam. On 7/24 visit, he was neurovascularly intact and given Toradol. He was in podiatry clinic for this issue when he was sent to the ED. He states that he has constant 10/10 pain in his LLE, that is made worse with movement and sometimes wakes him up from sleep. The pain has the worse concentration in his 1st and 5th phalanges. Denies any chest pain, SOB. He states that he is able to take his hypertension medications and takes them everyday.     The history is provided by the patient and medical records. No  was used.     Review of patient's allergies indicates:  No Known Allergies  Past Medical History:   Diagnosis Date    Arthritis     Cancer     Prostate    Diabetes mellitus     Hyperthyroidism     PAD (peripheral artery disease)      Past Surgical History:   Procedure Laterality Date    LAPAROSCOPIC CHOLECYSTECTOMY N/A 9/5/2023    Procedure: CHOLECYSTECTOMY, LAPAROSCOPIC;  Surgeon: Alvaro Snyder MD;  Location: [facility];  Service: General;  Laterality: N/A;    PROSTATE SURGERY       Family History   Problem Relation Name Age of Onset    Diabetes Mother L.M Kohli     Hypertension Mother L.M Kohli     Hyperlipidemia Mother L.M Kohli     Diabetes Father L.C Kohli     Hyperlipidemia Father L.C Kohli     Hypertension Father L.C Kohli     Gout Father " PAM Kohli     Cancer Paternal Uncle      Stroke Maternal Grandmother      Amblyopia Neg Hx      Blindness Neg Hx      Cataracts Neg Hx      Glaucoma Neg Hx      Macular degeneration Neg Hx      Retinal detachment Neg Hx      Strabismus Neg Hx      Thyroid disease Neg Hx       Social History     Tobacco Use    Smoking status: Every Day     Current packs/day: 1.00     Average packs/day: 1 pack/day for 52.6 years (52.6 ttl pk-yrs)     Types: Vaping with nicotine, Cigarettes     Start date: 1972    Smokeless tobacco: Current    Tobacco comments:     Enrolled in the [email or web address] on 2/20/19 (SCT Member ID # [id number])  Ambulatory referral to Smoking Cessation clinic following hospital discharge.  Pt is a 1 pk/day cigarette smoker x 52 yrs.- switched to vaping with nicotine approx. 3 months ago.    Substance Use Topics    Alcohol use: Not Currently     Alcohol/week: 0.0 standard drinks of alcohol     Comment: Former User    Drug use: Not Currently     Types: Cocaine, Marijuana     Comment: Former User     Review of Systems    Physical Exam     Initial Vitals [08/05/24 1515]   BP Pulse Resp Temp SpO2   (!) 228/95 (!) 41 20 98.4 °F (36.9 °C) 97 %      MAP       --         Physical Exam    Constitutional: He appears well-developed and well-nourished.   HENT:   Head: Normocephalic and atraumatic.   Eyes: EOM are normal.   Neck:   Normal range of motion.  Cardiovascular:            Bradycardiac   Pulmonary/Chest: No respiratory distress.   Abdominal: Abdomen is soft. He exhibits no distension.   Musculoskeletal:         General: Normal range of motion.      Cervical back: Normal range of motion.     Neurological: He is alert and oriented to person, place, and time.   Skin:   L. Foot phalanges cool to touch,   Psychiatric: He has a normal mood and affect. His behavior is normal. Judgment and thought content normal.         ED Course   Procedures  Labs Reviewed   CBC W/ AUTO DIFFERENTIAL - Abnormal       Result Value    WBC  7.79      RBC 4.02 (*)     Hemoglobin 11.9 (*)     Hematocrit 40.2       (*)     MCH 29.6      MCHC 29.6 (*)     RDW 13.7      Platelets 292      MPV 11.0      Immature Granulocytes 0.3      Gran # (ANC) 5.3      Immature Grans (Abs) 0.02      Lymph # 1.8      Mono # 0.5      Eos # 0.2      Baso # 0.03      nRBC 0      Gran % 67.4      Lymph % 23.0      Mono % 6.3      Eosinophil % 2.6      Basophil % 0.4      Differential Method Automated      Narrative:     Release to patient->Immediate   COMPREHENSIVE METABOLIC PANEL - Abnormal    Sodium 145      Potassium 4.0      Chloride 114 (*)     CO2 21 (*)     Glucose 108      BUN 12      Creatinine 1.2      Calcium 9.8      Total Protein 7.6      Albumin 3.7      Total Bilirubin 0.3      Alkaline Phosphatase 383 (*)     AST 19      ALT 40      eGFR >60.0      Anion Gap 10      Narrative:     Release to patient->Immediate   HIV 1 / 2 ANTIBODY    HIV 1/2 Ag/Ab Non-reactive      Narrative:     Release to patient->Immediate   HEPATITIS C ANTIBODY    Hepatitis C Ab Non-reactive      Narrative:     Release to patient->Immediate   PROTIME-INR    Prothrombin Time 10.7      INR 1.0     APTT    aPTT 27.9            Imaging Results               US Lower Extremity Arteries Bilateral (Final result)  Result time 08/05/24 22:18:52      Final result by Emanuel Alegre MD (08/05/24 22:18:52)                   Impression:      High-grade stenosis in the common femoral artery on the RIGHT with occluded SFA.    Occluded SFA on the RIGHT    Patent deep profunda femoral artery on the RIGHT.    Occluded anterior tibial arteries BILATERALLY.    High-grade stenosis in the LEFT popliteal artery.    This report was flagged in Epic as abnormal.      Electronically signed by: Emanuel Alegre  Date:    08/05/2024  Time:    22:18               Narrative:    EXAMINATION:  US LOWER EXTREMITY ARTERIES BILATERAL    CLINICAL HISTORY:  Pain in right foot    TECHNIQUE:  Bilateral lower extremity  "arterial duplex ultrasound examination performed. Multiple gray scale and color doppler images were obtained in addition to waveform analysis.    COMPARISON:  None    FINDINGS:  The peak systolic velocities on the right are as follows, in centimeters/second:    Common femoral artery: 425    Superficial femoral artery, proximal: Occluded    Superficial femoral artery, mid portion: Occluded    Superficial femoral artery, distal: Occluded    Popliteal artery: 69- 41    Posterior tibial artery: 55    Anterior tibial artery: Occluded    The peak systolic velocities on the left are as follows, in centimeters/second:    Common femoral artery: 193    Superficial femoral artery, proximal: 191    Superficial femoral artery, mid portion: 80    Superficial femoral artery, distal: 111    Popliteal artery: 600    Posterior tibial artery: 13    Anterior tibial artery: Occluded    Normal arterial waveforms are demonstrated. Biphasic and mono phasic waveforms throughout the majority of the lower extremities with triphasic waveforms only seen in left common femoral artery.                                       Medications   oxyCODONE immediate release tablet 10 mg (10 mg Oral Given 8/5/24 2022)   heparin 25,000 units in dextrose 5% (100 units/ml) IV bolus from bag HIGH INTENSITY nomogram - [facility] (5,752 Units Intravenous Bolus from Bag 8/5/24 2218)     Medical Decision Making  68 M presents from podiatry clinic for HTN and "no blood flow to feet". He has a PMHx for Hypertension (on amlodipine, hydralazine, hydrochlorothiazide, propranolol), DM, PAD, prostate cancer, and MI s/p stent.     Ddx includes Cellulitis v Claudication v Acute Arterial occlusion     EKG demonstrated sinus bradycardia    Amount and/or Complexity of Data Reviewed  Labs: ordered. Decision-making details documented in ED Course.  ECG/medicine tests: ordered and independent interpretation performed. Decision-making details documented in ED " Course.    Risk  Prescription drug management.  Decision regarding hospitalization.              Attending Attestation:   Physician Attestation Statement for Resident:  As the supervising MD   Physician Attestation Statement: I have personally seen and examined this patient.   I agree with the above history.  -:   As the supervising MD I agree with the above PE.   -: Doppler DP bilaterally   As the supervising MD I agree with the above treatment, course, plan, and disposition.   -: Discussed the case with vascular surgery.  They report the patient does not have to get admitted and they will schedule an outpatient angiogram and outpatient follow up.  Considered admission for heparin but after discussing with vascular surgery, decided it was not needed.    See ED course for additional attending MDM                     ED Course as of 08/05/24 2352   Mon Aug 05, 2024   1716 CBC auto differential(!)  CBC unremarkable without leukocytosis, significant anemia, or decreased platelets   [BD]   1716 Comprehensive metabolic panel(!)  CMP unremarkable without significant electrolyte derangement, impaired renal function, or elevated LFTs   [BD]   1721 EKG 12-lead  EKG independently interpreted by me shows sinus bradycardia, rate 42, no STEMI. When compared to 8/5/24, T waves no longer flipped inferiorly [BD]   1754 Discussed the case with the patient's podiatrist who he saw earlier today.  She reports that he was reporting 10/10 pain in clinic.  This is the 1st time she has seen him so his baseline is unclear and she states he does not have any ABIs in the chart.  He also reported a headache in his blood pressure was persistently greater than 200 systolic.  His 4th and 5th digits were dusky/cold and painful.  She states if he has blood flow on ultrasound, then she was okay with further outpatient management for his circulatory problems [BD]   2302 Sign out: nathan. ntd [AC]      ED Course User Index  [AC] Samara Bishop,  MD  [BD] Adin Parrish MD                           Clinical Impression:  Final diagnoses:  [M79.671, M79.672] Foot pain, bilateral  [R00.1] Bradycardia  [I73.9] PAD (peripheral artery disease) (Primary)          ED Disposition Condition    Discharge Stable          ED Prescriptions       Medication Sig Dispense Start Date End Date Auth. Provider    oxyCODONE-acetaminophen (PERCOCET) 5-325 mg per tablet Take 1 tablet by mouth every 6 (six) hours as needed for Pain. 12 tablet 8/5/2024 -- Rod Johnson MD          Follow-up Information       Follow up With Specialties Details Why Contact Info    Roxane Harp MD Family Medicine   [address] [facility] [address]  [phone]               Adin Parrish MD  08/05/24 9895       Adin Parrish MD  08/05/24 4518       Rod Johnson MD  Resident  08/05/24 3785

## 2024-08-05 NOTE — ED TRIAGE NOTES
Pt arrives to ED from podiatry clinic for HTN. Pt endorses left foot pain for several months, seen twice at [facility]. Went to podiatry clinic today and was found to be HTN SBP > 200. States he is compliant with home regimen. Denies chest pain, SOB. Endorses constant pain in left foot; exacerbated with movement. Hx of DM and venous insufficiency/PAD. Pulses weak with doppler.

## 2024-08-05 NOTE — ED NOTES
Called pt. Pt. States he went to the parking garage and is on his way back down. Pt. Advised to immediately return to the ED so he can be escorted to his room.

## 2024-08-06 LAB
OHS QRS DURATION: 76 MS
OHS QTC CALCULATION: 399 MS

## 2024-08-06 NOTE — CONSULTS
[facility] - Emergency Dept  Vascular Surgery  Consult Note    Inpatient consult to Vascular Surgery  Consult performed by: Josue Hernandez MD  Consult ordered by: Adin Parrish MD        Subjective:     Chief Complaint/Reason for Admission: Left 1st and 5th toe pain    History of Present Illness: 68M presenting with 2 months of left toe pain, specifically in 1st and 5th toe. He reports the pain as severe and limit his ambulation. He presents today from podiatry clinic concerned for acute limb ischemia -- vascular surgery was consulted for further evaluation. Of note, arterial ultrasound was obtained which demonstrates scattered occlusions, likely of chronic origin. Pertinent comborbidities include HTN, HLD, CAD, DM.     (Not in a hospital admission)      Review of patient's allergies indicates:  No Known Allergies    Past Medical History:   Diagnosis Date    Arthritis     Cancer     Prostate    Diabetes mellitus     Hyperthyroidism     PAD (peripheral artery disease)      Past Surgical History:   Procedure Laterality Date    LAPAROSCOPIC CHOLECYSTECTOMY N/A 9/5/2023    Procedure: CHOLECYSTECTOMY, LAPAROSCOPIC;  Surgeon: Alvaro Snyedr MD;  Location: [facility];  Service: General;  Laterality: N/A;    PROSTATE SURGERY       Family History       Problem Relation (Age of Onset)    Cancer Paternal Uncle    Diabetes Mother, Father    Gout Father    Hyperlipidemia Mother, Father    Hypertension Mother, Father    Stroke Maternal Grandmother          Tobacco Use    Smoking status: Every Day     Current packs/day: 1.00     Average packs/day: 1 pack/day for 52.6 years (52.6 ttl pk-yrs)     Types: Vaping with nicotine, Cigarettes     Start date: 1972    Smokeless tobacco: Current    Tobacco comments:     Enrolled in the [employer] on 2/20/19 ([employer]CT Member ID # [id number])  Ambulatory referral to Smoking Cessation clinic following hospital discharge.  Pt is a 1 pk/day cigarette smoker x 52 yrs.- switched to vaping with  nicotine approx. 3 months ago.    Substance and Sexual Activity    Alcohol use: Not Currently     Alcohol/week: 0.0 standard drinks of alcohol     Comment: Former User    Drug use: Not Currently     Types: Cocaine, Marijuana     Comment: Former User    Sexual activity: Not Currently     Review of Systems  Objective:     Vital Signs (Most Recent):  Temp: 98.4 °F (36.9 °C) (08/05/24 1515)  Pulse: (!) 42 (08/05/24 2230)  Resp: (!) 22 (08/05/24 2230)  BP: (!) 198/101 (08/05/24 2230)  SpO2: 99 % (08/05/24 2230) Vital Signs (24h Range):  Temp:  [98.4 °F (36.9 °C)] 98.4 °F (36.9 °C)  Pulse:  [41-50] 42  Resp:  [16-22] 22  SpO2:  [97 %-100 %] 99 %  BP: (142-228)/() 198/101     Weight: 80.7 kg (178 lb)  Body mass index is 27.88 kg/m².        Physical Exam  Vascular  Right femoral palpable  Right DP biphasic  Right PT monophasic  No motorsensory deficits.  Left femoral palpable  Left PT monophasic  Left DP monophasic    Significant Labs:  All pertinent labs from the last 24 hours have been reviewed.    Significant Diagnostics:  I have reviewed all pertinent imaging results/findings within the past 24 hours.    Assessment/Plan:   In summary, 68M presenting with left 1st and 5th toe pain. He is a current smoker, NIDDM. Vascular surgery consulted for left lower limb acute limb ischemia. Fortunately, his legs are warm and well perfused and without motorsensory deficit. He has biphasic pedal signals in the left foot.     At this time, recommend outpatient vascular follow up and will obtain ABIs and PVRS. Likely an indication for elective angiogram, possible PTA and stenting.     Plan discussed with patient and ED provider, and all in agreement.       There are no hospital problems to display for this patient.      Thank you for your consult. I will sign off. Please contact us if you have any additional questions.    Josue Hernandez MD  Vascular Surgery Fellow  [facility] - Emergency Dept

## 2024-08-06 NOTE — ED NOTES
PT says that if he does not get something to eat he may consider going home and coming back later this week.  PT educated on importance of staying and getting anticoagulation.  PT wishes to speak to MD.  MD notified.

## 2024-08-06 NOTE — ED NOTES
PT back in room.  Pt reconnected to wall monitor.  Pt complaining of pain in his feet.  PT oriented to room and has no other complaints at this time.

## 2024-08-06 NOTE — DISCHARGE INSTRUCTIONS
Follow up with Primary care doctor and Vascular surgery     It was a pleasure taking care of you today!     Diagnosis: Claudication         Follow-Up Plan:  - Follow-up with primary care doctor within 3 - 5 days to discuss your recent ER visit and any additional concerns that you may have.  - Additional testing and/or evaluation as directed by your primary doctor    Return to the Emergency Department for symptoms including but not limited to: persistence or worsening of symptoms, shortness of breath or chest pain, inability to drink without vomiting, passing out/fainting/ loss of consciousness, or if you have other concerns.

## 2024-08-09 DIAGNOSIS — I73.9 PAD (PERIPHERAL ARTERY DISEASE): Primary | Chronic | ICD-10-CM

## 2024-09-04 ENCOUNTER — OFFICE VISIT (OUTPATIENT)
Dept: INTERNAL MEDICINE | Facility: CLINIC | Age: 69
End: 2024-09-04
Payer: MEDICARE

## 2024-09-04 VITALS
WEIGHT: 168.63 LBS | RESPIRATION RATE: 18 BRPM | OXYGEN SATURATION: 97 % | TEMPERATURE: 97 F | DIASTOLIC BLOOD PRESSURE: 62 MMHG | BODY MASS INDEX: 26.47 KG/M2 | SYSTOLIC BLOOD PRESSURE: 140 MMHG | HEIGHT: 67 IN | HEART RATE: 55 BPM

## 2024-09-04 DIAGNOSIS — I73.9 PAD (PERIPHERAL ARTERY DISEASE): ICD-10-CM

## 2024-09-04 DIAGNOSIS — M79.675 PAIN OF TOE OF LEFT FOOT: Primary | ICD-10-CM

## 2024-09-04 DIAGNOSIS — E11.51 DIABETES MELLITUS WITH PERIPHERAL CIRCULATORY DISORDER: ICD-10-CM

## 2024-09-04 PROCEDURE — 99214 OFFICE O/P EST MOD 30 MIN: CPT | Mod: HCNC,S$GLB,, | Performed by: INTERNAL MEDICINE

## 2024-09-04 PROCEDURE — 3008F BODY MASS INDEX DOCD: CPT | Mod: HCNC,CPTII,S$GLB, | Performed by: INTERNAL MEDICINE

## 2024-09-04 PROCEDURE — 4010F ACE/ARB THERAPY RXD/TAKEN: CPT | Mod: HCNC,CPTII,S$GLB, | Performed by: INTERNAL MEDICINE

## 2024-09-04 PROCEDURE — 1125F AMNT PAIN NOTED PAIN PRSNT: CPT | Mod: HCNC,CPTII,S$GLB, | Performed by: INTERNAL MEDICINE

## 2024-09-04 PROCEDURE — 1159F MED LIST DOCD IN RCRD: CPT | Mod: HCNC,CPTII,S$GLB, | Performed by: INTERNAL MEDICINE

## 2024-09-04 PROCEDURE — 3077F SYST BP >= 140 MM HG: CPT | Mod: HCNC,CPTII,S$GLB, | Performed by: INTERNAL MEDICINE

## 2024-09-04 PROCEDURE — 3044F HG A1C LEVEL LT 7.0%: CPT | Mod: HCNC,CPTII,S$GLB, | Performed by: INTERNAL MEDICINE

## 2024-09-04 PROCEDURE — 99999 PR PBB SHADOW E&M-EST. PATIENT-LVL V: CPT | Mod: PBBFAC,HCNC,, | Performed by: INTERNAL MEDICINE

## 2024-09-04 PROCEDURE — 3078F DIAST BP <80 MM HG: CPT | Mod: HCNC,CPTII,S$GLB, | Performed by: INTERNAL MEDICINE

## 2024-09-04 PROCEDURE — 3288F FALL RISK ASSESSMENT DOCD: CPT | Mod: HCNC,CPTII,S$GLB, | Performed by: INTERNAL MEDICINE

## 2024-09-04 PROCEDURE — 1101F PT FALLS ASSESS-DOCD LE1/YR: CPT | Mod: HCNC,CPTII,S$GLB, | Performed by: INTERNAL MEDICINE

## 2024-09-04 RX ORDER — GABAPENTIN 300 MG/1
300 CAPSULE ORAL NIGHTLY
Qty: 30 CAPSULE | Refills: 1 | Status: SHIPPED | OUTPATIENT
Start: 2024-09-04 | End: 2025-09-04

## 2024-09-04 RX ORDER — AMOXICILLIN AND CLAVULANATE POTASSIUM 875; 125 MG/1; MG/1
1 TABLET, FILM COATED ORAL 2 TIMES DAILY
Qty: 20 TABLET | Refills: 0 | Status: SHIPPED | OUTPATIENT
Start: 2024-09-04

## 2024-09-04 NOTE — PROGRESS NOTES
History of present illness:   68-year-old gentleman with hypertension, diabetes mellitus, dyslipidemia, PA D and others is in with a chronic issue.  The patient has had some persistent pain in his left 1st toe for several months.  This is along the medial nailbed in very localized.  He is being evaluated for PA D and has an appointment next week with vascular surgery.  He describes area previously as becoming a bit swollen and draining.  He has been treated previously with antibiotics.  He states his been no real change recently over his baseline discomfort.  A chronic pain in the toe with touch.  No recent trauma.    Current medications:   Medications all noted reviewed in our electronic medical record medication list.      Review of systems:   Constitutional: No fever no chills.    Respiratory: No cough shortness of breath.    Cardiovascular:  Denies chest pain palpitations or syncope.  GI: No nausea no vomiting no diarrhea.    Physical examination:   General:  Alert gentleman no acute distress.    Vital signs: All noted and reviewed.  Afebrile.    Cardiovascular: Regular rate rhythm.  No significant murmur.  Left lower extremity:  No gross deformity.  Attention directed to the 1st toe.  No significant swelling.  Tender to palpation along of the nailbed medially.  There is no gross fluctuance ,discharge or other.  No expressible fluid.  There are no acute ischemic changes noted.  The foot is warm.    Data:   Reviewed previous ED and other evaluations and vascular studies, radiographic studies.      Impression:   Chronic left 1st toe pain, the patient has known PAD though his toe symptoms appear to be a localized problem with the nail and nailbed.  There is no evidence of any current abscess.    Plan:   To cover any potential infection he was prescribed Augmentin 875 mg b.i.d. 10 days.    Referral to follow-up with podiatry.    Trial gabapentin 300 mg q.h.s. and advise.    Keep vascular surgery appointment  upcoming

## 2024-09-11 ENCOUNTER — OUTPATIENT CASE MANAGEMENT (OUTPATIENT)
Dept: ADMINISTRATIVE | Facility: OTHER | Age: 69
End: 2024-09-11
Payer: MEDICARE

## 2024-09-11 NOTE — PROGRESS NOTES
Outpatient Care Management  Patient Not Qualified    Patient: Neena Kohli  MRN:  [id number]  Date of Service:  9/11/2024  Completed by:  Chacha Patricia RN    Chief Complaint   Patient presents with    OPCM Chart Review     9/11/24    Case Closure     9/11/24       Patient Summary                9/11/24 Unable to reach patient for follow up after multiple attempts. OPCM letter has been sent. Closing case.

## 2024-10-02 ENCOUNTER — TELEPHONE (OUTPATIENT)
Dept: PODIATRY | Facility: CLINIC | Age: 69
End: 2024-10-02
Payer: MEDICARE

## 2024-10-02 NOTE — TELEPHONE ENCOUNTER
Left VM in regards to rescheduling appointment. Instructed patient to call back when available.   LOR Frank             ----- Message from Summer sent at 10/2/2024  2:19 PM CDT -----  .Type:  Patient Call Back    Who Called:  PT       Does the patient know what this is regarding?: PT WANTS TO RE/S     Would the patient rather a call back YES     Best Call Back Number: [phone]    Additional Information: Thank You

## 2024-10-04 DIAGNOSIS — I10 HYPERTENSION, ESSENTIAL: Chronic | ICD-10-CM

## 2024-10-04 RX ORDER — HYDROCHLOROTHIAZIDE 25 MG/1
25 TABLET ORAL DAILY
Qty: 90 TABLET | Refills: 2 | Status: SHIPPED | OUTPATIENT
Start: 2024-10-04

## 2024-10-04 RX ORDER — HYDRALAZINE HYDROCHLORIDE 100 MG/1
100 TABLET, FILM COATED ORAL EVERY 8 HOURS
Qty: 270 TABLET | Refills: 3 | Status: SHIPPED | OUTPATIENT
Start: 2024-10-04

## 2024-10-04 NOTE — TELEPHONE ENCOUNTER
Refill Routing Note   Medication(s) are not appropriate for processing by [facility] for the following reason(s):        Required vitals abnormal  ED/Hospital Visit since last OV with provider    [facility] action(s):  Defer     Requires labs : Yes             Appointments  past 12m or future 3m with PCP    Date Provider   Last Visit   5/29/2024 Roxane Harp MD   Next Visit   Visit date not found Roxane Harp MD   ED visits in past 90 days: 2        Note composed:2:11 PM 10/04/2024

## 2024-10-04 NOTE — TELEPHONE ENCOUNTER
Care Due:                  Date            Visit Type   Department     Provider  --------------------------------------------------------------------------------                                EP -                              PRIMARY      MET INTERNAL  Last Visit: 05-      CARE ([facility])   MEDICINE       Roxane Harp  Next Visit: None Scheduled  None         None Found                                                            Last  Test          Frequency    Reason                     Performed    Due Date  --------------------------------------------------------------------------------    HBA1C.......  6 months...  glimepiride, metFORMIN...  03- 09-    [facility] Embedded Care Due Messages. Reference number: [id number].   10/04/2024 4:42:24 AM CDT

## 2024-10-04 NOTE — TELEPHONE ENCOUNTER
Approved Prescriptions     hydroCHLOROthiazide (HYDRODIURIL) 25 MG tablet         Sig: Take 1 tablet (25 mg total) by mouth once daily.    Original sig: TAKE 1 TABLET ONE TIME DAILY    Disp: 90 tablet    Refills: 2 (Pharmacy requested: 3)    Start: 10/4/2024    Class: Normal    Authorized by: Roxane Harp MD    To pharmacy: .        To be filled at: [facility] [address] [place]         I spoke to pt and notified him Rx above was sent to his pharmacy. Pt is out of town and did not bring BP cuff with him.  HTN f/u appt scheduled 10-22-24 at 1 pm. I will mail out appt reminder  Pt verbalized understanding

## 2024-10-08 RX ORDER — GABAPENTIN 300 MG/1
300 CAPSULE ORAL NIGHTLY
Qty: 30 CAPSULE | Refills: 1 | Status: SHIPPED | OUTPATIENT
Start: 2024-10-08

## 2024-10-17 ENCOUNTER — TELEPHONE (OUTPATIENT)
Dept: INTERNAL MEDICINE | Facility: CLINIC | Age: 69
End: 2024-10-17
Payer: MEDICARE

## 2024-10-17 DIAGNOSIS — Z87.891 HISTORY OF TOBACCO USE: Primary | ICD-10-CM

## 2024-10-17 NOTE — TELEPHONE ENCOUNTER
----- Message from Denise Stewart sent at 10/17/2024  9:25 AM CDT -----  Regarding: LDCT  Good morning,  This patient LDCT in May recommended 6 month f/u.  Your note is saying to inform pt,      No patient portal.  Please convey reassuring low-dose CT chest results remind him that we will do this annually until the age of 79.    I placed an order for Diagnostic LDCT, Do you want pt to have this CT or place him on a yearly follow up date?      Thanks  Denise

## 2024-10-17 NOTE — TELEPHONE ENCOUNTER
Patient informed of upcoming appointment details. Patient verbalized understanding.  ----- Message from Janell sent at 10/17/2024 12:42 PM CDT -----  Regarding: Missed call  Contact: [phone]  Calling in regards to missed call from office. Please call and discuss.    [phone]

## 2024-10-21 ENCOUNTER — OUTPATIENT CASE MANAGEMENT (OUTPATIENT)
Dept: ADMINISTRATIVE | Facility: OTHER | Age: 69
End: 2024-10-21
Payer: MEDICARE

## 2024-10-21 NOTE — PROGRESS NOTES
Outpatient Care Management  Initial Patient Assessment    Patient: Neena Kohli  MRN: [id number]  Date of Service: 10/21/2024  Completed by: Chacha Patricia RN  Referral Date: 07/04/2024  Date of Eligibility: 10/21/2024  Program:   High Risk  Status: Ongoing  Effective Dates: 10/21/2024 - present  Responsible Staff: Chacha Patricia RN        Reason for Visit   Patient presents with    OPCM Chart Review     10/21/24    Nursing Assessment     10/21/24    OPCM Enrollment Call     10/21/24       Brief Summary:  Neena Kohli was was self referred for CKD. Patient qualifies for program based on risk score of 83%.   Active problem list, medical, surgical and social history reviewed. Active comorbidities include HTN, PAD, Hyperlipidemia, DM, CKD, Depression, and is a current everyday smoker. Areas of need identified by patient include help managing CKD.   Next steps: Follow up with patient on or around 11/4/24.    Disability Status  Is the patient alert and oriented (person, place, time, and situation)?: Alert and oriented x 4  Hearing Difficulty or Deaf: no  Visual Difficulty or Blind: yes  Visual and Hearing Needs Conclusion: wears readers  Vision Management: Other (wears readers)  Difficulty Concentrating, Remembering or Making Decisions: no  Communication Difficulty: no  Eating/Swallowing Difficulty: no  Walking or Climbing Stairs Difficulty: no  Dressing/Bathing Difficulty: no  Toileting : Independent  Continence : Continence - Not a problem  Difficulty Managing Errands Independently: no  Equipment Currently Used at Home: blood pressure machine; glucometer  ADL Conclusion Statement: Patient lives alone. Still drives. Independent with all ADLs.  Service Animal Required: no  Change in Functional Status Since Onset of Current Illness/Injury: no        Spiritual Beliefs  Spiritual, Cultural Beliefs, Voodoo Practices, Values that Affect Care: no      Social History     Socioeconomic History    Marital  status: Single   Tobacco Use    Smoking status: Every Day     Current packs/day: 1.00     Average packs/day: 1 pack/day for 52.8 years (52.8 ttl pk-yrs)     Types: Vaping with nicotine, Cigarettes     Start date: 1972    Smokeless tobacco: Current    Tobacco comments:     Enrolled in the [employer] on 2/20/19 (SCT Member ID # [id number])  Ambulatory referral to Smoking Cessation clinic following hospital discharge.  Pt is a 1 pk/day cigarette smoker x 52 yrs.- switched to vaping with nicotine approx. 3 months ago.    Substance and Sexual Activity    Alcohol use: Not Currently     Alcohol/week: 0.0 standard drinks of alcohol     Comment: Former User    Drug use: Not Currently     Types: Cocaine, Marijuana     Comment: Former User    Sexual activity: Not Currently     Social Drivers of Health     Financial Resource Strain: Low Risk  (10/21/2024)    Overall Financial Resource Strain (CARDIA)     Difficulty of Paying Living Expenses: Not very hard   Food Insecurity: No Food Insecurity (10/21/2024)    Hunger Vital Sign     Worried About Running Out of Food in the Last Year: Never true     Ran Out of Food in the Last Year: Never true   Transportation Needs: No Transportation Needs (10/21/2024)    TRANSPORTATION NEEDS     Transportation : No   Physical Activity: Insufficiently Active (10/21/2024)    Exercise Vital Sign     Days of Exercise per Week: 3 days     Minutes of Exercise per Session: 10 min   Stress: Stress Concern Present (10/21/2024)    [employer] - Occupational Stress Questionnaire     Feeling of Stress : To some extent   Housing Stability: Low Risk  (10/21/2024)    Housing Stability Vital Sign     Unable to Pay for Housing in the Last Year: No     Homeless in the Last Year: No       Roles and Relationships  Primary Source of Support/Comfort: child(suzan)  Name of Support/Comfort Primary Source: Amada Kohli  Secondary Source of Support/Comfort: sibling(s)  Name of Support/Comfort  Secondary Source: Abhishek whitaker      Advance Directives (For Healthcare)  Advance Directive  (If Adv Dir status is received, view document under Adv Dir in header or Chart Review Media tab): Patient does not have Advance Directive, declines information.  Patient Requests Assistance: Handouts provided; Patient will do independently        Patient Reported Insurance  Verified current insurance plan:: [employer]  [employer] benefits discussed:: Mail Order Pharmacy            10/21/2024     2:12 PM 7/17/2024     4:14 PM 7/9/2024     3:35 PM 3/7/2024     9:20 AM 9/26/2023     3:00 PM 12/12/2022     2:41 PM 6/14/2022     1:45 PM   Depression Patient Health Questionnaire   Over the last two weeks how often have you been bothered by little interest or pleasure in doing things Not at all Several days Not at all Not at all Not at all Several days Not at all   Over the last two weeks how often have you been bothered by feeling down, depressed or hopeless Several days Several days Not at all Not at all Not at all Not at all Several days   PHQ-2 Total Score 1 2 0 0 0 1 1       Learning Assessment       10/21/2024 1412 [facility] (10/21/2024 - Present)   Created by Chacha Patricia, RN -  (Nurse) Status: Complete                 PRIMARY LEARNER     Primary Learner Name:  Neena Kohli BB - 10/21/2024 1412    Relationship:  Patient BB - 10/21/2024 1412    Does the primary learner have any barriers to learning?:  No Barriers BB - 10/21/2024 1412    What is the preferred language of the primary learner?:  English BB - 10/21/2024 1412    Is an  required?:  No BB - 10/21/2024 1412    How does the primary learner prefer to learn new concepts?:  Listening BB - 10/21/2024 1412    How often do you need to have someone help you read instructions, pamphlets, or written material from your doctor or pharmacy?:  Never BB - 10/21/2024 1412        CO-LEARNER #1     No question answered         CO-LEARNER #2     No question answered        SPECIAL TOPICS     No question answered        ANSWERED BY:     No question answered        Comments         Edit History       Chacha Patricia, RN -  (Nurse)   10/21/2024 5716

## 2024-10-21 NOTE — LETTER
Neena Kohli  [address] [facility] [address] [place] [id number]    Dear Mr. Kohli,     Welcome to [facility] Outpatient Care Management Program. We are here to assist patients with multiple long-term (chronic) conditions who often need more personalized healthcare.    It was a pleasure talking with you today. My name is Chacha Patricia RN. I look forward to working with you again as your Care Manager. I will be contacting you by telephone routinely to help coordinate care and resolve issues.    My goal is to help you function at the healthiest and highest level possible. You can contact me directly at [phone].    As an [facility] patient with [employer], some of the services we provide, at no cost to you, include:     Development of an individualized care plan with a Registered Nurse   Connection with a   Assistance from a Community Health Worker  Connection with available resources and services    Coordinate communication among your care team members   Provide coaching and education  Help you understand your doctor's treatment plan  Help you obtain information about your insurance coverage.    All services provided by [facility]s Outpatient Care Managers and other care team members are coordinated with and communicated to your primary care team.      As part of your enrollment, you will be receiving education materials and more information about these services in your [facility] account, by phone, or through the mail. If you do not wish to participate or receive information, you can Opt Out by contacting our office at [phone]    Kind Regards,        Chacha Patricia RN  [facility]   Outpatient Care Management

## 2024-10-22 ENCOUNTER — OFFICE VISIT (OUTPATIENT)
Dept: INTERNAL MEDICINE | Facility: CLINIC | Age: 69
End: 2024-10-22
Payer: MEDICARE

## 2024-10-22 ENCOUNTER — LAB VISIT (OUTPATIENT)
Dept: LAB | Facility: HOSPITAL | Age: 69
End: 2024-10-22
Attending: STUDENT IN AN ORGANIZED HEALTH CARE EDUCATION/TRAINING PROGRAM
Payer: MEDICARE

## 2024-10-22 VITALS
BODY MASS INDEX: 27.34 KG/M2 | SYSTOLIC BLOOD PRESSURE: 138 MMHG | RESPIRATION RATE: 16 BRPM | WEIGHT: 174.19 LBS | DIASTOLIC BLOOD PRESSURE: 68 MMHG | HEART RATE: 68 BPM | HEIGHT: 67 IN | TEMPERATURE: 97 F

## 2024-10-22 DIAGNOSIS — E11.22 TYPE 2 DIABETES MELLITUS WITH STAGE 3A CHRONIC KIDNEY DISEASE, WITHOUT LONG-TERM CURRENT USE OF INSULIN: ICD-10-CM

## 2024-10-22 DIAGNOSIS — N18.31 TYPE 2 DIABETES MELLITUS WITH STAGE 3A CHRONIC KIDNEY DISEASE, WITHOUT LONG-TERM CURRENT USE OF INSULIN: ICD-10-CM

## 2024-10-22 DIAGNOSIS — E11.51 DIABETES MELLITUS WITH PERIPHERAL CIRCULATORY DISORDER: ICD-10-CM

## 2024-10-22 DIAGNOSIS — Z91.81 AT HIGH RISK FOR INJURY RELATED TO FALL: ICD-10-CM

## 2024-10-22 DIAGNOSIS — I10 HYPERTENSION, ESSENTIAL: Primary | ICD-10-CM

## 2024-10-22 DIAGNOSIS — I73.9 PVD (PERIPHERAL VASCULAR DISEASE): ICD-10-CM

## 2024-10-22 DIAGNOSIS — I25.10 CORONARY ARTERY DISEASE, UNSPECIFIED VESSEL OR LESION TYPE, UNSPECIFIED WHETHER ANGINA PRESENT, UNSPECIFIED WHETHER NATIVE OR TRANSPLANTED HEART: ICD-10-CM

## 2024-10-22 PROCEDURE — 99214 OFFICE O/P EST MOD 30 MIN: CPT | Mod: HCNC,25,S$GLB, | Performed by: STUDENT IN AN ORGANIZED HEALTH CARE EDUCATION/TRAINING PROGRAM

## 2024-10-22 PROCEDURE — 3078F DIAST BP <80 MM HG: CPT | Mod: HCNC,CPTII,S$GLB, | Performed by: STUDENT IN AN ORGANIZED HEALTH CARE EDUCATION/TRAINING PROGRAM

## 2024-10-22 PROCEDURE — 3044F HG A1C LEVEL LT 7.0%: CPT | Mod: HCNC,CPTII,S$GLB, | Performed by: STUDENT IN AN ORGANIZED HEALTH CARE EDUCATION/TRAINING PROGRAM

## 2024-10-22 PROCEDURE — 1125F AMNT PAIN NOTED PAIN PRSNT: CPT | Mod: HCNC,CPTII,S$GLB, | Performed by: STUDENT IN AN ORGANIZED HEALTH CARE EDUCATION/TRAINING PROGRAM

## 2024-10-22 PROCEDURE — 99999 PR PBB SHADOW E&M-EST. PATIENT-LVL V: CPT | Mod: PBBFAC,HCNC,, | Performed by: STUDENT IN AN ORGANIZED HEALTH CARE EDUCATION/TRAINING PROGRAM

## 2024-10-22 PROCEDURE — 1159F MED LIST DOCD IN RCRD: CPT | Mod: HCNC,CPTII,S$GLB, | Performed by: STUDENT IN AN ORGANIZED HEALTH CARE EDUCATION/TRAINING PROGRAM

## 2024-10-22 PROCEDURE — 1100F PTFALLS ASSESS-DOCD GE2>/YR: CPT | Mod: HCNC,CPTII,S$GLB, | Performed by: STUDENT IN AN ORGANIZED HEALTH CARE EDUCATION/TRAINING PROGRAM

## 2024-10-22 PROCEDURE — 3288F FALL RISK ASSESSMENT DOCD: CPT | Mod: HCNC,CPTII,S$GLB, | Performed by: STUDENT IN AN ORGANIZED HEALTH CARE EDUCATION/TRAINING PROGRAM

## 2024-10-22 PROCEDURE — G0009 ADMIN PNEUMOCOCCAL VACCINE: HCPCS | Mod: HCNC,S$GLB,, | Performed by: STUDENT IN AN ORGANIZED HEALTH CARE EDUCATION/TRAINING PROGRAM

## 2024-10-22 PROCEDURE — 90677 PCV20 VACCINE IM: CPT | Mod: HCNC,S$GLB,, | Performed by: STUDENT IN AN ORGANIZED HEALTH CARE EDUCATION/TRAINING PROGRAM

## 2024-10-22 PROCEDURE — 82570 ASSAY OF URINE CREATININE: CPT | Mod: HCNC | Performed by: STUDENT IN AN ORGANIZED HEALTH CARE EDUCATION/TRAINING PROGRAM

## 2024-10-22 PROCEDURE — 4010F ACE/ARB THERAPY RXD/TAKEN: CPT | Mod: HCNC,CPTII,S$GLB, | Performed by: STUDENT IN AN ORGANIZED HEALTH CARE EDUCATION/TRAINING PROGRAM

## 2024-10-22 PROCEDURE — 82043 UR ALBUMIN QUANTITATIVE: CPT | Mod: HCNC | Performed by: STUDENT IN AN ORGANIZED HEALTH CARE EDUCATION/TRAINING PROGRAM

## 2024-10-22 PROCEDURE — 3008F BODY MASS INDEX DOCD: CPT | Mod: HCNC,CPTII,S$GLB, | Performed by: STUDENT IN AN ORGANIZED HEALTH CARE EDUCATION/TRAINING PROGRAM

## 2024-10-22 PROCEDURE — 3075F SYST BP GE 130 - 139MM HG: CPT | Mod: HCNC,CPTII,S$GLB, | Performed by: STUDENT IN AN ORGANIZED HEALTH CARE EDUCATION/TRAINING PROGRAM

## 2024-10-22 NOTE — PROGRESS NOTES
Subjective:    The following note was written in combination with deep-scribe software and dictation.      Chief Complaint: Hypertension (Fol up visit. ) and Toe Pain (Left foot, great toe. Pain at 9.  Was in hosp for recently. )    HPI  Mr. Neena Kohli is a 69 y.o. man with diffuse osteoarthritis (lumbar degenerative disc disease, carpal tunnel syndrome, etc.;), hypertension (HCTZ 25, irbesartan 300, amlodipine 10, and carvedilol 25), hyperlipidemia (atorvastatin 80), diabetes with significant secondary peripheral vascular disease (glimepiride 1 and metformin 1000 b.i.d.), MDD (Seroquel 100 q.h.s., Celexa 20, hydralazine 100 t.i.d. p.r.n., and Wellbutrin 75), gout (allopurinol 100 and colchicine 0.6 daily), osteoporosis (VitD 50 K/week), & coronary artery disease (ASA 81) presenting for short interval hypertensive follow-up after recently establishing primary care.     Hypertension:   -HCTZ 25, amlodipine 10, carvedilol 25 b.i.d., and irbesartan 300  -at last appointment, was persistently hypertensive in office and I facilitated overdue cardiology reestablishment (normotensive at that appointment)   -hypertensive episodes seem to be correlate if 2 episodes of bilateral lower extremity claudication to severe peripheral vascular disease; as of last check, A1c and cholesterol were very well controlled    Smoking:  - mynor-contemplation stage, but is questionably ready   - has previously been referred to the smoking cessation program    Fall risk:  - significant given general medical frailty and history; in addition, I would want mechanical fall on the stairs just prior to this appointment.  The only thing that prevented significant trauma (fell backwards) was that he was caught by the people behind him and sustained no trauma.    Overdue health maintenance:   -due for a number of routine vaccinations and proteinuria screening (no prior history of significant proteinuria)      Review of Systems   Constitutional:   Negative for appetite change, chills and fever.   HENT: Negative.     Respiratory:  Negative for cough, chest tightness and shortness of breath.    Cardiovascular:  Negative for chest pain, palpitations and leg swelling.   Gastrointestinal:  Negative for abdominal distention, abdominal pain, blood in stool, constipation, diarrhea, nausea and vomiting.   Endocrine: Negative.    Genitourinary:  Negative for difficulty urinating, dysuria, frequency and hematuria.   Musculoskeletal: Negative.    Integumentary:  Negative.   Neurological: Negative.    Psychiatric/Behavioral: Negative.           Objective:      Vitals:    10/22/24 1310   BP: 138/68   Pulse: 68   Resp: 16   Temp: 97.3 °F (36.3 °C)      Physical Exam  Vitals reviewed.   Constitutional:       General: He is not in acute distress.     Appearance: Normal appearance.   HENT:      Head: Normocephalic and atraumatic.      Comments: Facial features are symmetric      Nose: Nose normal. No congestion or rhinorrhea.      Mouth/Throat:      Mouth: Mucous membranes are moist.      Pharynx: Oropharynx is clear. No oropharyngeal exudate or posterior oropharyngeal erythema.   Eyes:      General: No scleral icterus.     Extraocular Movements: Extraocular movements intact.      Conjunctiva/sclera: Conjunctivae normal.   Cardiovascular:      Rate and Rhythm: Normal rate and regular rhythm.      Pulses: Normal pulses.      Heart sounds: Normal heart sounds.   Pulmonary:      Effort: Pulmonary effort is normal. No respiratory distress.      Breath sounds: Normal breath sounds.   Musculoskeletal:         General: No deformity or signs of injury. Normal range of motion.      Cervical back: Normal range of motion.      Comments: Gait normal    Skin:     General: Skin is warm and dry.      Findings: No rash.   Neurological:      General: No focal deficit present.      Mental Status: He is alert and oriented to person, place, and time. Mental status is at baseline.   Psychiatric:          Mood and Affect: Mood normal.         Behavior: Behavior normal.         Thought Content: Thought content normal.       Current Outpatient Medications on File Prior to Visit   Medication Sig Dispense Refill    allopurinoL (ZYLOPRIM) 100 MG tablet TAKE 1 TABLET ONE TIME DAILY 90 tablet 3    amLODIPine (NORVASC) 10 MG tablet Take 1 tablet (10 mg total) by mouth once daily. 90 tablet 1    aspirin (ECOTRIN) 81 MG EC tablet Take 81 mg by mouth once daily.      atorvastatin (LIPITOR) 80 MG tablet TAKE 1 TABLET EVERY EVENING 90 tablet 3    buPROPion (WELLBUTRIN) 75 MG tablet Take by mouth.      carvediloL (COREG) 25 MG tablet Take 1 tablet (25 mg total) by mouth 2 (two) times daily. 180 tablet 1    cholecalciferol, vitamin D3, 1,250 mcg (50,000 unit) capsule Take 50,000 Units by mouth every 14 (fourteen) days.      citalopram (CELEXA) 20 MG tablet Take 20 mg by mouth once daily.      gabapentin (NEURONTIN) 300 MG capsule TAKE 1 CAPSULE BY MOUTH EVERY EVENING 30 capsule 1    glimepiride (AMARYL) 1 MG tablet TAKE 2 TABLETS EVERY  tablet 1    hydrALAZINE (APRESOLINE) 100 MG tablet TAKE 1 TABLET EVERY 8 HOURS 270 tablet 3    hydroCHLOROthiazide (HYDRODIURIL) 25 MG tablet Take 1 tablet (25 mg total) by mouth once daily. 90 tablet 2    irbesartan (AVAPRO) 300 MG tablet Take 1 tablet (300 mg total) by mouth once daily. 90 tablet 1    metFORMIN (GLUCOPHAGE) 1000 MG tablet Take 1 tablet (1,000 mg total) by mouth 2 (two) times daily with meals. 180 tablet 1    oxyCODONE (ROXICODONE) 30 MG Tab SMARTSI.5-1 Tablet(s) By Mouth 3-4 Times Daily PRN      topiramate (TOPAMAX) 100 MG tablet Take 100 mg by mouth once daily.      traZODone (DESYREL) 100 MG tablet Take 100 mg by mouth every evening.      TRUE METRIX GLUCOSE TEST STRIP Strp       TRUEPLUS LANCETS 33 gauge Misc       amoxicillin-clavulanate 875-125mg (AUGMENTIN) 875-125 mg per tablet Take 1 tablet by mouth 2 (two) times daily. 20 tablet 0    colchicine (COLCRYS)  0.6 mg tablet 2 po , then 1 po 2 hours later.  Then 1 po qd (Patient not taking: Reported on 10/22/2024) 30 tablet 0    ergocalciferol (ERGOCALCIFEROL) 50,000 unit Cap Take 50,000 Units by mouth every 7 days.      ketorolac (TORADOL) 10 mg tablet Take 1 tablet (10 mg total) by mouth every 6 (six) hours as needed for Pain. (Patient not taking: Reported on 10/22/2024) 12 tablet 0    oxyCODONE-acetaminophen (PERCOCET) 5-325 mg per tablet Take 1 tablet by mouth every 6 (six) hours as needed for Pain. (Patient not taking: Reported on 10/22/2024) 12 tablet 0    propranoloL (INDERAL) 10 MG tablet 10 mg once daily.      QUEtiapine (SEROQUEL) 100 MG Tab Take 1 tablet (100 mg total) by mouth 2 (two) times daily. At bedtime 60 tablet 2    ZTLIDO 1.8 % PtMd        No current facility-administered medications on file prior to visit.         Assessment:       1. Hypertension, essential    2. Diabetes mellitus with peripheral circulatory disorder    3. At high risk for injury related to fall    4. PVD (peripheral vascular disease)    5. Coronary artery disease, unspecified vessel or lesion type, unspecified whether angina present, unspecified whether native or transplanted heart    6. Type 2 diabetes mellitus with stage 3a chronic kidney disease, without long-term current use of insulin        Plan:       Hypertension, essential   - normotensive (albeit borderline)   - is hypertensive episodes (on extensive regimen) seem more correlate to episodes of pain than uncontrolled underlying hypertension.  Will defer further medication titration/alteration to cardiology.      Diabetes mellitus with peripheral circulatory disorder   - very well controlled as per both serial A1cs and home preprandial glucose readings.      At high risk for injury related to fall  PVD (peripheral vascular disease)  Coronary artery disease, unspecified vessel or lesion type, unspecified whether angina present, unspecified whether native or transplanted  heart  -     Ambulatory referral/consult to [facility] ([place]); Future; Expected date: 10/29/2024   - will facilitate the patient's establishment with medical fitness for more overarching fall risk reduction simultaneous with aggressive modifiable cardiovascular disease risk interventions; recommendations and interventions appreciated     Type 2 diabetes mellitus with stage 3a chronic kidney disease, without long-term current use of insulin  -     Microalbumin/Creatinine Ratio, Urine; Future; Expected date: 10/22/2024  -     pneumoc 20-юлия conj-dip cr(PF) (PREVNAR-20 (PF)) injection Syrg 0.5 mL   - will facilitate annual proteinuria screening    Other orders  -     [facility] ASSIGN QUESTIONNAIRE SERIES ([place])  -     [facility] Patient [facility] ([place])     Return to clinic in one year for annual exam or sooner if dictated by labs or illness.

## 2024-10-23 LAB
ALBUMIN/CREAT UR: NORMAL UG/MG (ref 0–30)
CREAT UR-MCNC: 132 MG/DL (ref 23–375)
MICROALBUMIN UR DL<=1MG/L-MCNC: <5 UG/ML

## 2024-11-07 ENCOUNTER — TELEPHONE (OUTPATIENT)
Dept: PODIATRY | Facility: CLINIC | Age: 69
End: 2024-11-07
Payer: MEDICARE

## 2024-11-07 ENCOUNTER — OUTPATIENT CASE MANAGEMENT (OUTPATIENT)
Dept: ADMINISTRATIVE | Facility: OTHER | Age: 69
End: 2024-11-07
Payer: MEDICARE

## 2024-11-07 NOTE — TELEPHONE ENCOUNTER
Left vm message for patient in regards to cancelling his appointment on 11/12/2024 with Dr. Norton(Podiatry) and to contact office at [phone] to reschedule

## 2024-11-11 ENCOUNTER — OFFICE VISIT (OUTPATIENT)
Dept: PODIATRY | Facility: CLINIC | Age: 69
End: 2024-11-11
Payer: MEDICARE

## 2024-11-11 VITALS
DIASTOLIC BLOOD PRESSURE: 58 MMHG | WEIGHT: 174.19 LBS | HEIGHT: 67 IN | BODY MASS INDEX: 27.34 KG/M2 | SYSTOLIC BLOOD PRESSURE: 158 MMHG | HEART RATE: 50 BPM

## 2024-11-11 DIAGNOSIS — I73.9 PAD (PERIPHERAL ARTERY DISEASE): Chronic | ICD-10-CM

## 2024-11-11 DIAGNOSIS — M79.675 TOE PAIN, LEFT: ICD-10-CM

## 2024-11-11 DIAGNOSIS — L60.0 INGROWN NAIL: Primary | ICD-10-CM

## 2024-11-11 PROCEDURE — 99213 OFFICE O/P EST LOW 20 MIN: CPT | Mod: S$GLB,,, | Performed by: PODIATRIST

## 2024-11-11 PROCEDURE — 3078F DIAST BP <80 MM HG: CPT | Mod: CPTII,S$GLB,, | Performed by: PODIATRIST

## 2024-11-11 PROCEDURE — 3061F NEG MICROALBUMINURIA REV: CPT | Mod: CPTII,S$GLB,, | Performed by: PODIATRIST

## 2024-11-11 PROCEDURE — 1101F PT FALLS ASSESS-DOCD LE1/YR: CPT | Mod: CPTII,S$GLB,, | Performed by: PODIATRIST

## 2024-11-11 PROCEDURE — 3066F NEPHROPATHY DOC TX: CPT | Mod: CPTII,S$GLB,, | Performed by: PODIATRIST

## 2024-11-11 PROCEDURE — 4010F ACE/ARB THERAPY RXD/TAKEN: CPT | Mod: CPTII,S$GLB,, | Performed by: PODIATRIST

## 2024-11-11 PROCEDURE — 1126F AMNT PAIN NOTED NONE PRSNT: CPT | Mod: CPTII,S$GLB,, | Performed by: PODIATRIST

## 2024-11-11 PROCEDURE — 99999 PR PBB SHADOW E&M-EST. PATIENT-LVL V: CPT | Mod: PBBFAC,,, | Performed by: PODIATRIST

## 2024-11-11 PROCEDURE — 3044F HG A1C LEVEL LT 7.0%: CPT | Mod: CPTII,S$GLB,, | Performed by: PODIATRIST

## 2024-11-11 PROCEDURE — 3077F SYST BP >= 140 MM HG: CPT | Mod: CPTII,S$GLB,, | Performed by: PODIATRIST

## 2024-11-11 PROCEDURE — 1160F RVW MEDS BY RX/DR IN RCRD: CPT | Mod: CPTII,S$GLB,, | Performed by: PODIATRIST

## 2024-11-11 PROCEDURE — 3008F BODY MASS INDEX DOCD: CPT | Mod: CPTII,S$GLB,, | Performed by: PODIATRIST

## 2024-11-11 PROCEDURE — 3288F FALL RISK ASSESSMENT DOCD: CPT | Mod: CPTII,S$GLB,, | Performed by: PODIATRIST

## 2024-11-11 PROCEDURE — 1159F MED LIST DOCD IN RCRD: CPT | Mod: CPTII,S$GLB,, | Performed by: PODIATRIST

## 2024-11-11 RX ORDER — DOXYCYCLINE HYCLATE 100 MG
100 TABLET ORAL 2 TIMES DAILY
Qty: 20 TABLET | Refills: 0 | Status: SHIPPED | OUTPATIENT
Start: 2024-11-11 | End: 2024-11-21

## 2024-11-11 NOTE — PROGRESS NOTES
CHIEF CONCERN: Toe Pain (Pain of toe of left foot)         HPI:    Neena Kohli is a 69 y.o. male with concerns of painful toe on the left hallux.    The pain started months ago.    Pain aggravated by direct pressure and close toe shoes.   Patient denies acute trauma to the toe.    Home treatment:   he went to urgent care.       Patient Active Problem List   Diagnosis    Lumbar facet arthropathy    Chronic pain syndrome    DDD (degenerative disc disease), lumbar    Hyperuricemia    Microalbuminuria    Hyperkalemia    Iron deficiency anemia    Hypertension, essential    Type 2 diabetes mellitus with stage 3 chronic kidney disease, without long-term current use of insulin    Coronary artery disease involving native coronary artery of native heart without angina pectoris    PAD (peripheral artery disease)    Dyslipidemia associated with type 2 diabetes mellitus    Diabetes mellitus with peripheral circulatory disorder    Stage 3b chronic kidney disease    Hypertension associated with diabetes    Tobacco use disorder    Presence of stent in coronary artery    Bronchogenic cyst    Carcinoma of prostate    Carpal tunnel syndrome    Depressive disorder    Mixed hyperlipidemia    Impaired glucose tolerance    Spondylosis of lumbosacral spine without myelopathy    Ventricular premature beats    Old MI (myocardial infarction)    Mild aortic valve stenosis    Major depressive disorder, recurrent severe without psychotic features    Calculus of gallbladder with acute cholecystitis and obstruction    Hypertensive urgency    Hypokalemia    Sinus bradycardia    Obstructive sleep apnea    Coronary artery disease involving native coronary artery of native heart with angina pectoris    Pulmonary emphysema, unspecified emphysema type    Claudication of both lower extremities           Current Outpatient Medications on File Prior to Visit   Medication Sig Dispense Refill    allopurinoL (ZYLOPRIM) 100 MG tablet TAKE 1 TABLET ONE  TIME DAILY 90 tablet 3    amLODIPine (NORVASC) 10 MG tablet Take 1 tablet (10 mg total) by mouth once daily. 90 tablet 1    amoxicillin-clavulanate 875-125mg (AUGMENTIN) 875-125 mg per tablet Take 1 tablet by mouth 2 (two) times daily. 20 tablet 0    aspirin (ECOTRIN) 81 MG EC tablet Take 81 mg by mouth once daily.      atorvastatin (LIPITOR) 80 MG tablet TAKE 1 TABLET EVERY EVENING 90 tablet 3    buPROPion (WELLBUTRIN) 75 MG tablet Take by mouth.      carvediloL (COREG) 25 MG tablet Take 1 tablet (25 mg total) by mouth 2 (two) times daily. 180 tablet 1    cholecalciferol, vitamin D3, 1,250 mcg (50,000 unit) capsule Take 50,000 Units by mouth every 14 (fourteen) days.      citalopram (CELEXA) 20 MG tablet Take 20 mg by mouth once daily.      colchicine (COLCRYS) 0.6 mg tablet 2 po , then 1 po 2 hours later.  Then 1 po qd 30 tablet 0    ergocalciferol (ERGOCALCIFEROL) 50,000 unit Cap Take 50,000 Units by mouth every 7 days.      gabapentin (NEURONTIN) 300 MG capsule TAKE 1 CAPSULE BY MOUTH EVERY EVENING 30 capsule 1    glimepiride (AMARYL) 1 MG tablet TAKE 2 TABLETS EVERY  tablet 1    hydrALAZINE (APRESOLINE) 100 MG tablet TAKE 1 TABLET EVERY 8 HOURS 270 tablet 3    hydroCHLOROthiazide (HYDRODIURIL) 25 MG tablet Take 1 tablet (25 mg total) by mouth once daily. 90 tablet 2    irbesartan (AVAPRO) 300 MG tablet Take 1 tablet (300 mg total) by mouth once daily. 90 tablet 1    ketorolac (TORADOL) 10 mg tablet Take 1 tablet (10 mg total) by mouth every 6 (six) hours as needed for Pain. 12 tablet 0    metFORMIN (GLUCOPHAGE) 1000 MG tablet Take 1 tablet (1,000 mg total) by mouth 2 (two) times daily with meals. 180 tablet 1    oxyCODONE (ROXICODONE) 30 MG Tab SMARTSI.5-1 Tablet(s) By Mouth 3-4 Times Daily PRN      oxyCODONE-acetaminophen (PERCOCET) 5-325 mg per tablet Take 1 tablet by mouth every 6 (six) hours as needed for Pain. 12 tablet 0    propranoloL (INDERAL) 10 MG tablet 10 mg once daily.      QUEtiapine  "(SEROQUEL) 100 MG Tab Take 1 tablet (100 mg total) by mouth 2 (two) times daily. At bedtime 60 tablet 2    topiramate (TOPAMAX) 100 MG tablet Take 100 mg by mouth once daily.      traZODone (DESYREL) 100 MG tablet Take 100 mg by mouth every evening.      TRUE METRIX GLUCOSE TEST STRIP Strp       TRUEPLUS LANCETS 33 gauge Misc       ZTLIDO 1.8 % PtMd        No current facility-administered medications on file prior to visit.            Review of patient's allergies indicates:  No Known Allergies      ROS:  General ROS: negative for chills, fatigue or fever  Cardiovascular ROS: no chest pain or dyspnea on exertion  Musculoskeletal ROS: negative for - joint pain or joint stiffness.  Negative for loss of strength  Neuro ROS: Negative for syncope, numbness, or muscle weakness  Skin ROS: Negative for rash, itching or hair changes.  +Toenail changes        EXAM:   Vitals:    11/11/24 1138   BP: (!) 158/58   Pulse: (!) 50   Weight: 79 kg (174 lb 2.6 oz)   Height: 5' 7" (1.702 m)       LOWER EXTREMITY EXAM:    Vascular:    Dorsalis pedis and posterior tibial pulses are not palpable. 5 secs capillary refill time  Toes are cool to touch.    There is no  presence of digital hair.    There is  mild localized edema to the affected toe.     Neurological:    Light touch, proprioception, and sharp/dull sensation are all intact.   Mulders click:  Absent  Tinels:  Absent.   No LOPS    Dermatological:    The toenail is incurvated, Medial border of the left hallux.      Very tender to direct palpation.   no erythema noted to the affected area.     Absent paronychia.     Absent abscess      Musculoskeletal:    Muscle strength is 5/5 in all groups .    No swelling/crepitus at the interphalangeal joints.      8/5/2024:  Impression:    High-grade stenosis in the common femoral artery on the RIGHT with occluded SFA.   Occluded SFA on the RIGHT   Patent deep profunda femoral artery on the RIGHT.   Occluded anterior tibial arteries " BILATERALLY.   High-grade stenosis in the LEFT popliteal artery.         ASSESSMENT/PLAN     Problem List Items Addressed This Visit       PAD (peripheral artery disease) (Chronic)    Relevant Medications    doxycycline (VIBRA-TABS) 100 MG tablet     Other Visit Diagnoses       Ingrown nail    -  Primary    Relevant Medications    doxycycline (VIBRA-TABS) 100 MG tablet    Other Relevant Orders    X-Ray Foot Complete Left    Toe pain, left        Relevant Medications    doxycycline (VIBRA-TABS) 100 MG tablet    Other Relevant Orders    X-Ray Foot Complete Left            I counseled the patient on the patient's  conditions, their implications and medical management.   Xrays.  Doxycyline 100mg to cover possible infection.   Shoe and activity modification as needed for relief.   Avoid tight shoes.  Do not trim own toenails due to high risk from PAD.   Nail border trimmed today without immediate complication.  Follow up one month or sooner if concerned.

## 2024-11-18 ENCOUNTER — HOSPITAL ENCOUNTER (OUTPATIENT)
Dept: RADIOLOGY | Facility: HOSPITAL | Age: 69
Discharge: HOME | End: 2024-11-18
Attending: PODIATRIST
Payer: MEDICARE

## 2024-11-18 DIAGNOSIS — L60.0 INGROWN NAIL: ICD-10-CM

## 2024-11-18 DIAGNOSIS — M79.675 TOE PAIN, LEFT: ICD-10-CM

## 2024-11-18 PROCEDURE — 73630 X-RAY EXAM OF FOOT: CPT | Mod: 26,LT,, | Performed by: RADIOLOGY

## 2024-11-18 PROCEDURE — 73630 X-RAY EXAM OF FOOT: CPT | Mod: TC,PO,LT

## 2024-11-21 ENCOUNTER — HOSPITAL ENCOUNTER (OUTPATIENT)
Dept: RADIOLOGY | Facility: HOSPITAL | Age: 69
Discharge: HOME | End: 2024-11-21
Attending: STUDENT IN AN ORGANIZED HEALTH CARE EDUCATION/TRAINING PROGRAM
Payer: MEDICARE

## 2024-11-21 DIAGNOSIS — Z87.891 HISTORY OF TOBACCO USE: ICD-10-CM

## 2024-11-21 PROCEDURE — 71250 CT THORAX DX C-: CPT | Mod: TC

## 2024-11-22 ENCOUNTER — OUTPATIENT CASE MANAGEMENT (OUTPATIENT)
Dept: ADMINISTRATIVE | Facility: OTHER | Age: 69
End: 2024-11-22
Payer: MEDICARE

## 2024-11-22 NOTE — PROGRESS NOTES
11/22/2024 1st attempt to complete follow-up for Outpatient Care Management: Left message for patient requesting a return call. OPCM letter has been sent. Will attempt to contact patient again at a later date.

## 2024-11-22 NOTE — LETTER
Neena Kohli  [address] [facility] [address] [place] [id number]      Dear Mr. Chaudhary,     I am your nurse with [facility]. I was unsuccessful in reaching you today. At your earliest convenience, I would like to discuss your healthcare progress.      Please contact me at [phone] from 8:00 AM to 4:30 PM on Monday through Friday.     As you know, [facility] is a program offered to you through [facility] where a nurse is available 24/7 to answer questions or provide medical advice, their number is [phone].    Kind Regards,      Chacha Patricia, RN  Outpatient Care Management

## 2024-12-09 RX ORDER — IRBESARTAN 300 MG/1
300 TABLET ORAL
Qty: 90 TABLET | Refills: 1 | Status: SHIPPED | OUTPATIENT
Start: 2024-12-09

## 2024-12-09 RX ORDER — CARVEDILOL 25 MG/1
25 TABLET ORAL 2 TIMES DAILY
Qty: 180 TABLET | Refills: 1 | Status: SHIPPED | OUTPATIENT
Start: 2024-12-09

## 2024-12-09 RX ORDER — GLIMEPIRIDE 1 MG/1
TABLET ORAL
Qty: 180 TABLET | Refills: 1 | Status: SHIPPED | OUTPATIENT
Start: 2024-12-09

## 2024-12-09 RX ORDER — METFORMIN HYDROCHLORIDE 1000 MG/1
1000 TABLET ORAL 2 TIMES DAILY WITH MEALS
Qty: 180 TABLET | Refills: 1 | Status: SHIPPED | OUTPATIENT
Start: 2024-12-09

## 2024-12-09 NOTE — TELEPHONE ENCOUNTER
No care due was identified.  [facility] Embedded Care Due Messages. Reference number: [id number].   12/09/2024 3:30:29 PM CST

## 2024-12-09 NOTE — TELEPHONE ENCOUNTER
Refill Routing Note   Medication(s) are not appropriate for processing by [facility] for the following reason(s):        Required labs outdated  Required vitals abnormal    [facility] action(s):  Defer     Requires labs : Yes             Appointments  past 12m or future 3m with PCP    Date Provider   Last Visit   10/22/2024 Roxane Harp MD   Next Visit   3/6/2025 Roxane Harp MD   ED visits in past 90 days: 0        Note composed:2:50 PM 12/09/2024

## 2024-12-09 NOTE — TELEPHONE ENCOUNTER
Care Due:                  Date            Visit Type   Department     Provider  --------------------------------------------------------------------------------                                EP -                              PRIMARY      [facility] INTERNAL  Last Visit: 10-      CARE ([facility])   [facility]       Roxane Harp                               -                              PRIMARY      [facility] INTERNAL  Next Visit: 03-      CARE ([facility])   [facility]       Jorge CASIE Harp                                                            Last  Test          Frequency    Reason                     Performed    Due Date  --------------------------------------------------------------------------------    HBA1C.......  6 months...  glimepiride, metFORMIN...  03- 09-    Uric Acid...  12 months..  colchicine...............  03- 03-    [facility] Embedded Care Due Messages. Reference number: [id number].   12/09/2024 1:21:27 AM CST

## 2024-12-10 RX ORDER — AMLODIPINE BESYLATE 10 MG/1
10 TABLET ORAL DAILY
Qty: 90 TABLET | Refills: 3 | Status: SHIPPED | OUTPATIENT
Start: 2024-12-10

## 2024-12-10 NOTE — TELEPHONE ENCOUNTER
Refill Routing Note   Medication(s) are not appropriate for processing by [facility] for the following reason(s):        Required vitals abnormal    [facility] action(s):  Defer               Appointments  past 12m or future 3m with PCP    Date Provider   Last Visit   10/22/2024 Roxane Harp MD   Next Visit   3/6/2025 Roxane Harp MD   ED visits in past 90 days: 0        Note composed:7:16 AM 12/10/2024

## 2024-12-17 ENCOUNTER — OUTPATIENT CASE MANAGEMENT (OUTPATIENT)
Dept: ADMINISTRATIVE | Facility: OTHER | Age: 69
End: 2024-12-17
Payer: MEDICARE

## 2024-12-17 NOTE — PROGRESS NOTES
Outpatient Care Management  Patient Not Qualified    Patient: Neena Kohli  MRN:  [id number]  Date of Service:  12/17/2024  Completed by:  Chacha Patricia RN    Chief Complaint   Patient presents with    OPCM Chart Review     12/17/24    Case Closure     12/17/24       Patient Summary                12/17/24 Unable to reach patient for follow up after multiple attempts. OPCM letter has been sent. Closing case.

## 2024-12-20 RX ORDER — ALLOPURINOL 100 MG/1
100 TABLET ORAL
Qty: 90 TABLET | Refills: 3 | Status: SHIPPED | OUTPATIENT
Start: 2024-12-20

## 2024-12-30 RX ORDER — METHYLPREDNISOLONE 4 MG/1
TABLET ORAL
Qty: 21 TABLET | OUTPATIENT
Start: 2024-12-30

## 2024-12-30 NOTE — TELEPHONE ENCOUNTER
No care due was identified.  [facility] Embedded Care Due Messages. Reference number: [id number].   12/30/2024 2:25:04 PM CST

## 2025-01-02 ENCOUNTER — OFFICE VISIT (OUTPATIENT)
Dept: PODIATRY | Facility: CLINIC | Age: 70
End: 2025-01-02
Payer: MEDICARE

## 2025-01-02 VITALS
WEIGHT: 174.19 LBS | HEART RATE: 48 BPM | DIASTOLIC BLOOD PRESSURE: 79 MMHG | SYSTOLIC BLOOD PRESSURE: 173 MMHG | HEIGHT: 67 IN | BODY MASS INDEX: 27.34 KG/M2

## 2025-01-02 DIAGNOSIS — I73.9 PAD (PERIPHERAL ARTERY DISEASE): ICD-10-CM

## 2025-01-02 DIAGNOSIS — L85.3 DRY SKIN: ICD-10-CM

## 2025-01-02 DIAGNOSIS — M79.675 TOE PAIN, LEFT: Primary | ICD-10-CM

## 2025-01-02 DIAGNOSIS — L60.0 INGROWN NAIL: Chronic | ICD-10-CM

## 2025-01-02 PROCEDURE — 99214 OFFICE O/P EST MOD 30 MIN: CPT | Mod: S$GLB,,, | Performed by: PODIATRIST

## 2025-01-02 PROCEDURE — 1126F AMNT PAIN NOTED NONE PRSNT: CPT | Mod: CPTII,S$GLB,, | Performed by: PODIATRIST

## 2025-01-02 PROCEDURE — 99999 PR PBB SHADOW E&M-EST. PATIENT-LVL IV: CPT | Mod: PBBFAC,,, | Performed by: PODIATRIST

## 2025-01-02 PROCEDURE — 3072F LOW RISK FOR RETINOPATHY: CPT | Mod: CPTII,S$GLB,, | Performed by: PODIATRIST

## 2025-01-02 PROCEDURE — 1159F MED LIST DOCD IN RCRD: CPT | Mod: CPTII,S$GLB,, | Performed by: PODIATRIST

## 2025-01-02 PROCEDURE — 3288F FALL RISK ASSESSMENT DOCD: CPT | Mod: CPTII,S$GLB,, | Performed by: PODIATRIST

## 2025-01-02 PROCEDURE — 3077F SYST BP >= 140 MM HG: CPT | Mod: CPTII,S$GLB,, | Performed by: PODIATRIST

## 2025-01-02 PROCEDURE — 3078F DIAST BP <80 MM HG: CPT | Mod: CPTII,S$GLB,, | Performed by: PODIATRIST

## 2025-01-02 PROCEDURE — 3008F BODY MASS INDEX DOCD: CPT | Mod: CPTII,S$GLB,, | Performed by: PODIATRIST

## 2025-01-02 PROCEDURE — 1101F PT FALLS ASSESS-DOCD LE1/YR: CPT | Mod: CPTII,S$GLB,, | Performed by: PODIATRIST

## 2025-01-02 RX ORDER — OXYCODONE AND ACETAMINOPHEN 5; 325 MG/1; MG/1
1 TABLET ORAL EVERY 4 HOURS PRN
Qty: 28 TABLET | Refills: 0 | Status: SHIPPED | OUTPATIENT
Start: 2025-01-02 | End: 2025-01-09

## 2025-01-02 RX ORDER — AMMONIUM LACTATE 12 G/100G
1 CREAM TOPICAL DAILY
Qty: 140 G | Refills: 6 | Status: SHIPPED | OUTPATIENT
Start: 2025-01-02

## 2025-01-02 NOTE — PROGRESS NOTES
CHIEF CONCERN: Toe Pain (Pain in big toe of left foot)         HPI:    Neena Kohli is a 69 y.o. male with concerns of recurrence of chronic painful toe on the left hallux.    The pain started months ago.   Pain aggravated by direct pressure and close toe shoes.   On and off   Patient denies acute trauma to the toe.   He has gone to urgent care due to pain.  He does have PAD and type 2 DM.  High risk for toe amputation.       Patient Active Problem List   Diagnosis    Lumbar facet arthropathy    Chronic pain syndrome    DDD (degenerative disc disease), lumbar    Hyperuricemia    Microalbuminuria    Hyperkalemia    Iron deficiency anemia    Hypertension, essential    Type 2 diabetes mellitus with stage 3 chronic kidney disease, without long-term current use of insulin    Coronary artery disease involving native coronary artery of native heart without angina pectoris    PAD (peripheral artery disease)    Dyslipidemia associated with type 2 diabetes mellitus    Diabetes mellitus with peripheral circulatory disorder    Stage 3b chronic kidney disease    Hypertension associated with diabetes    Tobacco use disorder    Presence of stent in coronary artery    Bronchogenic cyst    Carcinoma of prostate    Carpal tunnel syndrome    Depressive disorder    Mixed hyperlipidemia    Impaired glucose tolerance    Spondylosis of lumbosacral spine without myelopathy    Ventricular premature beats    Old MI (myocardial infarction)    Mild aortic valve stenosis    Major depressive disorder, recurrent severe without psychotic features    Calculus of gallbladder with acute cholecystitis and obstruction    Hypertensive urgency    Hypokalemia    Sinus bradycardia    Obstructive sleep apnea    Coronary artery disease involving native coronary artery of native heart with angina pectoris    Pulmonary emphysema, unspecified emphysema type    Claudication of both lower extremities           Current Outpatient Medications on File Prior to  Visit   Medication Sig Dispense Refill    allopurinoL (ZYLOPRIM) 100 MG tablet TAKE 1 TABLET EVERY DAY 90 tablet 3    amLODIPine (NORVASC) 10 MG tablet Take 1 tablet (10 mg total) by mouth once daily. 90 tablet 3    amoxicillin-clavulanate 875-125mg (AUGMENTIN) 875-125 mg per tablet Take 1 tablet by mouth 2 (two) times daily. 20 tablet 0    aspirin (ECOTRIN) 81 MG EC tablet Take 81 mg by mouth once daily.      atorvastatin (LIPITOR) 80 MG tablet TAKE 1 TABLET EVERY EVENING 90 tablet 3    buPROPion (WELLBUTRIN) 75 MG tablet Take by mouth.      carvediloL (COREG) 25 MG tablet TAKE 1 TABLET TWICE DAILY 180 tablet 1    cholecalciferol, vitamin D3, 1,250 mcg (50,000 unit) capsule Take 50,000 Units by mouth every 14 (fourteen) days.      citalopram (CELEXA) 20 MG tablet Take 20 mg by mouth once daily.      colchicine (COLCRYS) 0.6 mg tablet 2 po , then 1 po 2 hours later.  Then 1 po qd 30 tablet 0    ergocalciferol (ERGOCALCIFEROL) 50,000 unit Cap Take 50,000 Units by mouth every 7 days.      gabapentin (NEURONTIN) 300 MG capsule TAKE 1 CAPSULE BY MOUTH EVERY EVENING 30 capsule 1    glimepiride (AMARYL) 1 MG tablet TAKE 2 TABLETS EVERY  tablet 1    hydrALAZINE (APRESOLINE) 100 MG tablet TAKE 1 TABLET EVERY 8 HOURS 270 tablet 3    hydroCHLOROthiazide (HYDRODIURIL) 25 MG tablet Take 1 tablet (25 mg total) by mouth once daily. 90 tablet 2    irbesartan (AVAPRO) 300 MG tablet TAKE 1 TABLET ONE TIME DAILY 90 tablet 1    ketorolac (TORADOL) 10 mg tablet Take 1 tablet (10 mg total) by mouth every 6 (six) hours as needed for Pain. 12 tablet 0    metFORMIN (GLUCOPHAGE) 1000 MG tablet TAKE 1 TABLET TWICE DAILY WITH MEALS 180 tablet 1    oxyCODONE (ROXICODONE) 30 MG Tab SMARTSI.5-1 Tablet(s) By Mouth 3-4 Times Daily PRN      propranoloL (INDERAL) 10 MG tablet 10 mg once daily.      QUEtiapine (SEROQUEL) 100 MG Tab Take 1 tablet (100 mg total) by mouth 2 (two) times daily. At bedtime 60 tablet 2    topiramate (TOPAMAX) 100  "MG tablet Take 100 mg by mouth once daily.      traZODone (DESYREL) 100 MG tablet Take 100 mg by mouth every evening.      TRUE METRIX GLUCOSE TEST STRIP Strp       TRUEPLUS LANCETS 33 gauge Misc       ZTLIDO 1.8 % PtMd        No current facility-administered medications on file prior to visit.            Review of patient's allergies indicates:  No Known Allergies      ROS:  General ROS: negative for chills, fatigue or fever  Cardiovascular ROS: no chest pain or dyspnea on exertion  Musculoskeletal ROS: negative for - joint pain or joint stiffness.  Negative for loss of strength  Neuro ROS: Negative for syncope, numbness, or muscle weakness  Skin ROS: Negative for rash, itching or hair changes.  +Toenail changes        EXAM:   Vitals:    01/02/25 1208   BP: (!) 173/79   Pulse: (!) 48   Weight: 79 kg (174 lb 2.6 oz)   Height: 5' 7" (1.702 m)       LOWER EXTREMITY EXAM:    Vascular:    Dorsalis pedis and posterior tibial pulses are not palpable. 5 secs capillary refill time  Toes are cool to touch.    There is no  presence of digital hair.    There is  mild localized edema to the affected toe.     Neurological:    Light touch, proprioception, and sharp/dull sensation are all intact.   Mulders click:  Absent  Tinels:  Absent.   No LOPS    Dermatological:    The toenail is incurvated, Medial border of the left hallux.      Very tender to direct palpation.   mild erythema noted to the affected area.     Absent paronychia.     Absent abscess      Musculoskeletal:    Muscle strength is 5/5 in all groups .    No swelling/crepitus at the interphalangeal joints.      8/5/2024: US Arteries  Impression:    High-grade stenosis in the common femoral artery on the RIGHT with occluded SFA.   Occluded SFA on the RIGHT   Patent deep profunda femoral artery on the RIGHT.   Occluded anterior tibial arteries BILATERALLY.   High-grade stenosis in the LEFT popliteal artery.         ASSESSMENT/PLAN     Problem List Items Addressed This " Visit       PAD (peripheral artery disease) (Chronic)     Other Visit Diagnoses       Toe pain, left    -  Primary    Relevant Orders    X-Ray Foot Complete Left (Completed)    Ingrown nail  (Chronic)       Relevant Medications    ammonium lactate 12 % Crea    Dry skin        Relevant Medications    ammonium lactate 12 % Crea            I counseled the patient on the patient's  conditions, their implications and medical management.   Exacerbation of chronic condition.   Xrays ordered.   Nail trimmed gently.  The area is very tender.   No immediate complication.   Shoe and activity modification as needed for relief.   Avoid tight shoes.  Amlactin for dryness.   Follow up as scheduled.

## 2025-01-06 ENCOUNTER — HOSPITAL ENCOUNTER (OUTPATIENT)
Dept: RADIOLOGY | Facility: HOSPITAL | Age: 70
Discharge: HOME | End: 2025-01-06
Attending: PODIATRIST
Payer: MEDICARE

## 2025-01-06 DIAGNOSIS — M79.675 TOE PAIN, LEFT: ICD-10-CM

## 2025-01-06 PROCEDURE — 73630 X-RAY EXAM OF FOOT: CPT | Mod: 26,LT,, | Performed by: RADIOLOGY

## 2025-01-06 PROCEDURE — 73630 X-RAY EXAM OF FOOT: CPT | Mod: TC,PO,LT

## 2025-01-14 ENCOUNTER — TELEPHONE (OUTPATIENT)
Dept: INTERNAL MEDICINE | Facility: CLINIC | Age: 70
End: 2025-01-14
Payer: MEDICARE

## 2025-01-14 NOTE — TELEPHONE ENCOUNTER
----- Message from Mamie sent at 1/14/2025  8:40 AM CST -----  Regarding: advice  Contact: patient  Type: Needs Medical Advice  Who Called:  patient  Symptoms (please be specific):    How long has patient had these symptoms:    Pharmacy name and phone #:    Best Call Back Number: [phone] (home)   Additional Information: Patient states he missed his appointment with the health center and would like advice about getting another appointment with them. Please call patient to advise.Thanks!

## 2025-01-17 ENCOUNTER — OUTPATIENT CASE MANAGEMENT (OUTPATIENT)
Dept: ADMINISTRATIVE | Facility: OTHER | Age: 70
End: 2025-01-17
Payer: MEDICARE

## 2025-01-17 NOTE — LETTER
Neena Kohli  [address] [facility] [address] [place], [place] [address] [address]    Dear Mr. Neena Kohli,     Welcome to [facility]. We are here to assist patients with multiple long-term (chronic) conditions who often need more personalized healthcare.    It was a pleasure talking with you today. My name is Chacha Patricia RN. I look forward to working with you again as your Nurse Care Manager. I will be contacting you by telephone routinely to help coordinate care and resolve issues.    My goal is to help you function at the healthiest and highest level possible. You can contact me directly at [phone].    As an [facility] patient with [employer], some of the services we provide, at no cost to you, include:     Development of an individualized care plan with a Registered Nurse   Connection with a   Assistance from a Community Health Worker  Connection with available resources and services    Coordinate communication among your care team members   Provide coaching and education  Help you understand your doctor's treatment plan  Help you obtain information about your insurance coverage.    All services provided by [facility]s Outpatient Care Managers and other care team members are coordinated with and communicated to your primary care team.      As part of your enrollment, you will be receiving education materials and more information about these services in your [facility] account, by phone, or through the mail. If you do not wish to participate or receive information, you can Opt Out by contacting our office at [phone].     Kind Regards,        Chacha Patricia RN  [facility]   Outpatient Care Management

## 2025-01-24 ENCOUNTER — OUTPATIENT CASE MANAGEMENT (OUTPATIENT)
Dept: ADMINISTRATIVE | Facility: OTHER | Age: 70
End: 2025-01-24
Payer: MEDICARE

## 2025-01-27 NOTE — PROGRESS NOTES
Outpatient Care Management  Initial Patient Assessment    Patient: Neena Kohli  MRN: [id number]  Date of Service: 01/17/2025  Completed by: Chacha Patricia RN  Referral Date: 07/04/2024  Date of Eligibility: 1/17/2025  Program:   High Risk  Status: Ongoing  Effective Dates: 1/17/2025 - present  Responsible Staff: Chacha Patricia RN        Reason for Visit   Patient presents with    OPCM Chart Review     1/17/25    Nursing Assessment     1/17/25    OPCM Enrollment Call     1/17/25       Brief Summary:  Neena Kohli was self referred for CKD. Patient qualifies for program based on risk score of 83%.   Active problem list, medical, surgical and social history reviewed. Active comorbidities include DM, CKD HTN, PAD, Hyperlipidemia, and Depression. Areas of need identified by patient include help managing health.   Next steps: Follow up with patient on or around 1/24/25.     Disability Status  Is the patient alert and oriented (person, place, time, and situation)?: Alert and oriented x 4  Hearing Difficulty or Deaf: no (no issues)  Visual Difficulty or Blind: yes  Visual and Hearing Needs Conclusion: wears readers  Vision Management: Other (wears readers)  Difficulty Concentrating, Remembering or Making Decisions: no  Communication Difficulty: no  Eating/Swallowing Difficulty: no  Walking or Climbing Stairs Difficulty: no  Dressing/Bathing Difficulty: no  Toileting : Independent  Continence : Continence - Not a problem  Difficulty Managing Errands Independently: no  Equipment Currently Used at Home: blood pressure machine; glucometer; CPAP  ADL Conclusion Statement: Independent with all ADLs. Lives alone.  Service Animal Required: no  Change in Functional Status Since Onset of Current Illness/Injury: no        Spiritual Beliefs  Spiritual, Cultural Beliefs, Protestant Practices, Values that Affect Care: no      Social History     Socioeconomic History    Marital status: Single   Tobacco Use    Smoking  status: Every Day     Current packs/day: 1.00     Average packs/day: 1 pack/day for 53.1 years (53.1 ttl pk-yrs)     Types: Vaping with nicotine, Cigarettes     Start date: 1972    Smokeless tobacco: Current    Tobacco comments:     Enrolled in the [employer] on 2/20/19 ([employer] Member ID # [id number])  Ambulatory referral to Smoking Cessation clinic following hospital discharge.  Pt is a 1 pk/day cigarette smoker x 52 yrs.- switched to vaping with nicotine approx. 3 months ago.    Substance and Sexual Activity    Alcohol use: Not Currently     Alcohol/week: 0.0 standard drinks of alcohol     Comment: Former User    Drug use: Not Currently     Types: Cocaine, Marijuana     Comment: Former User    Sexual activity: Not Currently     Social Drivers of Health     Financial Resource Strain: Low Risk  (1/17/2025)    Overall Financial Resource Strain (CARDIA)     Difficulty of Paying Living Expenses: Not very hard   Food Insecurity: No Food Insecurity (1/17/2025)    Hunger Vital Sign     Worried About Running Out of Food in the Last Year: Never true     Ran Out of Food in the Last Year: Never true   Transportation Needs: No Transportation Needs (1/17/2025)    TRANSPORTATION NEEDS     Transportation : No   Physical Activity: Insufficiently Active (1/17/2025)    Exercise Vital Sign     Days of Exercise per Week: 3 days     Minutes of Exercise per Session: 10 min   Stress: Stress Concern Present (1/17/2025)    [place] [employer] - Occupational Stress Questionnaire     Feeling of Stress : To some extent   Housing Stability: Low Risk  (1/17/2025)    Housing Stability Vital Sign     Unable to Pay for Housing in the Last Year: No     Homeless in the Last Year: No       Roles and Relationships  Primary Source of Support/Comfort: sibling(s)  Name of Support/Comfort Primary Source: Abhishek Kohli brother  Secondary Source of Support/Comfort: child(suzan)  Name of Support/Comfort Secondary Source: April Abhishek  daughter      Advance Directives (For Healthcare)  Advance Directive  (If Adv Dir status is received, view document under Adv Dir in header or Chart Review Media tab): Patient does not have Advance Directive, declines information.  Patient Requests Assistance: Handouts provided; Patient will do independently        Patient Reported Insurance  Verified current insurance plan:: [employer]vantage  [employer] benefits discussed:: Mail Order Pharmacy            1/17/2025     3:48 PM 10/21/2024     2:12 PM 7/17/2024     4:14 PM 7/9/2024     3:35 PM 3/7/2024     9:20 AM 9/26/2023     3:00 PM 12/12/2022     2:41 PM   Depression Patient Health Questionnaire   Over the last two weeks how often have you been bothered by little interest or pleasure in doing things Not at all Not at all Several days Not at all Not at all Not at all Several days   Over the last two weeks how often have you been bothered by feeling down, depressed or hopeless Not at all Several days Several days Not at all Not at all Not at all Not at all   PHQ-2 Total Score 0 1 2 0 0 0 1       Learning Assessment       01/17/2025 0943 [facility] (1/17/2025 - Present)   Created by Chacha Patricia, RN -  (Nurse) Status: Complete                 PRIMARY LEARNER     Primary Learner Name:  Neena Kohli BB - 01/17/2025 0943    Relationship:  Patient BB - 01/17/2025 0943    Does the primary learner have any barriers to learning?:  No Barriers BB - 01/17/2025 0943    What is the preferred language of the primary learner?:  English BB - 01/17/2025 0943    Is an  required?:  No BB - 01/17/2025 0943    How does the primary learner prefer to learn new concepts?:  Listening BB - 01/17/2025 0943    How often do you need to have someone help you read instructions, pamphlets, or written material from your doctor or pharmacy?:  Never BB - 01/17/2025 0943        CO-LEARNER #1     No question answered        CO-LEARNER #2     No question  answered        SPECIAL TOPICS     No question answered        ANSWERED BY:     No question answered        Comments         Edit History       Chacha Patricia, RN -  (Nurse)   01/17/2025 5312

## 2025-01-27 NOTE — PROGRESS NOTES
Outpatient Care Management  Plan of Care Follow Up Visit    Patient: Neena Kohli  MRN: [id number]  Date of Service: 01/24/2025  Completed by: Chacha Patricia RN  Referral Date: 07/04/2024    Reason for Visit   Patient presents with    OPCM Chart Review     1/24/25    OPCM RN Follow Up Call     1/24/25       Brief Summary:   OPCM follow up complete. Continued education on CKD. Patient is in agreement with follow up call on or around 1/30/25.

## 2025-01-30 ENCOUNTER — OUTPATIENT CASE MANAGEMENT (OUTPATIENT)
Dept: ADMINISTRATIVE | Facility: OTHER | Age: 70
End: 2025-01-30
Payer: MEDICARE

## 2025-01-30 NOTE — PROGRESS NOTES
Outpatient Care Management  Plan of Care Follow Up Visit    Patient: Neena Kohli  MRN: [id number]  Date of Service: 01/30/2025  Completed by: Chacha Patricia RN  Referral Date: 07/04/2024    Reason for Visit   Patient presents with    OPCM Chart Review     1/30/25    OPCM RN Follow Up Call     1/30/25       Brief Summary:     OPCM complete. Continued education on DM. Patient is in agreement with follow up call on or around 2/13/25.

## 2025-02-14 ENCOUNTER — OUTPATIENT CASE MANAGEMENT (OUTPATIENT)
Dept: ADMINISTRATIVE | Facility: OTHER | Age: 70
End: 2025-02-14
Payer: MEDICARE

## 2025-02-14 NOTE — PROGRESS NOTES
Outpatient Care Management  Plan of Care Follow Up Visit    Patient: Neena Kohli  MRN: [id number]  Date of Service: 02/14/2025  Completed by: Susi Coates RN  Referral Date: 07/04/2024    Reason for Visit   Patient presents with    OPCM RN Follow Up Call       Brief Summary: OPCM RN followed up with Mr. Chaudhary today for care plan review.    Next Steps:   Explore continued blood glucose readings.  Review if Mr. Chaudhary is scheduled with physical exercise therapy program.  Explore recent dietary habits. Following a low sodium diet?  Mr. Chaudhary agrees to follow up on or around 2/28/25.

## 2025-02-18 ENCOUNTER — TELEPHONE (OUTPATIENT)
Dept: ADMINISTRATIVE | Facility: CLINIC | Age: 70
End: 2025-02-18
Payer: MEDICARE

## 2025-02-18 NOTE — TELEPHONE ENCOUNTER
Called pt; no answer; left message informing pt I was calling to confirm pt's 2/25/25 home visit for pt's eawv appt and to make sure that appt date still worked for pt; left my name and number for pt to return my call if pt had any questions or concerns.

## 2025-02-23 ENCOUNTER — TELEPHONE (OUTPATIENT)
Dept: HOME HEALTH SERVICES | Facility: CLINIC | Age: 70
End: 2025-02-23
Payer: MEDICARE

## 2025-02-24 ENCOUNTER — TELEPHONE (OUTPATIENT)
Dept: ADMINISTRATIVE | Facility: CLINIC | Age: 70
End: 2025-02-24
Payer: MEDICARE

## 2025-02-24 ENCOUNTER — TELEPHONE (OUTPATIENT)
Dept: HOME HEALTH SERVICES | Facility: CLINIC | Age: 70
End: 2025-02-24
Payer: MEDICARE

## 2025-02-25 ENCOUNTER — OFFICE VISIT (OUTPATIENT)
Dept: HOME HEALTH SERVICES | Facility: CLINIC | Age: 70
End: 2025-02-25
Payer: MEDICARE

## 2025-02-25 VITALS
HEIGHT: 67 IN | DIASTOLIC BLOOD PRESSURE: 70 MMHG | BODY MASS INDEX: 27.34 KG/M2 | HEART RATE: 51 BPM | SYSTOLIC BLOOD PRESSURE: 150 MMHG | TEMPERATURE: 97 F | OXYGEN SATURATION: 93 % | WEIGHT: 174.19 LBS | RESPIRATION RATE: 16 BRPM

## 2025-02-25 DIAGNOSIS — J98.4 CALCIFIED GRANULOMA OF LUNG: ICD-10-CM

## 2025-02-25 DIAGNOSIS — G47.33 OBSTRUCTIVE SLEEP APNEA: ICD-10-CM

## 2025-02-25 DIAGNOSIS — F17.200 TOBACCO USE DISORDER: ICD-10-CM

## 2025-02-25 DIAGNOSIS — D50.9 IRON DEFICIENCY ANEMIA, UNSPECIFIED IRON DEFICIENCY ANEMIA TYPE: ICD-10-CM

## 2025-02-25 DIAGNOSIS — I15.2 HYPERTENSION ASSOCIATED WITH DIABETES: ICD-10-CM

## 2025-02-25 DIAGNOSIS — F33.2 MAJOR DEPRESSIVE DISORDER, RECURRENT SEVERE WITHOUT PSYCHOTIC FEATURES: ICD-10-CM

## 2025-02-25 DIAGNOSIS — Z99.89 DEPENDENCE ON OTHER ENABLING MACHINES AND DEVICES: ICD-10-CM

## 2025-02-25 DIAGNOSIS — I73.9 CLAUDICATION OF BOTH LOWER EXTREMITIES: ICD-10-CM

## 2025-02-25 DIAGNOSIS — E11.59 HYPERTENSION ASSOCIATED WITH DIABETES: ICD-10-CM

## 2025-02-25 DIAGNOSIS — E11.51 DIABETES MELLITUS WITH PERIPHERAL CIRCULATORY DISORDER: ICD-10-CM

## 2025-02-25 DIAGNOSIS — J43.9 PULMONARY EMPHYSEMA, UNSPECIFIED EMPHYSEMA TYPE: ICD-10-CM

## 2025-02-25 DIAGNOSIS — F17.200 NEEDS SMOKING CESSATION EDUCATION: ICD-10-CM

## 2025-02-25 DIAGNOSIS — C61 CARCINOMA OF PROSTATE: ICD-10-CM

## 2025-02-25 DIAGNOSIS — E78.5 DYSLIPIDEMIA ASSOCIATED WITH TYPE 2 DIABETES MELLITUS: ICD-10-CM

## 2025-02-25 DIAGNOSIS — G89.4 CHRONIC PAIN SYNDROME: ICD-10-CM

## 2025-02-25 DIAGNOSIS — R26.9 ABNORMALITY OF GAIT AND MOBILITY: ICD-10-CM

## 2025-02-25 DIAGNOSIS — I25.10 CORONARY ARTERY DISEASE INVOLVING NATIVE CORONARY ARTERY OF NATIVE HEART WITHOUT ANGINA PECTORIS: Chronic | ICD-10-CM

## 2025-02-25 DIAGNOSIS — R41.3 MEMORY CHANGES: ICD-10-CM

## 2025-02-25 DIAGNOSIS — E11.69 DYSLIPIDEMIA ASSOCIATED WITH TYPE 2 DIABETES MELLITUS: ICD-10-CM

## 2025-02-25 DIAGNOSIS — Z00.00 ENCOUNTER FOR MEDICARE ANNUAL WELLNESS EXAM: Primary | ICD-10-CM

## 2025-02-25 DIAGNOSIS — I73.9 PAD (PERIPHERAL ARTERY DISEASE): Chronic | ICD-10-CM

## 2025-02-25 DIAGNOSIS — R29.6 FREQUENT FALLS: ICD-10-CM

## 2025-02-25 PROCEDURE — 4010F ACE/ARB THERAPY RXD/TAKEN: CPT | Mod: CPTII,S$GLB,,

## 2025-02-25 PROCEDURE — 3078F DIAST BP <80 MM HG: CPT | Mod: CPTII,S$GLB,,

## 2025-02-25 PROCEDURE — 1125F AMNT PAIN NOTED PAIN PRSNT: CPT | Mod: CPTII,S$GLB,,

## 2025-02-25 PROCEDURE — 1170F FXNL STATUS ASSESSED: CPT | Mod: CPTII,S$GLB,,

## 2025-02-25 PROCEDURE — G0439 PPPS, SUBSEQ VISIT: HCPCS | Mod: S$GLB,,,

## 2025-02-25 PROCEDURE — 1160F RVW MEDS BY RX/DR IN RCRD: CPT | Mod: CPTII,S$GLB,,

## 2025-02-25 PROCEDURE — 1159F MED LIST DOCD IN RCRD: CPT | Mod: CPTII,S$GLB,,

## 2025-02-25 PROCEDURE — 1100F PTFALLS ASSESS-DOCD GE2>/YR: CPT | Mod: CPTII,S$GLB,,

## 2025-02-25 PROCEDURE — 3077F SYST BP >= 140 MM HG: CPT | Mod: CPTII,S$GLB,,

## 2025-02-25 PROCEDURE — 3288F FALL RISK ASSESSMENT DOCD: CPT | Mod: CPTII,S$GLB,,

## 2025-02-25 PROCEDURE — 3072F LOW RISK FOR RETINOPATHY: CPT | Mod: CPTII,S$GLB,,

## 2025-02-25 PROCEDURE — 1158F ADVNC CARE PLAN TLK DOCD: CPT | Mod: CPTII,S$GLB,,

## 2025-02-25 NOTE — PROGRESS NOTES
"Neena Kohli presented for an initial Medicare AWV today. The following components were reviewed and updated:    Medical history  Family History  Social history  Allergies and Current Medications  Health Risk Assessment  Health Maintenance  Care Team    **See Completed Assessments for Annual Wellness visit with in the encounter summary    The following assessments were completed:  Depression Screening  Cognitive function Screening    Timed Get Up Test: Deferred, impaired mobility  Whisper Test      Opioid documentation:      Patient does have a current opioid prescription.      Patient accepted further discussion regarding opioid medication use.      Patient is currently taking oxycodone narcotic for back pain.        Pain level today is 8/10.       In addition to narcotic pain medications, patient is also using physical therapy for pain control.       Patient is followed by a specialist currently for their pain and will not be referred today.       Patient's opioid risk potential based on ORT-OUD tool:       Zenon each box that applies   No   Yes     Family history of substance abuse   Alcohol [x] []   Illegal drugs [x] []   Rx drugs [x] []     Personal history of substance abuse   Alcohol [x] []   Illegal drugs [x] []   Rx drugs [x] []     Age between 16-45 years   [x]   []     Patient with ADD, OCD, Bipolar disorder, schizoprenia   [x]   []     Patient with depression   []   [x]                         Scoring total                                                        1         Non-opioid treatment options have been discussed today and added to the patient's after visit summary.          Vitals:    02/25/25 0838   BP: (!) 150/70   BP Location: Left arm   Patient Position: Sitting   Pulse: (!) 51   Resp: 16   Temp: 97.3 °F (36.3 °C)   SpO2: (!) 93%   Weight: 79 kg (174 lb 2.6 oz)   Height: 5' 7" (1.702 m)     Body mass index is 27.28 kg/m².       Physical Exam  Vitals reviewed.   Constitutional:       General: He " is not in acute distress.     Appearance: Normal appearance.   HENT:      Head: Normocephalic.   Cardiovascular:      Rate and Rhythm: Normal rate and regular rhythm.      Pulses: Normal pulses.      Heart sounds: Normal heart sounds.   Pulmonary:      Effort: Pulmonary effort is normal. No respiratory distress.      Breath sounds: Normal breath sounds. No wheezing.   Abdominal:      General: Bowel sounds are normal.      Tenderness: There is no abdominal tenderness.   Musculoskeletal:         General: Normal range of motion.      Cervical back: Normal range of motion.      Right lower leg: No edema.      Left lower leg: No edema.   Skin:     General: Skin is warm and dry.      Capillary Refill: Capillary refill takes less than 2 seconds.   Neurological:      General: No focal deficit present.      Mental Status: He is alert and oriented to person, place, and time.      Gait: Gait abnormal.   Psychiatric:         Mood and Affect: Mood normal.         Behavior: Behavior normal.           Diagnoses and health risks identified today and associated recommendations/orders:    1. Encounter for Medicare annual wellness exam  Assessments completed.   recommendations reviewed.  F/u with PCP as instructed.      2. Calcified granuloma of lung  Chronic, stable on current regimen. Followed by Pulmonology.     3. Pulmonary emphysema, unspecified emphysema type  Chronic, stable on current regimen. Followed by Pulmonology.     4. Major depressive disorder, recurrent severe without psychotic features  Chronic, stable on current Wellbutrin, Celexa regimen. Followed by PCP.     5. Diabetes mellitus with peripheral circulatory disorder  Chronic, stable on current Metformin, Amaryl regimen. Followed by PCP.   Lab Results   Component Value Date    HGBA1C 5.7 (H) 03/07/2024     - Hemoglobin A1C; Future    6. Hypertension associated with diabetes  Chronic, stable on current Amlodipine, Avapro, Propranolol regimen. Followed by PCP.     7.  Dyslipidemia associated with type 2 diabetes mellitus  Chronic, stable on current statin regimen. Followed by PCP.     8. Carcinoma of prostate  Chronic, stable on current regimen. Followed by Urology.     9. Coronary artery disease involving native coronary artery of native heart without angina pectoris  Chronic, stable on current regimen. Followed by Cardiology.     10. PAD (peripheral artery disease)  Chronic, stable on current regimen. Followed by Cardiology.     11. Claudication of both lower extremities  Chronic, stable on current regimen. Followed by Cardiology.     12. Iron deficiency anemia, unspecified iron deficiency anemia type  Chronic, stable on current regimen. Followed by PCP.   Lab Results   Component Value Date    IRON 48 (L) 06/30/2022    TIBC 311 06/30/2022    LABIRON 15 (L) 06/30/2022      13. Obstructive sleep apnea  Chronic, stable on current regimen. Followed by PCP.     14. Memory changes  Poor score on mini-cog.   - Ambulatory referral/consult to Neurology; Future    15. Tobacco use disorder  Smokes 0.5 PPD. Ready to quit. Counseling provided.     16. Chronic pain syndrome  Chronic, stable on current Oxycodone regimen. Followed by Pain Medicine.     17. Frequent falls  Reports frequent falls.   - Ambulatory Referral/Consult to Physical/Occupational Therapy; Future    18. Abnormality of gait and mobility  Unable to safely ambulate without assistance.   - Ambulatory Referral/Consult to Physical/Occupational Therapy; Future    19. Dependence on other enabling machines and devices  Uses cane.       Provided Gerroy with a 5-10 year written screening schedule and personal prevention plan. Recommendations were developed using the USPSTF age appropriate recommendations. Education, counseling, and referrals were provided as needed.  After Visit Summary printed and given to patient which includes a list of additional screenings\tests needed.    Follow up in about 1 year (around 2/25/2026) for  Medicare AWV.      Keesha Xiao, CHAY    I offered to discuss advanced care planning, including how to pick a person who would make decisions for you if you were unable to make them for yourself, called a health care power of , and what kind of decisions you might make such as use of life sustaining treatments such as ventilators and tube feeding when faced with a life limiting illness recorded on a living will that they will need to know. (How you want to be cared for as you near the end of your natural life)     X Patient is interested in learning more about how to make advanced directives.  I provided them paperwork and offered to discuss this with them.

## 2025-02-25 NOTE — PATIENT INSTRUCTIONS
Counseling and Referral of Other Preventative  (Italic type indicates deductible and co-insurance are waived)    Patient Name: Neena Kohli  Today's Date: 2/25/2025    Health Maintenance       Date Due Completion Date    RSV Vaccine (Age 60+ and Pregnant patients) (1 - Risk 60-74 years 1-dose series) Never done ---    Foot Exam 08/23/2024 8/23/2023 (Done)    Override on 8/23/2023: Done    Influenza Vaccine (1) 09/01/2024 9/27/2023    COVID-19 Vaccine (4 - 2024-25 season) 09/01/2024 1/27/2022    Shingles Vaccine (2 of 2) 09/18/2024 7/24/2024    Hemoglobin A1c 12/10/2024 6/10/2024    Lipid Panel 06/10/2025 6/10/2024    Diabetic Eye Exam 07/24/2025 7/24/2024    Override on 8/9/2023: Done    Diabetes Urine Screening 10/22/2025 10/22/2024    LDCT Lung Screen 11/21/2025 11/21/2024    Colorectal Cancer Screening 06/17/2027 6/17/2024    TETANUS VACCINE 07/24/2034 7/24/2024        No orders of the defined types were placed in this encounter.      The following information is provided to all patients.  This information is to help you find resources for any of the problems found today that may be affecting your health:                  Living healthy guide: www.[facility].[place].gov      Understanding Diabetes: www.diabetes.org      Eating healthy: www.cdc.gov/healthyweight      CDC home safety checklist: www.cdc.gov/steadi/patient.html      Agency on Aging: www.goea.[place].[facility]      Alcoholics anonymous (AA): www.aa.org      Physical Activity: www.camilo.nih.gov/jc6krgj      Tobacco use: www.quitwithusla.org

## 2025-03-05 ENCOUNTER — OUTPATIENT CASE MANAGEMENT (OUTPATIENT)
Dept: ADMINISTRATIVE | Facility: OTHER | Age: 70
End: 2025-03-05
Payer: MEDICARE

## 2025-03-05 NOTE — PROGRESS NOTES
3/5/2025  1st attempt to complete Follow-Up for Outpatient Care Management, left message. Will mail unable to assess letter.

## 2025-03-05 NOTE — LETTER
Neena Kohli  [address]  [place] [address]      Dear Neena Kohli,     I am your nurse with [facility]s Outpatient Care Management Department. I was unsuccessful in reaching you today. At your earliest convenience, I would like to discuss your healthcare progress.      Please contact me at [phone] from 8:00AM to 4:30 PM on Monday thru Friday.     As you know, [facility] is a program offered to you through [facility] where a nurse is available 24/7 to answer questions or provide medical advice, their number is [phone].    Thanks,      Susi Coates, RN  Outpatient Care Management

## 2025-03-07 ENCOUNTER — OFFICE VISIT (OUTPATIENT)
Dept: PODIATRY | Facility: CLINIC | Age: 70
End: 2025-03-07
Payer: MEDICARE

## 2025-03-07 VITALS
HEIGHT: 67 IN | DIASTOLIC BLOOD PRESSURE: 72 MMHG | BODY MASS INDEX: 27.34 KG/M2 | WEIGHT: 174.19 LBS | HEART RATE: 51 BPM | SYSTOLIC BLOOD PRESSURE: 185 MMHG

## 2025-03-07 DIAGNOSIS — E11.9 ENCOUNTER FOR DIABETIC FOOT EXAM: ICD-10-CM

## 2025-03-07 DIAGNOSIS — L60.0 INGROWN NAIL: Primary | Chronic | ICD-10-CM

## 2025-03-07 DIAGNOSIS — I73.9 PAD (PERIPHERAL ARTERY DISEASE): ICD-10-CM

## 2025-03-07 DIAGNOSIS — E11.51 DIABETES MELLITUS WITH PERIPHERAL CIRCULATORY DISORDER: Chronic | ICD-10-CM

## 2025-03-07 DIAGNOSIS — M79.675 TOE PAIN, LEFT: ICD-10-CM

## 2025-03-07 DIAGNOSIS — M47.817 SPONDYLOSIS OF LUMBOSACRAL SPINE WITHOUT MYELOPATHY: ICD-10-CM

## 2025-03-07 PROCEDURE — 99999 PR PBB SHADOW E&M-EST. PATIENT-LVL IV: CPT | Mod: PBBFAC,HCNC,, | Performed by: PODIATRIST

## 2025-03-07 RX ORDER — OXYCODONE AND ACETAMINOPHEN 10; 325 MG/1; MG/1
1 TABLET ORAL EVERY 6 HOURS PRN
Qty: 20 TABLET | Refills: 0 | Status: SHIPPED | OUTPATIENT
Start: 2025-03-07 | End: 2025-03-14

## 2025-03-07 NOTE — PROGRESS NOTES
CHIEF CONCERN: Diabetic Foot Exam (Foot Exam/PCP Roxane Hrap MD  03/06/25)         HPI:    Neena Kohli is a 69 y.o. male with concerns of recurrence of chronic painful toe on the LEFT  hallux.  There is callus build up at the distal aspect,  bilateral borders are incurvated.  No wounds or drainage report.    The pain started months ago.   Pain aggravated by direct pressure and close toe shoes.   On and off.   Shoe inspection today, there's a rolled up sock in the LEFT shoe.    Patient denies acute trauma to the toe.  He does have PAD and type 2 DM.  High risk for toe amputation.   He also has chronic low pain pain and chronic pain syndrome.  He reports not on any oxycodone right now but needs some until his visit with his PCP.       PCP:  Roxane Harp MD   Last PCP Visit: 10/22/2024        Patient Active Problem List   Diagnosis    Lumbar facet arthropathy    Chronic pain syndrome    DDD (degenerative disc disease), lumbar    Hyperuricemia    Microalbuminuria    Hyperkalemia    Iron deficiency anemia    Hypertension, essential    Type 2 diabetes mellitus with stage 3 chronic kidney disease, without long-term current use of insulin    Coronary artery disease involving native coronary artery of native heart without angina pectoris    PAD (peripheral artery disease)    Dyslipidemia associated with type 2 diabetes mellitus    Diabetes mellitus with peripheral circulatory disorder    Hypertension associated with diabetes    Tobacco use disorder    Presence of stent in coronary artery    Bronchogenic cyst    Carcinoma of prostate    Carpal tunnel syndrome    Depressive disorder    Mixed hyperlipidemia    Impaired glucose tolerance    Spondylosis of lumbosacral spine without myelopathy    Ventricular premature beats    Old MI (myocardial infarction)    Mild aortic valve stenosis    Major depressive disorder, recurrent severe without psychotic features    Calculus of gallbladder with acute cholecystitis  and obstruction    Hypertensive urgency    Hypokalemia    Sinus bradycardia    Obstructive sleep apnea    Coronary artery disease involving native coronary artery of native heart with angina pectoris    Pulmonary emphysema, unspecified emphysema type    Claudication of both lower extremities    Calcified granuloma of lung           Current Outpatient Medications on File Prior to Visit   Medication Sig Dispense Refill    allopurinoL (ZYLOPRIM) 100 MG tablet TAKE 1 TABLET EVERY DAY 90 tablet 3    amLODIPine (NORVASC) 10 MG tablet Take 1 tablet (10 mg total) by mouth once daily. 90 tablet 3    ammonium lactate 12 % Crea Apply 1 application  topically once daily. To dry skin on the feet. 140 g 6    aspirin (ECOTRIN) 81 MG EC tablet Take 81 mg by mouth once daily.      atorvastatin (LIPITOR) 80 MG tablet TAKE 1 TABLET EVERY EVENING 90 tablet 3    buPROPion (WELLBUTRIN) 75 MG tablet Take by mouth.      carvediloL (COREG) 25 MG tablet TAKE 1 TABLET TWICE DAILY 180 tablet 1    cholecalciferol, vitamin D3, 1,250 mcg (50,000 unit) capsule Take 50,000 Units by mouth every 14 (fourteen) days.      citalopram (CELEXA) 20 MG tablet Take 20 mg by mouth once daily.      ergocalciferol (ERGOCALCIFEROL) 50,000 unit Cap Take 50,000 Units by mouth every 7 days.      gabapentin (NEURONTIN) 300 MG capsule TAKE 1 CAPSULE BY MOUTH EVERY EVENING 30 capsule 1    glimepiride (AMARYL) 1 MG tablet TAKE 2 TABLETS EVERY  tablet 1    hydrALAZINE (APRESOLINE) 100 MG tablet TAKE 1 TABLET EVERY 8 HOURS 270 tablet 3    hydroCHLOROthiazide (HYDRODIURIL) 25 MG tablet Take 1 tablet (25 mg total) by mouth once daily. 90 tablet 2    irbesartan (AVAPRO) 300 MG tablet TAKE 1 TABLET ONE TIME DAILY 90 tablet 1    metFORMIN (GLUCOPHAGE) 1000 MG tablet TAKE 1 TABLET TWICE DAILY WITH MEALS 180 tablet 1    propranoloL (INDERAL) 10 MG tablet 10 mg once daily.      QUEtiapine (SEROQUEL) 100 MG Tab Take 1 tablet (100 mg total) by mouth 2 (two) times daily. At  "bedtime 60 tablet 2    topiramate (TOPAMAX) 100 MG tablet Take 100 mg by mouth once daily.      traZODone (DESYREL) 100 MG tablet Take 100 mg by mouth every evening.      TRUE METRIX GLUCOSE TEST STRIP Strp       TRUEPLUS LANCETS 33 gauge Misc       ZTLIDO 1.8 % PtMd       [DISCONTINUED] oxyCODONE (ROXICODONE) 30 MG Tab SMARTSI.5-1 Tablet(s) By Mouth 3-4 Times Daily PRN       No current facility-administered medications on file prior to visit.            Review of patient's allergies indicates:  No Known Allergies      ROS:  General ROS: negative for chills, fatigue or fever  Cardiovascular ROS: no chest pain or dyspnea on exertion  Musculoskeletal ROS: negative for - joint pain or joint stiffness.  Negative for loss of strength  Neuro ROS: Negative for syncope, numbness, or muscle weakness  Skin ROS: Negative for rash, itching or hair changes.  +Toenail changes        EXAM:   Vitals:    25 0912   BP: (!) 185/72   Pulse: (!) 51   Weight: 79 kg (174 lb 2.6 oz)   Height: 5' 7" (1.702 m)       LOWER EXTREMITY EXAM:    Vascular:    Dorsalis pedis and posterior tibial pulses are not palpable. 5 secs capillary refill time  Toes are cool to touch.    There is no  presence of digital hair.    There is  mild localized edema to the affected toe.     Neurological:    Light touch, proprioception, and sharp/dull sensation are all intact.   Mulders click:  Absent  Tinels:  Absent.   No LOPS      Dermatological:    The toenail is incurvated, bilateral  border of the left hallux.      Very tender to direct palpation.   No erythema noted to the affected area.     Absent paronychia.     Absent abscess  Fissured skin, distal aspect, superficial without drainage or infection.       Musculoskeletal:    Muscle strength is 5/5 in all groups .    No swelling/crepitus at the interphalangeal joints.      2024: US Arteries  Impression:    High-grade stenosis in the common femoral artery on the RIGHT with occluded SFA.   Occluded " SFA on the RIGHT   Patent deep profunda femoral artery on the RIGHT.   Occluded anterior tibial arteries BILATERALLY.   High-grade stenosis in the LEFT popliteal artery.               ASSESSMENT/PLAN     Problem List Items Addressed This Visit       PAD (peripheral artery disease) (Chronic)    Diabetes mellitus with peripheral circulatory disorder    Spondylosis of lumbosacral spine without myelopathy    Relevant Medications    oxyCODONE-acetaminophen (PERCOCET)  mg per tablet     Other Visit Diagnoses         Ingrown nail  (Chronic)   -  Primary    left great toe      Encounter for diabetic foot exam          Toe pain, left        Relevant Medications    oxyCODONE-acetaminophen (PERCOCET)  mg per tablet            I counseled the patient on the patient's  conditions, their implications and medical management.   Exacerbation of chronic condition.     Prescription as above.   Shoe inspection.  There's a rolled sock in the front of the LEFT shoe  advised patient to check shoes daily, it may be jamming the toe exacerbating the pain.   Foot exam.   Continue good nutrition and blood sugar control to help prevent podiatric complications of diabetes.   Maintain proper foot hygiene.   Continue wearing proper shoe gear, daily foot inspections, never walking without protective shoe gear, never putting sharp instruments to feet.  Continue AmLactin to feet daily.   Follow up 2 months foot exam due to high risk foot type.

## 2025-03-10 ENCOUNTER — PATIENT MESSAGE (OUTPATIENT)
Dept: ADMINISTRATIVE | Facility: HOSPITAL | Age: 70
End: 2025-03-10
Payer: MEDICARE

## 2025-03-11 ENCOUNTER — OUTPATIENT CASE MANAGEMENT (OUTPATIENT)
Dept: ADMINISTRATIVE | Facility: OTHER | Age: 70
End: 2025-03-11
Payer: MEDICARE

## 2025-03-11 NOTE — PROGRESS NOTES
3/11/2025- 2nd attempt to complete Follow-Up  for Outpatient Care Management, left message.  Will mail/portal unable to assess letter.

## 2025-03-13 ENCOUNTER — CLINICAL SUPPORT (OUTPATIENT)
Dept: REHABILITATION | Facility: HOSPITAL | Age: 70
End: 2025-03-13
Payer: MEDICARE

## 2025-03-13 VITALS — SYSTOLIC BLOOD PRESSURE: 150 MMHG | HEART RATE: 51 BPM | DIASTOLIC BLOOD PRESSURE: 82 MMHG

## 2025-03-13 DIAGNOSIS — Z74.09 IMPAIRED FUNCTIONAL MOBILITY, BALANCE, GAIT, AND ENDURANCE: Primary | ICD-10-CM

## 2025-03-13 DIAGNOSIS — R29.6 FREQUENT FALLS: ICD-10-CM

## 2025-03-13 DIAGNOSIS — R26.9 ABNORMALITY OF GAIT AND MOBILITY: ICD-10-CM

## 2025-03-13 PROCEDURE — 97162 PT EVAL MOD COMPLEX 30 MIN: CPT | Mod: HCNC,PN

## 2025-03-13 NOTE — PROGRESS NOTES
Outpatient Rehab    Physical Therapy Evaluation (only)    Patient Name: Neena Kohli  MRN: [id number]  YOB: 1955  Encounter Date: 3/13/2025    Therapy Diagnosis:   Encounter Diagnoses   Name Primary?    Abnormality of gait and mobility     Frequent falls     Impaired functional mobility, balance, gait, and endurance Yes     Physician: Keesha Xiao NP    Physician Orders: Eval and Treat  Medical Diagnosis: Abnormality of gait and mobility [R26.9], Frequent falls [R29.6]     Visit # / Visits Authorized:  1 / 1   Date of Evaluation:  3/13/2025   Insurance Authorization Period: 2/25/2025 to 2/25/2026  Plan of Care Certification:  3/13/2025 to 4/25/2025      Time In: 1115   Time Out: 1200  Total Time: 45   Total Billable Time: 45    Intake Outcome Measure for FOTO Survey    Therapist reviewed FOTO scores for Neena Kohli on 3/13/2025.   FOTO report - see Media section or FOTO account episode details.     Intake Score: 49%         Subjective   History of Present Illness  Neena is a 69 y.o. male who reports to physical therapy with a chief concern of balance.     The patient reports a medical diagnosis of Abnormality of gait and mobility (R26.9), Frequent falls (R29.6).            History of Present Condition/Illness: Was told his whole body is full of arthritis but his main concern is his falls. Had a fall last night going up the stairs. Feels as though his legs are crossing over each other but no light headedness or dizziness. Feels like he is always off balance. Reports maybe 1-2 falls a week that varies. Has never been to physical therapist prior    Additional Lymphedema History  The patient's medical history relevant to lymphedema includes Congestive heart failure.         Pain     Patient reports a current pain level of 0/10.     Location: Low back that radiates down R leg  Clinical Progression (since onset): Stable  Pain Qualities: Radiating  Pain-Relieving Factors:  Standing  Pain-Aggravating Factors: Sitting         Review of Systems  Patient reports: Diabetes, Memory Changes, and Tingling  Additional Review of Systems Details: Ingrown toenail on left foot     Living Arrangements  Living Situation  Housing: Home independently  Living Arrangements: Alone  Support Systems: Friends/neighbors    Home Setup  Type of Structure: Apartment/condo  Home Access: Stairs with rails  Entrance Stairs - Rails: Both    Equipment/Treatments  Mobility Equipment: Straight cane          Past Medical History/Physical Systems Review:   Neena Kohli  has a past medical history of Arthritis, Cancer, Diabetes mellitus, Hyperthyroidism, and PAD (peripheral artery disease).    Neena Kohli  has a past surgical history that includes Prostate surgery; Laparoscopic cholecystectomy (N/A, 09/05/2023); Coronary stent placement; and Peripheral arterial stent graft.    Neena has a current medication list which includes the following prescription(s): allopurinol, amlodipine, ammonium lactate, aspirin, atorvastatin, bupropion, carvedilol, cholecalciferol (vitamin d3), citalopram, ergocalciferol, gabapentin, glimepiride, hydralazine, hydrochlorothiazide, irbesartan, metformin, oxycodone-acetaminophen, propranolol, quetiapine, topiramate, trazodone, true metrix glucose test strip, trueplus lancets, and ztlido.    Review of patient's allergies indicates:  No Known Allergies     Objective   Vital Signs  BP (!) 150/82   Pulse (!) 51   BP Location: Right arm  BP Position: Sitting  BP Cuff Size: Adult         Lower Extremity Sensation  General Lumbar/Lower Extremity Sensation  Impaired: Left       Left Lumbar/Lower Extremity Sensation  Diminished: Light Touch  Left Lumbar/Lower Extremity Sensation Light Touch Comment: lower leg and foot                   Hip Strength - Planes of Motion   Right Strength Right Pain Left Strength Left  Pain   Flexion (L2) 3+   4     Extension           ABduction           ADduction            Internal Rotation           External Rotation               Knee Strength   Right Strength Right Pain Left Strength Left  Pain   Flexion (S2) 4-   4+     Prone Flexion           Extension (L3) 5   5            Ankle/Foot Strength - Planes of Motion   Right Strength Right Pain Left Strength Left  Pain   Dorsiflexion (L4) 4+   4+     Plantar Flexion (S1)           Inversion           Eversion           Great Toe Flexion           Great Toe Extension (L5)           Lesser Toes Flexion           Lesser Toes Extension                  Vestibular Positional and Balance Testing       Modified Clinical Test of Sensory Interaction and Balance (CTSIB-M): Condtion 1 and 2 pass onditon 3 28 seconds condition 4 3 seconds              Fall Risk  Functional mobility test results suggest the patient is: At Risk for Falls  Timed Up & Go (TUG)  Time: 16.36 seconds     An older adult who takes >=12 seconds to complete the TUG is at risk for falling.       Sit to Stand Testing  The patient completed 5 sit to stand transfers in 23.1 sec. with hands             Ambulation Details    FGA       Functional Gait Assessment:     *note: Measure a distance of 20 feet (~6 meters) for this assessment    1. Gait on level surface =  3   (3) Normal: less than 5.5 sec, no A.D., no imbalance, normal gait pattern, deviates <6in   (2) Mild impairment: 7-5.6 sec, uses A.D., mild gait deviations, or deviates 6-10 in   (1) Moderate impairment: > 7 sec, slow speed, imbalance, deviates 10-15 in.   (0) Severe impairment: needs assist, deviates >15 in, reach/touch wall  2. Change in Gait Speed = 2   (3) Normal: smooth change w/o loss of balance or gait deviation, deviates < 6 in, significant difference between speeds   (2) Mild impairment: changes speed, but demonstrates mild gait deviations, deviates 6-10 in, OR no deviations but unable to significantly speed, OR uses A.D.   (1) Moderate impairment: minor changes to speed, OR changes speed w/  significant deviations, deviates 10-15 in, OR  Changes speed , but loses balance & recovers   (0) Severe impairment: cannot change speed, deviates >15 in, or loses balance & needs assist  3. Gait with horizontal head turns  = 2   (3) Normal: no change in gait, deviates <6 in   (2) Mild impairment: slight change in speed, deviates 6-10 in, OR uses A.D.   (1) Moderate impairment: moderate change in speed, deviates 10-15 in   (0) Severe impairment: severe disruption of gait, deviates >15in  4. Gait with vertical head turns = 0   (3) Normal: no change in gait, deviates <6 in   (2) Mild impairment: slight change in speed, deviates 6-10 in OR uses A.D.   (1) Moderate impairment: moderate change in speed, deviates 10-15 in   (0) Severe impairment: severe disruption of gait, deviates >15 in  5. Gait with pivot turns = 2   (3) Normal: performs safely in 3 sec, no LOB   (2) Mild impairment: performs in >3 sec & no LOB, OR turns safely & requires several steps to regain LOB   (1) Moderate impairment: turns slow, OR requires several small steps for balance following turn & stop   (0) Severe impairment: cannot turn safely, needs assist  6. Step over obstacle = 1   (3) Normal: steps over 2 stacked boxes w/o change in speed or LOB   (2) Mild impairment: able to step over 1 box w/o change in speed or LOB   (1) Moderate impairment: steps over 1 box but must slow down, may require VC   (0) Severe impairment: cannot perform w/o assist  7. Gait with Narrow LOLLY = 0   (3) Normal: 10 steps no staggering   (2) Mild impairment: 7-9 steps   (1) Moderate impairment: 4-7 steps   (0) Severe impairment: < 4 steps or cannot perform w/o assist  8. Gait with eyes closed = 0   (3) Normal: < 7 sec, no A.D., no LOB, normal gait pattern, deviates <6 in   (2) Mild impairment: 7.1-9 sec, mild gait deviations, deviates 6-10 in   (1) Moderate impairment: > 9 sec, abnormal pattern, LOB, deviates 10-15 in   (0) Severe impairment: cannot perform w/o assist,  LOB, deviates >15in  9. Ambulating Backwards = 2   (3) Normal: no A.D., no LOB, normal gait pattern, deviates <6in   (2) Mild impairment: uses A.D., slower speed, mild gait deviations, deviates 6-10 in   (1) Moderate impairment: slow speed, abnormal gait pattern, LOB, deviates 10-15 in   (0) Severe impairment: severe gait deviations or LOB, deviates >15in  10. Steps = 2   (3) Normal: alternating feet, no rail   (2) Mild Impairment: alternating feet, uses rail   (1) Moderate impairment: step-to, uses rail   (0) Severe impairment: cannot perform safely    Score 14/30     Cutoffs:   <22/30 fall risk in older adults  <18/30 fall risk in Parkinsons    MDC/MCID:  Stroke = 4.2 points (MDC)  Vestibular = 6 points (MDC)  Geriatric = 4 points (MCID)  Parkinson's = 4 points (MDC)     Assessment & Plan   Assessment  Neena presents with a condition of Moderate complexity.   Presentation of Symptoms: Stable  Will Comorbidities Impact Care: Yes       Functional Limitations: Activity tolerance, Ambulating on uneven surfaces, Decreased ambulation distance/endurance, Maintaining balance, Increased risk of fall, Gait limitations, Gross motor coordination  Impairments: Impaired physical strength, Lack of appropriate home exercise program, Safety issue, Activity intolerance, Impaired balance, Abnormal gait    Patient Goal for Therapy (PT): walk straight without falling  Prognosis: Fair  Assessment Details: Neena is a 69 year old male referred to outpatient therapy with a medical diagnosis of Abnormality of gait and mobility, Frequent falls. He presents with impaired strength, impaired sensation in Lower Extremity, decreased endurance, back pain, impaired balance, and is a high risk of falls. He would benefit from skilled therapy interventions to address impairments, decrease risk of falls and prevent deconditioning.    Plan  From a physical therapy perspective, the patient would benefit from: Skilled Rehab Services    Planned therapy  interventions include: Therapeutic exercise, Therapeutic activities, Neuromuscular re-education, and Gait training.            Visit Frequency: 2 times Per Week for 6 Weeks.       This plan was discussed with Patient.   Discussion participants: Agreed Upon Plan of Care  Plan details: Next visit assess gait endurance with 6MWT/2MWT as tolerated          Patient's spiritual, cultural, and educational needs considered and patient agreeable to plan of care and goals.     Education  Education was done with Patient. The patient's learning style includes Listening. The patient Verbalizes understanding.         Physical therapy plan of care and expectations       Goals:   Active       Long Term Goals       Patient will score >/= 55% on FOTO limitation survey in order to improve self-perception of functional mobility deficits       Start:  03/13/25    Expected End:  04/25/25            Patient will improve bilateral lower extremity MMT grades by >/=1 grade in order to improve strength for ADL completion       Start:  03/13/25    Expected End:  04/25/25            Patient will score </= 17 Seconds on 5 Times Sit to  order to improve BLE endurance and muscular power for transfers       Start:  03/13/25    Expected End:  04/25/25            Patient will score >/= 20/30 on FGA with least restrictive assistive device in order to reduce risk for falls and improve postural control       Start:  03/13/25    Expected End:  04/25/25               Short Term Goals       Patient will be compliant with HEP in order to maximize PT benefits       Start:  03/13/25    Expected End:  04/04/25            Patient will complete TUG in </= 14 seconds with least restrictive assistive device in order to reduce risk for falls and improve safety with functional mobility       Start:  03/13/25    Expected End:  04/04/25            Patient will score >/= 17/30 on FGA with least restrictive assistive device in order to reduce risk for falls and  improve postural control       Start:  03/13/25    Expected End:  04/04/25            Patient will score </= 20 Seconds on 5 Times Sit to  order to improve BLE endurance and muscular power for transfers       Start:  03/13/25    Expected End:  04/04/25                Jaylin Thomas PT

## 2025-03-18 ENCOUNTER — OUTPATIENT CASE MANAGEMENT (OUTPATIENT)
Dept: ADMINISTRATIVE | Facility: OTHER | Age: 70
End: 2025-03-18
Payer: MEDICARE

## 2025-03-18 NOTE — PROGRESS NOTES
Outpatient Care Management  Plan of Care Follow Up Visit    Patient: Neena Kohli  MRN: [id number]  Date of Service: 03/18/2025  Completed by: Susi Coates RN  Referral Date: 07/04/2024    Reason for Visit   Patient presents with    OPCM RN Follow Up Call       Brief Summary: OPCM RN followed up with Mr. Chaudhary today for care plan review.    Next Steps:   Review recent blood pressure readings.  Explore continued blood glucose readings.    Mr. Chaudhary agrees to follow up on or around 4/1/25.

## 2025-03-26 ENCOUNTER — CLINICAL SUPPORT (OUTPATIENT)
Dept: REHABILITATION | Facility: HOSPITAL | Age: 70
End: 2025-03-26
Payer: MEDICARE

## 2025-03-26 DIAGNOSIS — Z74.09 IMPAIRED FUNCTIONAL MOBILITY, BALANCE, GAIT, AND ENDURANCE: Primary | ICD-10-CM

## 2025-03-26 PROCEDURE — 97110 THERAPEUTIC EXERCISES: CPT | Mod: HCNC,PN,CQ

## 2025-03-26 NOTE — PROGRESS NOTES
"  Outpatient Rehab    Physical Therapy Visit    Patient Name: Neena Kohli  MRN: [id number]  YOB: 1955  Encounter Date: 3/26/2025    Therapy Diagnosis:   Encounter Diagnosis   Name Primary?    Impaired functional mobility, balance, gait, and endurance Yes     Physician: Keseha Xiao NP    Physician Orders: Eval and Treat  Medical Diagnosis: Abnormality of gait and mobility  Frequent falls    Visit # / Visits Authorized:  1 / 10  Insurance Authorization Period: 3/13/2025 to 6/10/2025  Date of Evaluation: 03/13/25  Plan of Care Certification: 03/13/25 to 04/25/25     PT/PTA: PTA   Number of PTA visits since last PT visit:1  Time In: 1515   Time Out: 1600  Total Time: 45   Total Billable Time: 45 (3 TE)       Subjective   has some lower left leg pain when he walks for a few minutes, not currently doing any exercise, falling or almost falling daily.  Pain reported as 2/10. left lower leg    Objective            Treatment:  Therapeutic Exercise  TE 1: Nustep level 2.0 for 8 minutes * challenged with keeping >50 spm  TE 2: SLR: 2x10 B  TE 3: Clamshells: 2x10 B with red theraband  TE 4: Bridges: 2x10  TE 5: LAQ: 2x10 B with 2-3" holds  TE 6: Sit <>stand: 2x10 from elevated mat  TE 7: Step ups: 2x10 B with bilateral UE support (forward and lateral)      Time Entry(in minutes):  Therapeutic Exercise Time Entry: 45    Assessment & Plan   Assessment: Neena arrived to session without complaints and agreeable to treatment.  Fair tolerance of first follow up session after initial evaluation consisting mainly of HEP review and mat based strengthening.  Heavy verbal and tactile cuing required to complete exercises correctly with little caryover observed between sets. Challenged with multistep activity, visual demonstration often required for reinforcement.  He reported an exacerbation of lower left leg pain with all exercises but only mild.  He would benefit from continued PT services to improve safe " functional mobility.  Evaluation/Treatment Tolerance: Patient tolerated treatment well    Patient will continue to benefit from skilled outpatient physical therapy to address the deficits listed in the problem list box on initial evaluation, provide pt/family education and to maximize pt's level of independence in the home and community environment.     Patient's spiritual, cultural, and educational needs considered and patient agreeable to plan of care and goals.           Plan: Plan to cont with progression of PT goals per POC    Goals:   Active       Long Term Goals       Patient will score >/= 55% on FOTO limitation survey in order to improve self-perception of functional mobility deficits       Start:  03/13/25    Expected End:  04/25/25            Patient will improve bilateral lower extremity MMT grades by >/=1 grade in order to improve strength for ADL completion       Start:  03/13/25    Expected End:  04/25/25            Patient will score </= 17 Seconds on 5 Times Sit to  order to improve BLE endurance and muscular power for transfers       Start:  03/13/25    Expected End:  04/25/25            Patient will score >/= 20/30 on FGA with least restrictive assistive device in order to reduce risk for falls and improve postural control       Start:  03/13/25    Expected End:  04/25/25               Short Term Goals       Patient will be compliant with HEP in order to maximize PT benefits       Start:  03/13/25    Expected End:  04/04/25            Patient will complete TUG in </= 14 seconds with least restrictive assistive device in order to reduce risk for falls and improve safety with functional mobility       Start:  03/13/25    Expected End:  04/04/25            Patient will score >/= 17/30 on FGA with least restrictive assistive device in order to reduce risk for falls and improve postural control       Start:  03/13/25    Expected End:  04/04/25            Patient will score </= 20 Seconds on 5  Times Sit to  order to improve BLE endurance and muscular power for transfers       Start:  03/13/25    Expected End:  04/04/25                Elayne Brooke, PTA

## 2025-04-02 ENCOUNTER — CLINICAL SUPPORT (OUTPATIENT)
Dept: REHABILITATION | Facility: HOSPITAL | Age: 70
End: 2025-04-02
Payer: MEDICARE

## 2025-04-02 DIAGNOSIS — Z74.09 IMPAIRED FUNCTIONAL MOBILITY, BALANCE, GAIT, AND ENDURANCE: Primary | ICD-10-CM

## 2025-04-02 PROCEDURE — 97110 THERAPEUTIC EXERCISES: CPT | Mod: HCNC,PN,CQ

## 2025-04-02 NOTE — PROGRESS NOTES
"    Outpatient Rehab    Physical Therapy Visit    Patient Name: Neena Kohli  MRN: [id number]  YOB: 1955  Encounter Date: 4/2/2025    Therapy Diagnosis:   Encounter Diagnosis   Name Primary?    Impaired functional mobility, balance, gait, and endurance Yes     Physician: Keesha Xiao NP    Physician Orders: Eval and Treat  Medical Diagnosis: Abnormality of gait and mobility  Frequent falls    Visit # / Visits Authorized:  2 / 10  Insurance Authorization Period: 3/13/2025 to 6/10/2025  Date of Evaluation: 03/13/25  Plan of Care Certification: 03/13/25 to 04/25/25     PT/PTA: PTA   Number of PTA visits since last PT visit:2  Time In: 1115   Time Out: 1200  Total Time: 45   Total Billable Time: 45 (3 TE)       Subjective   had a viral infection which caused him to miss previous two appointments, feeling better today, has been unable to start HEP yet.  Continues to complain of lower leg pain with exercises.  Pain reported as 2/10. left lower leg    Objective            Treatment:  Therapeutic Exercise  TE 1: Nustep level 2.0 for 8 minutes * challenged with keeping >50 spm  TE 2: SLR: 2x10 B  TE 3: Clamshells: 2x10 B with red theraband  TE 4: Bridges: 2x10  TE 5: LAQ: 2x10 B with 2-3" holds 2# ankle weights  TE 6: Sit <>stand: 2x10 from green chair (heavy UE support)  TE 7: 4" step ups: 2x10 B with single UE support (forward and lateral)  TE 8: Calf raises: 2x10 with UE support  TE 9: Lateral steps with yellow theraband: 6 lengths x 10 feet      Time Entry(in minutes):  Therapeutic Exercise Time Entry: 45    Assessment & Plan   Assessment: Neena arrived to session with complaints of lower leg pain but was agreeable to treatment.  Again, education provided regarding nature of claudication and treatment with walking program provided.  Fair tolerance of treatment consisting of mat based strengthening and functional strengthening.  Multiple seated rest breaks during standing portion. only mild.  " Challenged with multistep activity, visual demonstration required for task reinforcement.  No carryover of exercise mechanics observed throughout session.  He would benefit from continued PT services to improve safe functional mobility.  Evaluation/Treatment Tolerance: Patient limited by pain    Patient will continue to benefit from skilled outpatient physical therapy to address the deficits listed in the problem list box on initial evaluation, provide pt/family education and to maximize pt's level of independence in the home and community environment.     Patient's spiritual, cultural, and educational needs considered and patient agreeable to plan of care and goals.           Plan: Plan to cont with progression of PT goals per POC    Goals:   Active       Long Term Goals       Patient will score >/= 55% on FOTO limitation survey in order to improve self-perception of functional mobility deficits       Start:  03/13/25    Expected End:  04/25/25            Patient will improve bilateral lower extremity MMT grades by >/=1 grade in order to improve strength for ADL completion       Start:  03/13/25    Expected End:  04/25/25            Patient will score </= 17 Seconds on 5 Times Sit to  order to improve BLE endurance and muscular power for transfers       Start:  03/13/25    Expected End:  04/25/25            Patient will score >/= 20/30 on FGA with least restrictive assistive device in order to reduce risk for falls and improve postural control       Start:  03/13/25    Expected End:  04/25/25               Short Term Goals       Patient will be compliant with HEP in order to maximize PT benefits       Start:  03/13/25    Expected End:  04/04/25            Patient will complete TUG in </= 14 seconds with least restrictive assistive device in order to reduce risk for falls and improve safety with functional mobility       Start:  03/13/25    Expected End:  04/04/25            Patient will score >/= 17/30 on  FGA with least restrictive assistive device in order to reduce risk for falls and improve postural control       Start:  03/13/25    Expected End:  04/04/25            Patient will score </= 20 Seconds on 5 Times Sit to  order to improve BLE endurance and muscular power for transfers       Start:  03/13/25    Expected End:  04/04/25                Elayne Brooke, PTA

## 2025-04-03 ENCOUNTER — OUTPATIENT CASE MANAGEMENT (OUTPATIENT)
Dept: ADMINISTRATIVE | Facility: OTHER | Age: 70
End: 2025-04-03
Payer: MEDICARE

## 2025-04-03 NOTE — PROGRESS NOTES
4/3/2025  1st attempt to complete Follow-Up for Outpatient Care Management, left message. Will send portal message with unable to assess letter.

## 2025-04-03 NOTE — LETTER
Neena Kohli  [address]  [place] [address]      Dear Neena Kohli,     I am your nurse with [facility]s Outpatient Care Management Department. I was unsuccessful in reaching you today. At your earliest convenience, I would like to discuss your healthcare progress.      Please contact me at [phone] from 8:00AM to 4:30 PM on Monday thru Friday.     As you know, [facility] is a program offered to you through [facility] where a nurse is available 24/7 to answer questions or provide medical advice, their number is [phone].    Thanks,      Susi Coates, RN  Outpatient Care Management

## 2025-04-08 ENCOUNTER — OUTPATIENT CASE MANAGEMENT (OUTPATIENT)
Dept: ADMINISTRATIVE | Facility: OTHER | Age: 70
End: 2025-04-08
Payer: MEDICARE

## 2025-04-08 NOTE — PROGRESS NOTES
4/8/2025  1st attempt to complete Follow-Up for Outpatient Care Management, left message. Will send portal message with unable to assess letter.

## 2025-04-08 NOTE — LETTER
Neena Kohli  [address]  [place] [address]      Dear Neena Kohli,     I am your nurse with [facility]s Outpatient Care Management Department. I was unsuccessful in reaching you today. At your earliest convenience, I would like to discuss your healthcare progress.      Please contact me at [phone] from 8:00AM to 4:30 PM on Monday thru Friday.     As you know, [facility] is a program offered to you through [facility] where a nurse is available 24/7 to answer questions or provide medical advice, their number is [phone].    Thanks,      Susi Coates, RN  Outpatient Care Management

## 2025-04-09 ENCOUNTER — OUTPATIENT CASE MANAGEMENT (OUTPATIENT)
Dept: ADMINISTRATIVE | Facility: OTHER | Age: 70
End: 2025-04-09
Payer: MEDICARE

## 2025-04-09 ENCOUNTER — CLINICAL SUPPORT (OUTPATIENT)
Dept: REHABILITATION | Facility: HOSPITAL | Age: 70
End: 2025-04-09
Payer: MEDICARE

## 2025-04-09 DIAGNOSIS — Z74.09 IMPAIRED FUNCTIONAL MOBILITY, BALANCE, GAIT, AND ENDURANCE: Primary | ICD-10-CM

## 2025-04-09 PROCEDURE — 97110 THERAPEUTIC EXERCISES: CPT | Mod: HCNC

## 2025-04-09 PROCEDURE — 97112 NEUROMUSCULAR REEDUCATION: CPT | Mod: HCNC

## 2025-04-09 NOTE — PROGRESS NOTES
Outpatient Rehab    Physical Therapy Progress Note    Patient Name: Neena Kohli  MRN: [id number]  YOB: 1955  Encounter Date: 4/9/2025    Therapy Diagnosis:   Encounter Diagnosis   Name Primary?    Impaired functional mobility, balance, gait, and endurance Yes     Physician: Keesha Xiao NP    Physician Orders: Eval and Treat  Medical Diagnosis: Abnormality of gait and mobility  Frequent falls    Visit # / Visits Authorized:  3 / 10  Insurance Authorization Period: 3/13/2025 to 6/10/2025  Date of Evaluation: 3/13/2025  Plan of Care Certification: 3/13/2025 to 4/25/2025     PT/PTA: PT   Number of PTA visits since last PT visit:0  Time In: 1030   Time Out: 1115  Total Time: 45   Total Billable Time: 45    FOTO:  Intake Score: 50%  Survey Score 1: 56%  Survey Score 2:  %         Subjective   Feeling okay; Has been not too good with HEP based on laziness; feels like the leg pain is getting better but still present.  Pain reported as 8/10. lower back pain going down left leg    Objective         Fall Risk  Functional mobility test results suggest the patient is: At Risk for Falls  Timed Up & Go (TUG)  Time: 15.83 seconds  Observations: Loss of balance, Slow tentative pace, and Little or no arm swing  An older adult who takes >=12 seconds to complete the TUG is at risk for falling.       Sit to Stand Testing  The patient completed 5 sit to stand transfers in 18.61 sec. with hands                 Treatment:  Therapeutic Exercise  TE 1: Nustep level 2.0 for 8 minutes * challenged with keeping >50 spm  TE 2: STS from Blue chair 2x10  TE 8: Calf raises: 2x20 with UE support  TE 9: Lateral steps with yellow theraband: 6 lengths x 10 feet  Balance/Neuromuscular Re-Education  NMR 1: Alternating toe taps 3x30 seconds  Therapeutic Activity  TA 1: FOTO, TUG, 5xSTS assessment    Time Entry(in minutes):  Neuromuscular Re-Education Time Entry: 15  Therapeutic Exercise Time Entry: 30    Assessment & Plan    Assessment: Neena arrived with continued reports of Lower leg pain. Again educated on the nature of the pain and importance of walking program. He was reassessed for TUG, 5xSTS and FOTO. He is making good progress towards his goals. He continues to be a falls risk at this time. He Required frequent rest breaks between exercises and demonstrated moderate amounts of fatigue. He would benefit from continued therapy to improve balance and activity tolerance  Evaluation/Treatment Tolerance: Patient limited by fatigue    Patient will continue to benefit from skilled outpatient physical therapy to address the deficits listed in the problem list box on initial evaluation, provide pt/family education and to maximize pt's level of independence in the home and community environment.     Patient's spiritual, cultural, and educational needs considered and patient agreeable to plan of care and goals.           Plan: Plan to cont with progression of PT goals per POC    Goals:   Active       Long Term Goals       Patient will score >/= 55% on FOTO limitation survey in order to improve self-perception of functional mobility deficits       Start:  03/13/25    Expected End:  04/25/25            Patient will improve bilateral lower extremity MMT grades by >/=1 grade in order to improve strength for ADL completion (Progressing)       Start:  03/13/25    Expected End:  04/25/25            Patient will score </= 17 Seconds on 5 Times Sit to  order to improve BLE endurance and muscular power for transfers (Progressing)       Start:  03/13/25    Expected End:  04/25/25            Patient will score >/= 20/30 on FGA with least restrictive assistive device in order to reduce risk for falls and improve postural control (Progressing)       Start:  03/13/25    Expected End:  04/25/25               Short Term Goals       Patient will be compliant with HEP in order to maximize PT benefits (Progressing)       Start:  03/13/25    Expected  End:  04/04/25            Patient will complete TUG in </= 14 seconds with least restrictive assistive device in order to reduce risk for falls and improve safety with functional mobility (Progressing)       Start:  03/13/25    Expected End:  04/04/25            Patient will score >/= 17/30 on FGA with least restrictive assistive device in order to reduce risk for falls and improve postural control (Progressing)       Start:  03/13/25    Expected End:  04/04/25            Patient will score </= 20 Seconds on 5 Times Sit to  order to improve BLE endurance and muscular power for transfers (Progressing)       Start:  03/13/25    Expected End:  04/04/25                Jaylin Thomas, PT

## 2025-04-14 ENCOUNTER — CLINICAL SUPPORT (OUTPATIENT)
Dept: REHABILITATION | Facility: HOSPITAL | Age: 70
End: 2025-04-14
Payer: MEDICARE

## 2025-04-14 DIAGNOSIS — Z74.09 IMPAIRED FUNCTIONAL MOBILITY, BALANCE, GAIT, AND ENDURANCE: Primary | ICD-10-CM

## 2025-04-14 PROCEDURE — 97110 THERAPEUTIC EXERCISES: CPT | Mod: HCNC,PN,CQ

## 2025-04-14 PROCEDURE — 97112 NEUROMUSCULAR REEDUCATION: CPT | Mod: HCNC,PN,CQ

## 2025-04-14 NOTE — PROGRESS NOTES
"    Outpatient Rehab    Physical Therapy Progress Note    Patient Name: Neena Kohli  MRN: [id number]  YOB: 1955  Encounter Date: 4/14/2025    Therapy Diagnosis:   Encounter Diagnosis   Name Primary?    Impaired functional mobility, balance, gait, and endurance Yes     Physician: Keesha Xiao NP    Physician Orders: Eval and Treat  Medical Diagnosis: Abnormality of gait and mobility  Frequent falls    Visit # / Visits Authorized:  4 / 10  Insurance Authorization Period: 3/13/2025 to 6/10/2025  Date of Evaluation: 3/13/2025  Plan of Care Certification: 3/13/2025 to 4/25/2025     PT/PTA: PTA   Number of PTA visits since last PT visit:1  Time In: 1115   Time Out: 1200  Total Time: 45   Total Billable Time: 40      Subjective   No change in leg pain, has not begun strengthening HEP or walking program provided on first session.  Pain reported as 8/10. lower back pain going down left leg    Objective           Treatment:  Therapeutic Exercise  TE 1: Nustep level 2.0 for 8 minutes * able to stay >70 spm today  TE 5: LAQ: 2x10 B with 2-3" holds 2# ankle weights  TE 6: Sit <>stand: 2x10 from black chair with airex (no UE support)  TE 7: 4" step ups: 2x10 B with single UE support (forward and lateral)  TE 8: Calf raises: 2x20 with UE support  TE 9: Lateral steps with yellow theraband: 6 lengths x 10 feet  Balance/Neuromuscular Re-Education  NMR 1: Alternating toe taps 3x30 seconds    Time Entry(in minutes):  Neuromuscular Re-Education Time Entry: 10  Therapeutic Exercise Time Entry: 30    Assessment & Plan   Assessment: Neena arrived with continued reports of high levels of left lower leg pain but was agreeable to treatment. Session focused on general strengthening and endurance based activity.  He continues to fatigue quickly and require multiple extended seated rest breaks for water and recovery.  He would most benefit from continued therapy with increased activity at home and daily HEP completion to " maximize benefit of twice a week treatment.  Evaluation/Treatment Tolerance: Patient limited by fatigue    Patient will continue to benefit from skilled outpatient physical therapy to address the deficits listed in the problem list box on initial evaluation, provide pt/family education and to maximize pt's level of independence in the home and community environment.     Patient's spiritual, cultural, and educational needs considered and patient agreeable to plan of care and goals.           Plan: Plan to cont with progression of PT goals per POC    Goals:   Active       Long Term Goals       Patient will score >/= 55% on FOTO limitation survey in order to improve self-perception of functional mobility deficits       Start:  03/13/25    Expected End:  04/25/25            Patient will improve bilateral lower extremity MMT grades by >/=1 grade in order to improve strength for ADL completion (Progressing)       Start:  03/13/25    Expected End:  04/25/25            Patient will score </= 17 Seconds on 5 Times Sit to  order to improve BLE endurance and muscular power for transfers (Progressing)       Start:  03/13/25    Expected End:  04/25/25            Patient will score >/= 20/30 on FGA with least restrictive assistive device in order to reduce risk for falls and improve postural control (Progressing)       Start:  03/13/25    Expected End:  04/25/25               Short Term Goals       Patient will be compliant with HEP in order to maximize PT benefits (Progressing)       Start:  03/13/25    Expected End:  04/04/25            Patient will complete TUG in </= 14 seconds with least restrictive assistive device in order to reduce risk for falls and improve safety with functional mobility (Progressing)       Start:  03/13/25    Expected End:  04/04/25            Patient will score >/= 17/30 on FGA with least restrictive assistive device in order to reduce risk for falls and improve postural control (Progressing)        Start:  03/13/25    Expected End:  04/04/25            Patient will score </= 20 Seconds on 5 Times Sit to  order to improve BLE endurance and muscular power for transfers (Progressing)       Start:  03/13/25    Expected End:  04/04/25                Elayne Brooke, PTA

## 2025-04-16 ENCOUNTER — DOCUMENTATION ONLY (OUTPATIENT)
Facility: HOSPITAL | Age: 70
End: 2025-04-16
Payer: MEDICARE

## 2025-04-16 NOTE — PROGRESS NOTES
Arrived to session with bandage on head. Reported suffering a fall last night where he hit his head on the floor. Presented with right knee pain and headache immediately after fall. Referred patient to follow up with MD. Therapy held for today.     Jaylin Thomas, PT, DPT

## 2025-04-16 NOTE — PROGRESS NOTES
Subjective:    The following note was written in combination with deep-scribe software and dictation (to which understanding and consent was verbally expressed); please excuse any transcription and/or dictation errors.      Chief Complaint: Fall (4-15-25), Joint Pain (Possible gout), Urinary Frequency, and Headache    HPI  Mr. Neena Kohli is a 69 y.o. man with diffuse osteoarthritis (lumbar degenerative disc disease, carpal tunnel syndrome, etc.;), hypertension (HCTZ 25, irbesartan 300, amlodipine 10, and carvedilol 25), hyperlipidemia (atorvastatin 80), diabetes with significant secondary peripheral vascular disease (glimepiride 1 and metformin 1000 b.i.d.), MDD (Seroquel 100 q.h.s., Celexa 20, hydralazine 100 t.i.d. p.r.n., and Wellbutrin 75), gout (allopurinol 100 and colchicine 0.6 daily; not currently taking), osteoporosis (VitD 50 K/week), & coronary artery disease (ASA 81) presenting for evaluation following a fall:     Fall:  -fell out of his computer chair 2 days ago to blunt head trauma; bandage/hemostatic today  -Turned away from physical therapy (originally ordered for fall prevention) 48 hours ago after presenting they bandage on his head from a fall onto the floor the preceding night with associated knee pain; advised to present to the emergency department (did not)  - reports resolution of the headache endorsed yesterday with the no neurologic changes  - is not on prescription anticoagulation, but does take a baby aspirin due to underlying diabetes    HTN:  - normotensive on current regimen    HA:  - resolved    Gout?:  - reporting diffuse pain to lower extremities bilaterally including medial foot and left toe (which raised personal gout concern)  - denies associated joint/soft tissue swelling    Ingrown toenail:  - history of such with the personally concern for recurrence   - is establish with podiatry    Urinary frequency:  - reporting increasing urinary frequency recently with notable history of  prostate cancer status post prostatectomy  - also reporting occasional dark/malodorous urine without dysuria or hematuria    Past Medical History:   Diagnosis Date    Arthritis     Cancer     Prostate    Diabetes mellitus     Hyperthyroidism     PAD (peripheral artery disease)      Past Surgical History:   Procedure Laterality Date    CORONARY STENT PLACEMENT      LAPAROSCOPIC CHOLECYSTECTOMY N/A 09/05/2023    Procedure: CHOLECYSTECTOMY, LAPAROSCOPIC;  Surgeon: Alvaro Snyder MD;  Location: [facility];  Service: General;  Laterality: N/A;    PERIPHERAL ARTERIAL STENT GRAFT      PROSTATE SURGERY       Family History   Problem Relation Name Age of Onset    Diabetes Mother L.M Kohli     Hypertension Mother L.M Kohli     Hyperlipidemia Mother L.M Kohli     Diabetes Father L.C Kohli     Hyperlipidemia Father L.C Kohli     Hypertension Father L.C Kohli     Gout Father L.C Kohli     Cancer Paternal Uncle      Stroke Maternal Grandmother      Amblyopia Neg Hx      Blindness Neg Hx      Cataracts Neg Hx      Glaucoma Neg Hx      Macular degeneration Neg Hx      Retinal detachment Neg Hx      Strabismus Neg Hx      Thyroid disease Neg Hx       Social History[1]  Review of patient's allergies indicates:  No Known Allergies  Neena Kohli had no medications administered during this visit.     Review of Systems   Constitutional:  Negative for appetite change, chills and fever.   HENT: Negative.     Respiratory:  Negative for cough, chest tightness and shortness of breath.    Cardiovascular:  Negative for chest pain, palpitations and leg swelling.   Gastrointestinal:  Negative for abdominal distention, abdominal pain, blood in stool, constipation, diarrhea, nausea and vomiting.   Endocrine: Negative.    Genitourinary:  Positive for frequency. Negative for difficulty urinating, dysuria and hematuria.   Musculoskeletal:  Positive for arthralgias.   Integumentary:  Positive for wound.   Neurological:  Positive for headaches  (Resolved).   Psychiatric/Behavioral: Negative.           Objective:      Vitals:    04/17/25 1105   BP: 136/60   Pulse: (!) 50   Resp: 18   Temp: 98.1 °F (36.7 °C)      Physical Exam  Vitals reviewed.   Constitutional:       General: He is not in acute distress.     Appearance: Normal appearance.   HENT:      Head: Normocephalic and atraumatic.        Comments: Facial features are symmetric   Bandage is dry in distribution as above     Nose: Nose normal. No congestion or rhinorrhea.      Mouth/Throat:      Mouth: Mucous membranes are moist.      Pharynx: Oropharynx is clear. No oropharyngeal exudate or posterior oropharyngeal erythema.   Eyes:      General: No scleral icterus.     Extraocular Movements: Extraocular movements intact.      Conjunctiva/sclera: Conjunctivae normal.   Cardiovascular:      Rate and Rhythm: Normal rate and regular rhythm.      Pulses: Normal pulses.      Heart sounds: Normal heart sounds.   Pulmonary:      Effort: Pulmonary effort is normal. No respiratory distress.      Breath sounds: Normal breath sounds.   Musculoskeletal:         General: No deformity or signs of injury. Normal range of motion.      Cervical back: Normal range of motion.   Skin:     General: Skin is warm and dry.      Findings: No rash.   Neurological:      General: No focal deficit present.      Mental Status: He is alert and oriented to person, place, and time. Mental status is at baseline.   Psychiatric:         Mood and Affect: Mood normal.         Behavior: Behavior normal.         Thought Content: Thought content normal.       Medications Ordered Prior to Encounter[2]      Assessment:       1. Urinary frequency    2. Pain in both lower extremities    3. Fall, subsequent encounter        Plan:       Urinary frequency        -     Ambulatory referral/consult to Urology; Future; Expected date: 04/24/2025  -     Urinalysis, Reflex to Urine Culture; Future; Expected date: 04/17/2025   - screen for UTI and facilitate  returned to urology given history; recommendations and interventions appreciated     Pain in both lower extremities  Fall, subsequent encounter   - given lack of joint/soft tissue swelling & diffuse in nature of pain, gout seems unlikely.  - have encouraged the patient returned to physical therapy then stressed importance of ED presentation for any subsequent falls/trauma                [1]   Social History  Socioeconomic History    Marital status: Single   Tobacco Use    Smoking status: Every Day     Current packs/day: 1.00     Average packs/day: 1 pack/day for 53.3 years (53.3 ttl pk-yrs)     Types: Vaping with nicotine, Cigarettes     Start date: 1972    Smokeless tobacco: Current    Tobacco comments:     Enrolled in the [employer] on 2/20/19 ([employer] Member ID # [id number])  Ambulatory referral to Smoking Cessation clinic following hospital discharge.  Pt is a 1 pk/day cigarette smoker x 52 yrs.- switched to vaping with nicotine approx. 3 months ago.    Substance and Sexual Activity    Alcohol use: Not Currently     Alcohol/week: 0.0 standard drinks of alcohol     Comment: Former User    Drug use: Not Currently     Types: Cocaine, Marijuana     Comment: Former User    Sexual activity: Not Currently     Social Drivers of Health     Financial Resource Strain: Low Risk  (2/25/2025)    Overall Financial Resource Strain (CARDIA)     Difficulty of Paying Living Expenses: Not hard at all   Food Insecurity: No Food Insecurity (2/25/2025)    Hunger Vital Sign     Worried About Running Out of Food in the Last Year: Never true     Ran Out of Food in the Last Year: Never true   Transportation Needs: No Transportation Needs (2/25/2025)    PRAPARE - Transportation     Lack of Transportation (Medical): No     Lack of Transportation (Non-Medical): No   Physical Activity: Inactive (2/25/2025)    Exercise Vital Sign     Days of Exercise per Week: 0 days     Minutes of Exercise per Session: 0 min   Stress: Stress Concern Present  (2/25/2025)    [employer] - Occupational Stress Questionnaire     Feeling of Stress : To some extent   Housing Stability: Low Risk  (2/25/2025)    Housing Stability Vital Sign     Unable to Pay for Housing in the Last Year: No     Homeless in the Last Year: No   [2]   Current Outpatient Medications on File Prior to Visit   Medication Sig Dispense Refill    amLODIPine (NORVASC) 10 MG tablet Take 1 tablet (10 mg total) by mouth once daily. 90 tablet 3    ammonium lactate 12 % Crea Apply 1 application  topically once daily. To dry skin on the feet. 140 g 6    aspirin (ECOTRIN) 81 MG EC tablet Take 81 mg by mouth once daily.      atorvastatin (LIPITOR) 80 MG tablet TAKE 1 TABLET EVERY EVENING 90 tablet 3    buPROPion (WELLBUTRIN) 75 MG tablet Take by mouth.      carvediloL (COREG) 25 MG tablet TAKE 1 TABLET TWICE DAILY 180 tablet 1    cholecalciferol, vitamin D3, 1,250 mcg (50,000 unit) capsule Take 50,000 Units by mouth every 14 (fourteen) days.      citalopram (CELEXA) 20 MG tablet Take 20 mg by mouth once daily.      gabapentin (NEURONTIN) 300 MG capsule TAKE 1 CAPSULE BY MOUTH EVERY EVENING 30 capsule 1    glimepiride (AMARYL) 1 MG tablet TAKE 2 TABLETS EVERY  tablet 1    hydrALAZINE (APRESOLINE) 100 MG tablet TAKE 1 TABLET EVERY 8 HOURS 270 tablet 3    hydroCHLOROthiazide (HYDRODIURIL) 25 MG tablet Take 1 tablet (25 mg total) by mouth once daily. 90 tablet 2    irbesartan (AVAPRO) 300 MG tablet TAKE 1 TABLET ONE TIME DAILY 90 tablet 1    metFORMIN (GLUCOPHAGE) 1000 MG tablet TAKE 1 TABLET TWICE DAILY WITH MEALS 180 tablet 1    propranoloL (INDERAL) 10 MG tablet 10 mg once daily.      QUEtiapine (SEROQUEL) 100 MG Tab Take 1 tablet (100 mg total) by mouth 2 (two) times daily. At bedtime 60 tablet 2    traZODone (DESYREL) 100 MG tablet Take 100 mg by mouth every evening.      TRUE METRIX GLUCOSE TEST STRIP Strp       TRUEPLUS LANCETS 33 gauge Misc       ZTLIDO 1.8 % PtMd        allopurinoL (ZYLOPRIM) 100 MG tablet TAKE 1 TABLET EVERY DAY (Patient not taking: Reported on 4/17/2025) 90 tablet 3    ergocalciferol (ERGOCALCIFEROL) 50,000 unit Cap Take 50,000 Units by mouth every 7 days. (Patient not taking: Reported on 4/17/2025)      topiramate (TOPAMAX) 100 MG tablet Take 100 mg by mouth once daily. (Patient not taking: Reported on 4/17/2025)       No current facility-administered medications on file prior to visit.

## 2025-04-17 ENCOUNTER — OFFICE VISIT (OUTPATIENT)
Dept: INTERNAL MEDICINE | Facility: CLINIC | Age: 70
End: 2025-04-17
Payer: MEDICARE

## 2025-04-17 ENCOUNTER — LAB VISIT (OUTPATIENT)
Dept: LAB | Facility: HOSPITAL | Age: 70
End: 2025-04-17
Payer: MEDICARE

## 2025-04-17 VITALS
SYSTOLIC BLOOD PRESSURE: 136 MMHG | WEIGHT: 168.44 LBS | HEIGHT: 67 IN | OXYGEN SATURATION: 96 % | TEMPERATURE: 98 F | BODY MASS INDEX: 26.44 KG/M2 | DIASTOLIC BLOOD PRESSURE: 60 MMHG | RESPIRATION RATE: 18 BRPM | HEART RATE: 50 BPM

## 2025-04-17 DIAGNOSIS — M79.605 PAIN IN BOTH LOWER EXTREMITIES: ICD-10-CM

## 2025-04-17 DIAGNOSIS — R35.0 URINARY FREQUENCY: Primary | ICD-10-CM
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
## 2025-04-17 DIAGNOSIS — R35.0 URINARY FREQUENCY: ICD-10-CM

## 2025-04-17 DIAGNOSIS — M79.604 PAIN IN BOTH LOWER EXTREMITIES: ICD-10-CM

## 2025-04-17 LAB
BACTERIA #/AREA URNS AUTO: ABNORMAL /HPF
BILIRUB UR QL STRIP.AUTO: NEGATIVE
CLARITY UR: CLEAR
COLOR UR AUTO: YELLOW
GLUCOSE UR QL STRIP: NEGATIVE
HGB UR QL STRIP: NEGATIVE
KETONES UR QL STRIP: NEGATIVE
LEUKOCYTE ESTERASE UR QL STRIP: NEGATIVE
MICROSCOPIC COMMENT: ABNORMAL
NITRITE UR QL STRIP: POSITIVE
PH UR STRIP: 6 [PH]
PROT UR QL STRIP: ABNORMAL
RBC #/AREA URNS AUTO: <1 /HPF (ref 0–4)
SP GR UR STRIP: 1.01
SQUAMOUS #/AREA URNS AUTO: 1 /HPF
UROBILINOGEN UR STRIP-ACNC: NEGATIVE EU/DL
WBC #/AREA URNS AUTO: 9 /HPF (ref 0–5)
WBC CLUMPS UR QL AUTO: ABNORMAL

## 2025-04-17 PROCEDURE — 99999 PR PBB SHADOW E&M-EST. PATIENT-LVL V: CPT | Mod: PBBFAC,HCNC,, | Performed by: STUDENT IN AN ORGANIZED HEALTH CARE EDUCATION/TRAINING PROGRAM

## 2025-04-17 PROCEDURE — 99214 OFFICE O/P EST MOD 30 MIN: CPT | Mod: HCNC,S$GLB,, | Performed by: STUDENT IN AN ORGANIZED HEALTH CARE EDUCATION/TRAINING PROGRAM

## 2025-04-17 PROCEDURE — 3075F SYST BP GE 130 - 139MM HG: CPT | Mod: HCNC,CPTII,S$GLB, | Performed by: STUDENT IN AN ORGANIZED HEALTH CARE EDUCATION/TRAINING PROGRAM

## 2025-04-17 PROCEDURE — 1125F AMNT PAIN NOTED PAIN PRSNT: CPT | Mod: HCNC,CPTII,S$GLB, | Performed by: STUDENT IN AN ORGANIZED HEALTH CARE EDUCATION/TRAINING PROGRAM

## 2025-04-17 PROCEDURE — 3072F LOW RISK FOR RETINOPATHY: CPT | Mod: HCNC,CPTII,S$GLB, | Performed by: STUDENT IN AN ORGANIZED HEALTH CARE EDUCATION/TRAINING PROGRAM

## 2025-04-17 PROCEDURE — 4010F ACE/ARB THERAPY RXD/TAKEN: CPT | Mod: HCNC,CPTII,S$GLB, | Performed by: STUDENT IN AN ORGANIZED HEALTH CARE EDUCATION/TRAINING PROGRAM

## 2025-04-17 PROCEDURE — 1159F MED LIST DOCD IN RCRD: CPT | Mod: HCNC,CPTII,S$GLB, | Performed by: STUDENT IN AN ORGANIZED HEALTH CARE EDUCATION/TRAINING PROGRAM

## 2025-04-17 PROCEDURE — G2211 COMPLEX E/M VISIT ADD ON: HCPCS | Mod: GZ,HCNC,S$GLB, | Performed by: STUDENT IN AN ORGANIZED HEALTH CARE EDUCATION/TRAINING PROGRAM

## 2025-04-17 PROCEDURE — 3078F DIAST BP <80 MM HG: CPT | Mod: HCNC,CPTII,S$GLB, | Performed by: STUDENT IN AN ORGANIZED HEALTH CARE EDUCATION/TRAINING PROGRAM

## 2025-04-17 PROCEDURE — 3008F BODY MASS INDEX DOCD: CPT | Mod: HCNC,CPTII,S$GLB, | Performed by: STUDENT IN AN ORGANIZED HEALTH CARE EDUCATION/TRAINING PROGRAM

## 2025-04-17 PROCEDURE — 1101F PT FALLS ASSESS-DOCD LE1/YR: CPT | Mod: HCNC,CPTII,S$GLB, | Performed by: STUDENT IN AN ORGANIZED HEALTH CARE EDUCATION/TRAINING PROGRAM

## 2025-04-17 PROCEDURE — 81001 URINALYSIS AUTO W/SCOPE: CPT | Mod: HCNC

## 2025-04-17 PROCEDURE — 3288F FALL RISK ASSESSMENT DOCD: CPT | Mod: HCNC,CPTII,S$GLB, | Performed by: STUDENT IN AN ORGANIZED HEALTH CARE EDUCATION/TRAINING PROGRAM

## 2025-04-21 ENCOUNTER — OUTPATIENT CASE MANAGEMENT (OUTPATIENT)
Dept: ADMINISTRATIVE | Facility: OTHER | Age: 70
End: 2025-04-21
Payer: MEDICARE

## 2025-04-21 NOTE — PROGRESS NOTES
4/21/2025  After multiple unsuccessful attempts to reach Mr. Chaudhary, will proceed with case closure.

## 2025-04-22 ENCOUNTER — RESULTS FOLLOW-UP (OUTPATIENT)
Dept: INTERNAL MEDICINE | Facility: CLINIC | Age: 70
End: 2025-04-22

## 2025-04-22 RX ORDER — NITROFURANTOIN 25; 75 MG/1; MG/1
100 CAPSULE ORAL 2 TIMES DAILY
Qty: 10 CAPSULE | Refills: 0 | Status: SHIPPED | OUTPATIENT
Start: 2025-04-22 | End: 2025-04-27

## 2025-04-28 ENCOUNTER — OFFICE VISIT (OUTPATIENT)
Dept: UROLOGY | Facility: CLINIC | Age: 70
End: 2025-04-28
Payer: MEDICARE

## 2025-04-28 ENCOUNTER — CLINICAL SUPPORT (OUTPATIENT)
Dept: REHABILITATION | Facility: HOSPITAL | Age: 70
End: 2025-04-28
Payer: MEDICARE

## 2025-04-28 VITALS
DIASTOLIC BLOOD PRESSURE: 71 MMHG | BODY MASS INDEX: 26.41 KG/M2 | SYSTOLIC BLOOD PRESSURE: 161 MMHG | HEART RATE: 46 BPM | WEIGHT: 168.63 LBS

## 2025-04-28 DIAGNOSIS — R35.0 URINARY FREQUENCY: Primary | ICD-10-CM

## 2025-04-28 DIAGNOSIS — Z74.09 IMPAIRED FUNCTIONAL MOBILITY, BALANCE, GAIT, AND ENDURANCE: Primary | ICD-10-CM

## 2025-04-28 PROCEDURE — 1101F PT FALLS ASSESS-DOCD LE1/YR: CPT | Mod: CPTII,S$GLB,,

## 2025-04-28 PROCEDURE — 3077F SYST BP >= 140 MM HG: CPT | Mod: CPTII,S$GLB,,

## 2025-04-28 PROCEDURE — 1159F MED LIST DOCD IN RCRD: CPT | Mod: CPTII,S$GLB,,

## 2025-04-28 PROCEDURE — 87086 URINE CULTURE/COLONY COUNT: CPT | Mod: HCNC

## 2025-04-28 PROCEDURE — 3078F DIAST BP <80 MM HG: CPT | Mod: CPTII,S$GLB,,

## 2025-04-28 PROCEDURE — 3072F LOW RISK FOR RETINOPATHY: CPT | Mod: CPTII,S$GLB,,

## 2025-04-28 PROCEDURE — 3288F FALL RISK ASSESSMENT DOCD: CPT | Mod: CPTII,S$GLB,,

## 2025-04-28 PROCEDURE — 99214 OFFICE O/P EST MOD 30 MIN: CPT | Mod: S$GLB,,,

## 2025-04-28 PROCEDURE — 1160F RVW MEDS BY RX/DR IN RCRD: CPT | Mod: CPTII,S$GLB,,

## 2025-04-28 PROCEDURE — 4010F ACE/ARB THERAPY RXD/TAKEN: CPT | Mod: CPTII,S$GLB,,

## 2025-04-28 PROCEDURE — 99999 PR PBB SHADOW E&M-EST. PATIENT-LVL IV: CPT | Mod: PBBFAC,,,

## 2025-04-28 PROCEDURE — 97112 NEUROMUSCULAR REEDUCATION: CPT | Mod: HCNC

## 2025-04-28 PROCEDURE — 3008F BODY MASS INDEX DOCD: CPT | Mod: CPTII,S$GLB,,

## 2025-04-28 PROCEDURE — 97110 THERAPEUTIC EXERCISES: CPT | Mod: HCNC

## 2025-04-28 PROCEDURE — 97530 THERAPEUTIC ACTIVITIES: CPT | Mod: HCNC

## 2025-04-28 NOTE — PROGRESS NOTES
[facility] Department of Urology      New  Urinary Frequency  Note    4/28/2025    Referred by:  Roxane Harp MD    History of Present Illness    CHIEF COMPLAINT:  Patient presents today for urinary frequency    URINARY SYMPTOMS:  He reports increased urinary frequency, particularly at night with 5-6 episodes of nocturia. He notes strong urine odor and dark colored urine despite adequate water intake. He denies urinary incontinence, hematuria, dysuria, and history of kidney stones.    MEDICAL HISTORY:  He has a history of prostate cancer s/p prostatectomy. PSA was less than 0.01 one year ago. He has sleep apnea requiring CPAP therapy but reports not having CPAP settings adjusted in an extended period of time.    DIET:  He reports drinking about two to three glasses of water per day, though he is unsure of when during the day he consumes most of it.        A review of 10+ systems was conducted with pertinent positive and negative findings documented in HPI with all other systems reviewed and negative.    Past medical, family, surgical and social history reviewed as documented in chart with pertinent positive medical, family, surgical and social history detailed in HPI.    Exam Findings:    Const: no acute distress, conversant and alert  Eyes: anicteric, extraocular muscles intact  ENMT: normocephalic, Nl oral membranes  Cardio: no cyanosis, nl cap refill  Pulm: no tachypnea; no resp distress  Abd: soft, no tenderness  Musc: no laceration, no tenderness  Neuro: alert; oriented x 3  Skin: warm, dry; no petichiae  Psych: no anxiety; normal speech       Assessment/Plan:    Assessment & Plan    IMPRESSION:  - Urinary frequency may be related to sleep issues.  - Daytime urinary frequency (5-6 times) is within normal limits.    URINARY TRACT INFECTION:  1. Ordered urine culture to rule out infection given urinary frequency. The previously ordered UA did not reflex to culture despite being ordered by PCP. His UA was  nitrite positive at that visit.   2. Patient to complete a 3-day volume-based voiding diary, measuring and recording fluid intake and urinary output.    OBSTRUCTIVE SLEEP APNEA:  1. Recommended follow up with sleep doctor CPAP adjustment.    FOLLOW-UP:  1. Follow up in 3 months with completed voiding diary.       I spent a total of 30 minutes on the day of the visit.This includes face to face time and non-face to face time preparing to see the patient (eg, review of tests), obtaining and/or reviewing separately obtained history, documenting clinical information in the electronic or other health record, independently interpreting results and communicating results to the patient/family/caregiver, or care coordinator.

## 2025-04-28 NOTE — PROGRESS NOTES
Outpatient Rehab    Physical Therapy Progress Note : Updated Plan of Care    Patient Name: Neena Kohli  MRN: [id number]  YOB: 1955  Encounter Date: 4/28/2025    Therapy Diagnosis:   Encounter Diagnosis   Name Primary?    Impaired functional mobility, balance, gait, and endurance Yes     Physician: Keesha Xiao NP    Physician Orders: Eval and Treat  Medical Diagnosis: Abnormality of gait and mobility  Frequent falls    Visit # / Visits Authorized:  5 / 10  Insurance Authorization Period: 3/13/2025 to 6/10/2025  Date of Evaluation: 3/13/2025  Plan of Care Certification: 4/28/2025 to 6/13/2025     PT/PTA: PT   Number of PTA visits since last PT visit:0  Time In: 0820   Time Out: 0900  Total Time: 40   Total Billable Time: 40           Subjective   has been doing some of the band exercises but needs to do more; feels like therapy is beneficial.  Pain reported as 9/10. lower back pain going down left leg    Objective         Fall Risk  Functional mobility test results suggest the patient is: At Risk for Falls  Timed Up & Go (TUG)  Time: 15.2 seconds     An older adult who takes >=12 seconds to complete the TUG is at risk for falling.       Sit to Stand Testing  The patient completed 5 sit to stand transfers in 17.06 sec. with hands                Functional Gait Assessment:     *note: Measure a distance of 20 feet (~6 meters) for this assessment    1. Gait on level surface =  1   (3) Normal: less than 5.5 sec, no A.D., no imbalance, normal gait pattern, deviates <6in   (2) Mild impairment: 7-5.6 sec, uses A.D., mild gait deviations, or deviates 6-10 in   (1) Moderate impairment: > 7 sec, slow speed, imbalance, deviates 10-15 in.   (0) Severe impairment: needs assist, deviates >15 in, reach/touch wall  2. Change in Gait Speed = 2   (3) Normal: smooth change w/o loss of balance or gait deviation, deviates < 6 in, significant difference between speeds   (2) Mild impairment: changes speed, but  demonstrates mild gait deviations, deviates 6-10 in, OR no deviations but unable to significantly speed, OR uses A.D.   (1) Moderate impairment: minor changes to speed, OR changes speed w/ significant deviations, deviates 10-15 in, OR  Changes speed , but loses balance & recovers   (0) Severe impairment: cannot change speed, deviates >15 in, or loses balance & needs assist  3. Gait with horizontal head turns  = 3   (3) Normal: no change in gait, deviates <6 in   (2) Mild impairment: slight change in speed, deviates 6-10 in, OR uses A.D.   (1) Moderate impairment: moderate change in speed, deviates 10-15 in   (0) Severe impairment: severe disruption of gait, deviates >15in  4. Gait with vertical head turns = 3   (3) Normal: no change in gait, deviates <6 in   (2) Mild impairment: slight change in speed, deviates 6-10 in OR uses A.D.   (1) Moderate impairment: moderate change in speed, deviates 10-15 in   (0) Severe impairment: severe disruption of gait, deviates >15 in  5. Gait with pivot turns = 2   (3) Normal: performs safely in 3 sec, no LOB   (2) Mild impairment: performs in >3 sec & no LOB, OR turns safely & requires several steps to regain LOB   (1) Moderate impairment: turns slow, OR requires several small steps for balance following turn & stop   (0) Severe impairment: cannot turn safely, needs assist  6. Step over obstacle = 1   (3) Normal: steps over 2 stacked boxes w/o change in speed or LOB   (2) Mild impairment: able to step over 1 box w/o change in speed or LOB   (1) Moderate impairment: steps over 1 box but must slow down, may require VC   (0) Severe impairment: cannot perform w/o assist  7. Gait with Narrow LOLLY = 1   (3) Normal: 10 steps no staggering   (2) Mild impairment: 7-9 steps   (1) Moderate impairment: 4-7 steps   (0) Severe impairment: < 4 steps or cannot perform w/o assist  8. Gait with eyes closed = 2   (3) Normal: < 7 sec, no A.D., no LOB, normal gait pattern, deviates <6 in   (2) Mild  "impairment: 7.1-9 sec, mild gait deviations, deviates 6-10 in   (1) Moderate impairment: > 9 sec, abnormal pattern, LOB, deviates 10-15 in   (0) Severe impairment: cannot perform w/o assist, LOB, deviates >15in  9. Ambulating Backwards = 2   (3) Normal: no A.D., no LOB, normal gait pattern, deviates <6in   (2) Mild impairment: uses A.D., slower speed, mild gait deviations, deviates 6-10 in   (1) Moderate impairment: slow speed, abnormal gait pattern, LOB, deviates 10-15 in   (0) Severe impairment: severe gait deviations or LOB, deviates >15in  10. Steps = 2   (3) Normal: alternating feet, no rail   (2) Mild Impairment: alternating feet, uses rail   (1) Moderate impairment: step-to, uses rail   (0) Severe impairment: cannot perform safely    Score 19/30     Cutoffs:   <22/30 fall risk in older adults  <18/30 fall risk in Parkinsons    MDC/MCID:  Stroke = 4.2 points (MDC)  Vestibular = 6 points (MDC)  Geriatric = 4 points (MCID)  Parkinson's = 4 points (MDC)     Treatment:  Therapeutic Exercise  TE 1: Nustep level 2.0 for 8 minutes * able to stay >70 spm today  TE 7: 4 " lateral steps ups 2x45"  Balance/Neuromuscular Re-Education  NMR 1: Alternating toe taps to cone 3x30 seconds  NMR 2: Static NBOS on airex with eyes closed 2x45"  Therapeutic Activity  TA 1: Goal reassessment    Time Entry(in minutes):  Neuromuscular Re-Education Time Entry: 10  Therapeutic Activity Time Entry: 25  Therapeutic Exercise Time Entry: 10    Assessment & Plan   Assessment  Neena            Functional Limitations: Activity tolerance, Ambulating on uneven surfaces, Decreased ambulation distance/endurance, Maintaining balance, Increased risk of fall, Gait limitations, Gross motor coordination  Impairments: Impaired physical strength, Lack of appropriate home exercise program, Safety issue, Activity intolerance, Impaired balance, Abnormal gait    Assessment Details: Neena has attended 6 visits of outpatient physical therapy referred for " impaired balance and gait abnormality. He has progressed well towards his goals but has been limited due to scheduling issues and fall where he hit his head. He continues to be a high falls risk at this time based on objective testing and would benefit from continued skilled therapy to meet the remainder of his goals and decrease his falls risk.    Plan  From a physical therapy perspective, the patient would benefit from: Skilled Rehab Services    Planned therapy interventions include: Therapeutic exercise, Therapeutic activities, Neuromuscular re-education, and Gait training.            Visit Frequency: 2 times Per Week for 6 Weeks.       This plan was discussed with Patient.   Discussion participants: Agreed Upon Plan of Care             Patient will continue to benefit from skilled outpatient physical therapy to address the deficits listed in the problem list box on initial evaluation, provide pt/family education and to maximize pt's level of independence in the home and community environment.     Patient's spiritual, cultural, and educational needs considered and patient agreeable to plan of care and goals.           Goals:   Active       Long Term Goals       Patient will score >/= 55% on FOTO limitation survey in order to improve self-perception of functional mobility deficits (Progressing)       Start:  03/13/25    Expected End:  04/25/25            Patient will improve bilateral lower extremity MMT grades by >/=1 grade in order to improve strength for ADL completion (Progressing)       Start:  03/13/25    Expected End:  04/25/25            Patient will score </= 17 Seconds on 5 Times Sit to  order to improve BLE endurance and muscular power for transfers (Met)       Start:  03/13/25    Expected End:  04/25/25    Resolved:  04/28/25         Patient will score >/= 20/30 on FGA with least restrictive assistive device in order to reduce risk for falls and improve postural control (Progressing)        Start:  03/13/25    Expected End:  04/25/25               Short Term Goals       Patient will be compliant with HEP in order to maximize PT benefits (Progressing)       Start:  03/13/25    Expected End:  04/04/25            Patient will complete TUG in </= 14 seconds with least restrictive assistive device in order to reduce risk for falls and improve safety with functional mobility (Progressing)       Start:  03/13/25    Expected End:  04/04/25            Patient will score >/= 17/30 on FGA with least restrictive assistive device in order to reduce risk for falls and improve postural control (Met)       Start:  03/13/25    Expected End:  04/04/25    Resolved:  04/28/25         Patient will score </= 20 Seconds on 5 Times Sit to  order to improve BLE endurance and muscular power for transfers (Met)       Start:  03/13/25    Expected End:  04/04/25    Resolved:  04/28/25             Jaylin Thomas, PT

## 2025-05-03 LAB — BACTERIA UR CULT: ABNORMAL

## 2025-05-05 ENCOUNTER — CLINICAL SUPPORT (OUTPATIENT)
Dept: REHABILITATION | Facility: HOSPITAL | Age: 70
End: 2025-05-05
Payer: MEDICARE

## 2025-05-05 ENCOUNTER — RESULTS FOLLOW-UP (OUTPATIENT)
Dept: UROLOGY | Facility: CLINIC | Age: 70
End: 2025-05-05

## 2025-05-05 DIAGNOSIS — Z74.09 IMPAIRED FUNCTIONAL MOBILITY, BALANCE, GAIT, AND ENDURANCE: Primary | ICD-10-CM

## 2025-05-05 DIAGNOSIS — I10 HYPERTENSION, ESSENTIAL: Chronic | ICD-10-CM

## 2025-05-05 DIAGNOSIS — I25.2 OLD MI (MYOCARDIAL INFARCTION): Chronic | ICD-10-CM

## 2025-05-05 DIAGNOSIS — N18.31 TYPE 2 DIABETES MELLITUS WITH STAGE 3A CHRONIC KIDNEY DISEASE, WITHOUT LONG-TERM CURRENT USE OF INSULIN: Primary | ICD-10-CM

## 2025-05-05 DIAGNOSIS — E11.22 TYPE 2 DIABETES MELLITUS WITH STAGE 3A CHRONIC KIDNEY DISEASE, WITHOUT LONG-TERM CURRENT USE OF INSULIN: Primary | ICD-10-CM

## 2025-05-05 DIAGNOSIS — E78.2 MIXED HYPERLIPIDEMIA: Chronic | ICD-10-CM

## 2025-05-05 PROCEDURE — 97110 THERAPEUTIC EXERCISES: CPT | Mod: HCNC,PN,CQ

## 2025-05-05 PROCEDURE — 97112 NEUROMUSCULAR REEDUCATION: CPT | Mod: HCNC,PN,CQ

## 2025-05-05 RX ORDER — ATORVASTATIN CALCIUM 80 MG/1
80 TABLET, FILM COATED ORAL NIGHTLY
Qty: 90 TABLET | Refills: 3 | Status: SHIPPED | OUTPATIENT
Start: 2025-05-05

## 2025-05-05 RX ORDER — NITROFURANTOIN 25; 75 MG/1; MG/1
100 CAPSULE ORAL 2 TIMES DAILY
Qty: 10 CAPSULE | Refills: 0 | Status: SHIPPED | OUTPATIENT
Start: 2025-05-05 | End: 2025-05-10

## 2025-05-05 NOTE — TELEPHONE ENCOUNTER
Refill Routing Note   Medication(s) are not appropriate for processing by [facility] for the following reason(s):        Required labs outdated: glimepiride, metformin  Required vitals abnormal: irbesartan    [facility] action(s):  Defer   Requires labs : Yes             Appointments  past 12m or future 3m with PCP    Date Provider   Last Visit   4/17/2025 Roxane Harp MD   Next Visit   Visit date not found Roxane Harp MD   ED visits in past 90 days: 0        Note composed:6:20 PM 05/05/2025

## 2025-05-05 NOTE — PROGRESS NOTES
"  Outpatient Rehab    Physical Therapy Visit    Patient Name: Neena Kohli  MRN: [id number]  YOB: 1955  Encounter Date: 5/5/2025    Therapy Diagnosis:   Encounter Diagnosis   Name Primary?    Impaired functional mobility, balance, gait, and endurance Yes     Physician: Keesha Xiao NP    Physician Orders: Eval and Treat  Medical Diagnosis: Abnormality of gait and mobility  Frequent falls    Visit # / Visits Authorized:  6 / 10  Insurance Authorization Period: 3/13/2025 to 6/10/2025  Date of Evaluation: 3/13/2025  Plan of Care Certification: 4/28/2025 to 6/13/2025     PT/PTA: PTA   Number of PTA visits since last PT visit:1  Time In: 1600   Time Out: 1645  Total Time (in minutes): 45   Total Billable Time (in minutes): 45         Subjective   not really doing his exercises or his walking and admits to spending the majority of his day sitting..  Pain reported as 8/10. lower back pain going down left leg    Objective            Treatment:  Therapeutic Exercise  TE 1: Nustep level 2.0 for 8 minutes * able to stay >70 spm today  TE 5: LAQ: 2x10 B with 2-3" holds 2# ankle weights  TE 6: Sit <>stand: 2x10 from black chair with airex (no UE support)  TE 7: 4 " lateral steps ups 2x45"  TE 8: Calf raises: 2x20 with UE support  TE 9: Lateral steps with yellow theraband: 6 lengths x 10 feet  Balance/Neuromuscular Re-Education  NMR 1: Alternating toe taps to cone 3x30 seconds  NMR 2: Static NBOS on airex with eyes closed 2x45"    Time Entry(in minutes):  Neuromuscular Re-Education Time Entry: 15  Therapeutic Exercise Time Entry: 25    Assessment & Plan   Assessment: Neena arrived with continued reports of high levels of left lower leg pain but was agreeable to treatment. Session focused on general strengthening and endurance based activity.  He continues to fatigue quickly and require multiple extended seated rest breaks for water and recovery.  He would most benefit from continued therapy with increased " activity at home and daily HEP completion to maximize benefit of twice a week treatment.  Evaluation/Treatment Tolerance: Patient limited by fatigue    Patient will continue to benefit from skilled outpatient physical therapy to address the deficits listed in the problem list box on initial evaluation, provide pt/family education and to maximize pt's level of independence in the home and community environment.     Patient's spiritual, cultural, and educational needs considered and patient agreeable to plan of care and goals.           Plan: Plan to cont with progression of PT goals per POC    Goals:   Active       Long Term Goals       Patient will score >/= 55% on FOTO limitation survey in order to improve self-perception of functional mobility deficits (Progressing)       Start:  03/13/25    Expected End:  04/25/25            Patient will improve bilateral lower extremity MMT grades by >/=1 grade in order to improve strength for ADL completion (Progressing)       Start:  03/13/25    Expected End:  04/25/25            Patient will score </= 17 Seconds on 5 Times Sit to  order to improve BLE endurance and muscular power for transfers (Met)       Start:  03/13/25    Expected End:  04/25/25    Resolved:  04/28/25         Patient will score >/= 20/30 on FGA with least restrictive assistive device in order to reduce risk for falls and improve postural control (Progressing)       Start:  03/13/25    Expected End:  04/25/25               Short Term Goals       Patient will be compliant with HEP in order to maximize PT benefits (Progressing)       Start:  03/13/25    Expected End:  04/04/25            Patient will complete TUG in </= 14 seconds with least restrictive assistive device in order to reduce risk for falls and improve safety with functional mobility (Progressing)       Start:  03/13/25    Expected End:  04/04/25            Patient will score >/= 17/30 on FGA with least restrictive assistive device in  order to reduce risk for falls and improve postural control (Met)       Start:  03/13/25    Expected End:  04/04/25    Resolved:  04/28/25         Patient will score </= 20 Seconds on 5 Times Sit to  order to improve BLE endurance and muscular power for transfers (Met)       Start:  03/13/25    Expected End:  04/04/25    Resolved:  04/28/25             Elayne Brooke, PTA

## 2025-05-05 NOTE — TELEPHONE ENCOUNTER
Care Due:                  Date            Visit Type   Department     Provider  --------------------------------------------------------------------------------                                EP -                              PRIMARY      MET INTERNAL  Last Visit: 04-      [facility]   MEDICINE       Roxane Harp  Next Visit: None Scheduled  None         None Found                                                            Last  Test          Frequency    Reason                     Performed    Due Date  --------------------------------------------------------------------------------    CBC.........  12 months..  allopurinoL..............  08- 07-    CMP.........  12 months..  allopurinoL, glimepiride,   08- 07-                             hydroCHLOROthiazide,                             irbesartan, metFORMIN....    HBA1C.......  6 months...  glimepiride, metFORMIN...  03- 09-    Uric Acid...  12 months..  allopurinoL..............  03- 03-    [facility] Embedded Care Due Messages. Reference number: [id number].   5/05/2025 1:31:15 AM CDT
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
## 2025-05-06 ENCOUNTER — TELEPHONE (OUTPATIENT)
Dept: INTERNAL MEDICINE | Facility: CLINIC | Age: 70
End: 2025-05-06
Payer: MEDICARE

## 2025-05-06 ENCOUNTER — CLINICAL SUPPORT (OUTPATIENT)
Dept: REHABILITATION | Facility: HOSPITAL | Age: 70
End: 2025-05-06
Payer: MEDICARE

## 2025-05-06 DIAGNOSIS — Z74.09 IMPAIRED FUNCTIONAL MOBILITY, BALANCE, GAIT, AND ENDURANCE: Primary | ICD-10-CM

## 2025-05-06 PROCEDURE — 97110 THERAPEUTIC EXERCISES: CPT | Mod: HCNC,PN,CQ

## 2025-05-06 PROCEDURE — 97112 NEUROMUSCULAR REEDUCATION: CPT | Mod: HCNC,PN,CQ

## 2025-05-06 RX ORDER — IRBESARTAN 300 MG/1
300 TABLET ORAL
Qty: 90 TABLET | Refills: 1 | Status: SHIPPED | OUTPATIENT
Start: 2025-05-06

## 2025-05-06 RX ORDER — METFORMIN HYDROCHLORIDE 1000 MG/1
1000 TABLET ORAL 2 TIMES DAILY WITH MEALS
Qty: 180 TABLET | Refills: 0 | Status: SHIPPED | OUTPATIENT
Start: 2025-05-06

## 2025-05-06 RX ORDER — GLIMEPIRIDE 1 MG/1
2 TABLET ORAL
Qty: 180 TABLET | Refills: 0 | Status: SHIPPED | OUTPATIENT
Start: 2025-05-06

## 2025-05-06 NOTE — PROGRESS NOTES
"  Outpatient Rehab    Physical Therapy Visit    Patient Name: Neena Kohli  MRN: [id number]  YOB: 1955  Encounter Date: 5/6/2025    Therapy Diagnosis:   Encounter Diagnosis   Name Primary?    Impaired functional mobility, balance, gait, and endurance Yes     Physician: Keesha Xiao NP    Physician Orders: Eval and Treat  Medical Diagnosis: Abnormality of gait and mobility  Frequent falls    Visit # / Visits Authorized:  7 / 10  Insurance Authorization Period: 3/13/2025 to 6/10/2025  Date of Evaluation: 3/13/2025  Plan of Care Certification: 4/28/2025 to 6/13/2025     PT/PTA: PTA   Number of PTA visits since last PT visit:2  Time In: 1030   Time Out: 1115  Total Time (in minutes): 45   Total Billable Time (in minutes): 45         Subjective   not really doing his exercises or his walking and admits to spending the majority of his day sitting..  Pain reported as 7/10. lower back pain going down left leg    Objective            Treatment:  Therapeutic Exercise  TE 1: Nustep level 2.0 for 8 minutes * able to stay >70 spm today  TE 5: LAQ: 2x10 B with 2-3" holds 2# ankle weights  TE 6: Sit <>stand: 2x10 from black chair with airex (no UE support)  TE 7: 4" forward step ups: 2x45" ea leg, lateral steps up and over: 2x45"  TE 8: Calf raises: 2x20 with UE support  TE 9: Lateral steps with yellow theraband: 6 lengths x 10 feet  Balance/Neuromuscular Re-Education  NMR 1: Alternating toe taps to cone 3x30 seconds  NMR 2: Static NBOS on airex with eyes closed 2x45"    Time Entry(in minutes):  Neuromuscular Re-Education Time Entry: 15  Therapeutic Exercise Time Entry: 25    Assessment & Plan   Assessment: Neena arrived with continued reports of high levels of left lower leg pain but was agreeable to treatment. Session focused on general strengthening and endurance based activity.  He continues to fatigue quickly and require multiple extended seated rest breaks for water and recovery.  He would most " benefit from continued therapy with increased activity at home and daily HEP completion to maximize benefit of twice a week treatment.  Evaluation/Treatment Tolerance: Patient limited by fatigue    Patient will continue to benefit from skilled outpatient physical therapy to address the deficits listed in the problem list box on initial evaluation, provide pt/family education and to maximize pt's level of independence in the home and community environment.     Patient's spiritual, cultural, and educational needs considered and patient agreeable to plan of care and goals.           Plan: Plan to cont with progression of PT goals per POC    Goals:   Active       Long Term Goals       Patient will score >/= 55% on FOTO limitation survey in order to improve self-perception of functional mobility deficits (Progressing)       Start:  03/13/25    Expected End:  04/25/25            Patient will improve bilateral lower extremity MMT grades by >/=1 grade in order to improve strength for ADL completion (Progressing)       Start:  03/13/25    Expected End:  04/25/25            Patient will score </= 17 Seconds on 5 Times Sit to  order to improve BLE endurance and muscular power for transfers (Met)       Start:  03/13/25    Expected End:  04/25/25    Resolved:  04/28/25         Patient will score >/= 20/30 on FGA with least restrictive assistive device in order to reduce risk for falls and improve postural control (Progressing)       Start:  03/13/25    Expected End:  04/25/25               Short Term Goals       Patient will be compliant with HEP in order to maximize PT benefits (Progressing)       Start:  03/13/25    Expected End:  04/04/25            Patient will complete TUG in </= 14 seconds with least restrictive assistive device in order to reduce risk for falls and improve safety with functional mobility (Progressing)       Start:  03/13/25    Expected End:  04/04/25            Patient will score >/= 17/30 on FGA  with least restrictive assistive device in order to reduce risk for falls and improve postural control (Met)       Start:  03/13/25    Expected End:  04/04/25    Resolved:  04/28/25         Patient will score </= 20 Seconds on 5 Times Sit to  order to improve BLE endurance and muscular power for transfers (Met)       Start:  03/13/25    Expected End:  04/04/25    Resolved:  04/28/25             Elayne Brooke, PTA

## 2025-05-06 NOTE — TELEPHONE ENCOUNTER
Pt due for lab appt (A1c I ordered and remind him that he is almost due for annual diabetic eye exam)    I tried calling pt to discuss the above recommendation from   Dr. Harp.  No answer. Left VM for pt to call the clinic

## 2025-05-08 ENCOUNTER — TELEPHONE (OUTPATIENT)
Dept: INTERNAL MEDICINE | Facility: CLINIC | Age: 70
End: 2025-05-08
Payer: MEDICARE

## 2025-05-08 NOTE — TELEPHONE ENCOUNTER
I spoke to pt, order for A1c have been changed to [facility]. He can call the lab to schedule an appt. He verbalized understanding

## 2025-05-08 NOTE — TELEPHONE ENCOUNTER
----- Message from Ammy sent at 5/8/2025  1:12 PM CDT -----  Contact: [phone]  [email or web address] Patient is calling for Medical Advice regarding:A1C lab order needs to go to [facility] How long has patient had these symptoms:Pharmacy name and phone#:Patient wants a call back or thru [facility]:call back Comments:Please give call when this has been sent Please advise patient replies from provider may take up to 48 hours.

## 2025-05-12 ENCOUNTER — CLINICAL SUPPORT (OUTPATIENT)
Dept: REHABILITATION | Facility: HOSPITAL | Age: 70
End: 2025-05-12
Payer: MEDICARE

## 2025-05-12 DIAGNOSIS — Z74.09 IMPAIRED FUNCTIONAL MOBILITY, BALANCE, GAIT, AND ENDURANCE: Primary | ICD-10-CM

## 2025-05-12 PROCEDURE — 97110 THERAPEUTIC EXERCISES: CPT | Mod: HCNC,PN

## 2025-05-12 PROCEDURE — 97112 NEUROMUSCULAR REEDUCATION: CPT | Mod: HCNC,PN
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
## 2025-05-14 ENCOUNTER — CLINICAL SUPPORT (OUTPATIENT)
Dept: REHABILITATION | Facility: HOSPITAL | Age: 70
End: 2025-05-14
Payer: MEDICARE

## 2025-05-14 ENCOUNTER — TELEPHONE (OUTPATIENT)
Dept: INTERNAL MEDICINE | Facility: CLINIC | Age: 70
End: 2025-05-14
Payer: MEDICARE

## 2025-05-14 DIAGNOSIS — G89.4 CHRONIC PAIN SYNDROME: ICD-10-CM

## 2025-05-14 DIAGNOSIS — M51.369 DEGENERATION OF INTERVERTEBRAL DISC OF LUMBAR REGION, UNSPECIFIED WHETHER PAIN PRESENT: ICD-10-CM

## 2025-05-14 DIAGNOSIS — M47.816 LUMBAR FACET ARTHROPATHY: Primary | ICD-10-CM

## 2025-05-14 DIAGNOSIS — Z74.09 IMPAIRED FUNCTIONAL MOBILITY, BALANCE, GAIT, AND ENDURANCE: Primary | ICD-10-CM

## 2025-05-14 PROCEDURE — 97110 THERAPEUTIC EXERCISES: CPT | Mod: HCNC,PN

## 2025-05-14 PROCEDURE — 97530 THERAPEUTIC ACTIVITIES: CPT | Mod: HCNC,PN

## 2025-05-14 NOTE — PROGRESS NOTES
Outpatient Rehab    Physical Therapy Visit    Patient Name: Neena Kohli  MRN: [id number]  YOB: 1955  Encounter Date: 5/14/2025    Therapy Diagnosis:   Encounter Diagnosis   Name Primary?    Impaired functional mobility, balance, gait, and endurance Yes         Physician: Keesha Xiao NP    Physician Orders: Eval and Treat  Medical Diagnosis: Abnormality of gait and mobility  Frequent falls    Visit # / Visits Authorized:  9 / 20  Insurance Authorization Period: 3/13/2025 to 6/30/2025  Date of Evaluation: 3/13/2025  Plan of Care Certification: 4/28/2025 to 6/13/2025     PT/PTA:     Number of PTA visits since last PT visit:   Time In: 1300   Time Out: 1345  Total Time (in minutes): 45   Total Billable Time (in minutes): 45         Subjective   Pt reports his back is still really high and causing significant discomfort.         Objective              Treatment:  Therapeutic Exercise  TE 1: 2 x10 supine lower trunk rotations  TE 2: x 10 each way, seated ball rollouts  TE 3: 2 x 10 supine marching with emphasis on abdominal contraction  TE 4: 3 x 6 supine bridges, mild tightness during last two reps of third set  TE 5: 2 x 8 wall squats with big yoga ball  TE 6: x 6 yoga ball run outs to assist with thoracolumbar extension  TE 7: Nustep level 2.5 for 8 minutes  TE 8: 3 x 30' passive SKTC stretch, each LE  Therapeutic Activity  TA 1: Time used to discuss POC and referral to ortho PT      Time Entry(in minutes):  Therapeutic Activity Time Entry: 12  Therapeutic Exercise Time Entry: 33    Assessment & Plan   Assessment: Pt tolerated treatment fairly well this afternoon and was able to participate with all activities reporting mild improvement with pain. Treatment with more orthopedic focus today to trial for potential effectiveness of ortho PT. Pt reports he has not done ortho PT for his back but is willing to try it especially since his low back pain is his chief complaint. PT  educated/communicated with pt about his signifcant amount of low back pain hindering goals of improving balance and that he is more appropriate for ortho PT to deal with the pain first. Pt agreed and would like to start. PT messaged pt's internal medicine physican about new referral to ortho PT. Pt remains a good candidate for OPPT to improve his functional mobility       Patient will continue to benefit from skilled outpatient physical therapy to address the deficits listed in the problem list box on initial evaluation, provide pt/family education and to maximize pt's level of independence in the home and community environment.     Patient's spiritual, cultural, and educational needs considered and patient agreeable to plan of care and goals.           Plan: Potential discharge for pt who will be starting ortho PT    Goals:   Active       Long Term Goals       Patient will score >/= 55% on FOTO limitation survey in order to improve self-perception of functional mobility deficits (Progressing)       Start:  03/13/25    Expected End:  06/02/25            Patient will improve bilateral lower extremity MMT grades by >/=1 grade in order to improve strength for ADL completion (Progressing)       Start:  03/13/25    Expected End:  06/02/25            Patient will score </= 17 Seconds on 5 Times Sit to  order to improve BLE endurance and muscular power for transfers (Met)       Start:  03/13/25    Expected End:  04/25/25    Resolved:  04/28/25         Patient will score >/= 20/30 on FGA with least restrictive assistive device in order to reduce risk for falls and improve postural control (Progressing)       Start:  03/13/25    Expected End:  06/02/25               Short Term Goals       Patient will be compliant with HEP in order to maximize PT benefits (Progressing)       Start:  03/13/25    Expected End:  06/02/25            Patient will complete TUG in </= 14 seconds with least restrictive assistive device in  order to reduce risk for falls and improve safety with functional mobility (Progressing)       Start:  03/13/25    Expected End:  06/02/25            Patient will score >/= 17/30 on FGA with least restrictive assistive device in order to reduce risk for falls and improve postural control (Met)       Start:  03/13/25    Expected End:  04/04/25    Resolved:  04/28/25         Patient will score </= 20 Seconds on 5 Times Sit to  order to improve BLE endurance and muscular power for transfers (Met)       Start:  03/13/25    Expected End:  04/04/25    Resolved:  04/28/25                 Gautam Bal, PT

## 2025-05-21 ENCOUNTER — CLINICAL SUPPORT (OUTPATIENT)
Dept: REHABILITATION | Facility: HOSPITAL | Age: 70
End: 2025-05-21
Payer: MEDICARE

## 2025-05-21 DIAGNOSIS — Z74.09 IMPAIRED FUNCTIONAL MOBILITY, BALANCE, GAIT, AND ENDURANCE: Primary | ICD-10-CM

## 2025-05-21 PROCEDURE — 97110 THERAPEUTIC EXERCISES: CPT | Mod: PN,CQ

## 2025-05-21 PROCEDURE — 97112 NEUROMUSCULAR REEDUCATION: CPT | Mod: PN,CQ

## 2025-05-22 NOTE — PROGRESS NOTES
Outpatient Rehab    Physical Therapy Visit    Patient Name: Neena Kohli  MRN: [id number]  YOB: 1955  Encounter Date: 5/21/2025    Therapy Diagnosis:   Encounter Diagnosis   Name Primary?    Impaired functional mobility, balance, gait, and endurance Yes         Physician: Keesha Xiao NP    Physician Orders: Eval and Treat  Medical Diagnosis: Abnormality of gait and mobility  Frequent falls    Visit # / Visits Authorized:  10 / 20  Insurance Authorization Period: 3/13/2025 to 6/30/2025  Date of Evaluation: 3/13/2025  Plan of Care Certification: 4/28/2025 to 6/13/2025     PT/PTA: PTA   Number of PTA visits since last PT visit:1  Time In: 1300   Time Out: 1345  Total Time (in minutes): 45   Total Billable Time (in minutes): 45         Subjective   Back pain persists and biggest limiting factor.  Pain reported as 8/10. low back wrapping around left leg    Objective              Treatment:  Therapeutic Exercise  TE 1: 2 x10 supine lower trunk rotations  TE 2: x 10 each way, seated ball rollouts  TE 3: 2 x 10 supine marching with emphasis on abdominal contraction  TE 4: 3 x 6 supine bridges, mild tightness during last two reps of third set  TE 5: 2 x 8 wall squats with big yoga ball  TE 6: x 6 yoga ball run outs to assist with thoracolumbar extension  TE 7: Nustep level 2.5 for 8 minutes  TE 8: 3 x 30' passive SKTC stretch, each LE  TE 9: supine sciatic nerve glides, tactile cueing to help with coordingation of movement  Balance/Neuromuscular Re-Education  NMR 1: Alternating toe taps to orange cone 2 x 10, alternating toe taps to small cone 2 x10, UE touchdown support  NMR 2: 2x30 seconds, modified SLS with contra LE on step  NMR 3: x 4 laps in bar, ambulating stepping over  (4) hurdles      Time Entry(in minutes):  Neuromuscular Re-Education Time Entry: 15  Therapeutic Exercise Time Entry: 25    Assessment & Plan   Assessment: Neena arrived to session with complaints of lower back pain that  radiated and wrapped down his left leg.  Session focused on mat based strengthening and self stretching. No change in back pain with any activity.  Heavy reliance on UE support for standing portion and multiple instances of striking cones and curdles with decreased step clearance observed.  Minimal improvement with cuing.  He is scheduled for an evaluation with ortho PT next week for further assessment to address back pain.  Evaluation/Treatment Tolerance: Patient limited by pain    Patient will continue to benefit from skilled outpatient physical therapy to address the deficits listed in the problem list box on initial evaluation, provide pt/family education and to maximize pt's level of independence in the home and community environment.     Patient's spiritual, cultural, and educational needs considered and patient agreeable to plan of care and goals.           Plan: Potential discharge for pt who will be starting ortho PT    Goals:   Active       Long Term Goals       Patient will score >/= 55% on FOTO limitation survey in order to improve self-perception of functional mobility deficits (Progressing)       Start:  03/13/25    Expected End:  06/02/25            Patient will improve bilateral lower extremity MMT grades by >/=1 grade in order to improve strength for ADL completion (Progressing)       Start:  03/13/25    Expected End:  06/02/25            Patient will score </= 17 Seconds on 5 Times Sit to  order to improve BLE endurance and muscular power for transfers (Met)       Start:  03/13/25    Expected End:  04/25/25    Resolved:  04/28/25         Patient will score >/= 20/30 on FGA with least restrictive assistive device in order to reduce risk for falls and improve postural control (Progressing)       Start:  03/13/25    Expected End:  06/02/25               Short Term Goals       Patient will be compliant with HEP in order to maximize PT benefits (Progressing)       Start:  03/13/25    Expected  End:  06/02/25            Patient will complete TUG in </= 14 seconds with least restrictive assistive device in order to reduce risk for falls and improve safety with functional mobility (Progressing)       Start:  03/13/25    Expected End:  06/02/25            Patient will score >/= 17/30 on FGA with least restrictive assistive device in order to reduce risk for falls and improve postural control (Met)       Start:  03/13/25    Expected End:  04/04/25    Resolved:  04/28/25         Patient will score </= 20 Seconds on 5 Times Sit to  order to improve BLE endurance and muscular power for transfers (Met)       Start:  03/13/25    Expected End:  04/04/25    Resolved:  04/28/25                 Elayne Brooke, PTA

## 2025-05-25 NOTE — TELEPHONE ENCOUNTER
No care due was identified.  [facility] Embedded Care Due Messages. Reference number: [id number].   5/25/2025 1:38:22 PM CDT

## 2025-05-26 ENCOUNTER — CLINICAL SUPPORT (OUTPATIENT)
Dept: REHABILITATION | Facility: HOSPITAL | Age: 70
End: 2025-05-26
Payer: MEDICARE

## 2025-05-26 VITALS — DIASTOLIC BLOOD PRESSURE: 60 MMHG | SYSTOLIC BLOOD PRESSURE: 136 MMHG

## 2025-05-26 DIAGNOSIS — R53.1 DECREASED STRENGTH, ENDURANCE, AND MOBILITY: ICD-10-CM

## 2025-05-26 DIAGNOSIS — M51.369 DEGENERATION OF INTERVERTEBRAL DISC OF LUMBAR REGION, UNSPECIFIED WHETHER PAIN PRESENT: ICD-10-CM

## 2025-05-26 DIAGNOSIS — Z74.09 DECREASED STRENGTH, ENDURANCE, AND MOBILITY: ICD-10-CM

## 2025-05-26 DIAGNOSIS — M47.816 LUMBAR FACET ARTHROPATHY: ICD-10-CM

## 2025-05-26 DIAGNOSIS — R68.89 DECREASED STRENGTH, ENDURANCE, AND MOBILITY: ICD-10-CM

## 2025-05-26 DIAGNOSIS — G89.4 CHRONIC PAIN SYNDROME: ICD-10-CM

## 2025-05-26 DIAGNOSIS — M53.86 DECREASED ROM OF LUMBAR SPINE: Primary | ICD-10-CM

## 2025-05-26 PROCEDURE — 97530 THERAPEUTIC ACTIVITIES: CPT | Mod: PN

## 2025-05-26 PROCEDURE — 97162 PT EVAL MOD COMPLEX 30 MIN: CPT | Mod: PN

## 2025-05-26 RX ORDER — HYDROCHLOROTHIAZIDE 25 MG/1
25 TABLET ORAL
Qty: 90 TABLET | Refills: 0 | Status: SHIPPED | OUTPATIENT
Start: 2025-05-26

## 2025-05-26 NOTE — TELEPHONE ENCOUNTER
Refill Routing Note   Medication(s) are not appropriate for processing by [facility] for the following reason(s):        Required vitals abnormal    [facility] action(s):  Defer               Appointments  past 12m or future 3m with PCP    Date Provider   Last Visit   4/17/2025 Roxane Harp MD   Next Visit   Visit date not found Roxane Harp MD   ED visits in past 90 days: 0        Note composed:11:52 AM 05/26/2025

## 2025-05-27 PROBLEM — R68.89 DECREASED STRENGTH, ENDURANCE, AND MOBILITY: Status: ACTIVE | Noted: 2025-05-27

## 2025-05-27 PROBLEM — Z74.09 DECREASED STRENGTH, ENDURANCE, AND MOBILITY: Status: ACTIVE | Noted: 2025-05-27

## 2025-05-27 PROBLEM — M53.86 DECREASED ROM OF LUMBAR SPINE: Status: ACTIVE | Noted: 2025-05-27

## 2025-05-27 PROBLEM — R53.1 DECREASED STRENGTH, ENDURANCE, AND MOBILITY: Status: ACTIVE | Noted: 2025-05-27

## 2025-05-27 NOTE — PROGRESS NOTES
Outpatient Rehab    Physical Therapy Evaluation    Patient Name: Neena Kohli  MRN: [id number]  YOB: 1955  Encounter Date: 5/26/2025    Therapy Diagnosis:   Encounter Diagnoses   Name Primary?    Lumbar facet arthropathy     Chronic pain syndrome     Degeneration of intervertebral disc of lumbar region, unspecified whether pain present     Decreased ROM of lumbar spine Yes    Decreased strength, endurance, and mobility      Physician: Keesha Xiao NP    Physician Orders: Eval and Treat  Medical Diagnosis: Lumbar facet arthropathy  Chronic pain syndrome  Degeneration of intervertebral disc of lumbar region, unspecified whether pain present    Visit # / Visits Authorized:  1 / 1  Insurance Authorization Period: 5/14/2025 to 5/14/2026  Date of Evaluation: 5/26/2025  Plan of Care Certification: 5/26/2025 to 8/5/2025     Time In: 1515   Time Out: 1558  Total Time (in minutes): 43   Total Billable Time (in minutes): 43    Intake Outcome Measure for FOTO Survey    Therapist reviewed FOTO scores for Neena Kohli on 5/26/2025.   FOTO report - see Media section or FOTO account episode details.     Intake Score: 43 (CHILANGO: 44%)%    Precautions:       Subjective   History of Present Illness  Neena is a 69 y.o. male who reports to physical therapy with a chief concern of Back pain.     The patient reports a medical diagnosis of M47.816 (ICD-10-CM) - Lumbar facet arthropathy  G89.4 (ICD-10-CM) - Chronic pain syndrome  M51.369 (ICD-10-CM) - Degeneration of intervertebral disc of lumbar region, unspecified whether pain present.    Diagnostic tests related to this condition: None.        History of Present Condition/Illness: He was doing PT for his balance and then his back pain was getting worse and he was only able to do mat exercises. The back has been for years and then it has just gotten worse. The pain is mostly on the Left side. He will feel it the most when he is walking. He is not really able to  walk more than 5 minutes before it will start to hurt. The pain will start in his Left hip and go down to about his knee. Then if he sits it will take about 10-20 minutes before the pain goes away. He will use a cane sometimes and it will sometimes help with the pain but it is not 100%. He will get numbness and tingling in his Left foot and he has it all the time. He thinks sometimes it will get worse with walking but he is unsure. He will typically take a pain pill and try to relax when his back pain is bothering him. He did PT for his back about 5 years ago and it helped him a little bit. He is being treated for an ingrown toe nail right now due to his diabetes. He puts cream on his foot and reports that he does regular foot inspections.     Activities of Daily Living  Social history was obtained from Patient.    General Prior Level of Function Comments: No difficulty with squatting or stairs  General Current Level of Function Comments: Severe difficutly with squatting and stairs and walking           Pain     Patient reports a current pain level of 7/10. Pain at best is reported as 5/10. Pain at worst is reported as 10/10.   Location: middle back to L side  Clinical Progression (since onset): Stable  Pain Qualities: Radiating  Pain-Relieving Factors: Medications - prescription, Sitting  Pain-Aggravating Factors: Walking, Standing         Living Arrangements  Living Situation  Housing: Home independently  Living Arrangements: Alone    Home Setup  Type of Structure: Apartment/condo  Home Access: Stairs with rails  Entrance Stairs - Rails: Both        Employment  Patient does not report that: Does the patient's condition impact their ability to work?  Employment Status: Retired          Past Medical History/Physical Systems Review:   Neena Kohli  has a past medical history of Arthritis, Cancer, Diabetes mellitus, Hyperthyroidism, and PAD (peripheral artery disease).    Neena Kohli  has a past surgical history  that includes Prostate surgery; Laparoscopic cholecystectomy (N/A, 09/05/2023); Coronary stent placement; and Peripheral arterial stent graft.    Neena has a current medication list which includes the following prescription(s): allopurinol, amlodipine, ammonium lactate, aspirin, atorvastatin, bupropion, carvedilol, cholecalciferol (vitamin d3), citalopram, ergocalciferol, gabapentin, glimepiride, hydralazine, hydrochlorothiazide, irbesartan, metformin, propranolol, quetiapine, topiramate, trazodone, true metrix glucose test strip, trueplus lancets, and ztlido.    Review of patient's allergies indicates:  No Known Allergies     Objective   Posture    Increased thoracic kyphosis is observed.                 Right Dermatomes  Right Lumbar Dermatome Light Touch  Intact: L2, L3, L4, and L5       Left Dermatomes  Left Lumbar Dermatome Light Touch  Intact: L2, L3, L4, and L5           Right Lower Extremity Reflexes  Patellar, L4: Brisk (3+)         Achilles, S1: Normal (2+)         Left Lower Extremity Reflexes  Patellar, L4: Normal (2+)          Achilles, S1: Normal (2+)             Lumbar Range of Motion   Active (deg) Passive (deg) Pain   Flexion  (50%)   Yes (pain at end range)   Extension  (50%)   Yes (pain at end range)   Right Lateral Flexion  (to mid thigh)       Right Rotation  (max limitation)       Left Lateral Flexion  (to knee)       Left Rotation  (max limitation)                           Hip Strength - Planes of Motion   Right Strength Right Pain Left Strength Left  Pain   Flexion (L2) 4-   3+     Extension 3-   3-     ABduction 4-   4-     ADduction           Internal Rotation           External Rotation               Knee Strength   Right Strength Right Pain Left Strength Left  Pain   Flexion (S2) 4   4     Prone Flexion           Extension (L3) 5   5            Ankle/Foot Strength - Planes of Motion   Right Strength Right Pain Left Strength Left  Pain   Dorsiflexion (L4) 4   4     Plantar Flexion (S1) 5  "  5     Inversion           Eversion           Great Toe Flexion           Great Toe Extension (L5) 4-         Lesser Toes Flexion           Lesser Toes Extension                       Cervical/Thoracic Special Tests  Thoracic Tests  Negative: Slump         Lumbar/Pelvic Girdle Special Tests       Lumbar Tests - SLR and Tension  Positive: Left Passive Straight Leg Raise  Negative: Right Passive Straight Leg Raise, Right Crossed Straight Leg Raise, and Left Crossed Straight Leg Raise                 Pelvic Girdle / Sacrum Tests  Positive: Right MULUGETA, Left MULUGETA, Right Sacral Spring, and Left Sacral Spring  Negative: Right FADIR and Left FADIR  Negative: Right Thigh Thrust/Posterior Shear and Left Thigh Thrust/Posterior Shear  Decrease in bilateral lower extremity hip internal rotation       Hip Special Tests  Intra-Articular/Impingement Tests  Positive: Right MULUGETA and Left MULUGETA  Negative: Right FADIR and Left FADIR  Sacroiliac Joint Tests  Negative: Right Thigh Thrust/Posterior Shear and Left Thigh Thrust/Posterior Shear              Treatment:  Therapeutic Activity  TA 1: seated lumbar flexion 10"x20  TA 2: seated lumbar rotation 5"x10 ea  TA 3: side lying hip abduction 2x10 ea    Time Entry(in minutes):  PT Evaluation (Moderate) Time Entry: 30  Therapeutic Activity Time Entry: 13    Assessment & Plan   Assessment  Neena presents with a condition of Moderate complexity.   Presentation of Symptoms: Stable       Functional Limitations: Activity tolerance, Completing work/school activities, Pain when reaching, Pain with ADLs/IADLs, Sitting tolerance, Disrupted sleep pattern, Range of motion, Performing household chores, Participating in leisure activities, Painful locomotion/ambulation, Bed mobility, Completing self-care activities, Decreased ambulation distance/endurance, Functional mobility, Gait limitations, Getting off the floor, Maintaining balance, Squatting, Standing tolerance  Impairments: Activity " intolerance, Abnormal or restricted range of motion, Impaired physical strength, Lack of appropriate home exercise program, Pain with functional activity    Patient Goal for Therapy (PT): To decrease his back pain when he is wlaking  Prognosis: Fair  Assessment Details: Patient is a 69 y.o. male with medical diagnosis of Lumbar facet arthropathy, Chronic pain syndrome, and Degeneration of intervertebral disc of lumbar region, unspecified whether pain present. He presents with signs and symptoms consistent with medical diagnosis. He has mobility deficits of lumbar spine and bilateral hips with internal rotation. Patient possible has involvements of bilateral hips due to reproduction of symptoms with hip special tests. Patient was inconsistent with reports of increase in radicular symptoms with adverse neural tension testing. He would benefit from skilled PT to address mobility and strength deficits to improve standing tolerance to allow him to return to neuro PT for his balance and decrease his risk for falls.     Plan  From a physical therapy perspective, the patient would benefit from: Skilled Rehab Services    Planned therapy interventions include: Therapeutic exercise, Therapeutic activities, Neuromuscular re-education, Manual therapy, ADLs/IADLs, Other (Comment), and Gait training. Dry Needling (prn)  Planned modalities to include: Biofeedback, Electrical stimulation - attended, Electrical stimulation - passive/unattended, Thermotherapy (hot pack), and Cryotherapy (cold pack).        Visit Frequency: 2 times Per Week for 8 Weeks.       This plan was discussed with Patient.   Discussion participants: Agreed Upon Plan of Care  Plan details: Frequency and duration of treatment to be adjusted as needed          The patient's spiritual, cultural, and educational needs were considered, and the patient is agreeable to the plan of care and goals.           Goals:   Active       long term goals        patient will be  independent with Home exercise program to supplement therapy        Start:  05/27/25    Expected End:  08/05/25            Patient will improve FOTO score to 53% to improve self perceived ability to perform functional tasks         Start:  05/27/25    Expected End:  08/05/25            Patient will be able to perform 12 sit to  30 seconds to improve functional strength and mobility       Start:  05/27/25    Expected End:  08/05/25            Patient will be able to stand for 10 minutes to improve ability to perform functional tasks like preparing meals        Start:  05/27/25    Expected End:  08/05/25            Patient will be able to navigate one flight of stairs with little to no difficulty to improve ability to navigate his home environment        Start:  05/27/25    Expected End:  08/05/25            patient will improve bilateral lower extremity strength to 4/5 to improve ability to perform functional tasks        Start:  05/27/25    Expected End:  08/05/25                Florinda Emery, PT ,DPT,OCS  05/27/2025

## 2025-05-30 ENCOUNTER — CLINICAL SUPPORT (OUTPATIENT)
Dept: REHABILITATION | Facility: HOSPITAL | Age: 70
End: 2025-05-30
Payer: MEDICARE

## 2025-05-30 DIAGNOSIS — R68.89 DECREASED STRENGTH, ENDURANCE, AND MOBILITY: ICD-10-CM

## 2025-05-30 DIAGNOSIS — R53.1 DECREASED STRENGTH, ENDURANCE, AND MOBILITY: ICD-10-CM

## 2025-05-30 DIAGNOSIS — Z74.09 DECREASED STRENGTH, ENDURANCE, AND MOBILITY: ICD-10-CM

## 2025-05-30 DIAGNOSIS — M53.86 DECREASED ROM OF LUMBAR SPINE: Primary | ICD-10-CM

## 2025-05-30 PROCEDURE — 97110 THERAPEUTIC EXERCISES: CPT | Mod: PN

## 2025-05-30 PROCEDURE — 97112 NEUROMUSCULAR REEDUCATION: CPT | Mod: PN

## 2025-05-30 NOTE — PROGRESS NOTES
"  Outpatient Rehab    Physical Therapy Visit     Patient Name: Neena Kohli  MRN: [id number]  YOB: 1955  Encounter Date: 5/30/2025    Therapy Diagnosis:   Encounter Diagnoses   Name Primary?    Decreased ROM of lumbar spine Yes    Decreased strength, endurance, and mobility      Physician: Roxane Harp MD    Physician Orders: Eval and Treat  Medical Diagnosis: Abnormality of gait and mobility  Frequent falls    Visit # / Visits Authorized:  11 / 20  Insurance Authorization Period: 3/13/2025 to 6/30/2025  Date of Evaluation: 5/26/2025  Plan of Care Certification: 5/27/2025 to 8/5/2025      PT/PTA: PT   Number of PTA visits since last PT visit:0  Time In: 0900   Time Out: 0945  Total Time (in minutes): 45   Total Billable Time (in minutes): 23    FOTO:  Intake Score:  %  Survey Score 2:  %  Survey Score 3:  %    Precautions:       Subjective   He is doing okay. He has been doing his exercises at home. He feels like he is walking a little better..  Pain reported as 8/10.      Objective            Treatment:  Therapeutic Exercise  TE 1: seated flexion with peanut ball 5"x20  TE 2: seated lumbar rotation 5" 2x10 ea  TE 3: DKTC with peanut ball 5" x 20  TE 4: LTR 5" x 20  TE 5: Nustep x 8 minutes  Balance/Neuromuscular Re-Education  NMR 1: side lying hip abduction 3x10 ea  NMR 2: hook lying clams GTB 3x10 ea  Therapeutic Activity  TA 1: cybex leg press 4 plates 3x10  TA 2: cybex leg press 2 plates 3x10 SL    Time Entry(in minutes):  Neuromuscular Re-Education Time Entry: 10  Therapeutic Activity Time Entry: 10  Therapeutic Exercise Time Entry: 25    Assessment & Plan   Assessment: Patient was seen for his first follow up visit since initial evaluation. He arrived to [facility] reporting high amount of pain but displayed improved gait as compared to initial evaluation. PT continued with flexion based exercises to improve mobility and radicular symptoms. Patient denied increase in symptoms during " today's session. He was challenged and fatigued with leg press and required moderate cues to perform with slow and controlled movements.       The patient will continue to benefit from skilled outpatient physical therapy in order to address the deficits listed in the problem list on the initial evaluation, provide patient and family education, and maximize the patients level of independence in the home and community environments.     The patient's spiritual, cultural, and educational needs were considered, and the patient is agreeable to the plan of care and goals.           Plan: Improve lumbar mobility and strength    Goals:   Active       long term goals        patient will be independent with Home exercise program to supplement therapy        Start:  05/27/25    Expected End:  08/05/25            Patient will improve FOTO score to 53% to improve self perceived ability to perform functional tasks         Start:  05/27/25    Expected End:  08/05/25            Patient will be able to perform 12 sit to  30 seconds to improve functional strength and mobility       Start:  05/27/25    Expected End:  08/05/25            Patient will be able to stand for 10 minutes to improve ability to perform functional tasks like preparing meals        Start:  05/27/25    Expected End:  08/05/25            Patient will be able to navigate one flight of stairs with little to no difficulty to improve ability to navigate his home environment        Start:  05/27/25    Expected End:  08/05/25            patient will improve bilateral lower extremity strength to 4/5 to improve ability to perform functional tasks        Start:  05/27/25    Expected End:  08/05/25                Florinda Emery, PT ,DPT,OCS  05/30/2025

## 2025-06-02 ENCOUNTER — CLINICAL SUPPORT (OUTPATIENT)
Dept: REHABILITATION | Facility: HOSPITAL | Age: 70
End: 2025-06-02
Payer: MEDICARE

## 2025-06-02 DIAGNOSIS — Z74.09 DECREASED STRENGTH, ENDURANCE, AND MOBILITY: ICD-10-CM

## 2025-06-02 DIAGNOSIS — R53.1 DECREASED STRENGTH, ENDURANCE, AND MOBILITY: ICD-10-CM

## 2025-06-02 DIAGNOSIS — M53.86 DECREASED ROM OF LUMBAR SPINE: Primary | ICD-10-CM

## 2025-06-02 DIAGNOSIS — R68.89 DECREASED STRENGTH, ENDURANCE, AND MOBILITY: ICD-10-CM

## 2025-06-02 NOTE — PROGRESS NOTES
"  Outpatient Rehab    Physical Therapy Visit    Patient Name: Neena Kohli  MRN: [id number]  YOB: 1955  Encounter Date: 6/2/2025    Therapy Diagnosis:   Encounter Diagnoses   Name Primary?    Decreased ROM of lumbar spine Yes    Decreased strength, endurance, and mobility      Physician: Roxane Harp MD    Physician Orders: Eval and Treat  Medical Diagnosis: Abnormality of gait and mobility  Frequent falls    Visit # / Visits Authorized:  14 / 20  Insurance Authorization Period: 3/13/2025 to 6/30/2025  Date of Evaluation: 5/26/2025  Plan of Care Certification: 5/27/2025 to 8/5/2025      PT/PTA: PTA   Number of PTA visits since last PT visit:1  Time In: 1500   Time Out: 1600  Total Time (in minutes): 60   Total Billable Time (in minutes): 38    FOTO:  Intake Score:  %  Survey Score 2:  %  Survey Score 3:  %    Precautions:       Subjective   agreeable to PT session.  Pain reported as 6/10. B mid lower back    Objective            Treatment:  Therapeutic Exercise  TE 1: seated flexion with peanut ball 5"x20  TE 2: seated lumbar rotation 5" 2x10 ea  TE 3: DKTC with peanut ball 5" x 20  TE 4: LTR 5" x 20  Balance/Neuromuscular Re-Education  NMR 1: side lying hip abduction 3x10 ea  NMR 2: hook lying clams GTB 3x10 ea  Therapeutic Activity  TA 1: cybex leg press 4 plates 3x10  TA 2: cybex leg press 2 plates 3x10 SL  TA 3: side lying hip abduction 2x10 ea    Time Entry(in minutes):  Neuromuscular Re-Education Time Entry: 10  Therapeutic Activity Time Entry: 10  Therapeutic Exercise Time Entry: 18    Assessment & Plan   Assessment: Pt completed today's session focusing on lumbar/ B LE strengthening within tolerance. requires verbal instructions on technique and for paced performance of exercises. Rest breaks granted as needed for general fatigue recovery.       The patient will continue to benefit from skilled outpatient physical therapy in order to address the deficits listed in the problem list on " the initial evaluation, provide patient and family education, and maximize the patients level of independence in the home and community environments.     The patient's spiritual, cultural, and educational needs were considered, and the patient is agreeable to the plan of care and goals.           Plan: Improve lumbar mobility and strength, Progress within tolerance.    Goals:   Active       long term goals        patient will be independent with Home exercise program to supplement therapy        Start:  05/27/25    Expected End:  08/05/25            Patient will improve FOTO score to 53% to improve self perceived ability to perform functional tasks         Start:  05/27/25    Expected End:  08/05/25            Patient will be able to perform 12 sit to  30 seconds to improve functional strength and mobility       Start:  05/27/25    Expected End:  08/05/25            Patient will be able to stand for 10 minutes to improve ability to perform functional tasks like preparing meals        Start:  05/27/25    Expected End:  08/05/25            Patient will be able to navigate one flight of stairs with little to no difficulty to improve ability to navigate his home environment        Start:  05/27/25    Expected End:  08/05/25            patient will improve bilateral lower extremity strength to 4/5 to improve ability to perform functional tasks        Start:  05/27/25    Expected End:  08/05/25                Mark Anthony Johnson PTA

## 2025-06-04 ENCOUNTER — CLINICAL SUPPORT (OUTPATIENT)
Dept: REHABILITATION | Facility: HOSPITAL | Age: 70
End: 2025-06-04
Payer: MEDICARE

## 2025-06-04 DIAGNOSIS — Z74.09 DECREASED STRENGTH, ENDURANCE, AND MOBILITY: ICD-10-CM

## 2025-06-04 DIAGNOSIS — R68.89 DECREASED STRENGTH, ENDURANCE, AND MOBILITY: ICD-10-CM

## 2025-06-04 DIAGNOSIS — R53.1 DECREASED STRENGTH, ENDURANCE, AND MOBILITY: ICD-10-CM

## 2025-06-04 DIAGNOSIS — M53.86 DECREASED ROM OF LUMBAR SPINE: Primary | ICD-10-CM

## 2025-06-04 PROCEDURE — 97112 NEUROMUSCULAR REEDUCATION: CPT | Mod: HCNC,PN,CQ

## 2025-06-04 PROCEDURE — 97530 THERAPEUTIC ACTIVITIES: CPT | Mod: HCNC,PN,CQ

## 2025-06-04 NOTE — PROGRESS NOTES
"  Outpatient Rehab    Physical Therapy Visit    Patient Name: Neena Kohli  MRN: [id number]  YOB: 1955  Encounter Date: 6/4/2025    Therapy Diagnosis:   Encounter Diagnoses   Name Primary?    Decreased ROM of lumbar spine Yes    Decreased strength, endurance, and mobility      Physician: Roxane Harp MD    Physician Orders: Eval and Treat  Medical Diagnosis: Abnormality of gait and mobility  Frequent falls    Visit # / Visits Authorized:  14 / 20  Insurance Authorization Period: 3/13/2025 to 6/30/2025  Date of Evaluation: 5/26/2025  Plan of Care Certification: 5/27/2025 to 8/5/2025      PT/PTA: PTA   Number of PTA visits since last PT visit:2  Time In: 1400   Time Out: 1500  Total Time (in minutes): 60   Total Billable Time (in minutes): 30    FOTO:  Intake Score:  %  Survey Score 2:  %  Survey Score 3:  %    Precautions:       Subjective   "I was hurting enought to take a pain pill before coming". pt agreeable to PT session..  Pain reported as 7/10. B mid lower back    Objective            Treatment:  Therapeutic Exercise  TE 1: seated flexion with peanut ball 5"x20  TE 2: seated lumbar rotation 5" 2x10 ea  TE 7: Nustep level 2.5 for 8 minutes  TE 8: 3 x 30' passive SKTC stretch, each LE  TE 9: supine sciatic nerve glides, tactile cueing to help with coordingation of movement  Balance/Neuromuscular Re-Education  NMR 1: side lying hip abduction 3x10 ea  NMR 2: hook lying clams GTB 3x10 ea  NMR 3: x 4 laps in bar, ambulating stepping over  (4) hurdles  Therapeutic Activity  TA 1: cybex leg press 4 plates 3x10  TA 2: cybex leg press 2 plates 3x10 SL  TA 3: side lying hip abduction 2x10 ea      Time Entry(in minutes):  Neuromuscular Re-Education Time Entry: 15  Therapeutic Activity Time Entry: 15    Assessment & Plan   Assessment: Pt completed today's session focusing on lumbar/ B LE strengthening within tolerance. requires verbal instructions on technique and for paced performance of exercises. " He reported general fatigue at end of session but no worsening of pain .  Evaluation/Treatment Tolerance: Patient limited by pain    The patient will continue to benefit from skilled outpatient physical therapy in order to address the deficits listed in the problem list on the initial evaluation, provide patient and family education, and maximize the patients level of independence in the home and community environments.     The patient's spiritual, cultural, and educational needs were considered, and the patient is agreeable to the plan of care and goals.           Plan: Improve lumbar mobility and strength, Progress within tolerance.    Goals:   Active       long term goals        patient will be independent with Home exercise program to supplement therapy        Start:  05/27/25    Expected End:  08/05/25            Patient will improve FOTO score to 53% to improve self perceived ability to perform functional tasks         Start:  05/27/25    Expected End:  08/05/25            Patient will be able to perform 12 sit to  30 seconds to improve functional strength and mobility       Start:  05/27/25    Expected End:  08/05/25            Patient will be able to stand for 10 minutes to improve ability to perform functional tasks like preparing meals        Start:  05/27/25    Expected End:  08/05/25            Patient will be able to navigate one flight of stairs with little to no difficulty to improve ability to navigate his home environment        Start:  05/27/25    Expected End:  08/05/25            patient will improve bilateral lower extremity strength to 4/5 to improve ability to perform functional tasks        Start:  05/27/25    Expected End:  08/05/25                Mark Anthony Johnson PTA

## 2025-06-06 ENCOUNTER — OFFICE VISIT (OUTPATIENT)
Dept: INTERNAL MEDICINE | Facility: CLINIC | Age: 70
End: 2025-06-06
Payer: MEDICARE

## 2025-06-06 VITALS
RESPIRATION RATE: 16 BRPM | HEART RATE: 45 BPM | BODY MASS INDEX: 25.81 KG/M2 | TEMPERATURE: 97 F | SYSTOLIC BLOOD PRESSURE: 160 MMHG | HEIGHT: 67 IN | OXYGEN SATURATION: 98 % | WEIGHT: 164.44 LBS | DIASTOLIC BLOOD PRESSURE: 60 MMHG

## 2025-06-06 DIAGNOSIS — M79.662 PAIN IN LEFT LOWER LEG: Primary | ICD-10-CM

## 2025-06-06 DIAGNOSIS — R03.0 ELEVATED BLOOD PRESSURE READING: ICD-10-CM

## 2025-06-06 PROCEDURE — 99999 PR PBB SHADOW E&M-EST. PATIENT-LVL V: CPT | Mod: PBBFAC,HCNC,, | Performed by: PHYSICIAN ASSISTANT

## 2025-06-06 RX ORDER — OXYCODONE HYDROCHLORIDE 30 MG/1
30 TABLET ORAL EVERY 6 HOURS PRN
COMMUNITY

## 2025-06-09 ENCOUNTER — PATIENT MESSAGE (OUTPATIENT)
Dept: ADMINISTRATIVE | Facility: HOSPITAL | Age: 70
End: 2025-06-09
Payer: MEDICARE

## 2025-06-10 ENCOUNTER — CLINICAL SUPPORT (OUTPATIENT)
Dept: REHABILITATION | Facility: HOSPITAL | Age: 70
End: 2025-06-10
Payer: MEDICARE

## 2025-06-10 DIAGNOSIS — R68.89 DECREASED STRENGTH, ENDURANCE, AND MOBILITY: ICD-10-CM

## 2025-06-10 DIAGNOSIS — R53.1 DECREASED STRENGTH, ENDURANCE, AND MOBILITY: ICD-10-CM

## 2025-06-10 DIAGNOSIS — Z74.09 DECREASED STRENGTH, ENDURANCE, AND MOBILITY: ICD-10-CM

## 2025-06-10 DIAGNOSIS — M53.86 DECREASED ROM OF LUMBAR SPINE: Primary | ICD-10-CM

## 2025-06-10 PROCEDURE — 97110 THERAPEUTIC EXERCISES: CPT | Mod: HCNC,PN

## 2025-06-10 PROCEDURE — 97140 MANUAL THERAPY 1/> REGIONS: CPT | Mod: HCNC,PN

## 2025-06-10 NOTE — PROGRESS NOTES
"  Outpatient Rehab    Physical Therapy Visit    Patient Name: Neena Kohli  MRN: [id number]  YOB: 1955  Encounter Date: 6/10/2025    Therapy Diagnosis:   Encounter Diagnoses   Name Primary?    Decreased ROM of lumbar spine Yes    Decreased strength, endurance, and mobility      Physician: Roxane Harp MD    Physician Orders: Eval and Treat  Medical Diagnosis: Abnormality of gait and mobility  Frequent falls    Visit # / Visits Authorized:  14 / 20  Insurance Authorization Period: 3/13/2025 to 6/30/2025  Date of Evaluation: 5/26/2025  Plan of Care Certification: 5/27/2025 to 8/5/2025      PT/PTA: PT   Number of PTA visits since last PT visit:0  Time In: 1300   Time Out: 1400  Total Time (in minutes): 60   Total Billable Time (in minutes): 30    FOTO:  Intake Score:  %  Survey Score 2:  %  Survey Score 3:  %    Precautions:       Subjective   He is feeling better today than he did last week. "My back is always going to hurt".  Pain reported as 7/10. B mid lower back    Objective            Treatment:  Therapeutic Exercise  TE 1: seated flexion with peanut ball 5"x20  TE 2: seated lumbar rotation 5" 2x10 ea  TE 4: LTR 5" x 20  TE 5: Nustep x 8 minutes  TE 9: supine sciatic nerve glides x10 ea  Manual Therapy  MT 1: inferior lateral hip distraction with PROM IR  Balance/Neuromuscular Re-Education  NMR 2: hook lying clams GTB 3x10 ea  Therapeutic Activity  TA 1: cybex leg press 4 plates 3x10      Time Entry(in minutes):  Manual Therapy Time Entry: 10  Neuromuscular Re-Education Time Entry: 5  Therapeutic Activity Time Entry: 5  Therapeutic Exercise Time Entry: 40    Assessment & Plan   Assessment: Patient arrived to today's session with high amount of pain. PT performed joint mobilizations to bilateral hips to improve mobility and decrease pain. He reported some improvement in pain at end of treatment session. He would continue to benefit from mobility as well as strengthening to improve overall " function and QOL.       The patient will continue to benefit from skilled outpatient physical therapy in order to address the deficits listed in the problem list on the initial evaluation, provide patient and family education, and maximize the patients level of independence in the home and community environments.     The patient's spiritual, cultural, and educational needs were considered, and the patient is agreeable to the plan of care and goals.           Plan: Improve lumbar mobility and strength, Progress within tolerance.    Goals:   Active       long term goals        patient will be independent with Home exercise program to supplement therapy        Start:  05/27/25    Expected End:  08/05/25            Patient will improve FOTO score to 53% to improve self perceived ability to perform functional tasks         Start:  05/27/25    Expected End:  08/05/25            Patient will be able to perform 12 sit to  30 seconds to improve functional strength and mobility       Start:  05/27/25    Expected End:  08/05/25            Patient will be able to stand for 10 minutes to improve ability to perform functional tasks like preparing meals        Start:  05/27/25    Expected End:  08/05/25            Patient will be able to navigate one flight of stairs with little to no difficulty to improve ability to navigate his home environment        Start:  05/27/25    Expected End:  08/05/25            patient will improve bilateral lower extremity strength to 4/5 to improve ability to perform functional tasks        Start:  05/27/25    Expected End:  08/05/25                Florinda Emery, PT ,DPT,OCS  06/10/2025

## 2025-06-12 ENCOUNTER — CLINICAL SUPPORT (OUTPATIENT)
Dept: REHABILITATION | Facility: HOSPITAL | Age: 70
End: 2025-06-12
Payer: MEDICARE

## 2025-06-12 DIAGNOSIS — M53.86 DECREASED ROM OF LUMBAR SPINE: Primary | ICD-10-CM

## 2025-06-12 DIAGNOSIS — R68.89 DECREASED STRENGTH, ENDURANCE, AND MOBILITY: ICD-10-CM

## 2025-06-12 DIAGNOSIS — R53.1 DECREASED STRENGTH, ENDURANCE, AND MOBILITY: ICD-10-CM

## 2025-06-12 DIAGNOSIS — Z74.09 DECREASED STRENGTH, ENDURANCE, AND MOBILITY: ICD-10-CM

## 2025-06-12 PROCEDURE — 97110 THERAPEUTIC EXERCISES: CPT | Mod: HCNC,PN

## 2025-06-12 PROCEDURE — 97140 MANUAL THERAPY 1/> REGIONS: CPT | Mod: HCNC,PN

## 2025-06-12 PROCEDURE — 97530 THERAPEUTIC ACTIVITIES: CPT | Mod: HCNC,PN

## 2025-06-12 NOTE — PROGRESS NOTES
"  Outpatient Rehab    Physical Therapy Visit    Patient Name: Neena Kohli  MRN: [id number]  YOB: 1955  Encounter Date: 6/12/2025    Therapy Diagnosis:   Encounter Diagnoses   Name Primary?    Decreased ROM of lumbar spine Yes    Decreased strength, endurance, and mobility      Physician: Roxane Harp MD    Physician Orders: Eval and Treat  Medical Diagnosis: Abnormality of gait and mobility  Frequent falls    Visit # / Visits Authorized:  15 / 20  Insurance Authorization Period: 3/13/2025 to 6/30/2025  Date of Evaluation: 5/26/2025  Plan of Care Certification: 5/27/2025 to 8/5/2025      PT/PTA: PT   Number of PTA visits since last PT visit:0  Time In: 1300   Time Out: 1355  Total Time (in minutes): 55   Total Billable Time (in minutes): 45    FOTO:  Intake Score:  %  Survey Score 2:  %  Survey Score 3:  %    Precautions:       Subjective   He is feeling good today. He always has some pain and took a pain pill before coming but he is doing better today..  Pain reported as 5/10. B mid lower back    Objective            Treatment:  Therapeutic Exercise  TE 1: seated flexion with peanut ball 5"x30  TE 2: seated lumbar rotation 5" 2x10 ea  TE 3: DKTC with peanut ball 5" x 20  TE 4: LTR 5" x 20  TE 5: Nustep x 8 minutes lvl3  Manual Therapy  MT 1: inferior lateral hip distraction with PROM IR  Balance/Neuromuscular Re-Education  NMR 2: hook lying clams GTB 3x10 ea  Therapeutic Activity  TA 1: cybex leg press 4 plates 3x10  TA 2: cybex leg press 2 plates 3x10 SL      Time Entry(in minutes):  Manual Therapy Time Entry: 15  Neuromuscular Re-Education Time Entry: 5  Therapeutic Activity Time Entry: 10  Therapeutic Exercise Time Entry: 25    Assessment & Plan   Assessment: Patient arrived to today's session with overall improvements in symptoms but some continued low back pain. PT continued with bilateral hip mobilizations to improve mobility and pain. Patient responded well to treatment with reports of " improvement in pain. He requires some cues throughout session for slow and controlled movements and to decrease compensations with exercises. He was approproately fatigued at end of treatment session.       The patient will continue to benefit from skilled outpatient physical therapy in order to address the deficits listed in the problem list on the initial evaluation, provide patient and family education, and maximize the patients level of independence in the home and community environments.     The patient's spiritual, cultural, and educational needs were considered, and the patient is agreeable to the plan of care and goals.           Plan: Improve lumbar mobility and strength, Progress within tolerance.    Goals:   Active       long term goals        patient will be independent with Home exercise program to supplement therapy        Start:  05/27/25    Expected End:  08/05/25            Patient will improve FOTO score to 53% to improve self perceived ability to perform functional tasks         Start:  05/27/25    Expected End:  08/05/25            Patient will be able to perform 12 sit to  30 seconds to improve functional strength and mobility       Start:  05/27/25    Expected End:  08/05/25            Patient will be able to stand for 10 minutes to improve ability to perform functional tasks like preparing meals        Start:  05/27/25    Expected End:  08/05/25            Patient will be able to navigate one flight of stairs with little to no difficulty to improve ability to navigate his home environment        Start:  05/27/25    Expected End:  08/05/25            patient will improve bilateral lower extremity strength to 4/5 to improve ability to perform functional tasks        Start:  05/27/25    Expected End:  08/05/25                Florinda Emery, PT ,DPT,OCS  06/12/2025

## 2025-06-17 ENCOUNTER — CLINICAL SUPPORT (OUTPATIENT)
Dept: REHABILITATION | Facility: HOSPITAL | Age: 70
End: 2025-06-17
Payer: MEDICARE

## 2025-06-17 DIAGNOSIS — Z74.09 DECREASED STRENGTH, ENDURANCE, AND MOBILITY: ICD-10-CM

## 2025-06-17 DIAGNOSIS — R68.89 DECREASED STRENGTH, ENDURANCE, AND MOBILITY: ICD-10-CM

## 2025-06-17 DIAGNOSIS — M53.86 DECREASED ROM OF LUMBAR SPINE: Primary | ICD-10-CM

## 2025-06-17 DIAGNOSIS — R53.1 DECREASED STRENGTH, ENDURANCE, AND MOBILITY: ICD-10-CM

## 2025-06-17 PROCEDURE — 97530 THERAPEUTIC ACTIVITIES: CPT | Mod: HCNC,PN,CQ

## 2025-06-23 NOTE — PROGRESS NOTES
"  Outpatient Rehab    Physical Therapy Visit    Patient Name: Neena Kohli  MRN: [id number]  YOB: 1955  Encounter Date: 6/17/2025    Therapy Diagnosis:   Encounter Diagnoses   Name Primary?    Decreased ROM of lumbar spine Yes    Decreased strength, endurance, and mobility      Physician: Roxane Harp MD    Physician Orders: Eval and Treat  Medical Diagnosis: Abnormality of gait and mobility  Frequent falls  Surgical Diagnosis: Not applicable for this Episode   Surgical Date: Not applicable for this Episode  Days Since Last Surgery: Not applicable for this Episode    Visit # / Visits Authorized:  16 / 20  Insurance Authorization Period: 3/13/2025 to 6/30/2025  Date of Evaluation: 5/26/2025  Plan of Care Certification: 5/27/2025 to 8/5/2025      PT/PTA: PTA   Number of PTA visits since last PT visit:1  Time In: 1300   Time Out: 1401  Total Time (in minutes): 61   Total Billable Time (in minutes): 23    FOTO:  Intake Score:  %  Survey Score 2:  %  Survey Score 3:  %    Precautions:       Subjective   Pt agreeable to PT session. Reports taking pain pills prior to arrival  today..  Pain reported as 6/10. B mid lower back    Objective            Treatment:  Therapeutic Exercise  TE 1: seated flexion with peanut ball 5"x30  TE 2: seated lumbar rotation 5" 2x10 ea  TE 3: DKTC with peanut ball 5" x 20  TE 4: LTR 5" x 20  TE 5: Nustep x 8 minutes lvl3  TE 9: supine sciatic nerve glides x10 ea  Balance/Neuromuscular Re-Education  NMR 1: side lying hip abduction 3x10 ea  NMR 2: hook lying clams GTB 3x10 ea  Therapeutic Activity  TA 1: cybex leg press 4 plates 3x10  TA 2: cybex leg press 2 plates 3x10 SL  TA 3: side lying hip abduction 2x10 ea    Time Entry(in minutes):  Therapeutic Exercise Time Entry: 23    Assessment & Plan   Assessment: Pt completed treatment today with focus on lumbar strengthening / ROM within tolerance. Rest breaks encouraged between Mat activities and resistive machinery for " general fatigue recovery. Verbal instructions provided on pace of exercise completion. Reports of lumbar pain throughout treatment, but no greater than 6/10 pain scale.        The patient will continue to benefit from skilled outpatient physical therapy in order to address the deficits listed in the problem list on the initial evaluation, provide patient and family education, and maximize the patients level of independence in the home and community environments.     The patient's spiritual, cultural, and educational needs were considered, and the patient is agreeable to the plan of care and goals.           Plan: Improve lumbar mobility and strength, Progress within tolerance.    Goals:   Active       long term goals        patient will be independent with Home exercise program to supplement therapy        Start:  05/27/25    Expected End:  08/05/25            Patient will improve FOTO score to 53% to improve self perceived ability to perform functional tasks         Start:  05/27/25    Expected End:  08/05/25            Patient will be able to perform 12 sit to  30 seconds to improve functional strength and mobility       Start:  05/27/25    Expected End:  08/05/25            Patient will be able to stand for 10 minutes to improve ability to perform functional tasks like preparing meals        Start:  05/27/25    Expected End:  08/05/25            Patient will be able to navigate one flight of stairs with little to no difficulty to improve ability to navigate his home environment        Start:  05/27/25    Expected End:  08/05/25            patient will improve bilateral lower extremity strength to 4/5 to improve ability to perform functional tasks        Start:  05/27/25    Expected End:  08/05/25                Mark Anthony Johnson PTA

## 2025-06-24 ENCOUNTER — CLINICAL SUPPORT (OUTPATIENT)
Dept: REHABILITATION | Facility: HOSPITAL | Age: 70
End: 2025-06-24
Payer: MEDICARE

## 2025-06-24 DIAGNOSIS — M53.86 DECREASED ROM OF LUMBAR SPINE: Primary | ICD-10-CM

## 2025-06-24 DIAGNOSIS — R68.89 DECREASED STRENGTH, ENDURANCE, AND MOBILITY: ICD-10-CM

## 2025-06-24 DIAGNOSIS — Z74.09 DECREASED STRENGTH, ENDURANCE, AND MOBILITY: ICD-10-CM

## 2025-06-24 DIAGNOSIS — R53.1 DECREASED STRENGTH, ENDURANCE, AND MOBILITY: ICD-10-CM

## 2025-06-24 PROCEDURE — 97530 THERAPEUTIC ACTIVITIES: CPT | Mod: HCNC,PN,CQ

## 2025-06-24 PROCEDURE — 97112 NEUROMUSCULAR REEDUCATION: CPT | Mod: HCNC,PN,CQ

## 2025-06-24 PROCEDURE — 97110 THERAPEUTIC EXERCISES: CPT | Mod: HCNC,PN,CQ

## 2025-06-24 NOTE — PROGRESS NOTES
"  Outpatient Rehab    Physical Therapy Visit    Patient Name: Neena Kohli  MRN: [id number]  YOB: 1955  Encounter Date: 6/24/2025    Therapy Diagnosis:   Encounter Diagnoses   Name Primary?    Decreased ROM of lumbar spine Yes    Decreased strength, endurance, and mobility      Physician: Roxane Harp MD    Physician Orders: Eval and Treat  Medical Diagnosis: Abnormality of gait and mobility  Frequent falls  Surgical Diagnosis: Not applicable for this Episode   Surgical Date: Not applicable for this Episode  Days Since Last Surgery: Not applicable for this Episode    Visit # / Visits Authorized:  18 / 30  Insurance Authorization Period: 3/13/2025 to 12/31/2025  Date of Evaluation: 5/26/2025  Plan of Care Certification: 5/27/2025 to 8/5/2025      PT/PTA: PTA   Number of PTA visits since last PT visit:1  Time In: 1300   Time Out: 1400  Total Time (in minutes): 60   Total Billable Time (in minutes): 38    FOTO:  Intake Score:  %  Survey Score 2:  %  Survey Score 3:  %    Precautions:       Subjective   Pt agreeable to PT session. Reports taking pain pills prior to arrival  today..  Pain reported as 6/10. B mid lower back    Objective            Treatment:  Therapeutic Exercise  TE 1: seated flexion with peanut ball 5"x30  TE 2: seated lumbar rotation 5" 2x10 ea  TE 3: DKTC with peanut ball 5" x 20  TE 4: LTR 5" x 20  TE 7: Nustep level 2.5 for 8 minutes  TE 8: 3 x 30' passive SKTC stretch, each LE  TE 9: supine sciatic nerve glides x10 ea  Balance/Neuromuscular Re-Education  NMR 1: side lying hip abduction 3x10 ea  NMR 2: hook lying clams GTB 3x10 ea  NMR 3: bridges 5" 3x10  Therapeutic Activity  TA 1: cybex leg press 4 plates 3x10  TA 2: cybex leg press 2 plates 3x10 SL  TA 3: standing hip abduction 2x10 ea  TA 4: standing hip extension 2x10ea  TA 5: step ups 6" 3x10 ea    Time Entry(in minutes):       Assessment & Plan   Assessment: Pt completed today's session with no new reports of pain, " however difficult to assess pain due to pt under effects of pain meds. He requires verbal instructions to prevent rushing through activities and to correct technique with weighted machinery.       The patient will continue to benefit from skilled outpatient physical therapy in order to address the deficits listed in the problem list on the initial evaluation, provide patient and family education, and maximize the patients level of independence in the home and community environments.     The patient's spiritual, cultural, and educational needs were considered, and the patient is agreeable to the plan of care and goals.           Plan: Improve lumbar mobility and strength, Progress within tolerance.    Goals:   Active       long term goals        patient will be independent with Home exercise program to supplement therapy  (Progressing)       Start:  05/27/25    Expected End:  08/05/25            Patient will improve FOTO score to 53% to improve self perceived ability to perform functional tasks   (Progressing)       Start:  05/27/25    Expected End:  08/05/25            Patient will be able to perform 12 sit to  30 seconds to improve functional strength and mobility (Progressing)       Start:  05/27/25    Expected End:  08/05/25            Patient will be able to stand for 10 minutes to improve ability to perform functional tasks like preparing meals  (Met)       Start:  05/27/25    Expected End:  08/05/25    Resolved:  06/26/25         Patient will be able to navigate one flight of stairs with little to no difficulty to improve ability to navigate his home environment  (Met)       Start:  05/27/25    Expected End:  08/05/25    Resolved:  06/26/25         patient will improve bilateral lower extremity strength to 4/5 to improve ability to perform functional tasks  (Met)       Start:  05/27/25    Expected End:  08/05/25    Resolved:  06/26/25             Mark Anthony Johnson PTA

## 2025-06-25 ENCOUNTER — OFFICE VISIT (OUTPATIENT)
Dept: CARDIOLOGY | Facility: CLINIC | Age: 70
End: 2025-06-25
Payer: MEDICARE

## 2025-06-25 VITALS
OXYGEN SATURATION: 96 % | HEIGHT: 67 IN | HEART RATE: 47 BPM | DIASTOLIC BLOOD PRESSURE: 66 MMHG | WEIGHT: 174.25 LBS | SYSTOLIC BLOOD PRESSURE: 184 MMHG | BODY MASS INDEX: 27.35 KG/M2

## 2025-06-25 DIAGNOSIS — I73.9 PAD (PERIPHERAL ARTERY DISEASE): ICD-10-CM

## 2025-06-25 DIAGNOSIS — I25.10 CORONARY ARTERY DISEASE INVOLVING NATIVE CORONARY ARTERY OF NATIVE HEART WITHOUT ANGINA PECTORIS: ICD-10-CM

## 2025-06-25 DIAGNOSIS — I73.9 INTERMITTENT CLAUDICATION: ICD-10-CM

## 2025-06-25 DIAGNOSIS — R07.9 CHEST PAIN, UNSPECIFIED TYPE: Primary | ICD-10-CM

## 2025-06-25 DIAGNOSIS — E78.2 MIXED HYPERLIPIDEMIA: ICD-10-CM

## 2025-06-25 DIAGNOSIS — I73.9 CLAUDICATION OF BOTH LOWER EXTREMITIES: ICD-10-CM

## 2025-06-25 DIAGNOSIS — I10 HYPERTENSION, ESSENTIAL: ICD-10-CM

## 2025-06-25 DIAGNOSIS — Z95.5 PRESENCE OF STENT IN CORONARY ARTERY: ICD-10-CM

## 2025-06-25 PROCEDURE — 99999 PR PBB SHADOW E&M-EST. PATIENT-LVL III: CPT | Mod: PBBFAC,HCNC,, | Performed by: INTERNAL MEDICINE

## 2025-06-25 PROCEDURE — 93000 ELECTROCARDIOGRAM COMPLETE: CPT | Mod: HCNC,S$GLB,, | Performed by: INTERNAL MEDICINE

## 2025-06-25 RX ORDER — CILOSTAZOL 50 MG/1
50 TABLET ORAL 2 TIMES DAILY
Qty: 60 TABLET | Refills: 11 | Status: SHIPPED | OUTPATIENT
Start: 2025-06-25 | End: 2026-06-25

## 2025-06-25 NOTE — PROGRESS NOTES
Subjective:   @Patient ID:  Neena Kohli is a 69 y.o. male who presents for evaluation of No chief complaint on file.      HPI:       Patient is a 69-year-old male, patient of Dr. hernandez, here to establish care with a new cardiologist.  unable to get an earlier appointment with primary cardiologist.  Active medical problems include        -history of hypertension that is controlled as long as he is taking his medications regularly currently on carvedilol HCTZ or irbesartan at hydralazine.  -hyperlipidemia LDL of 40 on Lipitor 80 mg  -on aspirin therapy 81 mg daily  -history of CAD with bare metal stent in the RCA.  Late stent thrombosis noted in 2010.  -current smoker trying to quit  -history of peripheral arterial disease with intermittent claudication.  Able to walk 4 city blocks.  Previous arterial ultrasound showed left popliteal artery occlusion and significant stenosis of right common femoral and superficial femoral arteries.  -echocardiogram in 2023 with normal systolic and diastolic function mild mitral regurgitation.  -negative stress test in 2021          No chest pain or pressure.  Does have intermittent claudication symptoms.  We discussed significant pulse pressure likely coming from noncompliant vessels.    Results for orders placed during the hospital encounter of 09/03/23    Echo Saline Bubble? No    Interpretation Summary    Left Ventricle: The left ventricle is normal in size. Normal wall thickness. Normal wall motion. There is normal systolic function. Biplane (2D) method of discs ejection fraction is 71%. There is normal diastolic function.    Left Atrium: Left atrium is moderately dilated.    Right Ventricle: Normal right ventricular cavity size. Wall thickness is normal. Right ventricle wall motion  is normal. Systolic function is normal.    Mitral Valve: There is mild regurgitation.    Tricuspid Valve: There is mild regurgitation.    Pulmonary Artery: The estimated pulmonary artery  systolic pressure is 20 mmHg.    IVC/SVC: Normal venous pressure at 3 mmHg.      Results for orders placed during the hospital encounter of 06/04/21    Nuclear Stress - Cardiology Interpreted    Interpretation Summary    Normal myocardial perfusion scan. There is no evidence of myocardial ischemia or infarction.    The gated perfusion images showed an ejection fraction of 63% at rest. The gated perfusion images showed an ejection fraction of 72% post stress. Normal ejection fraction is greater than 51%.    There is normal wall motion at rest and post stress.    LV cavity size is normal at rest and normal at stress.    The EKG portion of this study is negative for ischemia.    The patient reported no chest pain during the stress test.    During stress, rare PACs are noted.    There are no prior studies for comparison.      No results found for this or any previous visit.      Please document below the medical necessity for continuous telemetry monitoring or discontinue the current order if appropriate.    Current rhythm from flowsheet:               Patient Active Problem List    Diagnosis Date Noted    Decreased ROM of lumbar spine 05/27/2025    Decreased strength, endurance, and mobility 05/27/2025    Impaired functional mobility, balance, gait, and endurance 03/13/2025    Calcified granuloma of lung 02/25/2025    Claudication of both lower extremities 07/09/2024    Coronary artery disease involving native coronary artery of native heart with angina pectoris 05/16/2024    Pulmonary emphysema, unspecified emphysema type 05/16/2024    Calculus of gallbladder with acute cholecystitis and obstruction 09/04/2023    Hypertensive urgency 09/04/2023    Hypokalemia 09/04/2023    Sinus bradycardia 09/04/2023    Obstructive sleep apnea 09/04/2023    Major depressive disorder, recurrent severe without psychotic features 08/23/2023    Mild aortic valve stenosis 06/14/2022    Presence of stent in coronary artery 05/28/2021     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    Carcinoma of prostate 05/28/2021    Depressive disorder 05/28/2021    Mixed hyperlipidemia 05/28/2021    Impaired glucose tolerance 05/28/2021    Spondylosis of lumbosacral spine without myelopathy 05/28/2021    Ventricular premature beats 05/28/2021    Old MI (myocardial infarction)     Tobacco use disorder 03/26/2021    Hypertension associated with diabetes 01/18/2021    Type 2 diabetes mellitus with stage 3 chronic kidney disease, without long-term current use of insulin 12/11/2020    Coronary artery disease involving native coronary artery of native heart without angina pectoris 12/11/2020    PAD (peripheral artery disease) 12/11/2020    Dyslipidemia associated with type 2 diabetes mellitus 12/11/2020    Diabetes mellitus with peripheral circulatory disorder 12/11/2020    Hyperuricemia 12/09/2019    Microalbuminuria 12/09/2019    Hyperkalemia 12/09/2019    Iron deficiency anemia 12/09/2019    Hypertension, essential 12/09/2019    Lumbar facet arthropathy 10/05/2015    Chronic pain syndrome 10/05/2015    DDD (degenerative disc disease), lumbar 10/05/2015    Carpal tunnel syndrome 10/28/2013                    LAST HbA1c  Lab Results   Component Value Date    HGBA1C 5.7 (H) 03/07/2024       Lipid panel  Lab Results   Component Value Date    CHOL 86 (L) 03/07/2024    CHOL 113 (L) 01/24/2023    CHOL 88 (L) 01/21/2022     Lab Results   Component Value Date    HDL 28 (L) 03/07/2024    HDL 32 (L) 01/24/2023    HDL 30 (L) 01/21/2022     Lab Results   Component Value Date    LDLCALC 40.2 (L) 03/07/2024    LDLCALC 62.6 (L) 01/24/2023    LDLCALC 35.8 (L) 01/21/2022     Lab Results   Component Value Date    TRIG 89 03/07/2024    TRIG 92 01/24/2023    TRIG 111 01/21/2022     Lab Results   Component Value Date    CHOLHDL 32.6 03/07/2024    CHOLHDL 28.3 01/24/2023    CHOLHDL 34.1 01/21/2022            Review of Systems   Constitutional: Negative for chills and fever.   HENT:  Negative for  hearing loss and nosebleeds.    Eyes:  Negative for blurred vision.   Cardiovascular:         As in HPI    Respiratory:  Negative for cough, hemoptysis and shortness of breath.    Endocrine: Negative for cold intolerance and polyuria.   Hematologic/Lymphatic: Negative for bleeding problem.   Skin:  Negative for itching.   Musculoskeletal:  Negative for falls.   Gastrointestinal:  Negative for abdominal pain and hematochezia.   Genitourinary:  Negative for hematuria.   Neurological:  Negative for dizziness and loss of balance.   Psychiatric/Behavioral:  Negative for altered mental status and depression.        Objective:   Physical Exam  Constitutional:       Appearance: He is well-developed.   HENT:      Head: Normocephalic and atraumatic.   Eyes:      Conjunctiva/sclera: Conjunctivae normal.   Neck:      Vascular: No carotid bruit or JVD.   Cardiovascular:      Rate and Rhythm: Normal rate and regular rhythm.      Pulses:           Carotid pulses are 2+ on the right side and 2+ on the left side.       Radial pulses are 2+ on the right side and 2+ on the left side.      Heart sounds: Murmur heard.      No friction rub. No gallop.   Pulmonary:      Effort: Pulmonary effort is normal. No respiratory distress.      Breath sounds: Normal breath sounds. No stridor. No wheezing.   Abdominal:      General: Abdomen is flat.      Palpations: Abdomen is soft.   Musculoskeletal:      Cervical back: Neck supple.      Right lower leg: No edema.      Left lower leg: No edema.   Skin:     General: Skin is warm and dry.   Neurological:      Mental Status: He is alert and oriented to person, place, and time.   Psychiatric:         Behavior: Behavior normal.         Assessment:     1. Chest pain, unspecified type    2. Mixed hyperlipidemia    3. Coronary artery disease involving native coronary artery of native heart without angina pectoris    4. Presence of stent in coronary artery    5. Hypertension, essential    6. Claudication of  both lower extremities    7. Intermittent claudication    8. PAD (peripheral artery disease)        Plan:     -patient and his primary care physician concerned about significant difference in systolic and diastolic blood pressure with systolic blood pressure very elevated and diastolic in the 40s to 50 mm Hg range.  Likely this is from noncompliant vessels and likely has both systolic and diastolic hypertension.  Recommend aggressive blood pressure control  -continue high-intensity statin and aspirin therapy.  -no history of CHF.  Starting cilostazol for intermittent claudication.  -echocardiogram to rule out any major valvular abnormalities that can cause increased pulse pressure  -undergoing physical therapy these days for back pain and peripheral arterial disease.  -follow up in 3 months to assess need for invasive angiogram    Pertinent cardiac images and EKG reviewed independently.    Continue with current medical plan and lifestyle changes.  Return sooner for concerns or questions. If symptoms persist go to the ED  I have reviewed all pertinent data including patient's medical history in detail and updated the computerized patient record.     (Portions of this note were dictated using voice recognition software and may contain dictation related errors in spelling/grammar/syntax not found on text review)    Orders Placed This Encounter   Procedures    Ankle Brachial Indices (AMANDA)     Standing Status:   Future     Expiration Date:   6/25/2026     Exam Type::   Exercise     Release to patient:   Immediate    CV Ultrasound doppler arterial legs bilat     Standing Status:   Future     Expiration Date:   6/25/2026     Release to patient:   Immediate    IN OFFICE EKG 12-LEAD (to Ivor)    Echo     Standing Status:   Future     Expiration Date:   6/25/2026     Release to patient:   Immediate       Follow up as scheduled.     He expressed verbal understanding and agreed with the plan    Patient's Medications   New  Prescriptions    CILOSTAZOL (PLETAL) 50 MG TAB    Take 1 tablet (50 mg total) by mouth 2 (two) times daily.   Previous Medications    ALLOPURINOL (ZYLOPRIM) 100 MG TABLET    TAKE 1 TABLET EVERY DAY    AMLODIPINE (NORVASC) 10 MG TABLET    Take 1 tablet (10 mg total) by mouth once daily.    AMMONIUM LACTATE 12 % CREA    Apply 1 application  topically once daily. To dry skin on the feet.    ASPIRIN (ECOTRIN) 81 MG EC TABLET    Take 81 mg by mouth once daily.    ATORVASTATIN (LIPITOR) 80 MG TABLET    TAKE 1 TABLET EVERY EVENING    BUPROPION (WELLBUTRIN) 75 MG TABLET    Take by mouth.    CARVEDILOL (COREG) 25 MG TABLET    TAKE 1 TABLET TWICE DAILY    CHOLECALCIFEROL, VITAMIN D3, 1,250 MCG (50,000 UNIT) CAPSULE    Take 50,000 Units by mouth every 14 (fourteen) days.    CITALOPRAM (CELEXA) 20 MG TABLET    Take 20 mg by mouth once daily.    ERGOCALCIFEROL (ERGOCALCIFEROL) 50,000 UNIT CAP    Take 50,000 Units by mouth every 7 days.    GABAPENTIN (NEURONTIN) 300 MG CAPSULE    TAKE 1 CAPSULE BY MOUTH EVERY EVENING    GLIMEPIRIDE (AMARYL) 1 MG TABLET    TAKE 2 TABLETS EVERY DAY    HYDRALAZINE (APRESOLINE) 100 MG TABLET    TAKE 1 TABLET EVERY 8 HOURS    HYDROCHLOROTHIAZIDE (HYDRODIURIL) 25 MG TABLET    TAKE 1 TABLET EVERY DAY    IRBESARTAN (AVAPRO) 300 MG TABLET    TAKE 1 TABLET EVERY DAY    METFORMIN (GLUCOPHAGE) 1000 MG TABLET    TAKE 1 TABLET TWICE DAILY WITH MEALS    OXYCODONE (ROXICODONE) 30 MG TAB    Take 30 mg by mouth every 6 (six) hours as needed.    PROPRANOLOL (INDERAL) 10 MG TABLET    10 mg once daily.    QUETIAPINE (SEROQUEL) 100 MG TAB    Take 1 tablet (100 mg total) by mouth 2 (two) times daily. At bedtime    TOPIRAMATE (TOPAMAX) 100 MG TABLET    Take 100 mg by mouth once daily.    TRAZODONE (DESYREL) 100 MG TABLET    Take 100 mg by mouth every evening.    TRUE METRIX GLUCOSE TEST STRIP STRP        TRUEPLUS LANCETS 33 GAUGE MISC        ZTLIDO 1.8 % PTMD       Modified Medications    No medications on file    Discontinued Medications    No medications on file        Juan A Renee M.D

## 2025-06-26 ENCOUNTER — CLINICAL SUPPORT (OUTPATIENT)
Dept: REHABILITATION | Facility: HOSPITAL | Age: 70
End: 2025-06-26
Payer: MEDICARE

## 2025-06-26 DIAGNOSIS — R68.89 DECREASED STRENGTH, ENDURANCE, AND MOBILITY: ICD-10-CM

## 2025-06-26 DIAGNOSIS — Z74.09 DECREASED STRENGTH, ENDURANCE, AND MOBILITY: ICD-10-CM

## 2025-06-26 DIAGNOSIS — R53.1 DECREASED STRENGTH, ENDURANCE, AND MOBILITY: ICD-10-CM

## 2025-06-26 DIAGNOSIS — M53.86 DECREASED ROM OF LUMBAR SPINE: Primary | ICD-10-CM

## 2025-06-26 LAB
OHS QRS DURATION: 88 MS
OHS QTC CALCULATION: 415 MS

## 2025-06-26 PROCEDURE — 97530 THERAPEUTIC ACTIVITIES: CPT | Mod: HCNC,PN

## 2025-06-26 PROCEDURE — 97140 MANUAL THERAPY 1/> REGIONS: CPT | Mod: HCNC,PN

## 2025-06-26 NOTE — PROGRESS NOTES
"  Outpatient Rehab    Physical Therapy Progress Note    Patient Name: Neena Kohli  MRN: [id number]  YOB: 1955  Encounter Date: 6/26/2025    Therapy Diagnosis:   Encounter Diagnoses   Name Primary?    Decreased ROM of lumbar spine Yes    Decreased strength, endurance, and mobility      Physician: Roxane Harp MD    Physician Orders: Eval and Treat  Medical Diagnosis: Abnormality of gait and mobility  Frequent falls  Surgical Diagnosis: Not applicable for this Episode   Surgical Date: Not applicable for this Episode  Days Since Last Surgery: Not applicable for this Episode    Visit # / Visits Authorized:  18 / 30  Insurance Authorization Period: 3/13/2025 to 12/31/2025  Date of Evaluation: 5/26/2025  Plan of Care Certification: 5/27/2025 to 8/5/2025      PT/PTA: PT   Number of PTA visits since last PT visit:0  Time In: 1305   Time Out: 1400  Total Time (in minutes): 55   Total Billable Time (in minutes): 38    FOTO:  Intake Score: 43%  Survey Score 2: 49%  Survey Score 3: 49%    Precautions:       Subjective             Objective            Hip Strength - Planes of Motion   Right Strength Right Pain Left Strength Left  Pain   Flexion (L2) 4   4     Extension           ABduction           ADduction           Internal Rotation 4+   4+     External Rotation 4+   4+         Knee Strength   Right Strength Right Pain Left Strength Left  Pain   Flexion (S2) 4+   4+ Yes   Prone Flexion           Extension (L3) 5   5                   Sit to Stand Testing      The patient completed 9 repetitions of a sit to stand transfer in 30 seconds. without use of hands         Stairs Assistance Required   Assistance Level Upper Extremity Support Pattern   Ascending Independent Two rails Reciprocal   Descending Independent Two rails Reciprocal             Treatment:  Manual Therapy  MT 2: long axis distraction LLE  Balance/Neuromuscular Re-Education  NMR 3: bridges 5" 3x10  Therapeutic Activity  TA 1: cybex leg press " "4 plates 3x10  TA 2: cybex leg press 2 plates 3x10 SL  TA 3: standing hip abduction 2x10 ea  TA 4: standing hip extension 2x10ea  TA 5: step ups 6" 3x10 ea  TA 6: reassessment    Time Entry(in minutes):  Manual Therapy Time Entry: 10  Neuromuscular Re-Education Time Entry: 5  Therapeutic Activity Time Entry: 40    Assessment & Plan   Assessment: Mr. Kohli was reassessed today and displayed improvements in functional strength with 30 second sit to stand and was able to perform more reps before increase in low back pain. He also displayed improvements in ability to navigate stairs with use of railing. He continues to be most limited with his ability to stand and walk for prolonged periods of time. He would continue to benefit from skilled PT to address deficits and improve overall mobility and function.       The patient will continue to benefit from skilled outpatient physical therapy in order to address the deficits listed in the problem list on the initial evaluation, provide patient and family education, and maximize the patients level of independence in the home and community environments.     The patient's spiritual, cultural, and educational needs were considered, and the patient is agreeable to the plan of care and goals.           Plan: Improve lumbar mobility and strength, Progress within tolerance.    Goals:   Active       long term goals        patient will be independent with Home exercise program to supplement therapy  (Progressing)       Start:  05/27/25    Expected End:  08/05/25            Patient will improve FOTO score to 53% to improve self perceived ability to perform functional tasks   (Progressing)       Start:  05/27/25    Expected End:  08/05/25            Patient will be able to perform 12 sit to  30 seconds to improve functional strength and mobility (Progressing)       Start:  05/27/25    Expected End:  08/05/25            Patient will be able to stand for 10 minutes to improve " ability to perform functional tasks like preparing meals  (Met)       Start:  05/27/25    Expected End:  08/05/25    Resolved:  06/26/25         Patient will be able to navigate one flight of stairs with little to no difficulty to improve ability to navigate his home environment  (Met)       Start:  05/27/25    Expected End:  08/05/25    Resolved:  06/26/25         patient will improve bilateral lower extremity strength to 4/5 to improve ability to perform functional tasks  (Met)       Start:  05/27/25    Expected End:  08/05/25    Resolved:  06/26/25             Florinda Emery, PT ,DPT,OCS  06/26/2025

## 2025-07-01 ENCOUNTER — CLINICAL SUPPORT (OUTPATIENT)
Dept: REHABILITATION | Facility: HOSPITAL | Age: 70
End: 2025-07-01
Payer: MEDICARE

## 2025-07-01 DIAGNOSIS — Z74.09 DECREASED STRENGTH, ENDURANCE, AND MOBILITY: ICD-10-CM

## 2025-07-01 DIAGNOSIS — M53.86 DECREASED ROM OF LUMBAR SPINE: Primary | ICD-10-CM

## 2025-07-01 DIAGNOSIS — R53.1 DECREASED STRENGTH, ENDURANCE, AND MOBILITY: ICD-10-CM

## 2025-07-01 DIAGNOSIS — R68.89 DECREASED STRENGTH, ENDURANCE, AND MOBILITY: ICD-10-CM

## 2025-07-01 PROCEDURE — 97530 THERAPEUTIC ACTIVITIES: CPT | Mod: HCNC,PN,CQ

## 2025-07-01 PROCEDURE — 97110 THERAPEUTIC EXERCISES: CPT | Mod: HCNC,PN,CQ

## 2025-07-01 NOTE — PROGRESS NOTES
"Outpatient Rehab    Physical Therapy Visit    Patient Name: Neena Kohli  MRN: [id number]  YOB: 1955  Encounter Date: 7/1/2025    Therapy Diagnosis:   Encounter Diagnoses   Name Primary?    Decreased ROM of lumbar spine Yes    Decreased strength, endurance, and mobility      Physician: Roxane Harp MD    Physician Orders: Eval and Treat  Medical Diagnosis: Abnormality of gait and mobility  Frequent falls  Surgical Diagnosis: Not applicable for this Episode   Surgical Date: Not applicable for this Episode  Days Since Last Surgery: Not applicable for this Episode    Visit # / Visits Authorized:  19 / 30  Insurance Authorization Period: 3/13/2025 to 12/31/2025  Date of Evaluation: 5/26/2025  Plan of Care Certification: 5/27/2025 to 8/5/2025      PT/PTA: PTA   Number of PTA visits since last PT visit:2  Time In: 1300   Time Out: 1400  Total Time (in minutes): 60   Total Billable Time (in minutes): 38    FOTO:  Intake Score:  %  Survey Score 2:  %  Survey Score 3:  %    Precautions:       Subjective   "My back always hurts".  Pain reported as 7/10. B mid lower back    Objective            Treatment:  Therapeutic Exercise  TE 1: seated flexion with peanut ball 5"x30  TE 2: seated lumbar rotation 5" 2x10 ea  TE 3: DKTC with peanut ball 5" x 20  TE 4: LTR 5" x 20  TE 5: Nustep x 8 minutes lvl3  TE 8: 3 x 30' passive SKTC stretch, each LE  TE 9: supine sciatic nerve glides x10 ea  Balance/Neuromuscular Re-Education  NMR 1: side lying hip abduction 3x10 ea  NMR 2: hook lying clams GTB 3x10 ea  NMR 3: bridges 5" 3x10  Therapeutic Activity  TA 1: cybex leg press 4 plates 3x10  TA 2: cybex leg press 2 plates 3x10 SL  TA 3: standing hip abduction 2x10 ea  TA 4: standing hip extension 2x10ea  TA 5: step ups 6" 3x10 ea    Time Entry(in minutes):  Therapeutic Activity Time Entry: 28  Therapeutic Exercise Time Entry: 10    Assessment & Plan   Assessment: Pt completed today's session with no new reports of pain. " He requires verbal instructions to prevent rushing through activities and to correct technique exercises. No adverse response to session today.  Evaluation/Treatment Tolerance: Patient tolerated treatment well    The patient will continue to benefit from skilled outpatient physical therapy in order to address the deficits listed in the problem list on the initial evaluation, provide patient and family education, and maximize the patients level of independence in the home and community environments.     The patient's spiritual, cultural, and educational needs were considered, and the patient is agreeable to the plan of care and goals.           Plan: Improve lumbar mobility and strength, Progress within tolerance.    Goals:   Active       long term goals        patient will be independent with Home exercise program to supplement therapy  (Progressing)       Start:  05/27/25    Expected End:  08/05/25            Patient will improve FOTO score to 53% to improve self perceived ability to perform functional tasks   (Progressing)       Start:  05/27/25    Expected End:  08/05/25            Patient will be able to perform 12 sit to  30 seconds to improve functional strength and mobility (Progressing)       Start:  05/27/25    Expected End:  08/05/25            Patient will be able to stand for 10 minutes to improve ability to perform functional tasks like preparing meals  (Met)       Start:  05/27/25    Expected End:  08/05/25    Resolved:  06/26/25         Patient will be able to navigate one flight of stairs with little to no difficulty to improve ability to navigate his home environment  (Met)       Start:  05/27/25    Expected End:  08/05/25    Resolved:  06/26/25         patient will improve bilateral lower extremity strength to 4/5 to improve ability to perform functional tasks  (Met)       Start:  05/27/25    Expected End:  08/05/25    Resolved:  06/26/25             Mark Anthony Johnson PTA

## 2025-07-07 NOTE — TELEPHONE ENCOUNTER
Impression: Ocular hypertension, bilateral: H40.053. ONH stable OU, s/p LPI OU Tmax 30/29, /612
7/18 /93 stable in NFL OU vs. OCT x 1yr ago Plan: Discussed diagnosis in detail with patient. IOP stable. Recommend pt to re-start Latanoprost QHS OU (ERX'd) Please see the attached refill request.

## 2025-07-08 ENCOUNTER — CLINICAL SUPPORT (OUTPATIENT)
Dept: REHABILITATION | Facility: HOSPITAL | Age: 70
End: 2025-07-08
Payer: MEDICARE

## 2025-07-08 DIAGNOSIS — Z74.09 DECREASED STRENGTH, ENDURANCE, AND MOBILITY: ICD-10-CM

## 2025-07-08 DIAGNOSIS — M53.86 DECREASED ROM OF LUMBAR SPINE: Primary | ICD-10-CM

## 2025-07-08 DIAGNOSIS — R53.1 DECREASED STRENGTH, ENDURANCE, AND MOBILITY: ICD-10-CM

## 2025-07-08 DIAGNOSIS — R68.89 DECREASED STRENGTH, ENDURANCE, AND MOBILITY: ICD-10-CM

## 2025-07-08 PROCEDURE — 97110 THERAPEUTIC EXERCISES: CPT | Mod: HCNC,PN

## 2025-07-08 PROCEDURE — 97112 NEUROMUSCULAR REEDUCATION: CPT | Mod: HCNC,PN

## 2025-07-08 NOTE — PROGRESS NOTES
"  Outpatient Rehab    Physical Therapy Visit    Patient Name: Neena Kohli  MRN: [id number]  YOB: 1955  Encounter Date: 7/8/2025    Therapy Diagnosis:   Encounter Diagnoses   Name Primary?    Decreased ROM of lumbar spine Yes    Decreased strength, endurance, and mobility      Physician: Roxane Harp MD    Physician Orders: Eval and Treat  Medical Diagnosis: Abnormality of gait and mobility  Frequent falls  Surgical Diagnosis: Not applicable for this Episode   Surgical Date: Not applicable for this Episode  Days Since Last Surgery: Not applicable for this Episode    Visit # / Visits Authorized:  20 / 30  Insurance Authorization Period: 3/13/2025 to 12/31/2025  Date of Evaluation: 5/26/2025  Plan of Care Certification: 5/27/2025 to 8/5/2025      PT/PTA: PT   Number of PTA visits since last PT visit:0  Time In: 1300   Time Out: 1400  Total Time (in minutes): 60   Total Billable Time (in minutes): 30    FOTO:  Intake Score:  %  Survey Score 2:  %  Survey Score 3:  %    Precautions:       Subjective   He is doing a little better but he always has pain. He still cannot stand for prolonged periods of time. He follows up with the pain management doctor tomorrow.  Pain reported as 7/10. B mid lower back    Objective            Treatment:  Therapeutic Exercise  TE 1: seated flexion with peanut ball 5"x30  TE 2: seated lumbar rotation 5" 2x10 ea  TE 3: DKTC with peanut ball 5" x 20  TE 4: LTR 5" x 20  TE 5: Nustep x 8 minutes lvl3  Balance/Neuromuscular Re-Education  NMR 2: hook lying clams GTB 3x10 ea  NMR 3: bridges 5" 3x10  Therapeutic Activity  TA 1: cybex leg press 4 plates 3x10  TA 2: cybex leg press 2 plates 3x10 SL    Time Entry(in minutes):  Neuromuscular Re-Education Time Entry: 10  Therapeutic Activity Time Entry: 10  Therapeutic Exercise Time Entry: 40    Assessment & Plan   Assessment: Patient arrived to today's session with overall no change in reports of pain. He continues to have greatest " difficulty with standing and walking for prolonged periods of time. At this time PT advised patient to follow up with pain management prior to scheduling more visits due to lack of change in symptoms. Patient was agreeable to plan.        The patient will continue to benefit from skilled outpatient physical therapy in order to address the deficits listed in the problem list on the initial evaluation, provide patient and family education, and maximize the patients level of independence in the home and community environments.     The patient's spiritual, cultural, and educational needs were considered, and the patient is agreeable to the plan of care and goals.           Plan: Patient following up with pain management prior to scheduling more therapy visits    Goals:   Active       long term goals        patient will be independent with Home exercise program to supplement therapy  (Progressing)       Start:  05/27/25    Expected End:  08/05/25            Patient will improve FOTO score to 53% to improve self perceived ability to perform functional tasks   (Progressing)       Start:  05/27/25    Expected End:  08/05/25            Patient will be able to perform 12 sit to  30 seconds to improve functional strength and mobility (Progressing)       Start:  05/27/25    Expected End:  08/05/25            Patient will be able to stand for 10 minutes to improve ability to perform functional tasks like preparing meals  (Met)       Start:  05/27/25    Expected End:  08/05/25    Resolved:  06/26/25         Patient will be able to navigate one flight of stairs with little to no difficulty to improve ability to navigate his home environment  (Met)       Start:  05/27/25    Expected End:  08/05/25    Resolved:  06/26/25         patient will improve bilateral lower extremity strength to 4/5 to improve ability to perform functional tasks  (Met)       Start:  05/27/25    Expected End:  08/05/25    Resolved:  06/26/25              Florinda Emery, PT ,DPT,OCS  07/08/2025

## 2025-07-09 RX ORDER — CILOSTAZOL 50 MG/1
50 TABLET ORAL 2 TIMES DAILY
Qty: 180 TABLET | Refills: 0 | Status: SHIPPED | OUTPATIENT
Start: 2025-07-09

## 2025-07-24 RX ORDER — METFORMIN HYDROCHLORIDE 1000 MG/1
1000 TABLET ORAL 2 TIMES DAILY WITH MEALS
Qty: 180 TABLET | Refills: 0 | Status: SHIPPED | OUTPATIENT
Start: 2025-07-24

## 2025-07-24 RX ORDER — GLIMEPIRIDE 1 MG/1
2 TABLET ORAL
Qty: 180 TABLET | Refills: 0 | Status: SHIPPED | OUTPATIENT
Start: 2025-07-24

## 2025-07-24 NOTE — TELEPHONE ENCOUNTER
Refill Routing Note   Medication(s) are not appropriate for processing by [facility] for the following reason(s):        Required labs outdated    [facility] action(s):  Defer     Requires labs : Yes             Appointments  past 12m or future 3m with PCP    Date Provider   Last Visit   4/17/2025 Roxane Harp MD   Next Visit   Visit date not found Roxane Harp MD   ED visits in past 90 days: 0        Note composed:4:03 PM 07/24/2025

## 2025-07-24 NOTE — TELEPHONE ENCOUNTER
Care Due:                  Date            Visit Type   Department     Provider  --------------------------------------------------------------------------------                                EP -                              PRIMARY      MET INTERNAL  Last Visit: 04-      [facility] ([facility])   MEDICINE       Roxane Harp  Next Visit: None Scheduled  None         None Found                                                            Last  Test          Frequency    Reason                     Performed    Due Date  --------------------------------------------------------------------------------    CBC.........  12 months..  allopurinoL..............  08- 07-    CMP.........  12 months..  allopurinoL, glimepiride,   08- 07-                             hydroCHLOROthiazide,                             irbesartan, metFORMIN....    HBA1C.......  6 months...  glimepiride, metFORMIN...  03- 09-    Uric Acid...  12 months..  allopurinoL..............  03- 03-    Health [facility] Embedded Care Due Messages. Reference number: [id number].   7/24/2025 2:08:13 AM CDT

## 2025-07-25 ENCOUNTER — TELEPHONE (OUTPATIENT)
Dept: INTERNAL MEDICINE | Facility: CLINIC | Age: 70
End: 2025-07-25
Payer: MEDICARE

## 2025-07-25 NOTE — TELEPHONE ENCOUNTER
Annual 05/2024    I spoke to pt and notified him Rx glimepiride (AMARYL) 1 MG tablet and metFORMIN (GLUCOPHAGE) 1000 MG tablet was sent to his pharmacy. Pt overdue for annual. Appt scheduled 8-12-25 at 1 pm.  Pt verbalized understanding

## 2025-07-28 ENCOUNTER — CLINICAL SUPPORT (OUTPATIENT)
Dept: REHABILITATION | Facility: HOSPITAL | Age: 70
End: 2025-07-28
Payer: MEDICARE

## 2025-07-28 DIAGNOSIS — R53.1 DECREASED STRENGTH, ENDURANCE, AND MOBILITY: ICD-10-CM

## 2025-07-28 DIAGNOSIS — M53.86 DECREASED ROM OF LUMBAR SPINE: Primary | ICD-10-CM

## 2025-07-28 DIAGNOSIS — Z74.09 DECREASED STRENGTH, ENDURANCE, AND MOBILITY: ICD-10-CM

## 2025-07-28 DIAGNOSIS — R68.89 DECREASED STRENGTH, ENDURANCE, AND MOBILITY: ICD-10-CM

## 2025-07-28 PROCEDURE — 97530 THERAPEUTIC ACTIVITIES: CPT | Mod: PN

## 2025-07-28 NOTE — PROGRESS NOTES
Outpatient Rehab    Physical Therapy Progress Note : Updated Plan of Care    Patient Name: Neena Kohli  MRN: [id number]  YOB: 1955  Encounter Date: 7/28/2025    Therapy Diagnosis:   Encounter Diagnoses   Name Primary?    Decreased ROM of lumbar spine Yes    Decreased strength, endurance, and mobility      Physician: Roxane Harp MD    Physician Orders: Eval and Treat  Medical Diagnosis: Abnormality of gait and mobility  Frequent falls  Surgical Diagnosis: Not applicable for this Episode   Surgical Date: Not applicable for this Episode  Days Since Last Surgery: Not applicable for this Episode    Visit # / Visits Authorized: 21 / 30  Insurance Authorization Period: 3/13/2025 to 12/31/2025  Date of Evaluation: 5/26/2025   Plan of Care Certification: 7/28/2025 to 8/27/2025     PT/PTA: PT   Number of PTA visits since last PT visit:0  Time In: 1300   Time Out: 1358  Total Time (in minutes): 58   Total Billable Time (in minutes): 30    FOTO:  Intake Score (%): 43  Survey Score 2 (%): 49  Survey Score 3 (%): 49    Precautions:       Subjective   He saw pain management and they told him to keep up with his exercises and therapy. He feels like he is having more of an issue with his balance than his back..  Pain reported as 7/10. B mid lower back    Objective         Timed Up & Go (TUG)  Time: 15 seconds  Observations: Short strides  An older adult who takes >=12 seconds to complete the TUG is at risk for falling.       Sit to Stand Testing      The patient completed 8 repetitions of a sit to stand transfer in 30 seconds. without use of hands            Modified Clinical Test of Sensory Interaction and Balance (CTSIB-M): Condtion 1: 30 seconds  Condition 2 : 24 seconds with increase in postural sway  Condition 3 30 seconds   condition 4: 10 seconds     Functional Gait Assessment  Requirements: A marked 6-m (20-ft) walkway that is marked with a 30.48-cm (12-in) width.  __1____1. GAIT LEVEL  SURFACE  Instructions: Walk at your normal speed from here to the next faye (6 m  [20 ft]).  Grading: Faye the highest category that applies.  (3) Normal--Walks 6 m (20 ft) in less than 5.5 seconds, no assistive  devices, good speed, no evidence for imbalance, normal gait  pattern, deviates no more than 15.24 cm (6 in) outside of the  30.48-cm (12-in) walkway width.  (2) Mild impairment--Walks 6 m (20 ft) in less than 7 seconds but  greater than 5.5 seconds, uses assistive device, slower speed,  mild gait deviations, or deviates 15.24-25.4 cm (6-10 in)  outside of the 30.48-cm (12-in) walkway width.  (1) Moderate impairment--Walks 6 m (20 ft), slow speed, abnormal  gait pattern, evidence for imbalance, or deviates 25.4-  38.1 cm (10-15 in) outside of the 30.48-cm (12-in) walkway  width. Requires more than 7 seconds to ambulate 6 m (20 ft).  (0) Severe impairment--Cannot walk 6 m (20 ft) without assistance,  severe gait deviations or imbalance, deviates greater than 38.1  cm (15 in) outside of the 30.48-cm (12-in) walkway width or  reaches and touches the wall.  __1____2. CHANGE IN GAIT SPEED  Instructions: Begin walking at your normal pace (for 1.5 m [5 ft]). When  I tell you go, walk as fast as you can (for 1.5 m [5 ft]). When I tell you  slow, walk as slowly as you can (for 1.5 m [5 ft]).  Grading: Faye the highest category that applies.  (3) Normal--Able to smoothly change walking speed without loss of  balance or gait deviation. Shows a significant difference in  walking speeds between normal, fast, and slow speeds. Deviates  no more than 15.24 cm (6 in) outside of the 30.48-cm  (12-in) walkway width.  (2) Mild impairment--Is able to change speed but demonstrates  mild gait deviations, deviates 15.24-25.4 cm (6-10 in) outside  of the 30.48-cm (12-in) walkway width, or no gait deviations but  unable to achieve a significant change in velocity, or uses an  assistive device.  (1) Moderate impairment--Makes only  minor adjustments to walking  speed, or accomplishes a change in speed with significant  gait deviations, deviates 25.4-38.1 cm (10-15 in) outside the  30.48-cm (12-in) walkway width, or changes speed but loses  balance but is able to recover and continue walking.  (0) Severe impairment--Cannot change speeds, deviates greater  than 38.1 cm (15 in) outside 30.48-cm (12-in) walkway width,  or loses balance and has to reach for wall or be caught.  ___1____3. GAIT WITH HORIZONTAL HEAD TURNS  Instructions: Walk from here to the next faye 6 m (20 ft) away. Begin  walking at your normal pace. Keep walking straight; after 3 steps, turn  your head to the right and keep walking straight while looking to the  right. After 3 more steps, turn your head to the left and keep walking  straight while looking left. Continue alternating looking right and left  every 3 steps until you have completed 2 repetitions in each direction.  Grading: Faye the highest category that applies.  (3) Normal--Performs head turns smoothly with no change in gait.  Deviates no more than 15.24 cm (6 in) outside 30.48-cm (12-in)  walkway width.  (2) Mild impairment--Performs head turns smoothly with slight  change in gait velocity (eg, minor disruption to smooth gait  path), deviates 15.24-25.4 cm (6-10 in) outside 30.48-cm  (12-in) walkway width, or uses an assistive device.  (1) Moderate impairment--Performs head turns with moderate  change in gait velocity, slows down, deviates 25.4-38.1 cm  (10-15 in) outside 30.48-cm (12-in) walkway width but recovers,  can continue to walk.  (0) Severe impairment--Performs task with severe disruption of gait  (eg, staggers 38.1 cm [15 in] outside 30.48-cm (12-in) walkway  width, loses balance, stops, or reaches for wall).  ___1____4. GAIT WITH VERTICAL HEAD TURNS  Instructions: Walk from here to the next faye (6 m [20 ft]). Begin walking  at your normal pace. Keep walking straight; after 3 steps, tip your head  up  and keep walking straight while looking up. After 3 more steps, tip  your head down, keep walking straight while looking down. Continue  alternating looking up and down every 3 steps until you have completed  2 repetitions in each direction.  Grading: [place] the highest category that applies.  (3) Normal--Performs head turns with no change in gait. Deviates  no more than 15.24 cm (6 in) outside 30.48-cm (12-in) walkway  width.  (2) Mild impairment--Performs task with slight change in gait  velocity (eg, minor disruption to smooth gait path), deviates  15.24-25.4 cm (6-10 in) outside 30.48-cm (12-in) walkway  width or uses assistive device.  (1) Moderate impairment--Performs task with moderate change in  gait velocity, slows down, deviates 25.4-38.1 cm (10-15 in)  outside 30.48-cm (12-in) walkway width but recovers, can  continue to walk.  (0) Severe impairment--Performs task with severe disruption of gait  (eg, staggers 38.1 cm [15 in] outside 30.48-cm (12-in) walkway  width, loses balance, stops, reaches for wall).  ____2___5. GAIT AND PIVOT TURN  Instructions: Begin with walking at your normal pace. When I tell you,  turn and stop, turn as quickly as you can to face the opposite direction  and stop.  Grading: Zenon the highest category that applies.  (3) Normal--Pivot turns safely within 3 seconds and stops quickly  with no loss of balance.  (2) Mild impairment--Pivot turns safely in 3 seconds and stops  with no loss of balance, or pivot turns safely within 3 seconds  and stops with mild imbalance, requires small steps to catch  balance.  (1) Moderate impairment--Turns slowly, requires verbal cueing, or  requires several small steps to catch balance following turn and  stop.  (0) Severe impairment--Cannot turn safely, requires assistance to  turn and stop.  ___1____6. STEP OVER OBSTACLE  Instructions: Begin walking at your normal speed. When you come to the  shoe box, step over it, not around it, and keep  walking.  Grading: Faye the highest category that applies.  (3) Normal--Is able to step over 2 stacked shoe boxes taped  together (22.86 cm [9 in] total height) without changing gait  speed; no evidence of imbalance.  (2) Mild impairment--Is able to step over one shoe box (11.43 cm  [4.5 in] total height) without changing gait speed; no evidence  of imbalance.  (1) Moderate impairment--Is able to step over one shoe box (11.43  cm [4.5 in] total height) but must slow down and adjust steps to  clear box safely. May require verbal cueing.  (0) Severe impairment--Cannot perform without assistance.    _____0__7. GAIT WITH NARROW BASE OF SUPPORT  Instructions: Walk on the floor with arms folded across the chest, feet  aligned heel to toe in tandem for a distance of 3.6 m [12 ft]. The number  of steps taken in a straight line are counted for a maximum of 10 steps.  Grading: Faye the highest category that applies.  (3) Normal--Is able to ambulate for 10 steps heel to toe with no  staggering.  (2) Mild impairment--Ambulates 7-9 steps.  (1) Moderate impairment--Ambulates 4-7 steps.  (0) Severe impairment--Ambulates less than 4 steps heel to toe or  cannot perform without assistance.  ___0____8. GAIT WITH EYES CLOSED  Instructions: Walk at your normal speed from here to the next faye (6 m  [20 ft]) with your eyes closed.  Grading: Faye the highest category that applies.  (3) Normal--Walks 6 m (20 ft), no assistive devices, good speed,  no evidence of imbalance, normal gait pattern, deviates no more  than 15.24 cm (6 in) outside 30.48-cm (12-in) walkway width.  Ambulates 6 m (20 ft) in less than 7 seconds.  (2) Mild impairment--Walks 6 m (20 ft), uses assistive device,  slower speed, mild gait deviations, deviates 15.24-25.4 cm  (6-10 in) outside 30.48-cm (12-in) walkway width. Ambulates  6 m (20 ft) in less than 9 seconds but greater than 7 seconds.  (1) Moderate impairment--Walks 6 m (20 ft), slow speed, abnormal  gait  pattern, evidence for imbalance, deviates 25.4-38.1  cm (10-15 in) outside 30.48-cm (12-in) walkway width.  Requires more than 9 seconds to ambulate 6 m (20 ft).  (0) Severe impairment--Cannot walk 6 m (20 ft) without assistance,  severe gait deviations or imbalance, deviates greater than 38.1  cm (15 in) outside 30.48-cm (12-in) walkway width or will not  attempt task.  ___1___9. AMBULATING BACKWARDS  Instructions: Walk backwards until I tell you to stop.  Grading: Zenon the highest category that applies.  (3) Normal--Walks 6 m (20 ft), no assistive devices, good speed,  no evidence for imbalance, normal gait pattern, deviates no  more than 15.24 cm (6 in) outside 30.48-cm (12-in) walkway  width.  (2) Mild impairment--Walks 6 m (20 ft), uses assistive device,  slower speed, mild gait deviations, deviates 15.24-25.4 cm  (6-10 in) outside 30.48-cm (12-in) walkway width.  (1) Moderate impairment--Walks 6 m (20 ft), slow speed, abnormal  gait pattern, evidence for imbalance, deviates 25.4-38.1  cm (10-15 in) outside 30.48-cm (12-in) walkway width.  (0) Severe impairment--Cannot walk 6 m (20 ft) without assistance,  severe gait deviations or imbalance, deviates greater than 38.1  cm (15 in) outside 30.48-cm (12-in) walkway width or will not  attempt task.  ____2____10. STEPS  Instructions: Walk up these stairs as you would at home (ie, using the rail  if necessary). At the top turn around and walk down.  Grading: Zenon the highest category that applies.  (3) Normal--Alternating feet, no rail.  (2) Mild impairment--Alternating feet, must use rail.  (1) Moderate impairment--Two feet to a stair; must use rail.  (0) Severe impairment--Cannot do safely.    TOTAL SCORE: _10_____ MAXIMUM SCORE 30  a Adapted from Dynamic Gait Index.1 Modified and reprinted with permission of authors and [employer] (http://lww.com).       Cutoffs:   <22/30 fall risk in older adults  <18/30 fall risk in Parkinsons    "  MDC/MCID:  Stroke = 4.2 points (MDC)  Vestibular = 6 points (MDC)  Geriatric = 4 points (MCID)  Parkinson's = 4 points (MDC)     Treatment:  Balance/Neuromuscular Re-Education  NMR 1: step ups 6" 3x10 ea  NMR 2: standing on Airex with narrow base of support 30"x3 eyes open, 30"x 3 with eyes closed  NMR 3: cone taps 3x10 ea  Therapeutic Activity  TA 6: reassessment    Time Entry(in minutes):  Neuromuscular Re-Education Time Entry: 28  Therapeutic Activity Time Entry: 30    Assessment & Plan   Assessment  Patient was seen in the clinic for the first time in a few weeks due to therapy being on pause to follow up with pain management. He continues to have no change in overall pain. At this time he would like to focus on his balance. Therefore, PT assess patient's balance today. He is at an increase risk for falls due to low score with FGA and increase time required to perform Timed up and Go. PT will extend Plan of Care for one month. If patient does not make meaningful change in dynamic balance then he will be discharged from PT.      Plan  From a physical therapy perspective, the patient would benefit from: Skilled Rehab Services    Planned therapy interventions include: Therapeutic exercise, Therapeutic activities, Neuromuscular re-education, Manual therapy, ADLs/IADLs, Other (Comment), and Gait training. Dry Needling (prn)  Planned modalities to include: Biofeedback, Electrical stimulation - attended, Electrical stimulation - passive/unattended, Thermotherapy (hot pack), and Cryotherapy (cold pack).        Visit Frequency: 2 times Per Week for 4 Weeks.       This plan was discussed with Patient.   Discussion participants: Agreed Upon Plan of Care  Plan details: Frequency and duration of treatment to be adjusted as needed          The patient will continue to benefit from skilled outpatient physical therapy in order to address the deficits listed in the problem list on the initial evaluation, provide patient and " family education, and maximize the patients level of independence in the home and community environments.     The patient's spiritual, cultural, and educational needs were considered, and the patient is agreeable to the plan of care and goals.           Goals:   Active       balance long term goals       Patient will improve FGA by 4 points to see meaningful change with dynamic balance       Start:  07/28/25    Expected End:  08/27/25            Patient will be able to perform Timed up and Go in 12 seconds or less to improve mobility and decrease risk for falls        Start:  07/28/25    Expected End:  08/27/25            Patient will be able to perform 14 sit to  30 seconds to improve functional strength and mobility        Start:  07/28/25    Expected End:  08/27/25               long term goals        patient will be independent with Home exercise program to supplement therapy  (Progressing)       Start:  05/27/25    Expected End:  08/05/25            Patient will improve FOTO score to 53% to improve self perceived ability to perform functional tasks   (Unable to Meet)       Start:  05/27/25    Expected End:  08/05/25            Patient will be able to perform 12 sit to  30 seconds to improve functional strength and mobility (Progressing)       Start:  05/27/25    Expected End:  08/05/25            Patient will be able to stand for 10 minutes to improve ability to perform functional tasks like preparing meals  (Met)       Start:  05/27/25    Expected End:  08/05/25    Resolved:  06/26/25         Patient will be able to navigate one flight of stairs with little to no difficulty to improve ability to navigate his home environment  (Met)       Start:  05/27/25    Expected End:  08/05/25    Resolved:  06/26/25         patient will improve bilateral lower extremity strength to 4/5 to improve ability to perform functional tasks  (Met)       Start:  05/27/25    Expected End:  08/05/25    Resolved:   06/26/25             Florinda Emery, PT ,DPT,OCS  07/28/2025

## 2025-07-30 ENCOUNTER — CLINICAL SUPPORT (OUTPATIENT)
Dept: REHABILITATION | Facility: HOSPITAL | Age: 70
End: 2025-07-30
Payer: MEDICARE

## 2025-07-30 DIAGNOSIS — Z74.09 DECREASED STRENGTH, ENDURANCE, AND MOBILITY: ICD-10-CM

## 2025-07-30 DIAGNOSIS — M53.86 DECREASED ROM OF LUMBAR SPINE: Primary | ICD-10-CM

## 2025-07-30 DIAGNOSIS — R68.89 DECREASED STRENGTH, ENDURANCE, AND MOBILITY: ICD-10-CM

## 2025-07-30 DIAGNOSIS — R53.1 DECREASED STRENGTH, ENDURANCE, AND MOBILITY: ICD-10-CM

## 2025-07-30 PROCEDURE — 97530 THERAPEUTIC ACTIVITIES: CPT | Mod: PN,CQ

## 2025-07-30 PROCEDURE — 97112 NEUROMUSCULAR REEDUCATION: CPT | Mod: PN,CQ

## 2025-07-30 PROCEDURE — 97110 THERAPEUTIC EXERCISES: CPT | Mod: PN,CQ

## 2025-08-01 VITALS — SYSTOLIC BLOOD PRESSURE: 136 MMHG | DIASTOLIC BLOOD PRESSURE: 60 MMHG

## 2025-08-01 DIAGNOSIS — I10 HYPERTENSION, ESSENTIAL: Chronic | ICD-10-CM

## 2025-08-01 RX ORDER — IRBESARTAN 300 MG/1
300 TABLET ORAL
Qty: 100 TABLET | Refills: 2 | Status: SHIPPED | OUTPATIENT
Start: 2025-08-01

## 2025-08-01 NOTE — TELEPHONE ENCOUNTER
No care due was identified.  Health [facility] Embedded Care Due Messages. Reference number: [id number].   8/01/2025 1:44:51 AM CDT

## 2025-08-01 NOTE — TELEPHONE ENCOUNTER
Refill Routing Note   Medication(s) are not appropriate for processing by [facility] for the following reason(s):        Required labs outdated  Required vitals abnormal    [facility] action(s):  Defer        Medication Therapy Plan: 06/25/25 (!) 184/66      Appointments  past 12m or future 3m with PCP    Date Provider   Last Visit   4/17/2025 Roxane Harp MD   Next Visit   8/12/2025 Roxane Harp MD   ED visits in past 90 days: 0        Note composed:11:12 AM 08/01/2025

## 2025-08-04 ENCOUNTER — CLINICAL SUPPORT (OUTPATIENT)
Dept: REHABILITATION | Facility: HOSPITAL | Age: 70
End: 2025-08-04
Payer: MEDICARE

## 2025-08-04 DIAGNOSIS — R68.89 DECREASED STRENGTH, ENDURANCE, AND MOBILITY: ICD-10-CM

## 2025-08-04 DIAGNOSIS — M53.86 DECREASED ROM OF LUMBAR SPINE: Primary | ICD-10-CM

## 2025-08-04 DIAGNOSIS — Z74.09 DECREASED STRENGTH, ENDURANCE, AND MOBILITY: ICD-10-CM

## 2025-08-04 DIAGNOSIS — R53.1 DECREASED STRENGTH, ENDURANCE, AND MOBILITY: ICD-10-CM

## 2025-08-04 PROCEDURE — 97530 THERAPEUTIC ACTIVITIES: CPT | Mod: PN

## 2025-08-04 PROCEDURE — 97110 THERAPEUTIC EXERCISES: CPT | Mod: PN

## 2025-08-04 PROCEDURE — 97112 NEUROMUSCULAR REEDUCATION: CPT | Mod: PN

## 2025-08-04 NOTE — PROGRESS NOTES
"  Outpatient Rehab    Physical Therapy Visit    Patient Name: Neena Kohli  MRN: [id number]  YOB: 1955  Encounter Date: 8/4/2025    Therapy Diagnosis:   Encounter Diagnoses   Name Primary?    Decreased ROM of lumbar spine Yes    Decreased strength, endurance, and mobility      Physician: Keesha Xiao NP    Physician Orders: Eval and Treat  Medical Diagnosis: Abnormality of gait and mobility  Frequent falls  Surgical Diagnosis: Not applicable for this Episode   Surgical Date: Not applicable for this Episode  Days Since Last Surgery: Not applicable for this Episode    Visit # / Visits Authorized:  23 / 30  Insurance Authorization Period: 3/13/2025 to 12/31/2025  Date of Evaluation: 5/26/2025  Plan of Care Certification: 7/28/2025 to 8/27/2025      PT/PTA: PT   Number of PTA visits since last PT visit:0  Time In: 1405   Time Out: 1450  Total Time (in minutes): 45   Total Billable Time (in minutes): 38    FOTO:  Intake Score (%): 43  Survey Score 2 (%): 49  Survey Score 3 (%): 49    Precautions:         Subjective   He fell the other day when he was walking. He will sometimes walk and trip himself by accidentally crossing his legs in front and tripping himself. He denies hitting his head..         Objective            Treatment:  Therapeutic Exercise  TE 5: Nustep x 8 minutes lvl3  Balance/Neuromuscular Re-Education  NMR 1: step ups 6" 3x10 ea  NMR 2: standing on Airex with narrow base of support 30"x3 eyes open, 30"x 3 with eyes closed  NMR 3: cone taps 3x10 ea  Therapeutic Activity  TA 1: forward walk over small hurdles 3 laps in // bars  TA 2: lateral step over small hurdles 3 laps in // bars    Time Entry(in minutes):  Neuromuscular Re-Education Time Entry: 22  Therapeutic Activity Time Entry: 15  Therapeutic Exercise Time Entry: 8    Assessment & Plan   Assessment: Patient was SBA for all standing balance exercises today. He required moderate cues for lifting bilateral lower extremity over " hurdles with forward and lateral stepping. Periodic use of bilateral upper extremity to stabilize with standing balance. Patient began to have left lower extremity pain with standing and walking exercises during the session that did not improve with repeated lumbar flexion. At this time, balance continues to be prioritized over low back pain.       The patient will continue to benefit from skilled outpatient physical therapy in order to address the deficits listed in the problem list on the initial evaluation, provide patient and family education, and maximize the patients level of independence in the home and community environments.     The patient's spiritual, cultural, and educational needs were considered, and the patient is agreeable to the plan of care and goals.           Plan: Improve standing balance    Goals:   Active       balance long term goals       Patient will improve FGA by 4 points to see meaningful change with dynamic balance       Start:  07/28/25    Expected End:  08/27/25            Patient will be able to perform Timed up and Go in 12 seconds or less to improve mobility and decrease risk for falls        Start:  07/28/25    Expected End:  08/27/25            Patient will be able to perform 14 sit to  30 seconds to improve functional strength and mobility        Start:  07/28/25    Expected End:  08/27/25               long term goals        patient will be independent with Home exercise program to supplement therapy  (Progressing)       Start:  05/27/25    Expected End:  08/05/25            Patient will improve FOTO score to 53% to improve self perceived ability to perform functional tasks   (Unable to Meet)       Start:  05/27/25    Expected End:  08/05/25            Patient will be able to perform 12 sit to  30 seconds to improve functional strength and mobility (Progressing)       Start:  05/27/25    Expected End:  08/05/25            Patient will be able to stand for 10  minutes to improve ability to perform functional tasks like preparing meals  (Met)       Start:  05/27/25    Expected End:  08/05/25    Resolved:  06/26/25         Patient will be able to navigate one flight of stairs with little to no difficulty to improve ability to navigate his home environment  (Met)       Start:  05/27/25    Expected End:  08/05/25    Resolved:  06/26/25         patient will improve bilateral lower extremity strength to 4/5 to improve ability to perform functional tasks  (Met)       Start:  05/27/25    Expected End:  08/05/25    Resolved:  06/26/25             Florinda Emery, PT ,DPT,OCS  08/04/2025

## 2025-08-07 ENCOUNTER — CLINICAL SUPPORT (OUTPATIENT)
Dept: REHABILITATION | Facility: HOSPITAL | Age: 70
End: 2025-08-07
Payer: MEDICARE

## 2025-08-07 DIAGNOSIS — M53.86 DECREASED ROM OF LUMBAR SPINE: Primary | ICD-10-CM

## 2025-08-07 DIAGNOSIS — R53.1 DECREASED STRENGTH, ENDURANCE, AND MOBILITY: ICD-10-CM

## 2025-08-07 DIAGNOSIS — Z74.09 DECREASED STRENGTH, ENDURANCE, AND MOBILITY: ICD-10-CM

## 2025-08-07 DIAGNOSIS — R68.89 DECREASED STRENGTH, ENDURANCE, AND MOBILITY: ICD-10-CM

## 2025-08-07 PROCEDURE — 97530 THERAPEUTIC ACTIVITIES: CPT | Mod: PN,CQ

## 2025-08-07 PROCEDURE — 97112 NEUROMUSCULAR REEDUCATION: CPT | Mod: PN,CQ

## 2025-08-07 PROCEDURE — 97110 THERAPEUTIC EXERCISES: CPT | Mod: PN,CQ

## 2025-08-08 DIAGNOSIS — I10 HYPERTENSION, ESSENTIAL: Chronic | ICD-10-CM

## 2025-08-08 RX ORDER — HYDROCHLOROTHIAZIDE 25 MG/1
25 TABLET ORAL
Qty: 90 TABLET | Refills: 3 | Status: SHIPPED | OUTPATIENT
Start: 2025-08-08

## 2025-08-08 RX ORDER — HYDRALAZINE HYDROCHLORIDE 100 MG/1
100 TABLET, FILM COATED ORAL EVERY 8 HOURS
Qty: 270 TABLET | Refills: 3 | Status: SHIPPED | OUTPATIENT
Start: 2025-08-08

## 2025-08-11 ENCOUNTER — CLINICAL SUPPORT (OUTPATIENT)
Dept: REHABILITATION | Facility: HOSPITAL | Age: 70
End: 2025-08-11
Payer: MEDICARE

## 2025-08-11 DIAGNOSIS — Z74.09 DECREASED STRENGTH, ENDURANCE, AND MOBILITY: ICD-10-CM

## 2025-08-11 DIAGNOSIS — M53.86 DECREASED ROM OF LUMBAR SPINE: Primary | ICD-10-CM

## 2025-08-11 DIAGNOSIS — R68.89 DECREASED STRENGTH, ENDURANCE, AND MOBILITY: ICD-10-CM

## 2025-08-11 DIAGNOSIS — R53.1 DECREASED STRENGTH, ENDURANCE, AND MOBILITY: ICD-10-CM

## 2025-08-11 PROCEDURE — 97112 NEUROMUSCULAR REEDUCATION: CPT | Mod: PN,CQ

## 2025-08-11 PROCEDURE — 97110 THERAPEUTIC EXERCISES: CPT | Mod: PN,CQ

## 2025-08-11 PROCEDURE — 97530 THERAPEUTIC ACTIVITIES: CPT | Mod: PN,CQ

## 2025-08-12 ENCOUNTER — LAB VISIT (OUTPATIENT)
Dept: LAB | Facility: HOSPITAL | Age: 70
End: 2025-08-12
Attending: STUDENT IN AN ORGANIZED HEALTH CARE EDUCATION/TRAINING PROGRAM
Payer: MEDICARE

## 2025-08-12 ENCOUNTER — OFFICE VISIT (OUTPATIENT)
Dept: INTERNAL MEDICINE | Facility: CLINIC | Age: 70
End: 2025-08-12
Payer: MEDICARE

## 2025-08-12 VITALS
SYSTOLIC BLOOD PRESSURE: 138 MMHG | HEART RATE: 84 BPM | HEIGHT: 67 IN | OXYGEN SATURATION: 91 % | BODY MASS INDEX: 25.96 KG/M2 | TEMPERATURE: 98 F | DIASTOLIC BLOOD PRESSURE: 64 MMHG | WEIGHT: 165.38 LBS

## 2025-08-12 DIAGNOSIS — L98.9 SKIN LESIONS: ICD-10-CM

## 2025-08-12 DIAGNOSIS — E54 VITAMIN C DEFICIENCY: ICD-10-CM

## 2025-08-12 DIAGNOSIS — N18.31 TYPE 2 DIABETES MELLITUS WITH STAGE 3A CHRONIC KIDNEY DISEASE, WITHOUT LONG-TERM CURRENT USE OF INSULIN: ICD-10-CM

## 2025-08-12 DIAGNOSIS — Z00.00 PHYSICAL EXAM, ANNUAL: Primary | ICD-10-CM

## 2025-08-12 DIAGNOSIS — Z85.46 HISTORY OF PROSTATE CANCER: ICD-10-CM

## 2025-08-12 DIAGNOSIS — R35.0 URINARY FREQUENCY: ICD-10-CM

## 2025-08-12 DIAGNOSIS — E11.22 TYPE 2 DIABETES MELLITUS WITH STAGE 3A CHRONIC KIDNEY DISEASE, WITHOUT LONG-TERM CURRENT USE OF INSULIN: ICD-10-CM

## 2025-08-12 DIAGNOSIS — E53.8 B12 DEFICIENCY: ICD-10-CM

## 2025-08-12 DIAGNOSIS — N39.0 URINARY TRACT INFECTION WITHOUT HEMATURIA, SITE UNSPECIFIED: ICD-10-CM

## 2025-08-12 PROCEDURE — 4010F ACE/ARB THERAPY RXD/TAKEN: CPT | Mod: CPTII,S$GLB,, | Performed by: STUDENT IN AN ORGANIZED HEALTH CARE EDUCATION/TRAINING PROGRAM

## 2025-08-12 PROCEDURE — 1125F AMNT PAIN NOTED PAIN PRSNT: CPT | Mod: CPTII,S$GLB,, | Performed by: STUDENT IN AN ORGANIZED HEALTH CARE EDUCATION/TRAINING PROGRAM

## 2025-08-12 PROCEDURE — 3072F LOW RISK FOR RETINOPATHY: CPT | Mod: CPTII,S$GLB,, | Performed by: STUDENT IN AN ORGANIZED HEALTH CARE EDUCATION/TRAINING PROGRAM

## 2025-08-12 PROCEDURE — 3078F DIAST BP <80 MM HG: CPT | Mod: CPTII,S$GLB,, | Performed by: STUDENT IN AN ORGANIZED HEALTH CARE EDUCATION/TRAINING PROGRAM

## 2025-08-12 PROCEDURE — 99397 PER PM REEVAL EST PAT 65+ YR: CPT | Mod: S$GLB,,, | Performed by: STUDENT IN AN ORGANIZED HEALTH CARE EDUCATION/TRAINING PROGRAM

## 2025-08-12 PROCEDURE — 81001 URINALYSIS AUTO W/SCOPE: CPT

## 2025-08-12 PROCEDURE — 3288F FALL RISK ASSESSMENT DOCD: CPT | Mod: CPTII,S$GLB,, | Performed by: STUDENT IN AN ORGANIZED HEALTH CARE EDUCATION/TRAINING PROGRAM

## 2025-08-12 PROCEDURE — 3008F BODY MASS INDEX DOCD: CPT | Mod: CPTII,S$GLB,, | Performed by: STUDENT IN AN ORGANIZED HEALTH CARE EDUCATION/TRAINING PROGRAM

## 2025-08-12 PROCEDURE — 99214 OFFICE O/P EST MOD 30 MIN: CPT | Mod: 25,S$GLB,, | Performed by: STUDENT IN AN ORGANIZED HEALTH CARE EDUCATION/TRAINING PROGRAM

## 2025-08-12 PROCEDURE — 1101F PT FALLS ASSESS-DOCD LE1/YR: CPT | Mod: CPTII,S$GLB,, | Performed by: STUDENT IN AN ORGANIZED HEALTH CARE EDUCATION/TRAINING PROGRAM

## 2025-08-12 PROCEDURE — 1159F MED LIST DOCD IN RCRD: CPT | Mod: CPTII,S$GLB,, | Performed by: STUDENT IN AN ORGANIZED HEALTH CARE EDUCATION/TRAINING PROGRAM

## 2025-08-12 PROCEDURE — 99999 PR PBB SHADOW E&M-EST. PATIENT-LVL V: CPT | Mod: PBBFAC,,, | Performed by: STUDENT IN AN ORGANIZED HEALTH CARE EDUCATION/TRAINING PROGRAM

## 2025-08-12 PROCEDURE — 3075F SYST BP GE 130 - 139MM HG: CPT | Mod: CPTII,S$GLB,, | Performed by: STUDENT IN AN ORGANIZED HEALTH CARE EDUCATION/TRAINING PROGRAM

## 2025-08-12 RX ORDER — DICLOFENAC EPOLAMINE 0.01 G/1
SYSTEM TOPICAL
COMMUNITY
Start: 2025-07-10

## 2025-08-12 RX ORDER — OMEGA-3-ACID ETHYL ESTERS 1 G/1
2 CAPSULE, LIQUID FILLED ORAL 2 TIMES DAILY
COMMUNITY
Start: 2025-08-06

## 2025-08-13 ENCOUNTER — RESULTS FOLLOW-UP (OUTPATIENT)
Dept: INTERNAL MEDICINE | Facility: CLINIC | Age: 70
End: 2025-08-13
Payer: MEDICARE

## 2025-08-13 LAB
BACTERIA #/AREA URNS AUTO: NORMAL /HPF
BILIRUB UR QL STRIP.AUTO: NEGATIVE
CLARITY UR: CLEAR
COLOR UR AUTO: YELLOW
GLUCOSE UR QL STRIP: NEGATIVE
HGB UR QL STRIP: NEGATIVE
KETONES UR QL STRIP: NEGATIVE
LEUKOCYTE ESTERASE UR QL STRIP: NEGATIVE
MICROSCOPIC COMMENT: NORMAL
NITRITE UR QL STRIP: POSITIVE
PH UR STRIP: 6 [PH]
PROT UR QL STRIP: NEGATIVE
RBC #/AREA URNS AUTO: <1 /HPF (ref 0–4)
SP GR UR STRIP: 1.01
UROBILINOGEN UR STRIP-ACNC: NEGATIVE EU/DL
WBC #/AREA URNS AUTO: 5 /HPF (ref 0–5)

## 2025-08-14 ENCOUNTER — CLINICAL SUPPORT (OUTPATIENT)
Dept: REHABILITATION | Facility: HOSPITAL | Age: 70
End: 2025-08-14
Payer: MEDICARE

## 2025-08-14 DIAGNOSIS — R53.1 DECREASED STRENGTH, ENDURANCE, AND MOBILITY: ICD-10-CM

## 2025-08-14 DIAGNOSIS — M53.86 DECREASED ROM OF LUMBAR SPINE: Primary | ICD-10-CM

## 2025-08-14 DIAGNOSIS — R68.89 DECREASED STRENGTH, ENDURANCE, AND MOBILITY: ICD-10-CM

## 2025-08-14 DIAGNOSIS — Z74.09 DECREASED STRENGTH, ENDURANCE, AND MOBILITY: ICD-10-CM

## 2025-08-14 PROCEDURE — 97112 NEUROMUSCULAR REEDUCATION: CPT | Mod: PN

## 2025-08-18 ENCOUNTER — CLINICAL SUPPORT (OUTPATIENT)
Dept: REHABILITATION | Facility: HOSPITAL | Age: 70
End: 2025-08-18
Payer: MEDICARE

## 2025-08-18 ENCOUNTER — OFFICE VISIT (OUTPATIENT)
Dept: UROLOGY | Facility: CLINIC | Age: 70
End: 2025-08-18
Payer: MEDICARE

## 2025-08-18 VITALS
SYSTOLIC BLOOD PRESSURE: 182 MMHG | DIASTOLIC BLOOD PRESSURE: 70 MMHG | WEIGHT: 169.56 LBS | BODY MASS INDEX: 26.61 KG/M2 | HEART RATE: 54 BPM | HEIGHT: 67 IN

## 2025-08-18 DIAGNOSIS — N39.0 RECURRENT UTI: Primary | ICD-10-CM

## 2025-08-18 DIAGNOSIS — R35.0 URINARY FREQUENCY: ICD-10-CM

## 2025-08-18 DIAGNOSIS — Z74.09 DECREASED STRENGTH, ENDURANCE, AND MOBILITY: ICD-10-CM

## 2025-08-18 DIAGNOSIS — R68.89 DECREASED STRENGTH, ENDURANCE, AND MOBILITY: ICD-10-CM

## 2025-08-18 DIAGNOSIS — R53.1 DECREASED STRENGTH, ENDURANCE, AND MOBILITY: ICD-10-CM

## 2025-08-18 DIAGNOSIS — M53.86 DECREASED ROM OF LUMBAR SPINE: Primary | ICD-10-CM

## 2025-08-18 PROCEDURE — 1125F AMNT PAIN NOTED PAIN PRSNT: CPT | Mod: CPTII,S$GLB,,

## 2025-08-18 PROCEDURE — 97530 THERAPEUTIC ACTIVITIES: CPT | Mod: HCNC,PN,CQ

## 2025-08-18 PROCEDURE — 3077F SYST BP >= 140 MM HG: CPT | Mod: CPTII,S$GLB,,

## 2025-08-18 PROCEDURE — 4010F ACE/ARB THERAPY RXD/TAKEN: CPT | Mod: CPTII,S$GLB,,

## 2025-08-18 PROCEDURE — 3288F FALL RISK ASSESSMENT DOCD: CPT | Mod: CPTII,S$GLB,,

## 2025-08-18 PROCEDURE — 3078F DIAST BP <80 MM HG: CPT | Mod: CPTII,S$GLB,,

## 2025-08-18 PROCEDURE — 3072F LOW RISK FOR RETINOPATHY: CPT | Mod: CPTII,S$GLB,,

## 2025-08-18 PROCEDURE — 87186 SC STD MICRODIL/AGAR DIL: CPT | Mod: HCNC

## 2025-08-18 PROCEDURE — 99999 PR PBB SHADOW E&M-EST. PATIENT-LVL V: CPT | Mod: PBBFAC,,,

## 2025-08-18 PROCEDURE — 1101F PT FALLS ASSESS-DOCD LE1/YR: CPT | Mod: CPTII,S$GLB,,

## 2025-08-18 PROCEDURE — 99214 OFFICE O/P EST MOD 30 MIN: CPT | Mod: S$GLB,,,

## 2025-08-18 PROCEDURE — 97112 NEUROMUSCULAR REEDUCATION: CPT | Mod: HCNC,PN,CQ

## 2025-08-18 PROCEDURE — 1159F MED LIST DOCD IN RCRD: CPT | Mod: CPTII,S$GLB,,

## 2025-08-18 PROCEDURE — 1160F RVW MEDS BY RX/DR IN RCRD: CPT | Mod: CPTII,S$GLB,,

## 2025-08-18 PROCEDURE — 3008F BODY MASS INDEX DOCD: CPT | Mod: CPTII,S$GLB,,

## 2025-08-20 LAB — BACTERIA UR CULT: ABNORMAL

## 2025-08-21 ENCOUNTER — RESULTS FOLLOW-UP (OUTPATIENT)
Dept: UROLOGY | Facility: CLINIC | Age: 70
End: 2025-08-21
Payer: MEDICARE

## 2025-08-21 ENCOUNTER — CLINICAL SUPPORT (OUTPATIENT)
Dept: REHABILITATION | Facility: HOSPITAL | Age: 70
End: 2025-08-21
Payer: MEDICARE

## 2025-08-21 ENCOUNTER — TELEPHONE (OUTPATIENT)
Dept: UROLOGY | Facility: CLINIC | Age: 70
End: 2025-08-21
Payer: MEDICARE

## 2025-08-21 DIAGNOSIS — R53.1 DECREASED STRENGTH, ENDURANCE, AND MOBILITY: ICD-10-CM

## 2025-08-21 DIAGNOSIS — Z74.09 DECREASED STRENGTH, ENDURANCE, AND MOBILITY: ICD-10-CM

## 2025-08-21 DIAGNOSIS — M53.86 DECREASED ROM OF LUMBAR SPINE: Primary | ICD-10-CM

## 2025-08-21 DIAGNOSIS — N30.00 ACUTE CYSTITIS WITHOUT HEMATURIA: Primary | ICD-10-CM

## 2025-08-21 DIAGNOSIS — R68.89 DECREASED STRENGTH, ENDURANCE, AND MOBILITY: ICD-10-CM

## 2025-08-21 PROCEDURE — 97112 NEUROMUSCULAR REEDUCATION: CPT | Mod: HCNC,PN,CQ

## 2025-08-21 RX ORDER — SULFAMETHOXAZOLE AND TRIMETHOPRIM 800; 160 MG/1; MG/1
1 TABLET ORAL 2 TIMES DAILY
Qty: 10 TABLET | Refills: 0 | Status: SHIPPED | OUTPATIENT
Start: 2025-08-21 | End: 2025-08-26

## 2025-08-25 ENCOUNTER — CLINICAL SUPPORT (OUTPATIENT)
Dept: REHABILITATION | Facility: HOSPITAL | Age: 70
End: 2025-08-25
Payer: MEDICARE

## 2025-08-25 DIAGNOSIS — R68.89 DECREASED STRENGTH, ENDURANCE, AND MOBILITY: ICD-10-CM

## 2025-08-25 DIAGNOSIS — M53.86 DECREASED ROM OF LUMBAR SPINE: Primary | ICD-10-CM

## 2025-08-25 DIAGNOSIS — R53.1 DECREASED STRENGTH, ENDURANCE, AND MOBILITY: ICD-10-CM

## 2025-08-25 DIAGNOSIS — Z74.09 DECREASED STRENGTH, ENDURANCE, AND MOBILITY: ICD-10-CM

## 2025-08-25 PROCEDURE — 97530 THERAPEUTIC ACTIVITIES: CPT | Mod: HCNC,PN

## 2025-08-25 PROCEDURE — 97112 NEUROMUSCULAR REEDUCATION: CPT | Mod: HCNC,PN

## 2025-09-05 ENCOUNTER — OFFICE VISIT (OUTPATIENT)
Dept: CARDIOLOGY | Facility: CLINIC | Age: 70
End: 2025-09-05
Payer: MEDICARE

## 2025-09-05 ENCOUNTER — HOSPITAL ENCOUNTER (OUTPATIENT)
Dept: RADIOLOGY | Facility: HOSPITAL | Age: 70
Discharge: HOME | End: 2025-09-05
Payer: MEDICARE

## 2025-09-05 ENCOUNTER — OFFICE VISIT (OUTPATIENT)
Dept: INTERNAL MEDICINE | Facility: CLINIC | Age: 70
End: 2025-09-05
Payer: MEDICARE

## 2025-09-05 VITALS
HEIGHT: 67 IN | WEIGHT: 167.75 LBS | DIASTOLIC BLOOD PRESSURE: 60 MMHG | BODY MASS INDEX: 26.33 KG/M2 | RESPIRATION RATE: 11 BRPM | HEART RATE: 50 BPM | TEMPERATURE: 98 F | OXYGEN SATURATION: 96 % | SYSTOLIC BLOOD PRESSURE: 160 MMHG

## 2025-09-05 VITALS
HEART RATE: 42 BPM | WEIGHT: 165.38 LBS | BODY MASS INDEX: 25.96 KG/M2 | SYSTOLIC BLOOD PRESSURE: 158 MMHG | DIASTOLIC BLOOD PRESSURE: 60 MMHG | HEIGHT: 67 IN

## 2025-09-05 DIAGNOSIS — I73.9 CLAUDICATION OF BOTH LOWER EXTREMITIES: Primary | ICD-10-CM

## 2025-09-05 DIAGNOSIS — I10 HYPERTENSION, ESSENTIAL: Chronic | ICD-10-CM

## 2025-09-05 DIAGNOSIS — R00.1 SINUS BRADYCARDIA: ICD-10-CM

## 2025-09-05 DIAGNOSIS — I73.9 PAD (PERIPHERAL ARTERY DISEASE): Chronic | ICD-10-CM

## 2025-09-05 DIAGNOSIS — R73.03 PREDIABETES: ICD-10-CM

## 2025-09-05 DIAGNOSIS — I10 HYPERTENSION, UNSPECIFIED TYPE: ICD-10-CM

## 2025-09-05 DIAGNOSIS — I25.2 OLD MI (MYOCARDIAL INFARCTION): Chronic | ICD-10-CM

## 2025-09-05 DIAGNOSIS — G89.29 CHRONIC PAIN OF LOWER EXTREMITY, UNSPECIFIED LATERALITY: ICD-10-CM

## 2025-09-05 DIAGNOSIS — Z95.5 PRESENCE OF STENT IN CORONARY ARTERY: Chronic | ICD-10-CM

## 2025-09-05 DIAGNOSIS — M79.606 CHRONIC PAIN OF LOWER EXTREMITY, UNSPECIFIED LATERALITY: ICD-10-CM

## 2025-09-05 DIAGNOSIS — I25.10 CORONARY ARTERY DISEASE INVOLVING NATIVE CORONARY ARTERY OF NATIVE HEART WITHOUT ANGINA PECTORIS: Chronic | ICD-10-CM

## 2025-09-05 DIAGNOSIS — G89.29 CHRONIC PAIN OF LOWER EXTREMITY, UNSPECIFIED LATERALITY: Primary | ICD-10-CM

## 2025-09-05 DIAGNOSIS — E78.2 MIXED HYPERLIPIDEMIA: Chronic | ICD-10-CM

## 2025-09-05 DIAGNOSIS — M79.606 CHRONIC PAIN OF LOWER EXTREMITY, UNSPECIFIED LATERALITY: Primary | ICD-10-CM

## 2025-09-05 DIAGNOSIS — I73.9 PAD (PERIPHERAL ARTERY DISEASE): Primary | Chronic | ICD-10-CM

## 2025-09-05 DIAGNOSIS — I73.9 PAD (PERIPHERAL ARTERY DISEASE): ICD-10-CM

## 2025-09-05 PROCEDURE — 99999 PR PBB SHADOW E&M-EST. PATIENT-LVL V: CPT | Mod: PBBFAC,HCNC,,

## 2025-09-05 PROCEDURE — 73590 X-RAY EXAM OF LOWER LEG: CPT | Mod: TC,HCNC,PO,LT

## 2025-09-05 PROCEDURE — 99999 PR PBB SHADOW E&M-EST. PATIENT-LVL IV: CPT | Mod: PBBFAC,,, | Performed by: INTERNAL MEDICINE

## (undated) DEVICE — Device

## (undated) DEVICE — SCISSOR 5MMX35CM DIRECT DRIVE

## (undated) DEVICE — TROCAR ENDOPATH XCEL 5MM 7.5CM

## (undated) DEVICE — SUT 0 VICRYL / UR6 (J603)

## (undated) DEVICE — TRAY FOLEY CATH 16FR LATEX FRE

## (undated) DEVICE — MANIFOLD 4 PORT

## (undated) DEVICE — NDL HYPO REG 25G X 1 1/2

## (undated) DEVICE — ADHESIVE DERMABOND ADVANCED

## (undated) DEVICE — GLOVE SURGICAL LATEX SZ 8

## (undated) DEVICE — TROCAR ENDOPATH XCEL 11X100MM

## (undated) DEVICE — APPLICATOR CHLORAPREP ORN 26ML

## (undated) DEVICE — IRRIGATOR ENDOSCOPY DISP.

## (undated) DEVICE — SPONGE DERMA 8PLY 2X2

## (undated) DEVICE — APPLIER CLIP ENDO LIGAMAX 5MM

## (undated) DEVICE — CONTAINER MULTIPURPOSE/SPECIME

## (undated) DEVICE — BLADE SURG #15 CARBON STEEL

## (undated) DEVICE — BAG TISSUE RETRIEVAL 225ML

## (undated) DEVICE — SUT MONOCRYL 4-0 PS-1 UND

## (undated) DEVICE — SLEEVE SCD EXPRESS CALF MEDIUM

## (undated) DEVICE — ELECTRODE REM PLYHSV RETURN 9

## (undated) DEVICE — DRESSING TRANS 4X4 TEGADERM

## (undated) DEVICE — TROCAR ENDOPATH XCEL 11MM 10CM

## (undated) DEVICE — SPONGE LAP 18X18 PREWASHED

## (undated) DEVICE — TROCAR ENDOPATH XCEL 5X75MM